# Patient Record
Sex: FEMALE | Race: WHITE | NOT HISPANIC OR LATINO | Employment: UNEMPLOYED | URBAN - METROPOLITAN AREA
[De-identification: names, ages, dates, MRNs, and addresses within clinical notes are randomized per-mention and may not be internally consistent; named-entity substitution may affect disease eponyms.]

---

## 2017-06-23 ENCOUNTER — APPOINTMENT (OUTPATIENT)
Dept: VASCULAR SURGERY | Facility: CLINIC | Age: 57
End: 2017-06-23
Payer: COMMERCIAL

## 2017-06-23 PROCEDURE — 93971 EXTREMITY STUDY: CPT | Performed by: SURGERY

## 2017-06-23 PROCEDURE — 99202 OFFICE O/P NEW SF 15 MIN: CPT | Performed by: SURGERY

## 2017-06-23 PROCEDURE — 99207 ZZC VEINSOLUTIONS FREE SCREENING: CPT | Performed by: SURGERY

## 2019-08-08 ENCOUNTER — OFFICE VISIT (OUTPATIENT)
Dept: VASCULAR SURGERY | Facility: CLINIC | Age: 59
End: 2019-08-08
Payer: COMMERCIAL

## 2019-08-08 DIAGNOSIS — Z53.9 ERRONEOUS ENCOUNTER--DISREGARD: Primary | ICD-10-CM

## 2019-08-08 PROCEDURE — 99204 OFFICE O/P NEW MOD 45 MIN: CPT | Performed by: SURGERY

## 2019-08-09 NOTE — PROGRESS NOTES
"[facility] Office Note    Rebeka Fuentes is a 58-year-old female with whom I am familiar.  We performed radiofrequency ablation of her right great saphenous vein in September 2007 and then performed radiofrequency ablation of her left great saphenous vein and radiofrequency ablation of an incompetent perforating vein of her left leg and ultrasound-guided sclerotherapy on December 30, 2011.  These were performed for CEAP 4 bilateral lower extremity chronic venous insufficiency.    She returned in 2017 with concerns of progression of the disease process, especially on the right leg at that time.  At that time she was not wearing compression regularly and I recommended conservative treatment for her.  She was to see me in 2 months follow-up but did not return.  She feels she did fairly well from that time until a few months ago when she felt increasing firmness and erythema as well as an area of \"puckering\" on the medial left leg.  She has had no fevers or chills nor significantly increased pain and has had no wounds.  She admits that she has not been wearing hose as regularly as she should and wishes to have measurements for new hose.    Her last ultrasound was in June 2017.  This revealed incompetence of her right common femoral, proximal femoral, distal femoral and popliteal veins.  Her mid femoral vein appeared to be competent.  The right great saphenous vein was not visualized throughout most of the thigh but had weblike thrombus just below the knee with varicosities coursing from the great saphenous vein below the right knee.  There were 2 incompetent perforators, both less than 3.5 mm in diameter.  Her small saphenous vein was incompetent, measured 4.2 mm in diameter and had a 4.6 mm diameter incompetent  communicating with it 15 cm from the malleolus.    Physical exam  General: Pleasant female in no acute distress.  Extremities: She has extensive lipodermatosclerosis from at least the mid legs to " the ankles bilaterally.  There is no suggestion of acute cellulitis nor other any wounds on either leg.    On the left leg she has an area of puckering near the mid leg medially with some flaking skin in this indented area.  It does not appear to be imminent risk of breakdown to ulceration.    There is minimal hyperpigmentation of her skin, but she has extensive firmness consistent with advanced lipodermatosclerosis.  The ankles are quite large from the chronic edema and lipodermatosclerosis.    She has easily palpable dorsalis pedis pulses but I have difficulty palpating posterior tibial pulses given the lipodermatosclerosis.    Impression  CEAP for bilateral lower extremity chronic venous insufficiency.  I recommend that the patient return for bilateral lower extremity venous duplex ultrasound to ensure that there has not been progression of the disease process and to see if there is any correctable abnormality that would help decrease the risk of breakdown.    Of most importance, however, is that she began to wear compression regularly.  We measured her today in that regard and had a long discussion of the importance of wearing compression on a regular basis.  She also states that she has been less active and I strongly encouraged her to walk on a daily basis and effort to control her weight and to help decrease venous hypertension in her lower extremities.    Plan  She will return in the near future for bilateral lower extremity venous duplex ultrasound.  New support hose were ordered and will be sent to her home.    JEFF Lagunas MD    Please send a copy of this dictation to [facility] office

## 2020-02-12 ENCOUNTER — TRANSFERRED RECORDS (OUTPATIENT)
Dept: HEALTH INFORMATION MANAGEMENT | Facility: CLINIC | Age: 60
End: 2020-02-12

## 2020-02-13 ENCOUNTER — TELEPHONE (OUTPATIENT)
Dept: VASCULAR SURGERY | Facility: CLINIC | Age: 60
End: 2020-02-13

## 2020-02-14 ENCOUNTER — TELEPHONE (OUTPATIENT)
Dept: OPHTHALMOLOGY | Facility: CLINIC | Age: 60
End: 2020-02-14

## 2020-02-14 ENCOUNTER — TRANSCRIBE ORDERS (OUTPATIENT)
Dept: OTHER | Age: 60
End: 2020-02-14

## 2020-02-14 DIAGNOSIS — H49.20 SIXTH NERVE PALSY: Primary | ICD-10-CM

## 2020-02-14 NOTE — TELEPHONE ENCOUNTER
[facility] Call Center    Phone Message    May a detailed message be left on voicemail: no     Reason for Call: Other: The pt has been referred to [facility]Dr Worrell. The [facility] from [facility] called [facility] yesterday to set up an appt and told her to call back. There isn't a call record in the chart. I offered her an appt w/Gm in March and she insisted someone had called and offered her something else sooner. Please see the referral and call the pt's cell. Thanks.     Action Taken: Message routed to:  [facility]  eye gen    Travel Screening: Not Applicable

## 2020-02-14 NOTE — TELEPHONE ENCOUNTER
Returned pt call.  Pt is wondering if recent dx of Sixth Nerve Cranial Palsy could have resulted from vascular hx or recent change in ASA regimen.    Pt stated she has been a pt of Dr. Lagunas's for several years.  Pt stated that she has been suffering from double vision that started in her right eye and is now bilateral, and has had symptoms for approx. 5 wks.  Pt stated that she went to an ophthalmologist and was diagnosed with Sixth Nerve Cranial Palsy.  The pt stated she has taken daily  mg prophalacticly for approx. 30 years and recently cut the daily dose in half.  The pt called today questioning if cutting her ASA dose in half or her vascular hx could have attributed to her current condition.    This writer advised her that it is not likely to have contributed to her condition but will discuss with Dr. Lagunas and follow up with her afterwards.    Patient agreed with the plan and had no further questions.     Crystal Smart RN on 2/14/2020 at 1:14 PM

## 2020-02-15 ENCOUNTER — TRANSFERRED RECORDS (OUTPATIENT)
Dept: HEALTH INFORMATION MANAGEMENT | Facility: CLINIC | Age: 60
End: 2020-02-15

## 2020-02-26 NOTE — TELEPHONE ENCOUNTER
Called pt LDVM:  Dr. Lagunas reviewed concerns and agreed with previous advise that cutting ASA dose should not hav had an effect on sixth cranial nerve palsy dx.      Advised pt to call clinic with any further questions or concerns.    Crystal Smart RN on 2/26/2020 at 10:04 AM

## 2020-03-25 ENCOUNTER — TELEPHONE (OUTPATIENT)
Dept: OPHTHALMOLOGY | Facility: CLINIC | Age: 60
End: 2020-03-25

## 2020-03-25 NOTE — TELEPHONE ENCOUNTER
Left voicemail for patient asking if could reschedule appt to week earlier or later than currently scheduled.  Also asked where had MRI done.  Left direct number in order to call back.      Margaret Ureña on 3/25/2020 at 1:01 PM

## 2020-05-04 ENCOUNTER — TELEPHONE (OUTPATIENT)
Dept: OPHTHALMOLOGY | Facility: CLINIC | Age: 60
End: 2020-05-04

## 2020-05-04 NOTE — TELEPHONE ENCOUNTER
[facility] Call Center    Phone Message    May a detailed message be left on voicemail: yes     Reason for Call: Other: per patient is wanting to get a clal back in regards to knowing if needing to come to apt on 05/14/2002 or not. please advise.      Action Taken: Message routed to:  Other: eye    Travel Screening: Not Applicable

## 2020-05-04 NOTE — TELEPHONE ENCOUNTER
Spoke to patient.  Dr. Worrell had planned to see her in clinic on 5/14.  She states she would prefer to push out further.  She thinks she is doing ok.  Assisted in rescheduling to 6/18, but patient will call us if she worsens and needs to be seen sooner.      Margaret Ureña on 5/4/2020 at 10:07 AM

## 2020-06-18 ENCOUNTER — OFFICE VISIT (OUTPATIENT)
Dept: OPHTHALMOLOGY | Facility: CLINIC | Age: 60
End: 2020-06-18
Attending: OPHTHALMOLOGY
Payer: COMMERCIAL

## 2020-06-18 DIAGNOSIS — H49.21 RIGHT ABDUCENS NERVE PALSY: Primary | ICD-10-CM

## 2020-06-18 DIAGNOSIS — H53.10 SUBJECTIVE VISUAL DISTURBANCE: Primary | ICD-10-CM

## 2020-06-18 PROCEDURE — 92060 SENSORIMOTOR EXAMINATION: CPT | Mod: ZF | Performed by: OPHTHALMOLOGY

## 2020-06-18 PROCEDURE — G0463 HOSPITAL OUTPT CLINIC VISIT: HCPCS | Mod: ZF

## 2020-06-18 ASSESSMENT — VISUAL ACUITY
OS_SC: 20/25
OD_SC+: +2
OD_SC: 20/40
METHOD: SNELLEN - LINEAR
OS_SC+: -1

## 2020-06-18 ASSESSMENT — EXTERNAL EXAM - RIGHT EYE: OD_EXAM: NORMAL

## 2020-06-18 ASSESSMENT — SLIT LAMP EXAM - LIDS
COMMENTS: NORMAL
COMMENTS: NORMAL

## 2020-06-18 ASSESSMENT — EXTERNAL EXAM - LEFT EYE: OS_EXAM: NORMAL

## 2020-06-18 ASSESSMENT — CONF VISUAL FIELD
OD_NORMAL: 1
METHOD: COUNTING FINGERS
OS_NORMAL: 1

## 2020-06-18 ASSESSMENT — TONOMETRY
OD_IOP_MMHG: 19
OS_IOP_MMHG: 19
IOP_METHOD: ICARE

## 2020-06-18 NOTE — PROGRESS NOTES
Assessment & Plan     Rebeka Fuentes is a 59 year old female with the following diagnoses:   1. Right abducens nerve palsy    2. Subjective visual disturbance       Patient is a 59 year old female sent by Dr. Faajrdo for consultation of possible sixth nerve palsy. In January she had a frontal headache and sinus congestion. She was on her way to see her doctor due to concern for sinus issue when she developed double vision characterized as constant binocular horizontal. The next day she saw Dr. Fajardo who diagnosed with a RIGHT sixth nerve palsy. Her daughter notes that her RIGHT eye was turned in. Over the next month she notes that it got worse before she saw Dr. Fajardo in follow up. Dr. Fajardo noted that she was having worse diplopia in LEFT gaze.Dr. Fajardo then ordered a MRI. She notes occasional diplopia when fatigued that she is able to control with a few blinks and concentration. Denies facial numbness, facial droop, ptosis, dysphagia.     MRI brain and orbit 2/15/20:  Negative for structural etiology of sixth nerve palsy.    POH:Refractive error, ?esotropia, amblyopia  PMH: Borderline hyperlipidemia, history of superficial blood clots on birth control in her 30s  Medications: Aspirin  FH: Father with glaucoma, negative for strabismus  SH: Non-smoker    Visual acuity is 20/40 right eye and 20/25 left eye. Intraocular pressure is normal both eyes. Pupils normal without afferent pupillary defect. Color plates full both eyes. Confrontational visual fields full both eyes. Sensorimotor exam shows an esophoria of 10 prism diopters in primary gaze that is comitant in all gaze positions. Intermittent esotropia of 10 prism diopters in primary. She did not have orbicularis weakness. Slit lamp exam unremarkable. Dilated fundus exam unremarkable both eyes.    It is my impression that Rebeka had a microvascular sixth nerve palsy that is resolved. Dr. Fajardo's notes document a right sixth nerve palsy first and  then a left sixth nerve palsy five weeks later. The resolution of her diplopia is suggestive of a microvascular etiology. One consideration is that she could have had myasthenia gravis as the cause of the alternating 6th nerve palsy.  She could also have had convergence spasm, but this would be a diagnosis of exclusion.  If this occurs again, then I would recommend careful observation of the pupils with eye movement testing.  I have personally reviewed her MRI and did not see structural etiology of a left or right sixth nerve palsy.     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 Offered Fresnel prism, but patient declined. Return to clinic as needed for worsening symptoms. Recommend follow up with regular eye care provider.           Attending Physician Attestation:  Complete documentation of historical and exam elements from today's encounter can be found in the full encounter summary report (not reduplicated in this progress note).  I personally obtained the chief complaint(s) and history of present illness.  I confirmed and edited as necessary the review of systems, past medical/surgical history, family history, social history, and examination findings as documented by others; and I examined the patient myself.  I personally reviewed the relevant tests, images, and reports as documented above.  I formulated and edited as necessary the assessment and plan and discussed the findings and management plan with the patient and family. I personally reviewed the ophthalmic test(s) associated with this encounter, agree with the interpretation(s) as documented by the resident/fellow, and have edited the corresponding report(s) as necessary.  - Charlie Garcia MD  Ophthalmology, PGY-5  Neuro-Ophthalmology Fellow

## 2020-06-18 NOTE — Clinical Note
6/18/2020       RE: Rebeka Fuentes  [address]  [facility] [address]     Dear Colleague,    Thank you for referring your patient, Rebeka Fuentes, to the EYE CLINIC at [facility]. Please see a copy of my visit note below.           Assessment & Plan     Rebeka Fuentes is a 59 year old female with the following diagnoses:   1. Subjective visual disturbance       Patient is a 59 year old female sent by Dr. Fajardo for consultation of possible sixth nerve palsy. In January she had a frontal headache and sinus congestion. She was on her way to see her doctor due to concern for sinus issue when she developed double vision characterized as constant binocular horizontal. The next day she saw Dr. Fajardo who diagnosed with a RIGHT sixth nerve palsy. Her daughter notes that her RIGHT eye was turned in. Over the next month she notes that it got worse before she saw Dr. Fajardo in follow up. Dr. Fajardo noted that she was having worse diplopia in LEFT gaze.Dr. Fajardo then ordered a MRI. She notes occasional diplopia when fatigued that she is able to control with a few blinks and concentration. Denies facial numbness, facial droop, ptosis.     MRI brain and orbit 2/15/20:  Negative for structural etiology of sixth nerve palsy.    POH:Refractive error, ?esotropia, amblyopia  PMH: Borderline hyperlipidemia, history of blood clots on birth control   Medications: Aspirin  FH: Father with glaucoma, negative for strabismus  SH: Non-smoker    Visual acuity is 20/40 right eye and 20/25 left eye. Intraocular pressure is normal both eyes. Pupils normal without afferent pupillary defect. Color plates full both eyes. Confrontational visual fields full both eyes. Sensorimotor exam shows an esophoria of 10 prism diopters in primary gaze that is comitant in all gaze positions. Intermittent esotropia of 10 prism diopters in primary. Slit lamp exam unremarkable. Dilated fundus exam  unremarkable both eyes.    It is my impression that Rebeka had a microvascular sixth nerve palsy based upon history of right sixth nerve palsy seen by Dr. Fajardo with negative MRI. I have personally reviewed her MRI and did not see structural etiology of a left or right sixth nerve palsy. This has resolved which is consistent with microvascular etiology. 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           @ATT@    Charlie Garcia MD  Ophthalmology, PGY-5  Neuro-Ophthalmology Fellow             Assessment & Plan     Rebeka Fuentes is a 59 year old female with the following diagnoses:   1. Right abducens nerve palsy    2. Subjective visual disturbance       Patient is a 59 year old female sent by Dr. Fajardo for consultation of possible sixth nerve palsy. In January she had a frontal headache and sinus congestion. She was on her way to see her doctor due to concern for sinus issue when she developed double vision characterized as constant binocular horizontal. The next day she saw Dr. Fajardo who diagnosed with a RIGHT sixth nerve palsy. Her daughter notes that her RIGHT eye was turned in. Over the next month she notes that it got worse before she saw Dr. Fajardo in follow up. Dr. Fajardo noted that she was having worse diplopia in LEFT gaze.Dr. Fajardo then ordered a MRI. She notes occasional diplopia when fatigued that she is able to control with a few blinks and concentration. Denies facial numbness, facial droop, ptosis, dysphagia.     MRI brain and orbit 2/15/20:  Negative for structural etiology of sixth nerve palsy.    POH:Refractive error, ?esotropia, amblyopia  PMH: Borderline hyperlipidemia, history of superficial blood clots on birth control in her 30s  Medications: Aspirin  FH: Father with glaucoma, negative for strabismus  SH: Non-smoker    Visual acuity is 20/40 right eye and 20/25 left eye. Intraocular pressure is normal  both eyes. Pupils normal without afferent pupillary defect. Color plates full both eyes. Confrontational visual fields full both eyes. Sensorimotor exam shows an esophoria of 10 prism diopters in primary gaze that is comitant in all gaze positions. Intermittent esotropia of 10 prism diopters in primary. She did not have orbicularis weakness. Slit lamp exam unremarkable. Dilated fundus exam unremarkable both eyes.    It is my impression that Rebeka had a microvascular sixth nerve palsy that is resolved. Dr. Fajardo's notes document a right sixth nerve palsy first and then a left sixth nerve palsy. The resolution of her diplopia is suggestive of a microvascular etiology. The comitance of her esophoria today argues against myasthenia.  I have personally reviewed her MRI and did not see structural etiology of a left or right sixth nerve palsy. 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 Offered Fresnel prism, but patient declined. Return to clinic as needed for worsening symptoms. Recommend follow up with regular eye care provider.          Attending Physician Attestation:  Complete documentation of historical and exam elements from today's encounter can be found in the full encounter summary report (not reduplicated in this progress note).  I personally obtained the chief complaint(s) and history of present illness.  I confirmed and edited as necessary the review of systems, past medical/surgical history, family history, social history, and examination findings as documented by others; and I examined the patient myself.  I personally reviewed the relevant tests, images, and reports as documented above.  I formulated and edited as necessary the assessment and plan and discussed the findings and management plan with the patient and family. - Charlie Garcia MD  Ophthalmology, PGY-5  Neuro-Ophthalmology Fellow      Again,  thank you for allowing me to participate in the care of your patient.      Sincerely,    Charlie Worrell MD

## 2020-06-18 NOTE — NURSING NOTE
Chief Complaint(s) and History of Present Illness(es)     New Patient     In both eyes (Consult for 6th CN palsy).  Onset was sudden.  Onset: 2/2020.  Since onset it is gradually improving.  Associated symptoms include headache and double vision.              Comments     Initially seen at  Eye and thought to be a Right 6th CN palsy. Follow-up exam seems to be a bilateral 6th CN palsy - MRI brain was ordered and referred here for further evaluation.     Patient states double vision is gradually improving. Has not patch for a couple months now (onset 2/2020). Horizontal double vision intermittently only. Patient does not remember noticing any droopy eyelids.  +headaches    Brought MRI disc with. MRI amber was unremarkable per patient.     HAYDEN Damon 6/18/2020 7:23 AM

## 2020-06-18 NOTE — LETTER
2020         RE:  :  MRN: Rebeka Fuentes  1960  [id number]       Dear Dr. Terrell Fajardo,    Thank you for asking me to see your very pleasant patient, Rebeka Fuentes, in neuro-ophthalmic consultation.  I would like to thank you for sending your records and I have summarized them in the history of present illness.  For further details, please see my attached clinic note.     Assessment & Plan     Rebeka Fuentes is a 59-year-old female with the following diagnoses:   1. Right abducens nerve palsy    2. Subjective visual disturbance       Patient is a 59-year-old female sent by Dr. Fajardo for consultation of possible sixth nerve palsy. In January she had a frontal headache and sinus congestion. She was on her way to see her doctor due to concern for sinus issue when she developed double vision characterized as constant binocular horizontal. The next day she saw Dr. Fajardo who diagnosed with a RIGHT sixth nerve palsy. Her daughter notes that her RIGHT eye was turned in. Over the next month she notes that it got worse before she saw Dr. Fajardo in follow up. Dr. Fajardo noted that she was having worse diplopia in LEFT gaze.Dr. Fajardo then ordered a MRI. She notes occasional diplopia when fatigued that she is able to control with a few blinks and concentration. Denies facial numbness, facial droop, ptosis, dysphagia.     MRI brain and orbit 2/15/20:  Negative for structural etiology of sixth nerve palsy.    POH:Refractive error, ?esotropia, amblyopia  PMH: Borderline hyperlipidemia, history of superficial blood clots on birth control in her 30s  Medications: Aspirin  FH: Father with glaucoma, negative for strabismus  SH: Non-smoker    Visual acuity is 20/40 right eye and 20/25 left eye. Intraocular pressure is normal both eyes. Pupils normal without afferent pupillary defect. Color plates full both eyes. Confrontational visual fields full both eyes. Sensorimotor exam shows an esophoria of 10  prism diopters in primary gaze that is comitant in all gaze positions. Intermittent esotropia of 10 prism diopters in primary. She did not have orbicularis weakness. Slit lamp exam unremarkable. Dilated fundus exam unremarkable both eyes.    It is my impression that Rebeka had a microvascular sixth nerve palsy that is resolved. Dr. Roman's notes document a right sixth nerve palsy first and then a left sixth nerve palsy five weeks later. The resolution of her diplopia is suggestive of a microvascular etiology. One consideration is that she could have had myasthenia gravis as the cause of the alternating 6th nerve palsy.  She could also have had convergence spasm, but this would be a diagnosis of exclusion.  If this occurs again, then I would recommend careful observation of the pupils with eye movement testing.  I have personally reviewed her MRI and did not see structural etiology of a left or right sixth nerve palsy.     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 Offered Fresnel prism, but patient declined. Return to clinic as needed for worsening symptoms. Recommend follow up with regular eye care provider.     Again, thank you for allowing me to participate in the care of your patient.      Sincerely,    Charlie Worrell MD  Professor  Ophthalmology Residency   Director of Neuro-Ophthalmology  [employer] [facility] [employer] Endowed Chair  Departments of Ophthalmology, Neurology, and Neurosurgery  [address]  [address]  T - [phone]  F - [phone]  MAIA [email or web address]      CC:   EDWARD ROMAN  [facility]  [address] [place], [place] [address]  [place] [address]  VIA Facsimile: [phone]     [facility]  [address]  [facility] [address]  VIA Facsimile: [phone]

## 2020-11-05 ENCOUNTER — VIRTUAL VISIT (OUTPATIENT)
Dept: VASCULAR SURGERY | Facility: CLINIC | Age: 60
End: 2020-11-05
Payer: COMMERCIAL

## 2020-11-05 DIAGNOSIS — I87.2 VENOUS INSUFFICIENCY: Primary | ICD-10-CM

## 2020-11-05 PROCEDURE — 99213 OFFICE O/P EST LOW 20 MIN: CPT | Mod: 95 | Performed by: SURGERY

## 2020-11-05 RX ORDER — MULTIPLE VITAMINS W/ MINERALS TAB 9MG-400MCG
1 TAB ORAL DAILY
COMMUNITY

## 2020-11-05 RX ORDER — ASPIRIN 325 MG
1 TABLET ORAL
COMMUNITY
End: 2022-01-01

## 2020-11-05 NOTE — PROGRESS NOTES
"Rebeka Fuentes is a 60 year old female who is being evaluated via a billable video visit.      The patient has been notified of following:     \"This video visit will be conducted via a call between you and your physician/provider. We have found that certain health care needs can be provided without the need for an in-person physical exam.  This service lets us provide the care you need with a video conversation.  If a prescription is necessary we can send it directly to your pharmacy.  If lab work is needed we can place an order for that and you can then stop by our lab to have the test done at a later time.    Video visits are billed at different rates depending on your insurance coverage.  Please reach out to your insurance provider with any questions.    If during the course of the call the physician/provider feels a video visit is not appropriate, you will not be charged for this service.\"    Patient has given verbal consent for Video visit? Yes  How would you like to obtain your AVS? MyChart  If you are dropped from the video visit, the video invite should be resent to: Text to cell phone: [phone]  Will anyone else be joining your video visit? No        Video-Visit Details    Type of service:  Video Visit    Video Start Time: 2:58 PM  Video End Time: 3:14 PM    Originating Location (pt. Location): Home    Distant Location (provider location):  [facility] [place]     Platform used for Video Visit: [facility]    [facility] clinic note  Rebeka Kwon is a 60-year-old female with whom I am familiar.  I last saw her in August 2019 at which time I recommended bilateral lower extremity venous competency studies.  She did not return, though she did begin wearing compression hose on a more regular basis.    Unfortunately, several months ago she became less diligent in wearing her compression hose and has been sitting for long hours at her desk at home for work.  As result, her legs have " swelled more significantly and she has developed increasing hyperpigmentation of her medial ankle and also some skin changes on her lateral left leg.  Additionally, she is concerned about some erythematous, tubular, firm areas on her distal medial left thigh and leg below the knee that seem to come and go.  She has had no specific calf pain, pleuritic chest pain, shortness of breath or acute changes in her edema, though she admits to swelling of her legs on a daily basis, especially when she is not wearing her hose.    The patient states that we have treated some veins on her left lower extremity in the past but I do not have her paper chart to review.    Physical exam  General: Pleasant female in no acute distress  Extremities: Examination of her left lower extremity reveals some linear, mildly erythematous areas measuring about 2 to 3 inches in length on the distal medial left thigh.  The patient states that these areas are not firm and that they are not specifically tender.  She has edema from her knees to her ankles with 2-3+ edema of her ankles.  There is rather impressive stasis hyperpigmentation about her left medial ankle.  On her left lateral ankle, there are multiple small eschars, consistent with stasis dermatitis and early breakdown.  There is no erythema to suggest infection.    Impression  CEAP 4 left lower extremity chronic venous insufficiency with early skin breakdown.  She has not been wearing compression as she should and I have strongly encouraged her to begin wearing compression on a daily basis, especially when she is sitting in her home working from her desk.  She understands that if she does not pursue these conservative measures, she will likely breakdown and develop an ulceration.    She voiced understanding of my discussion and agrees with the plan.    Plan  Compression, leg elevation, exercise.  We will see her in follow-up in 1 month with a video visit.  If her skin changes are not  improved, or if they worsen more quickly, she will contact me sooner and we will perform a left lower extremity venous competency study.    C Ismael Lagunas MD    Dictated using Dragon voice recognition software which may result in transcription errors

## 2020-11-05 NOTE — LETTER
"    11/5/2020         RE: Rebeka Fuentes  [address]  [place] [address]        Dear Colleague,    Thank you for referring your patient, Rebeka Fuentes, to the [facility] [place]. Please see a copy of my visit note below.    Rebeka Fuentes is a 60 year old female who is being evaluated via a billable video visit.      The patient has been notified of following:     \"This video visit will be conducted via a call between you and your physician/provider. We have found that certain health care needs can be provided without the need for an in-person physical exam.  This service lets us provide the care you need with a video conversation.  If a prescription is necessary we can send it directly to your pharmacy.  If lab work is needed we can place an order for that and you can then stop by our lab to have the test done at a later time.    Video visits are billed at different rates depending on your insurance coverage.  Please reach out to your insurance provider with any questions.    If during the course of the call the physician/provider feels a video visit is not appropriate, you will not be charged for this service.\"    Patient has given verbal consent for Video visit? Yes  How would you like to obtain your AVS? MyChart  If you are dropped from the video visit, the video invite should be resent to: Text to cell phone: [phone]  Will anyone else be joining your video visit? No        Video-Visit Details    Type of service:  Video Visit    Video Start Time: 2:58 PM  Video End Time: 3:14 PM    Originating Location (pt. Location): Home    Distant Location (provider location):  [facility] [place]     Platform used for Video Visit: [employer]    [facility] clinic note  Rebeka Kwon is a 60-year-old female with whom I am familiar.  I last saw her in August 2019 at which time I recommended bilateral lower extremity venous competency studies.  She did not " return, though she did begin wearing compression hose on a more regular basis.    Unfortunately, several months ago she became less diligent in wearing her compression hose and has been sitting for long hours at her desk at home for work.  As result, her legs have swelled more significantly and she has developed increasing hyperpigmentation of her medial ankle and also some skin changes on her lateral left leg.  Additionally, she is concerned about some erythematous, tubular, firm areas on her distal medial left thigh and leg below the knee that seem to come and go.  She has had no specific calf pain, pleuritic chest pain, shortness of breath or acute changes in her edema, though she admits to swelling of her legs on a daily basis, especially when she is not wearing her hose.    The patient states that we have treated some veins on her left lower extremity in the past but I do not have her paper chart to review.    Physical exam  General: Pleasant female in no acute distress  Extremities: Examination of her left lower extremity reveals some linear, mildly erythematous areas measuring about 2 to 3 inches in length on the distal medial left thigh.  The patient states that these areas are not firm and that they are not specifically tender.  She has edema from her knees to her ankles with 2-3+ edema of her ankles.  There is rather impressive stasis hyperpigmentation about her left medial ankle.  On her left lateral ankle, there are multiple small eschars, consistent with stasis dermatitis and early breakdown.  There is no erythema to suggest infection.    Impression  CEAP 4 left lower extremity chronic venous insufficiency with early skin breakdown.  She has not been wearing compression as she should and I have strongly encouraged her to begin wearing compression on a daily basis, especially when she is sitting in her home working from her desk.  She understands that if she does not pursue these conservative measures,  she will likely breakdown and develop an ulceration.    She voiced understanding of my discussion and agrees with the plan.    Plan  Compression, leg elevation, exercise.  We will see her in follow-up in 1 month with a video visit.  If her skin changes are not improved, or if they worsen more quickly, she will contact me sooner and we will perform a left lower extremity venous competency study.    JEFF Lagunas MD    Dictated using Dragon voice recognition software which may result in transcription errors        Again, thank you for allowing me to participate in the care of your patient.        Sincerely,        José Luis Lagunas MD

## 2020-11-12 ENCOUNTER — TELEPHONE (OUTPATIENT)
Dept: VASCULAR SURGERY | Facility: CLINIC | Age: 60
End: 2020-11-12

## 2020-11-12 DIAGNOSIS — I87.2 VENOUS INSUFFICIENCY: Primary | ICD-10-CM

## 2020-11-12 NOTE — TELEPHONE ENCOUNTER
After Visit Follow Up  Per pt request, faxed their compression hose Rx to [facility].   Pt would like 2 pair(s) of beige, open-toe, knee high, 20-30mmhg compression hose. Fax confirmed.    Pt aware they will be shipped to their home address.    Soy Witt RN

## 2020-11-22 ENCOUNTER — HEALTH MAINTENANCE LETTER (OUTPATIENT)
Age: 60
End: 2020-11-22

## 2020-12-11 ENCOUNTER — VIRTUAL VISIT (OUTPATIENT)
Dept: VASCULAR SURGERY | Facility: CLINIC | Age: 60
End: 2020-12-11
Payer: COMMERCIAL

## 2020-12-11 DIAGNOSIS — I87.2 VENOUS INSUFFICIENCY: Primary | ICD-10-CM

## 2020-12-11 PROCEDURE — 99213 OFFICE O/P EST LOW 20 MIN: CPT | Mod: 95 | Performed by: SURGERY

## 2020-12-11 NOTE — PROGRESS NOTES
"Rebeka Fuentes is a 60 year old female who is being evaluated via a billable video visit.      The patient has been notified of following:     \"This video visit will be conducted via a call between you and your physician/provider. We have found that certain health care needs can be provided without the need for an in-person physical exam.  This service lets us provide the care you need with a video conversation.  If a prescription is necessary we can send it directly to your pharmacy.  If lab work is needed we can place an order for that and you can then stop by our lab to have the test done at a later time.    Video visits are billed at different rates depending on your insurance coverage.  Please reach out to your insurance provider with any questions.    If during the course of the call the physician/provider feels a video visit is not appropriate, you will not be charged for this service.\"    Patient has given verbal consent for Video visit? Yes  How would you like to obtain your AVS? MyChart  If you are dropped from the video visit, the video invite should be resent to: Text to cell phone: [phone]  Will anyone else be joining your video visit? No      Video-Visit Details    Type of service:  Video Visit    Video Start Time: 3:22 PM  Video End Time: 3:30 PM    Originating Location (pt. Location): Home    Distant Location (provider location):  [facility] [place]     Platform used for Video Visit: [facility]    [facility] solutions clinic note/video visit  Soy Fuentes is seen in follow-up of CEAP 4 left lower extremity chronic venous insufficiency.  She was last seen on 11/5/2020 with what appeared to be eminent skin breakdown if she did not start wearing compression hose and being more active.  She was sitting increased hours and her work from home and was not as active nor was she wearing her compression.    Since that time, she has been much more diligent in wearing her compression hose " on a regular basis and walking every evening.  She states that the skin has healed nicely and is much improved.    Physical exam  General: Pleasant female in no acute distress  Left lower extremity: There is mild erythema overlying the distal lateral left leg but no significant scaling of the skin nor eschars are there.  She does have 1+ edema.    Impression  Much improved appearance of skin following wearing compression hose, elevating and increasing activities.  I stressed the importance of continuing these conservative measures to prevent tissue breakdown.  I believe she understands the importance and is in agreement with the plan.    Additionally, she talked about intermittently appearing areas of erythema which she thinks may represent superficial thrombophlebitis.  She has not had any episodes since she has been wearing her compression hose to be more active.    If she continues to have superficial thrombophlebitis despite wearing compression hose, we may need to obtain an ultrasound to consider treatment options if she has underlying superficial incompetence.  She voiced understanding and her questions were answered    Plan  Compression, elevation, exercise with return as needed.    JEFF Lagunas MD    Dictated using Dragon voice recognition software which may result in transcription errors

## 2020-12-11 NOTE — LETTER
"    12/11/2020         RE: Rebeka Fuentes  [address]  [place] [facility] [place] [address]        Dear Colleague,    Thank you for referring your patient, Rebeka Fuentes, to the [facility] [place]. Please see a copy of my visit note below.    Rebeka Fuentes is a 60 year old female who is being evaluated via a billable video visit.      The patient has been notified of following:     \"This video visit will be conducted via a call between you and your physician/provider. We have found that certain health care needs can be provided without the need for an in-person physical exam.  This service lets us provide the care you need with a video conversation.  If a prescription is necessary we can send it directly to your pharmacy.  If lab work is needed we can place an order for that and you can then stop by our lab to have the test done at a later time.    Video visits are billed at different rates depending on your insurance coverage.  Please reach out to your insurance provider with any questions.    If during the course of the call the physician/provider feels a video visit is not appropriate, you will not be charged for this service.\"    Patient has given verbal consent for Video visit? Yes  How would you like to obtain your AVS? MyChart  If you are dropped from the video visit, the video invite should be resent to: Text to cell phone: [phone]  Will anyone else be joining your video visit? No      Video-Visit Details    Type of service:  Video Visit    Video Start Time: 3:22 PM  Video End Time: 3:30 PM    Originating Location (pt. Location): Home    Distant Location (provider location):  [facility] [place]     Platform used for Video Visit: [email or web address]    [facility]in solutions clinic note/video visit  Soy Fuentes is seen in follow-up of CEAP 4 left lower extremity chronic venous insufficiency.  She was last seen on 11/5/2020 with what appeared to be eminent skin breakdown " if she did not start wearing compression hose and being more active.  She was sitting increased hours and her work from home and was not as active nor was she wearing her compression.    Since that time, she has been much more diligent in wearing her compression hose on a regular basis and walking every evening.  She states that the skin has healed nicely and is much improved.    Physical exam  General: Pleasant female in no acute distress  Left lower extremity: There is mild erythema overlying the distal lateral left leg but no significant scaling of the skin nor eschars are there.  She does have 1+ edema.    Impression  Much improved appearance of skin following wearing compression hose, elevating and increasing activities.  I stressed the importance of continuing these conservative measures to prevent tissue breakdown.  I believe she understands the importance and is in agreement with the plan.    Additionally, she talked about intermittently appearing areas of erythema which she thinks may represent superficial thrombophlebitis.  She has not had any episodes since she has been wearing her compression hose to be more active.    If she continues to have superficial thrombophlebitis despite wearing compression hose, we may need to obtain an ultrasound to consider treatment options if she has underlying superficial incompetence.  She voiced understanding and her questions were answered    Plan  Compression, elevation, exercise with return as needed.    JEFF Lagunas MD    Dictated using Dragon voice recognition software which may result in transcription errors      Again, thank you for allowing me to participate in the care of your patient.        Sincerely,        José Luis Lagunas MD

## 2021-09-19 ENCOUNTER — HEALTH MAINTENANCE LETTER (OUTPATIENT)
Age: 61
End: 2021-09-19

## 2022-01-01 ENCOUNTER — MEDICAL CORRESPONDENCE (OUTPATIENT)
Dept: ADMINISTRATIVE | Facility: CLINIC | Age: 62
End: 2022-01-01

## 2022-01-01 ENCOUNTER — ALLIED HEALTH/NURSE VISIT (OUTPATIENT)
Dept: TRANSPLANT | Facility: CLINIC | Age: 62
End: 2022-01-01
Attending: INTERNAL MEDICINE
Payer: COMMERCIAL

## 2022-01-01 ENCOUNTER — APPOINTMENT (OUTPATIENT)
Dept: OCCUPATIONAL THERAPY | Facility: CLINIC | Age: 62
DRG: 014 | End: 2022-01-01
Attending: STUDENT IN AN ORGANIZED HEALTH CARE EDUCATION/TRAINING PROGRAM
Payer: COMMERCIAL

## 2022-01-01 ENCOUNTER — APPOINTMENT (OUTPATIENT)
Dept: OCCUPATIONAL THERAPY | Facility: CLINIC | Age: 62
DRG: 014 | End: 2022-01-01
Attending: INTERNAL MEDICINE
Payer: COMMERCIAL

## 2022-01-01 ENCOUNTER — CARE COORDINATION (OUTPATIENT)
Dept: ONCOLOGY | Facility: CLINIC | Age: 62
End: 2022-01-01

## 2022-01-01 ENCOUNTER — OFFICE VISIT (OUTPATIENT)
Dept: RADIATION ONCOLOGY | Facility: CLINIC | Age: 62
End: 2022-01-01
Attending: RADIOLOGY
Payer: COMMERCIAL

## 2022-01-01 ENCOUNTER — APPOINTMENT (OUTPATIENT)
Dept: GENERAL RADIOLOGY | Facility: CLINIC | Age: 62
DRG: 014 | End: 2022-01-01
Attending: INTERNAL MEDICINE
Payer: COMMERCIAL

## 2022-01-01 ENCOUNTER — HOSPITAL ENCOUNTER (INPATIENT)
Facility: CLINIC | Age: 62
LOS: 35 days | Discharge: HOME | DRG: 014 | End: 2022-12-27
Attending: INTERNAL MEDICINE | Admitting: STUDENT IN AN ORGANIZED HEALTH CARE EDUCATION/TRAINING PROGRAM
Payer: COMMERCIAL

## 2022-01-01 ENCOUNTER — APPOINTMENT (OUTPATIENT)
Dept: LAB | Facility: CLINIC | Age: 62
End: 2022-01-01
Attending: PHYSICIAN ASSISTANT
Payer: COMMERCIAL

## 2022-01-01 ENCOUNTER — TRANSFERRED RECORDS (OUTPATIENT)
Dept: HEALTH INFORMATION MANAGEMENT | Facility: CLINIC | Age: 62
End: 2022-01-01

## 2022-01-01 ENCOUNTER — TELEPHONE (OUTPATIENT)
Dept: TRANSPLANT | Facility: CLINIC | Age: 62
End: 2022-01-01

## 2022-01-01 ENCOUNTER — OFFICE VISIT (OUTPATIENT)
Dept: TRANSPLANT | Facility: CLINIC | Age: 62
End: 2022-01-01
Attending: STUDENT IN AN ORGANIZED HEALTH CARE EDUCATION/TRAINING PROGRAM
Payer: COMMERCIAL

## 2022-01-01 ENCOUNTER — ANCILLARY PROCEDURE (OUTPATIENT)
Dept: GENERAL RADIOLOGY | Facility: CLINIC | Age: 62
End: 2022-01-01
Attending: INTERNAL MEDICINE
Payer: COMMERCIAL

## 2022-01-01 ENCOUNTER — CARE COORDINATION (OUTPATIENT)
Dept: TRANSPLANT | Facility: CLINIC | Age: 62
End: 2022-01-01

## 2022-01-01 ENCOUNTER — APPOINTMENT (OUTPATIENT)
Dept: LAB | Facility: CLINIC | Age: 62
End: 2022-01-01
Attending: INTERNAL MEDICINE
Payer: COMMERCIAL

## 2022-01-01 ENCOUNTER — ANCILLARY PROCEDURE (OUTPATIENT)
Dept: CARDIOLOGY | Facility: CLINIC | Age: 62
End: 2022-01-01
Attending: INTERNAL MEDICINE
Payer: COMMERCIAL

## 2022-01-01 ENCOUNTER — APPOINTMENT (OUTPATIENT)
Dept: INTERVENTIONAL RADIOLOGY/VASCULAR | Facility: CLINIC | Age: 62
DRG: 014 | End: 2022-01-01
Attending: INTERNAL MEDICINE
Payer: COMMERCIAL

## 2022-01-01 ENCOUNTER — APPOINTMENT (OUTPATIENT)
Dept: CT IMAGING | Facility: CLINIC | Age: 62
DRG: 014 | End: 2022-01-01
Attending: PHYSICIAN ASSISTANT
Payer: COMMERCIAL

## 2022-01-01 ENCOUNTER — APPOINTMENT (OUTPATIENT)
Dept: GENERAL RADIOLOGY | Facility: CLINIC | Age: 62
DRG: 014 | End: 2022-01-01
Attending: PHYSICIAN ASSISTANT
Payer: COMMERCIAL

## 2022-01-01 ENCOUNTER — ONCOLOGY VISIT (OUTPATIENT)
Dept: RADIATION ONCOLOGY | Facility: CLINIC | Age: 62
End: 2022-01-01

## 2022-01-01 ENCOUNTER — ONCOLOGY VISIT (OUTPATIENT)
Dept: TRANSPLANT | Facility: CLINIC | Age: 62
End: 2022-01-01
Attending: PHYSICIAN ASSISTANT
Payer: COMMERCIAL

## 2022-01-01 ENCOUNTER — ANCILLARY PROCEDURE (OUTPATIENT)
Dept: CT IMAGING | Facility: CLINIC | Age: 62
End: 2022-01-01
Attending: INTERNAL MEDICINE
Payer: COMMERCIAL

## 2022-01-01 ENCOUNTER — TELEPHONE (OUTPATIENT)
Dept: INTERVENTIONAL RADIOLOGY/VASCULAR | Facility: CLINIC | Age: 62
End: 2022-01-01

## 2022-01-01 ENCOUNTER — LAB (OUTPATIENT)
Dept: LAB | Facility: CLINIC | Age: 62
End: 2022-01-01
Payer: COMMERCIAL

## 2022-01-01 ENCOUNTER — PRE VISIT (OUTPATIENT)
Dept: RADIATION ONCOLOGY | Facility: CLINIC | Age: 62
End: 2022-01-01

## 2022-01-01 ENCOUNTER — APPOINTMENT (OUTPATIENT)
Dept: MEDSURG UNIT | Facility: CLINIC | Age: 62
DRG: 014 | End: 2022-01-01
Attending: INTERNAL MEDICINE
Payer: COMMERCIAL

## 2022-01-01 ENCOUNTER — HEALTH MAINTENANCE LETTER (OUTPATIENT)
Age: 62
End: 2022-01-01

## 2022-01-01 VITALS
WEIGHT: 214.4 LBS | SYSTOLIC BLOOD PRESSURE: 109 MMHG | BODY MASS INDEX: 38.18 KG/M2 | TEMPERATURE: 99.8 F | HEART RATE: 79 BPM | DIASTOLIC BLOOD PRESSURE: 72 MMHG | OXYGEN SATURATION: 100 % | RESPIRATION RATE: 20 BRPM

## 2022-01-01 VITALS — OXYGEN SATURATION: 98 % | BODY MASS INDEX: 37.56 KG/M2 | RESPIRATION RATE: 18 BRPM | WEIGHT: 212 LBS

## 2022-01-01 VITALS
RESPIRATION RATE: 16 BRPM | TEMPERATURE: 99.3 F | WEIGHT: 208.9 LBS | HEART RATE: 109 BPM | BODY MASS INDEX: 37.01 KG/M2 | OXYGEN SATURATION: 97 % | DIASTOLIC BLOOD PRESSURE: 56 MMHG | SYSTOLIC BLOOD PRESSURE: 115 MMHG

## 2022-01-01 VITALS
RESPIRATION RATE: 16 BRPM | TEMPERATURE: 98.2 F | DIASTOLIC BLOOD PRESSURE: 73 MMHG | SYSTOLIC BLOOD PRESSURE: 117 MMHG | OXYGEN SATURATION: 99 % | WEIGHT: 215.3 LBS | HEIGHT: 63 IN | BODY MASS INDEX: 38.15 KG/M2 | HEART RATE: 73 BPM

## 2022-01-01 VITALS
SYSTOLIC BLOOD PRESSURE: 116 MMHG | BODY MASS INDEX: 37.62 KG/M2 | HEART RATE: 75 BPM | HEIGHT: 63 IN | RESPIRATION RATE: 18 BRPM | TEMPERATURE: 98.7 F | OXYGEN SATURATION: 99 % | WEIGHT: 212.3 LBS | DIASTOLIC BLOOD PRESSURE: 63 MMHG

## 2022-01-01 VITALS
WEIGHT: 209.3 LBS | DIASTOLIC BLOOD PRESSURE: 50 MMHG | SYSTOLIC BLOOD PRESSURE: 117 MMHG | HEIGHT: 63 IN | OXYGEN SATURATION: 98 % | BODY MASS INDEX: 37.09 KG/M2 | HEART RATE: 103 BPM | RESPIRATION RATE: 16 BRPM | TEMPERATURE: 98.8 F

## 2022-01-01 VITALS
BODY MASS INDEX: 37.56 KG/M2 | TEMPERATURE: 98.5 F | OXYGEN SATURATION: 100 % | RESPIRATION RATE: 20 BRPM | DIASTOLIC BLOOD PRESSURE: 77 MMHG | WEIGHT: 212 LBS | HEART RATE: 62 BPM | SYSTOLIC BLOOD PRESSURE: 120 MMHG

## 2022-01-01 VITALS
RESPIRATION RATE: 18 BRPM | SYSTOLIC BLOOD PRESSURE: 121 MMHG | HEART RATE: 82 BPM | WEIGHT: 214.4 LBS | BODY MASS INDEX: 37.99 KG/M2 | TEMPERATURE: 98.2 F | DIASTOLIC BLOOD PRESSURE: 85 MMHG | OXYGEN SATURATION: 97 %

## 2022-01-01 VITALS
DIASTOLIC BLOOD PRESSURE: 81 MMHG | OXYGEN SATURATION: 99 % | HEART RATE: 67 BPM | RESPIRATION RATE: 16 BRPM | WEIGHT: 212.6 LBS | TEMPERATURE: 98 F | BODY MASS INDEX: 37.67 KG/M2 | SYSTOLIC BLOOD PRESSURE: 134 MMHG

## 2022-01-01 VITALS
OXYGEN SATURATION: 98 % | TEMPERATURE: 98.6 F | RESPIRATION RATE: 16 BRPM | BODY MASS INDEX: 37.09 KG/M2 | SYSTOLIC BLOOD PRESSURE: 116 MMHG | WEIGHT: 209.3 LBS | HEART RATE: 102 BPM | DIASTOLIC BLOOD PRESSURE: 51 MMHG

## 2022-01-01 VITALS
RESPIRATION RATE: 18 BRPM | OXYGEN SATURATION: 99 % | DIASTOLIC BLOOD PRESSURE: 63 MMHG | BODY MASS INDEX: 37.62 KG/M2 | SYSTOLIC BLOOD PRESSURE: 116 MMHG | WEIGHT: 212.3 LBS | HEART RATE: 75 BPM | HEIGHT: 63 IN | TEMPERATURE: 98.7 F

## 2022-01-01 DIAGNOSIS — Z01.818 PREOP EXAMINATION: ICD-10-CM

## 2022-01-01 DIAGNOSIS — Z71.9 VISIT FOR COUNSELING: Primary | ICD-10-CM

## 2022-01-01 DIAGNOSIS — D70.9 NEUTROPENIC FEVER (H): ICD-10-CM

## 2022-01-01 DIAGNOSIS — Z86.2 PERSONAL HISTORY OF DISEASES OF BLOOD AND BLOOD-FORMING ORGANS: ICD-10-CM

## 2022-01-01 DIAGNOSIS — C92.01 ACUTE MYELOID LEUKEMIA IN REMISSION (H): Primary | ICD-10-CM

## 2022-01-01 DIAGNOSIS — E87.20 LACTIC ACIDOSIS: ICD-10-CM

## 2022-01-01 DIAGNOSIS — Z11.59 SCREENING FOR VIRAL DISEASE: ICD-10-CM

## 2022-01-01 DIAGNOSIS — C92.01 ACUTE MYELOID LEUKEMIA IN REMISSION (H): ICD-10-CM

## 2022-01-01 DIAGNOSIS — C92.00 LEUKEMIA, ACUTE MYELOID (H): Primary | ICD-10-CM

## 2022-01-01 DIAGNOSIS — Z94.81 STATUS POST BONE MARROW TRANSPLANT (H): ICD-10-CM

## 2022-01-01 DIAGNOSIS — E87.1 HYPONATREMIA: ICD-10-CM

## 2022-01-01 DIAGNOSIS — Z94.81 STATUS POST BONE MARROW TRANSPLANT (H): Primary | ICD-10-CM

## 2022-01-01 DIAGNOSIS — T45.1X5A PANCYTOPENIA DUE TO ANTINEOPLASTIC CHEMOTHERAPY (H): ICD-10-CM

## 2022-01-01 DIAGNOSIS — D61.810 PANCYTOPENIA DUE TO ANTINEOPLASTIC CHEMOTHERAPY (H): ICD-10-CM

## 2022-01-01 DIAGNOSIS — A09 DIARRHEA OF INFECTIOUS ORIGIN: ICD-10-CM

## 2022-01-01 DIAGNOSIS — R50.81 NEUTROPENIC FEVER (H): ICD-10-CM

## 2022-01-01 DIAGNOSIS — C92.00 ACUTE MYELOID LEUKEMIA (H): Primary | ICD-10-CM

## 2022-01-01 DIAGNOSIS — C92.00 AML (ACUTE MYELOGENOUS LEUKEMIA) (H): Primary | ICD-10-CM

## 2022-01-01 LAB
3D LVEF ECHO: NORMAL
ABO/RH(D): NORMAL
ABSSPTEST METHOD: NORMAL
ALBUMIN SERPL BCG-MCNC: 3.1 G/DL (ref 3.5–5.2)
ALBUMIN SERPL BCG-MCNC: 3.4 G/DL (ref 3.5–5.2)
ALBUMIN SERPL BCG-MCNC: 3.5 G/DL (ref 3.5–5.2)
ALBUMIN SERPL BCG-MCNC: 3.6 G/DL (ref 3.5–5.2)
ALBUMIN SERPL BCG-MCNC: 3.6 G/DL (ref 3.5–5.2)
ALBUMIN SERPL BCG-MCNC: 3.7 G/DL (ref 3.5–5.2)
ALBUMIN SERPL BCG-MCNC: 3.8 G/DL (ref 3.5–5.2)
ALBUMIN SERPL BCG-MCNC: 4 G/DL (ref 3.5–5.2)
ALBUMIN SERPL BCG-MCNC: 4 G/DL (ref 3.5–5.2)
ALBUMIN SERPL BCG-MCNC: 4.2 G/DL (ref 3.5–5.2)
ALBUMIN UR-MCNC: 10 MG/DL
ALBUMIN UR-MCNC: 10 MG/DL
ALBUMIN UR-MCNC: 30 MG/DL
ALBUMIN UR-MCNC: NEGATIVE MG/DL
ALBUMIN UR-MCNC: NEGATIVE MG/DL
ALP SERPL-CCNC: 42 U/L (ref 35–104)
ALP SERPL-CCNC: 46 U/L (ref 35–104)
ALP SERPL-CCNC: 48 U/L (ref 35–104)
ALP SERPL-CCNC: 53 U/L (ref 35–104)
ALP SERPL-CCNC: 54 U/L (ref 35–104)
ALP SERPL-CCNC: 59 U/L (ref 35–104)
ALP SERPL-CCNC: 61 U/L (ref 35–104)
ALP SERPL-CCNC: 62 U/L (ref 35–104)
ALP SERPL-CCNC: 65 U/L (ref 35–104)
ALP SERPL-CCNC: 66 U/L (ref 35–104)
ALP SERPL-CCNC: 70 U/L (ref 35–104)
ALP SERPL-CCNC: 74 U/L (ref 35–104)
ALT SERPL W P-5'-P-CCNC: 10 U/L (ref 10–35)
ALT SERPL W P-5'-P-CCNC: 12 U/L (ref 10–35)
ALT SERPL W P-5'-P-CCNC: 15 U/L (ref 10–35)
ALT SERPL W P-5'-P-CCNC: 15 U/L (ref 10–35)
ALT SERPL W P-5'-P-CCNC: 18 U/L (ref 10–35)
ALT SERPL W P-5'-P-CCNC: 21 U/L (ref 10–35)
ALT SERPL W P-5'-P-CCNC: 6 U/L (ref 10–35)
ALT SERPL W P-5'-P-CCNC: 6 U/L (ref 10–35)
ALT SERPL W P-5'-P-CCNC: 7 U/L (ref 10–35)
ALT SERPL W P-5'-P-CCNC: 8 U/L (ref 10–35)
ANION GAP SERPL CALCULATED.3IONS-SCNC: 10 MMOL/L (ref 7–15)
ANION GAP SERPL CALCULATED.3IONS-SCNC: 11 MMOL/L (ref 7–15)
ANION GAP SERPL CALCULATED.3IONS-SCNC: 12 MMOL/L (ref 7–15)
ANION GAP SERPL CALCULATED.3IONS-SCNC: 13 MMOL/L (ref 7–15)
ANION GAP SERPL CALCULATED.3IONS-SCNC: 13 MMOL/L (ref 7–15)
ANION GAP SERPL CALCULATED.3IONS-SCNC: 14 MMOL/L (ref 7–15)
ANION GAP SERPL CALCULATED.3IONS-SCNC: 7 MMOL/L (ref 7–15)
ANION GAP SERPL CALCULATED.3IONS-SCNC: 8 MMOL/L (ref 7–15)
ANION GAP SERPL CALCULATED.3IONS-SCNC: 9 MMOL/L (ref 7–15)
ANTIBODY SCREEN: NEGATIVE
APPEARANCE CSF: CLEAR
APPEARANCE UR: CLEAR
APTT PPP: 28 SECONDS (ref 22–38)
APTT PPP: 39 SECONDS (ref 22–38)
AST SERPL W P-5'-P-CCNC: 15 U/L (ref 10–35)
AST SERPL W P-5'-P-CCNC: 15 U/L (ref 10–35)
AST SERPL W P-5'-P-CCNC: 16 U/L (ref 10–35)
AST SERPL W P-5'-P-CCNC: 17 U/L (ref 10–35)
AST SERPL W P-5'-P-CCNC: 19 U/L (ref 10–35)
AST SERPL W P-5'-P-CCNC: 24 U/L (ref 10–35)
AST SERPL W P-5'-P-CCNC: 24 U/L (ref 10–35)
AST SERPL W P-5'-P-CCNC: 26 U/L (ref 10–35)
ATRIAL RATE - MUSE: 60 BPM
BACTERIA BLD CULT: ABNORMAL
BACTERIA BLD CULT: ABNORMAL
BACTERIA BLD CULT: NO GROWTH
BACTERIA SPEC CULT: NORMAL
BACTERIA UR CULT: NO GROWTH
BACTERIA UR CULT: NO GROWTH
BASOPHILS # BLD AUTO: 0 10E3/UL (ref 0–0.2)
BASOPHILS # BLD AUTO: 0.1 10E3/UL (ref 0–0.2)
BASOPHILS # BLD MANUAL: 0 10E3/UL (ref 0–0.2)
BASOPHILS # BLD MANUAL: 0.1 10E3/UL (ref 0–0.2)
BASOPHILS # BLD MANUAL: 0.1 10E3/UL (ref 0–0.2)
BASOPHILS NFR BLD AUTO: 0 %
BASOPHILS NFR BLD AUTO: 0 %
BASOPHILS NFR BLD AUTO: 1 %
BASOPHILS NFR BLD AUTO: 2 %
BASOPHILS NFR BLD MANUAL: 0 %
BASOPHILS NFR BLD MANUAL: 1 %
BILIRUB DIRECT SERPL-MCNC: <0.2 MG/DL (ref 0–0.3)
BILIRUB SERPL-MCNC: 0.2 MG/DL
BILIRUB SERPL-MCNC: 0.3 MG/DL
BILIRUB SERPL-MCNC: 0.4 MG/DL
BILIRUB SERPL-MCNC: <0.2 MG/DL
BILIRUB UR QL STRIP: NEGATIVE
BILL ONLY AUTO PBPC FREEZE: NORMAL
BILL ONLY AUTO PBPC FREEZE: NORMAL
BILL ONLY STEM CELL INFUSION: NORMAL
BLD PROD TYP BPU: NORMAL
BLOOD COMPONENT TYPE: NORMAL
BMT WORKUP IRRADIATED BLOOD REQUIRED: NORMAL
BMT WORKUP IRRADIATED BLOOD REQUIRED: NORMAL
BUN SERPL-MCNC: 10.1 MG/DL (ref 8–23)
BUN SERPL-MCNC: 10.2 MG/DL (ref 8–23)
BUN SERPL-MCNC: 10.3 MG/DL (ref 8–23)
BUN SERPL-MCNC: 10.7 MG/DL (ref 8–23)
BUN SERPL-MCNC: 10.8 MG/DL (ref 8–23)
BUN SERPL-MCNC: 11.2 MG/DL (ref 8–23)
BUN SERPL-MCNC: 11.2 MG/DL (ref 8–23)
BUN SERPL-MCNC: 11.3 MG/DL (ref 8–23)
BUN SERPL-MCNC: 11.4 MG/DL (ref 8–23)
BUN SERPL-MCNC: 11.5 MG/DL (ref 8–23)
BUN SERPL-MCNC: 11.5 MG/DL (ref 8–23)
BUN SERPL-MCNC: 11.7 MG/DL (ref 8–23)
BUN SERPL-MCNC: 12.2 MG/DL (ref 8–23)
BUN SERPL-MCNC: 12.6 MG/DL (ref 8–23)
BUN SERPL-MCNC: 15.9 MG/DL (ref 8–23)
BUN SERPL-MCNC: 16.2 MG/DL (ref 8–23)
BUN SERPL-MCNC: 16.4 MG/DL (ref 8–23)
BUN SERPL-MCNC: 18.2 MG/DL (ref 8–23)
BUN SERPL-MCNC: 19.4 MG/DL (ref 8–23)
BUN SERPL-MCNC: 20.1 MG/DL (ref 8–23)
BUN SERPL-MCNC: 7.2 MG/DL (ref 8–23)
BUN SERPL-MCNC: 7.6 MG/DL (ref 8–23)
BUN SERPL-MCNC: 8.1 MG/DL (ref 8–23)
BUN SERPL-MCNC: 8.2 MG/DL (ref 8–23)
BUN SERPL-MCNC: 8.5 MG/DL (ref 8–23)
BUN SERPL-MCNC: 8.5 MG/DL (ref 8–23)
BUN SERPL-MCNC: 8.7 MG/DL (ref 8–23)
BUN SERPL-MCNC: 8.7 MG/DL (ref 8–23)
BUN SERPL-MCNC: 8.8 MG/DL (ref 8–23)
BUN SERPL-MCNC: 8.9 MG/DL (ref 8–23)
BUN SERPL-MCNC: 9 MG/DL (ref 8–23)
BUN SERPL-MCNC: 9 MG/DL (ref 8–23)
BUN SERPL-MCNC: 9.1 MG/DL (ref 8–23)
BUN SERPL-MCNC: 9.3 MG/DL (ref 8–23)
BUN SERPL-MCNC: 9.4 MG/DL (ref 8–23)
BUN SERPL-MCNC: 9.5 MG/DL (ref 8–23)
BUN SERPL-MCNC: 9.7 MG/DL (ref 8–23)
BUN SERPL-MCNC: 9.8 MG/DL (ref 8–23)
BURR CELLS BLD QL SMEAR: SLIGHT
BURR CELLS BLD QL SMEAR: SLIGHT
C DIFF GDH STL QL IA: POSITIVE
C DIFF TOX A+B STL QL IA: NEGATIVE
C DIFF TOX B STL QL: NEGATIVE
C DIFF TOX B STL QL: POSITIVE
C PNEUM DNA SPEC QL NAA+PROBE: NOT DETECTED
CALCIUM SERPL-MCNC: 7.8 MG/DL (ref 8.8–10.2)
CALCIUM SERPL-MCNC: 7.9 MG/DL (ref 8.8–10.2)
CALCIUM SERPL-MCNC: 8 MG/DL (ref 8.8–10.2)
CALCIUM SERPL-MCNC: 8.1 MG/DL (ref 8.8–10.2)
CALCIUM SERPL-MCNC: 8.2 MG/DL (ref 8.8–10.2)
CALCIUM SERPL-MCNC: 8.2 MG/DL (ref 8.8–10.2)
CALCIUM SERPL-MCNC: 8.3 MG/DL (ref 8.8–10.2)
CALCIUM SERPL-MCNC: 8.4 MG/DL (ref 8.8–10.2)
CALCIUM SERPL-MCNC: 8.5 MG/DL (ref 8.8–10.2)
CALCIUM SERPL-MCNC: 8.5 MG/DL (ref 8.8–10.2)
CALCIUM SERPL-MCNC: 8.6 MG/DL (ref 8.8–10.2)
CALCIUM SERPL-MCNC: 8.7 MG/DL (ref 8.8–10.2)
CALCIUM SERPL-MCNC: 8.8 MG/DL (ref 8.8–10.2)
CALCIUM SERPL-MCNC: 8.9 MG/DL (ref 8.8–10.2)
CALCIUM SERPL-MCNC: 8.9 MG/DL (ref 8.8–10.2)
CALCIUM SERPL-MCNC: 9 MG/DL (ref 8.8–10.2)
CALCIUM SERPL-MCNC: 9 MG/DL (ref 8.8–10.2)
CALCIUM SERPL-MCNC: 9.2 MG/DL (ref 8.8–10.2)
CALCIUM SERPL-MCNC: 9.3 MG/DL (ref 8.8–10.2)
CALCIUM SERPL-MCNC: 9.4 MG/DL (ref 8.8–10.2)
CHLORIDE SERPL-SCNC: 102 MMOL/L (ref 98–107)
CHLORIDE SERPL-SCNC: 103 MMOL/L (ref 98–107)
CHLORIDE SERPL-SCNC: 105 MMOL/L (ref 98–107)
CHLORIDE SERPL-SCNC: 105 MMOL/L (ref 98–107)
CHLORIDE SERPL-SCNC: 106 MMOL/L (ref 98–107)
CHLORIDE SERPL-SCNC: 107 MMOL/L (ref 98–107)
CHLORIDE SERPL-SCNC: 108 MMOL/L (ref 98–107)
CHLORIDE SERPL-SCNC: 109 MMOL/L (ref 98–107)
CHLORIDE SERPL-SCNC: 109 MMOL/L (ref 98–107)
CHLORIDE SERPL-SCNC: 110 MMOL/L (ref 98–107)
CHLORIDE SERPL-SCNC: 111 MMOL/L (ref 98–107)
CHLORIDE SERPL-SCNC: 111 MMOL/L (ref 98–107)
CHLORIDE SERPL-SCNC: 112 MMOL/L (ref 98–107)
CHLORIDE SERPL-SCNC: 97 MMOL/L (ref 98–107)
CMV DNA SPEC NAA+PROBE-ACNC: NOT DETECTED IU/ML
CMV IGG SERPL IA-ACNC: <0.2 U/ML
CMV IGG SERPL IA-ACNC: <0.2 U/ML
CMV IGG SERPL IA-ACNC: NORMAL
CMV IGG SERPL IA-ACNC: NORMAL
CODING SYSTEM: NORMAL
COLOR CSF: COLORLESS
COLOR UR AUTO: ABNORMAL
COLOR UR AUTO: YELLOW
CREAT SERPL-MCNC: 0.47 MG/DL (ref 0.51–0.95)
CREAT SERPL-MCNC: 0.47 MG/DL (ref 0.51–0.95)
CREAT SERPL-MCNC: 0.48 MG/DL (ref 0.51–0.95)
CREAT SERPL-MCNC: 0.48 MG/DL (ref 0.51–0.95)
CREAT SERPL-MCNC: 0.51 MG/DL (ref 0.51–0.95)
CREAT SERPL-MCNC: 0.56 MG/DL (ref 0.51–0.95)
CREAT SERPL-MCNC: 0.57 MG/DL (ref 0.51–0.95)
CREAT SERPL-MCNC: 0.59 MG/DL (ref 0.51–0.95)
CREAT SERPL-MCNC: 0.59 MG/DL (ref 0.51–0.95)
CREAT SERPL-MCNC: 0.6 MG/DL (ref 0.51–0.95)
CREAT SERPL-MCNC: 0.61 MG/DL (ref 0.51–0.95)
CREAT SERPL-MCNC: 0.61 MG/DL (ref 0.51–0.95)
CREAT SERPL-MCNC: 0.62 MG/DL (ref 0.51–0.95)
CREAT SERPL-MCNC: 0.63 MG/DL (ref 0.51–0.95)
CREAT SERPL-MCNC: 0.64 MG/DL (ref 0.51–0.95)
CREAT SERPL-MCNC: 0.65 MG/DL (ref 0.51–0.95)
CREAT SERPL-MCNC: 0.66 MG/DL (ref 0.51–0.95)
CREAT SERPL-MCNC: 0.67 MG/DL (ref 0.51–0.95)
CREAT SERPL-MCNC: 0.68 MG/DL (ref 0.51–0.95)
CREAT SERPL-MCNC: 0.7 MG/DL (ref 0.51–0.95)
CREAT SERPL-MCNC: 0.7 MG/DL (ref 0.51–0.95)
CREAT SERPL-MCNC: 0.71 MG/DL (ref 0.51–0.95)
CREAT SERPL-MCNC: 0.71 MG/DL (ref 0.51–0.95)
CREAT SERPL-MCNC: 0.72 MG/DL (ref 0.51–0.95)
CREAT SERPL-MCNC: 0.75 MG/DL (ref 0.51–0.95)
CROSSMATCH: NORMAL
CROSSMATCHDATEVXM: NORMAL
CRP SERPL-MCNC: 34 MG/L
CULTURE HARVEST COMPLETE DATE: NORMAL
CULTURE HARVEST COMPLETE DATE: NORMAL
DEPRECATED HCO3 PLAS-SCNC: 21 MMOL/L (ref 22–29)
DEPRECATED HCO3 PLAS-SCNC: 21 MMOL/L (ref 22–29)
DEPRECATED HCO3 PLAS-SCNC: 22 MMOL/L (ref 22–29)
DEPRECATED HCO3 PLAS-SCNC: 23 MMOL/L (ref 22–29)
DEPRECATED HCO3 PLAS-SCNC: 24 MMOL/L (ref 22–29)
DEPRECATED HCO3 PLAS-SCNC: 25 MMOL/L (ref 22–29)
DEPRECATED HCO3 PLAS-SCNC: 26 MMOL/L (ref 22–29)
DEPRECATED HCO3 PLAS-SCNC: 27 MMOL/L (ref 22–29)
DEPRECATED HCO3 PLAS-SCNC: 28 MMOL/L (ref 22–29)
DIASTOLIC BLOOD PRESSURE - MUSE: NORMAL MMHG
DLCOCOR-%PRED-PRE: 119 %
DLCOCOR-PRE: 23.12 ML/MIN/MMHG
DLCOUNC-%PRED-PRE: 102 %
DLCOUNC-PRE: 19.78 ML/MIN/MMHG
DLCOUNC-PRED: 19.31 ML/MIN/MMHG
DONOR VXM: NORMAL
DRSSPDPA1*LOCUS: NORMAL
DRSSPDPB1*2 NMDP: NORMAL
DRSSPDPB1*2: NORMAL
DRSSPDPB1*LOCUS: NORMAL
DRSSPDPB1*LOCUSNMDP: NORMAL
DRSSPTEST METHOD: NORMAL
DSA VXM B1: NORMAL
DSA VXM B2: NORMAL
DSA VXMT1: NORMAL
DSA VXMT2: NORMAL
EBV VCA IGG SER IA-ACNC: >750 U/ML
EBV VCA IGG SER IA-ACNC: POSITIVE
ELLIPTOCYTES BLD QL SMEAR: SLIGHT
ENTEROCOCCUS FAECALIS: NOT DETECTED
ENTEROCOCCUS FAECIUM: NOT DETECTED
EOSINOPHIL # BLD AUTO: 0 10E3/UL (ref 0–0.7)
EOSINOPHIL # BLD AUTO: 0.2 10E3/UL (ref 0–0.7)
EOSINOPHIL # BLD AUTO: 0.2 10E3/UL (ref 0–0.7)
EOSINOPHIL # BLD MANUAL: 0 10E3/UL (ref 0–0.7)
EOSINOPHIL # BLD MANUAL: 0.2 10E3/UL (ref 0–0.7)
EOSINOPHIL # BLD MANUAL: 0.3 10E3/UL (ref 0–0.7)
EOSINOPHIL # BLD MANUAL: 0.3 10E3/UL (ref 0–0.7)
EOSINOPHIL NFR BLD AUTO: 0 %
EOSINOPHIL NFR BLD AUTO: 1 %
EOSINOPHIL NFR BLD AUTO: 3 %
EOSINOPHIL NFR BLD AUTO: 4 %
EOSINOPHIL NFR BLD MANUAL: 0 %
EOSINOPHIL NFR BLD MANUAL: 1 %
EOSINOPHIL NFR BLD MANUAL: 2 %
EOSINOPHIL NFR BLD MANUAL: 2 %
EOSINOPHIL NFR BLD MANUAL: 4 %
EOSINOPHIL NFR BLD MANUAL: 5 %
EOSINOPHIL NFR BLD MANUAL: 5 %
ERV-%PRED-PRE: 44 %
ERV-PRE: 0.14 L
ERV-PRED: 0.31 L
ERYTHROCYTE [DISTWIDTH] IN BLOOD BY AUTOMATED COUNT: 13 % (ref 10–15)
ERYTHROCYTE [DISTWIDTH] IN BLOOD BY AUTOMATED COUNT: 13.1 % (ref 10–15)
ERYTHROCYTE [DISTWIDTH] IN BLOOD BY AUTOMATED COUNT: 13.2 % (ref 10–15)
ERYTHROCYTE [DISTWIDTH] IN BLOOD BY AUTOMATED COUNT: 13.3 % (ref 10–15)
ERYTHROCYTE [DISTWIDTH] IN BLOOD BY AUTOMATED COUNT: 13.3 % (ref 10–15)
ERYTHROCYTE [DISTWIDTH] IN BLOOD BY AUTOMATED COUNT: 13.4 % (ref 10–15)
ERYTHROCYTE [DISTWIDTH] IN BLOOD BY AUTOMATED COUNT: 13.4 % (ref 10–15)
ERYTHROCYTE [DISTWIDTH] IN BLOOD BY AUTOMATED COUNT: 13.5 % (ref 10–15)
ERYTHROCYTE [DISTWIDTH] IN BLOOD BY AUTOMATED COUNT: 13.7 % (ref 10–15)
ERYTHROCYTE [DISTWIDTH] IN BLOOD BY AUTOMATED COUNT: 13.7 % (ref 10–15)
ERYTHROCYTE [DISTWIDTH] IN BLOOD BY AUTOMATED COUNT: 14 % (ref 10–15)
ERYTHROCYTE [DISTWIDTH] IN BLOOD BY AUTOMATED COUNT: 14 % (ref 10–15)
ERYTHROCYTE [DISTWIDTH] IN BLOOD BY AUTOMATED COUNT: 14.1 % (ref 10–15)
ERYTHROCYTE [DISTWIDTH] IN BLOOD BY AUTOMATED COUNT: 14.2 % (ref 10–15)
ERYTHROCYTE [DISTWIDTH] IN BLOOD BY AUTOMATED COUNT: 14.5 % (ref 10–15)
ERYTHROCYTE [DISTWIDTH] IN BLOOD BY AUTOMATED COUNT: 14.6 % (ref 10–15)
ERYTHROCYTE [DISTWIDTH] IN BLOOD BY AUTOMATED COUNT: 14.7 % (ref 10–15)
ERYTHROCYTE [DISTWIDTH] IN BLOOD BY AUTOMATED COUNT: 15 % (ref 10–15)
ERYTHROCYTE [DISTWIDTH] IN BLOOD BY AUTOMATED COUNT: 15.1 % (ref 10–15)
ERYTHROCYTE [DISTWIDTH] IN BLOOD BY AUTOMATED COUNT: 15.3 % (ref 10–15)
ERYTHROCYTE [DISTWIDTH] IN BLOOD BY AUTOMATED COUNT: 15.5 % (ref 10–15)
ERYTHROCYTE [DISTWIDTH] IN BLOOD BY AUTOMATED COUNT: 15.5 % (ref 10–15)
ERYTHROCYTE [DISTWIDTH] IN BLOOD BY AUTOMATED COUNT: 15.6 % (ref 10–15)
ERYTHROCYTE [DISTWIDTH] IN BLOOD BY AUTOMATED COUNT: 15.7 % (ref 10–15)
ERYTHROCYTE [DISTWIDTH] IN BLOOD BY AUTOMATED COUNT: 16 % (ref 10–15)
ERYTHROCYTE [DISTWIDTH] IN BLOOD BY AUTOMATED COUNT: 16.4 % (ref 10–15)
ERYTHROCYTE [DISTWIDTH] IN BLOOD BY AUTOMATED COUNT: 16.5 % (ref 10–15)
ERYTHROCYTE [DISTWIDTH] IN BLOOD BY AUTOMATED COUNT: 16.6 % (ref 10–15)
ERYTHROCYTE [DISTWIDTH] IN BLOOD BY AUTOMATED COUNT: 17.2 % (ref 10–15)
ERYTHROCYTE [DISTWIDTH] IN BLOOD BY AUTOMATED COUNT: 18.3 % (ref 10–15)
ERYTHROCYTE [DISTWIDTH] IN BLOOD BY AUTOMATED COUNT: 18.6 % (ref 10–15)
EXPTIME-PRE: 6.93 SEC
FEF2575-%PRED-PRE: 86 %
FEF2575-PRE: 1.82 L/SEC
FEF2575-PRED: 2.1 L/SEC
FEFMAX-%PRED-PRE: 88 %
FEFMAX-PRE: 5.25 L/SEC
FEFMAX-PRED: 5.97 L/SEC
FERRITIN SERPL-MCNC: 1323 NG/ML (ref 11–328)
FEV1-%PRED-PRE: 97 %
FEV1-PRE: 2.27 L
FEV1FEV6-PRE: 76 %
FEV1FEV6-PRED: 80 %
FEV1FVC-PRE: 76 %
FEV1FVC-PRED: 79 %
FEV1SVC-PRE: 78 %
FEV1SVC-PRED: 76 %
FIFMAX-PRE: 3.72 L/SEC
FLUAV H1 2009 PAND RNA SPEC QL NAA+PROBE: NOT DETECTED
FLUAV H1 RNA SPEC QL NAA+PROBE: NOT DETECTED
FLUAV H3 RNA SPEC QL NAA+PROBE: NOT DETECTED
FLUAV RNA SPEC QL NAA+PROBE: NOT DETECTED
FLUBV RNA SPEC QL NAA+PROBE: NOT DETECTED
FRCPLETH-%PRED-PRE: 87 %
FRCPLETH-PRE: 2.32 L
FRCPLETH-PRED: 2.65 L
FVC-%PRED-PRE: 101 %
FVC-PRE: 3 L
FVC-PRED: 2.97 L
GFR SERPL CREATININE-BSD FRML MDRD: 89 ML/MIN/1.73M2
GFR SERPL CREATININE-BSD FRML MDRD: >90 ML/MIN/1.73M2
GLUCOSE CSF-MCNC: 65 MG/DL (ref 40–70)
GLUCOSE SERPL-MCNC: 101 MG/DL (ref 70–99)
GLUCOSE SERPL-MCNC: 102 MG/DL (ref 70–99)
GLUCOSE SERPL-MCNC: 103 MG/DL (ref 70–99)
GLUCOSE SERPL-MCNC: 105 MG/DL (ref 70–99)
GLUCOSE SERPL-MCNC: 106 MG/DL (ref 70–99)
GLUCOSE SERPL-MCNC: 109 MG/DL (ref 70–99)
GLUCOSE SERPL-MCNC: 109 MG/DL (ref 70–99)
GLUCOSE SERPL-MCNC: 110 MG/DL (ref 70–99)
GLUCOSE SERPL-MCNC: 114 MG/DL (ref 70–99)
GLUCOSE SERPL-MCNC: 116 MG/DL (ref 70–99)
GLUCOSE SERPL-MCNC: 117 MG/DL (ref 70–99)
GLUCOSE SERPL-MCNC: 121 MG/DL (ref 70–99)
GLUCOSE SERPL-MCNC: 141 MG/DL (ref 70–99)
GLUCOSE SERPL-MCNC: 82 MG/DL (ref 70–99)
GLUCOSE SERPL-MCNC: 84 MG/DL (ref 70–99)
GLUCOSE SERPL-MCNC: 85 MG/DL (ref 70–99)
GLUCOSE SERPL-MCNC: 86 MG/DL (ref 70–99)
GLUCOSE SERPL-MCNC: 89 MG/DL (ref 70–99)
GLUCOSE SERPL-MCNC: 90 MG/DL (ref 70–99)
GLUCOSE SERPL-MCNC: 91 MG/DL (ref 70–99)
GLUCOSE SERPL-MCNC: 93 MG/DL (ref 70–99)
GLUCOSE SERPL-MCNC: 93 MG/DL (ref 70–99)
GLUCOSE SERPL-MCNC: 94 MG/DL (ref 70–99)
GLUCOSE SERPL-MCNC: 96 MG/DL (ref 70–99)
GLUCOSE SERPL-MCNC: 97 MG/DL (ref 70–99)
GLUCOSE SERPL-MCNC: 97 MG/DL (ref 70–99)
GLUCOSE SERPL-MCNC: 98 MG/DL (ref 70–99)
GLUCOSE SERPL-MCNC: 99 MG/DL (ref 70–99)
GLUCOSE UR STRIP-MCNC: NEGATIVE MG/DL
HADV DNA # SPEC NAA+PROBE: NOT DETECTED COPIES/ML
HADV DNA SPEC QL NAA+PROBE: NOT DETECTED
HBV CORE AB SERPL QL IA: NONREACTIVE
HBV DNA SERPL QL NAA+PROBE: NORMAL
HBV SURFACE AB SERPL IA-ACNC: 2.15 M[IU]/ML
HBV SURFACE AB SERPL IA-ACNC: NONREACTIVE M[IU]/ML
HBV SURFACE AG SERPL QL IA: NONREACTIVE
HCOV PNL SPEC NAA+PROBE: NOT DETECTED
HCT VFR BLD AUTO: 21.8 % (ref 35–47)
HCT VFR BLD AUTO: 22 % (ref 35–47)
HCT VFR BLD AUTO: 22.4 % (ref 35–47)
HCT VFR BLD AUTO: 22.4 % (ref 35–47)
HCT VFR BLD AUTO: 22.8 % (ref 35–47)
HCT VFR BLD AUTO: 23.1 % (ref 35–47)
HCT VFR BLD AUTO: 23.2 % (ref 35–47)
HCT VFR BLD AUTO: 23.2 % (ref 35–47)
HCT VFR BLD AUTO: 23.3 % (ref 35–47)
HCT VFR BLD AUTO: 23.3 % (ref 35–47)
HCT VFR BLD AUTO: 23.5 % (ref 35–47)
HCT VFR BLD AUTO: 23.9 % (ref 35–47)
HCT VFR BLD AUTO: 24 % (ref 35–47)
HCT VFR BLD AUTO: 24.2 % (ref 35–47)
HCT VFR BLD AUTO: 24.2 % (ref 35–47)
HCT VFR BLD AUTO: 24.4 % (ref 35–47)
HCT VFR BLD AUTO: 24.5 % (ref 35–47)
HCT VFR BLD AUTO: 24.5 % (ref 35–47)
HCT VFR BLD AUTO: 24.6 % (ref 35–47)
HCT VFR BLD AUTO: 24.8 % (ref 35–47)
HCT VFR BLD AUTO: 24.8 % (ref 35–47)
HCT VFR BLD AUTO: 25 % (ref 35–47)
HCT VFR BLD AUTO: 25.1 % (ref 35–47)
HCT VFR BLD AUTO: 27.7 % (ref 35–47)
HCT VFR BLD AUTO: 28.5 % (ref 35–47)
HCT VFR BLD AUTO: 29.8 % (ref 35–47)
HCT VFR BLD AUTO: 30.6 % (ref 35–47)
HCT VFR BLD AUTO: 30.9 % (ref 35–47)
HCT VFR BLD AUTO: 31.3 % (ref 35–47)
HCT VFR BLD AUTO: 31.4 % (ref 35–47)
HCT VFR BLD AUTO: 31.5 % (ref 35–47)
HCT VFR BLD AUTO: 31.7 % (ref 35–47)
HCT VFR BLD AUTO: 32 % (ref 35–47)
HCT VFR BLD AUTO: 32.1 % (ref 35–47)
HCT VFR BLD AUTO: 32.3 % (ref 35–47)
HCT VFR BLD AUTO: 33.9 % (ref 35–47)
HCT VFR BLD AUTO: 34.9 % (ref 35–47)
HCT VFR BLD AUTO: 37.2 % (ref 35–47)
HCV AB SERPL QL IA: NONREACTIVE
HCV RNA SERPL QL NAA+PROBE: NORMAL
HGB BLD-MCNC: 10 G/DL (ref 11.7–15.7)
HGB BLD-MCNC: 10.2 G/DL (ref 11.7–15.7)
HGB BLD-MCNC: 10.5 G/DL (ref 11.7–15.7)
HGB BLD-MCNC: 10.7 G/DL (ref 11.7–15.7)
HGB BLD-MCNC: 11.5 G/DL (ref 11.7–15.7)
HGB BLD-MCNC: 6.9 G/DL (ref 11.7–15.7)
HGB BLD-MCNC: 6.9 G/DL (ref 11.7–15.7)
HGB BLD-MCNC: 7 G/DL (ref 11.7–15.7)
HGB BLD-MCNC: 7 G/DL (ref 11.7–15.7)
HGB BLD-MCNC: 7.2 G/DL (ref 11.7–15.7)
HGB BLD-MCNC: 7.4 G/DL (ref 11.7–15.7)
HGB BLD-MCNC: 7.5 G/DL (ref 11.7–15.7)
HGB BLD-MCNC: 7.6 G/DL (ref 11.7–15.7)
HGB BLD-MCNC: 7.6 G/DL (ref 11.7–15.7)
HGB BLD-MCNC: 7.7 G/DL (ref 11.7–15.7)
HGB BLD-MCNC: 7.8 G/DL (ref 11.7–15.7)
HGB BLD-MCNC: 7.8 G/DL (ref 11.7–15.7)
HGB BLD-MCNC: 7.9 G/DL (ref 11.7–15.7)
HGB BLD-MCNC: 8 G/DL (ref 11.7–15.7)
HGB BLD-MCNC: 8 G/DL (ref 11.7–15.7)
HGB BLD-MCNC: 8.1 G/DL (ref 11.7–15.7)
HGB BLD-MCNC: 8.8 G/DL (ref 11.7–15.7)
HGB BLD-MCNC: 8.8 G/DL (ref 11.7–15.7)
HGB BLD-MCNC: 9.5 G/DL (ref 11.7–15.7)
HGB BLD-MCNC: 9.5 G/DL (ref 11.7–15.7)
HGB BLD-MCNC: 9.6 G/DL (ref 11.7–15.7)
HGB BLD-MCNC: 9.7 G/DL (ref 11.7–15.7)
HGB BLD-MCNC: 9.8 G/DL (ref 11.7–15.7)
HGB BLD-MCNC: 9.8 G/DL (ref 11.7–15.7)
HGB UR QL STRIP: ABNORMAL
HGB UR QL STRIP: NEGATIVE
HGB UR QL STRIP: NEGATIVE
HHV-6 DNA COPIES/ML, INSTRUMENT: 3586 COPIES/ML
HHV6 DNA SPEC NAA+PROBE-LOG#: 3.6 {LOG_COPIES}/ML
HIV 1+2 AB+HIV1 P24 AG SERPL QL IA: NONREACTIVE
HIV1+2 RNA SERPL QL NAA+PROBE: NORMAL
HMPV RNA SPEC QL NAA+PROBE: NOT DETECTED
HPIV1 RNA SPEC QL NAA+PROBE: NOT DETECTED
HPIV2 RNA SPEC QL NAA+PROBE: NOT DETECTED
HPIV3 RNA SPEC QL NAA+PROBE: NOT DETECTED
HPIV4 RNA SPEC QL NAA+PROBE: NOT DETECTED
HSV1 IGG SERPL QL IA: 35.5 INDEX
HSV1 IGG SERPL QL IA: ABNORMAL
HSV2 IGG SERPL QL IA: 0.59 INDEX
HSV2 IGG SERPL QL IA: ABNORMAL
HTLV I+II AB SER QL IA: NEGATIVE
IC-%PRED-PRE: 99 %
IC-PRE: 2.76 L
IC-PRED: 2.77 L
IMM GRANULOCYTES # BLD: 0 10E3/UL
IMM GRANULOCYTES # BLD: 0.1 10E3/UL
IMM GRANULOCYTES NFR BLD: 0 %
IMM GRANULOCYTES NFR BLD: 0 %
IMM GRANULOCYTES NFR BLD: 1 %
IMM GRANULOCYTES NFR BLD: 1 %
IMM GRANULOCYTES NFR BLD: 2 %
IMM GRANULOCYTES NFR BLD: 4 %
INR PPP: 0.97 (ref 0.85–1.15)
INR PPP: 1.01 (ref 0.85–1.15)
INR PPP: 1.01 (ref 0.85–1.15)
INR PPP: 1.02 (ref 0.85–1.15)
INR PPP: 1.04 (ref 0.85–1.15)
INR PPP: 1.05 (ref 0.85–1.15)
INR PPP: 1.06 (ref 0.85–1.15)
INTERPRETATION ECG - MUSE: NORMAL
INTERPRETATION: NORMAL
ISCN: NORMAL
ISSUE DATE AND TIME: NORMAL
KETONES UR STRIP-MCNC: 60 MG/DL
KETONES UR STRIP-MCNC: NEGATIVE MG/DL
LAB DIRECTOR COMMENTS: NORMAL
LAB DIRECTOR DISCLAIMER: NORMAL
LAB DIRECTOR INTERPRETATION: NORMAL
LAB DIRECTOR METHODOLOGY: NORMAL
LAB DIRECTOR RESULTS: NORMAL
LACTATE SERPL-SCNC: 0.4 MMOL/L (ref 0.7–2)
LACTATE SERPL-SCNC: 0.7 MMOL/L (ref 0.7–2)
LACTATE SERPL-SCNC: 0.8 MMOL/L (ref 0.7–2)
LACTATE SERPL-SCNC: 0.8 MMOL/L (ref 0.7–2)
LACTATE SERPL-SCNC: 0.9 MMOL/L (ref 0.7–2)
LACTATE SERPL-SCNC: 1.1 MMOL/L (ref 0.7–2)
LACTATE SERPL-SCNC: 1.2 MMOL/L (ref 0.7–2)
LACTATE SERPL-SCNC: 2.1 MMOL/L (ref 0.7–2)
LEUKOCYTE ESTERASE UR QL STRIP: NEGATIVE
LISTERIA SPECIES (DETECTED/NOT DETECTED): NOT DETECTED
LYMPHOCYTES # BLD AUTO: 0 10E3/UL (ref 0.8–5.3)
LYMPHOCYTES # BLD AUTO: 0.3 10E3/UL (ref 0.8–5.3)
LYMPHOCYTES # BLD AUTO: 0.7 10E3/UL (ref 0.8–5.3)
LYMPHOCYTES # BLD AUTO: 0.8 10E3/UL (ref 0.8–5.3)
LYMPHOCYTES # BLD MANUAL: 0 10E3/UL (ref 0.8–5.3)
LYMPHOCYTES # BLD MANUAL: 0.1 10E3/UL (ref 0.8–5.3)
LYMPHOCYTES # BLD MANUAL: 0.4 10E3/UL (ref 0.8–5.3)
LYMPHOCYTES # BLD MANUAL: 0.6 10E3/UL (ref 0.8–5.3)
LYMPHOCYTES # BLD MANUAL: 0.8 10E3/UL (ref 0.8–5.3)
LYMPHOCYTES # BLD MANUAL: 0.8 10E3/UL (ref 0.8–5.3)
LYMPHOCYTES NFR BLD AUTO: 1 %
LYMPHOCYTES NFR BLD AUTO: 12 %
LYMPHOCYTES NFR BLD AUTO: 15 %
LYMPHOCYTES NFR BLD AUTO: 4 %
LYMPHOCYTES NFR BLD MANUAL: 0 %
LYMPHOCYTES NFR BLD MANUAL: 1 %
LYMPHOCYTES NFR BLD MANUAL: 1 %
LYMPHOCYTES NFR BLD MANUAL: 14 %
LYMPHOCYTES NFR BLD MANUAL: 14 %
LYMPHOCYTES NFR BLD MANUAL: 15 %
LYMPHOCYTES NFR BLD MANUAL: 2 %
LYMPHOCYTES NFR BLD MANUAL: 3 %
LYMPHOCYTES NFR BLD MANUAL: 3 %
LYMPHOCYTES NFR BLD MANUAL: 4 %
LYMPHOCYTES NFR BLD MANUAL: 4 %
LYMPHOCYTES NFR BLD MANUAL: 5 %
M PNEUMO DNA SPEC QL NAA+PROBE: NOT DETECTED
MAGNESIUM SERPL-MCNC: 1.9 MG/DL (ref 1.7–2.3)
MAGNESIUM SERPL-MCNC: 2 MG/DL (ref 1.7–2.3)
MAGNESIUM SERPL-MCNC: 2.1 MG/DL (ref 1.7–2.3)
MAGNESIUM SERPL-MCNC: 2.2 MG/DL (ref 1.7–2.3)
MAGNESIUM SERPL-MCNC: 2.3 MG/DL (ref 1.7–2.3)
MCH RBC QN AUTO: 28.8 PG (ref 26.5–33)
MCH RBC QN AUTO: 29 PG (ref 26.5–33)
MCH RBC QN AUTO: 29 PG (ref 26.5–33)
MCH RBC QN AUTO: 29.1 PG (ref 26.5–33)
MCH RBC QN AUTO: 29.1 PG (ref 26.5–33)
MCH RBC QN AUTO: 29.2 PG (ref 26.5–33)
MCH RBC QN AUTO: 29.2 PG (ref 26.5–33)
MCH RBC QN AUTO: 29.3 PG (ref 26.5–33)
MCH RBC QN AUTO: 29.4 PG (ref 26.5–33)
MCH RBC QN AUTO: 29.5 PG (ref 26.5–33)
MCH RBC QN AUTO: 29.5 PG (ref 26.5–33)
MCH RBC QN AUTO: 29.6 PG (ref 26.5–33)
MCH RBC QN AUTO: 29.7 PG (ref 26.5–33)
MCH RBC QN AUTO: 29.8 PG (ref 26.5–33)
MCH RBC QN AUTO: 29.9 PG (ref 26.5–33)
MCH RBC QN AUTO: 29.9 PG (ref 26.5–33)
MCH RBC QN AUTO: 30 PG (ref 26.5–33)
MCH RBC QN AUTO: 30.1 PG (ref 26.5–33)
MCH RBC QN AUTO: 30.1 PG (ref 26.5–33)
MCH RBC QN AUTO: 30.2 PG (ref 26.5–33)
MCH RBC QN AUTO: 30.2 PG (ref 26.5–33)
MCH RBC QN AUTO: 30.3 PG (ref 26.5–33)
MCH RBC QN AUTO: 30.4 PG (ref 26.5–33)
MCH RBC QN AUTO: 30.5 PG (ref 26.5–33)
MCHC RBC AUTO-ENTMCNC: 30.1 G/DL (ref 31.5–36.5)
MCHC RBC AUTO-ENTMCNC: 30.2 G/DL (ref 31.5–36.5)
MCHC RBC AUTO-ENTMCNC: 30.3 G/DL (ref 31.5–36.5)
MCHC RBC AUTO-ENTMCNC: 30.6 G/DL (ref 31.5–36.5)
MCHC RBC AUTO-ENTMCNC: 30.6 G/DL (ref 31.5–36.5)
MCHC RBC AUTO-ENTMCNC: 30.7 G/DL (ref 31.5–36.5)
MCHC RBC AUTO-ENTMCNC: 30.9 G/DL (ref 31.5–36.5)
MCHC RBC AUTO-ENTMCNC: 31.1 G/DL (ref 31.5–36.5)
MCHC RBC AUTO-ENTMCNC: 31.2 G/DL (ref 31.5–36.5)
MCHC RBC AUTO-ENTMCNC: 31.3 G/DL (ref 31.5–36.5)
MCHC RBC AUTO-ENTMCNC: 31.4 G/DL (ref 31.5–36.5)
MCHC RBC AUTO-ENTMCNC: 31.4 G/DL (ref 31.5–36.5)
MCHC RBC AUTO-ENTMCNC: 31.5 G/DL (ref 31.5–36.5)
MCHC RBC AUTO-ENTMCNC: 31.6 G/DL (ref 31.5–36.5)
MCHC RBC AUTO-ENTMCNC: 31.6 G/DL (ref 31.5–36.5)
MCHC RBC AUTO-ENTMCNC: 31.7 G/DL (ref 31.5–36.5)
MCHC RBC AUTO-ENTMCNC: 31.8 G/DL (ref 31.5–36.5)
MCHC RBC AUTO-ENTMCNC: 31.9 G/DL (ref 31.5–36.5)
MCHC RBC AUTO-ENTMCNC: 32 G/DL (ref 31.5–36.5)
MCHC RBC AUTO-ENTMCNC: 32.2 G/DL (ref 31.5–36.5)
MCHC RBC AUTO-ENTMCNC: 32.3 G/DL (ref 31.5–36.5)
MCHC RBC AUTO-ENTMCNC: 32.3 G/DL (ref 31.5–36.5)
MCHC RBC AUTO-ENTMCNC: 32.5 G/DL (ref 31.5–36.5)
MCHC RBC AUTO-ENTMCNC: 32.6 G/DL (ref 31.5–36.5)
MCHC RBC AUTO-ENTMCNC: 32.7 G/DL (ref 31.5–36.5)
MCHC RBC AUTO-ENTMCNC: 33 G/DL (ref 31.5–36.5)
MCHC RBC AUTO-ENTMCNC: 33.1 G/DL (ref 31.5–36.5)
MCV RBC AUTO: 90 FL (ref 78–100)
MCV RBC AUTO: 90 FL (ref 78–100)
MCV RBC AUTO: 91 FL (ref 78–100)
MCV RBC AUTO: 92 FL (ref 78–100)
MCV RBC AUTO: 93 FL (ref 78–100)
MCV RBC AUTO: 94 FL (ref 78–100)
MCV RBC AUTO: 95 FL (ref 78–100)
MCV RBC AUTO: 96 FL (ref 78–100)
MCV RBC AUTO: 97 FL (ref 78–100)
MCV RBC AUTO: 98 FL (ref 78–100)
MCV RBC AUTO: 98 FL (ref 78–100)
MCV RBC AUTO: 99 FL (ref 78–100)
METAMYELOCYTES # BLD MANUAL: 0 10E3/UL
METAMYELOCYTES NFR BLD MANUAL: 1 %
METHODS: NORMAL
MONOCYTES # BLD AUTO: 0.2 10E3/UL (ref 0–1.3)
MONOCYTES # BLD AUTO: 0.2 10E3/UL (ref 0–1.3)
MONOCYTES # BLD AUTO: 0.3 10E3/UL (ref 0–1.3)
MONOCYTES # BLD AUTO: 0.6 10E3/UL (ref 0–1.3)
MONOCYTES # BLD AUTO: 0.6 10E3/UL (ref 0–1.3)
MONOCYTES # BLD AUTO: 0.7 10E3/UL (ref 0–1.3)
MONOCYTES # BLD MANUAL: 0 10E3/UL (ref 0–1.3)
MONOCYTES # BLD MANUAL: 0.1 10E3/UL (ref 0–1.3)
MONOCYTES # BLD MANUAL: 0.2 10E3/UL (ref 0–1.3)
MONOCYTES # BLD MANUAL: 0.4 10E3/UL (ref 0–1.3)
MONOCYTES # BLD MANUAL: 0.5 10E3/UL (ref 0–1.3)
MONOCYTES # BLD MANUAL: 0.5 10E3/UL (ref 0–1.3)
MONOCYTES NFR BLD AUTO: 11 %
MONOCYTES NFR BLD AUTO: 12 %
MONOCYTES NFR BLD AUTO: 8 %
MONOCYTES NFR BLD AUTO: 8 %
MONOCYTES NFR BLD AUTO: 9 %
MONOCYTES NFR BLD AUTO: 9 %
MONOCYTES NFR BLD MANUAL: 0 %
MONOCYTES NFR BLD MANUAL: 2 %
MONOCYTES NFR BLD MANUAL: 2 %
MONOCYTES NFR BLD MANUAL: 4 %
MONOCYTES NFR BLD MANUAL: 4 %
MONOCYTES NFR BLD MANUAL: 5 %
MONOCYTES NFR BLD MANUAL: 8 %
MONOCYTES NFR BLD MANUAL: 9 %
MUCOUS THREADS #/AREA URNS LPF: PRESENT /LPF
MYELOCYTES # BLD MANUAL: 0 10E3/UL
MYELOCYTES NFR BLD MANUAL: 1 %
NEUTROPHILS # BLD AUTO: 1.7 10E3/UL (ref 1.6–8.3)
NEUTROPHILS # BLD AUTO: 2.2 10E3/UL (ref 1.6–8.3)
NEUTROPHILS # BLD AUTO: 2.7 10E3/UL (ref 1.6–8.3)
NEUTROPHILS # BLD AUTO: 3.3 10E3/UL (ref 1.6–8.3)
NEUTROPHILS # BLD AUTO: 4.7 10E3/UL (ref 1.6–8.3)
NEUTROPHILS # BLD AUTO: 5.5 10E3/UL (ref 1.6–8.3)
NEUTROPHILS # BLD MANUAL: 0.6 10E3/UL (ref 1.6–8.3)
NEUTROPHILS # BLD MANUAL: 0.8 10E3/UL (ref 1.6–8.3)
NEUTROPHILS # BLD MANUAL: 1 10E3/UL (ref 1.6–8.3)
NEUTROPHILS # BLD MANUAL: 1 10E3/UL (ref 1.6–8.3)
NEUTROPHILS # BLD MANUAL: 1.1 10E3/UL (ref 1.6–8.3)
NEUTROPHILS # BLD MANUAL: 1.3 10E3/UL (ref 1.6–8.3)
NEUTROPHILS # BLD MANUAL: 1.6 10E3/UL (ref 1.6–8.3)
NEUTROPHILS # BLD MANUAL: 2.1 10E3/UL (ref 1.6–8.3)
NEUTROPHILS # BLD MANUAL: 3.3 10E3/UL (ref 1.6–8.3)
NEUTROPHILS # BLD MANUAL: 3.6 10E3/UL (ref 1.6–8.3)
NEUTROPHILS # BLD MANUAL: 3.6 10E3/UL (ref 1.6–8.3)
NEUTROPHILS # BLD MANUAL: 3.8 10E3/UL (ref 1.6–8.3)
NEUTROPHILS # BLD MANUAL: 5.3 10E3/UL (ref 1.6–8.3)
NEUTROPHILS # BLD MANUAL: 8.9 10E3/UL (ref 1.6–8.3)
NEUTROPHILS NFR BLD AUTO: 67 %
NEUTROPHILS NFR BLD AUTO: 75 %
NEUTROPHILS NFR BLD AUTO: 82 %
NEUTROPHILS NFR BLD AUTO: 87 %
NEUTROPHILS NFR BLD AUTO: 88 %
NEUTROPHILS NFR BLD AUTO: 89 %
NEUTROPHILS NFR BLD MANUAL: 70 %
NEUTROPHILS NFR BLD MANUAL: 71 %
NEUTROPHILS NFR BLD MANUAL: 77 %
NEUTROPHILS NFR BLD MANUAL: 86 %
NEUTROPHILS NFR BLD MANUAL: 91 %
NEUTROPHILS NFR BLD MANUAL: 92 %
NEUTROPHILS NFR BLD MANUAL: 93 %
NEUTROPHILS NFR BLD MANUAL: 94 %
NEUTROPHILS NFR BLD MANUAL: 95 %
NEUTROPHILS NFR BLD MANUAL: 97 %
NEUTROPHILS NFR BLD MANUAL: 98 %
NITRATE UR QL: NEGATIVE
NRBC # BLD AUTO: 0 10E3/UL
NRBC BLD AUTO-RTO: 0 /100
NRBC BLD MANUAL-RTO: 1 %
P AXIS - MUSE: 40 DEGREES
PATH REPORT.COMMENTS IMP SPEC: ABNORMAL
PATH REPORT.COMMENTS IMP SPEC: NORMAL
PATH REPORT.COMMENTS IMP SPEC: YES
PATH REPORT.FINAL DX SPEC: ABNORMAL
PATH REPORT.FINAL DX SPEC: NORMAL
PATH REPORT.GROSS SPEC: ABNORMAL
PATH REPORT.GROSS SPEC: NORMAL
PATH REPORT.MICROSCOPIC SPEC OTHER STN: ABNORMAL
PATH REPORT.MICROSCOPIC SPEC OTHER STN: NORMAL
PATH REPORT.RELEVANT HX SPEC: ABNORMAL
PATH REPORT.RELEVANT HX SPEC: ABNORMAL
PATH REPORT.RELEVANT HX SPEC: NORMAL
PATH REPORT.SITE OF ORIGIN SPEC: ABNORMAL
PATH REV: NORMAL
PH UR STRIP: 5.5 [PH] (ref 5–7)
PH UR STRIP: 5.5 [PH] (ref 5–7)
PH UR STRIP: 6 [PH] (ref 5–7)
PH UR STRIP: 6 [PH] (ref 5–7)
PH UR STRIP: 7.5 [PH] (ref 5–7)
PHOSPHATE SERPL-MCNC: 2.1 MG/DL (ref 2.5–4.5)
PHOSPHATE SERPL-MCNC: 2.4 MG/DL (ref 2.5–4.5)
PHOSPHATE SERPL-MCNC: 2.5 MG/DL (ref 2.5–4.5)
PHOSPHATE SERPL-MCNC: 2.5 MG/DL (ref 2.5–4.5)
PHOSPHATE SERPL-MCNC: 2.6 MG/DL (ref 2.5–4.5)
PHOSPHATE SERPL-MCNC: 2.8 MG/DL (ref 2.5–4.5)
PHOSPHATE SERPL-MCNC: 2.9 MG/DL (ref 2.5–4.5)
PHOSPHATE SERPL-MCNC: 2.9 MG/DL (ref 2.5–4.5)
PHOSPHATE SERPL-MCNC: 3 MG/DL (ref 2.5–4.5)
PHOSPHATE SERPL-MCNC: 3.1 MG/DL (ref 2.5–4.5)
PHOSPHATE SERPL-MCNC: 3.1 MG/DL (ref 2.5–4.5)
PHOSPHATE SERPL-MCNC: 3.2 MG/DL (ref 2.5–4.5)
PHOSPHATE SERPL-MCNC: 3.3 MG/DL (ref 2.5–4.5)
PHOSPHATE SERPL-MCNC: 3.7 MG/DL (ref 2.5–4.5)
PHOSPHATE SERPL-MCNC: 4.2 MG/DL (ref 2.5–4.5)
PHOSPHATE SERPL-MCNC: 4.3 MG/DL (ref 2.5–4.5)
PHOSPHATE SERPL-MCNC: 4.7 MG/DL (ref 2.5–4.5)
PLAT MORPH BLD: ABNORMAL
PLAT MORPH BLD: NORMAL
PLATELET # BLD AUTO: 10 10E3/UL (ref 150–450)
PLATELET # BLD AUTO: 103 10E3/UL (ref 150–450)
PLATELET # BLD AUTO: 11 10E3/UL (ref 150–450)
PLATELET # BLD AUTO: 113 10E3/UL (ref 150–450)
PLATELET # BLD AUTO: 12 10E3/UL (ref 150–450)
PLATELET # BLD AUTO: 12 10E3/UL (ref 150–450)
PLATELET # BLD AUTO: 120 10E3/UL (ref 150–450)
PLATELET # BLD AUTO: 13 10E3/UL (ref 150–450)
PLATELET # BLD AUTO: 14 10E3/UL (ref 150–450)
PLATELET # BLD AUTO: 145 10E3/UL (ref 150–450)
PLATELET # BLD AUTO: 15 10E3/UL (ref 150–450)
PLATELET # BLD AUTO: 16 10E3/UL (ref 150–450)
PLATELET # BLD AUTO: 16 10E3/UL (ref 150–450)
PLATELET # BLD AUTO: 169 10E3/UL (ref 150–450)
PLATELET # BLD AUTO: 17 10E3/UL (ref 150–450)
PLATELET # BLD AUTO: 20 10E3/UL (ref 150–450)
PLATELET # BLD AUTO: 20 10E3/UL (ref 150–450)
PLATELET # BLD AUTO: 22 10E3/UL (ref 150–450)
PLATELET # BLD AUTO: 23 10E3/UL (ref 150–450)
PLATELET # BLD AUTO: 23 10E3/UL (ref 150–450)
PLATELET # BLD AUTO: 231 10E3/UL (ref 150–450)
PLATELET # BLD AUTO: 253 10E3/UL (ref 150–450)
PLATELET # BLD AUTO: 3 10E3/UL (ref 150–450)
PLATELET # BLD AUTO: 30 10E3/UL (ref 150–450)
PLATELET # BLD AUTO: 32 10E3/UL (ref 150–450)
PLATELET # BLD AUTO: 4 10E3/UL (ref 150–450)
PLATELET # BLD AUTO: 4 10E3/UL (ref 150–450)
PLATELET # BLD AUTO: 46 10E3/UL (ref 150–450)
PLATELET # BLD AUTO: 47 10E3/UL (ref 150–450)
PLATELET # BLD AUTO: 5 10E3/UL (ref 150–450)
PLATELET # BLD AUTO: 55 10E3/UL (ref 150–450)
PLATELET # BLD AUTO: 6 10E3/UL (ref 150–450)
PLATELET # BLD AUTO: 6 10E3/UL (ref 150–450)
PLATELET # BLD AUTO: 614 10E3/UL (ref 150–450)
PLATELET # BLD AUTO: 72 10E3/UL (ref 150–450)
PLATELET # BLD AUTO: 8 10E3/UL (ref 150–450)
PLATELET # BLD AUTO: 9 10E3/UL (ref 150–450)
POTASSIUM SERPL-SCNC: 3.1 MMOL/L (ref 3.4–5.3)
POTASSIUM SERPL-SCNC: 3.3 MMOL/L (ref 3.4–5.3)
POTASSIUM SERPL-SCNC: 3.4 MMOL/L (ref 3.4–5.3)
POTASSIUM SERPL-SCNC: 3.5 MMOL/L (ref 3.4–5.3)
POTASSIUM SERPL-SCNC: 3.6 MMOL/L (ref 3.4–5.3)
POTASSIUM SERPL-SCNC: 3.7 MMOL/L (ref 3.4–5.3)
POTASSIUM SERPL-SCNC: 3.8 MMOL/L (ref 3.4–5.3)
POTASSIUM SERPL-SCNC: 3.9 MMOL/L (ref 3.4–5.3)
POTASSIUM SERPL-SCNC: 4 MMOL/L (ref 3.4–5.3)
POTASSIUM SERPL-SCNC: 4.1 MMOL/L (ref 3.4–5.3)
POTASSIUM SERPL-SCNC: 4.2 MMOL/L (ref 3.4–5.3)
POTASSIUM SERPL-SCNC: 4.2 MMOL/L (ref 3.4–5.3)
POTASSIUM SERPL-SCNC: 4.3 MMOL/L (ref 3.4–5.3)
PR INTERVAL - MUSE: 138 MS
PROCALCITONIN SERPL IA-MCNC: 0.12 NG/ML
PROT CSF-MCNC: 21.8 MG/DL (ref 15–45)
PROT SERPL-MCNC: 5.2 G/DL (ref 6.4–8.3)
PROT SERPL-MCNC: 5.7 G/DL (ref 6.4–8.3)
PROT SERPL-MCNC: 5.8 G/DL (ref 6.4–8.3)
PROT SERPL-MCNC: 6 G/DL (ref 6.4–8.3)
PROT SERPL-MCNC: 6 G/DL (ref 6.4–8.3)
PROT SERPL-MCNC: 6.2 G/DL (ref 6.4–8.3)
PROT SERPL-MCNC: 6.7 G/DL (ref 6.4–8.3)
PROT SERPL-MCNC: 6.7 G/DL (ref 6.4–8.3)
QRS DURATION - MUSE: 86 MS
QT - MUSE: 440 MS
QTC - MUSE: 440 MS
R AXIS - MUSE: 6 DEGREES
RBC # BLD AUTO: 2.36 10E6/UL (ref 3.8–5.2)
RBC # BLD AUTO: 2.36 10E6/UL (ref 3.8–5.2)
RBC # BLD AUTO: 2.4 10E6/UL (ref 3.8–5.2)
RBC # BLD AUTO: 2.41 10E6/UL (ref 3.8–5.2)
RBC # BLD AUTO: 2.46 10E6/UL (ref 3.8–5.2)
RBC # BLD AUTO: 2.48 10E6/UL (ref 3.8–5.2)
RBC # BLD AUTO: 2.49 10E6/UL (ref 3.8–5.2)
RBC # BLD AUTO: 2.52 10E6/UL (ref 3.8–5.2)
RBC # BLD AUTO: 2.53 10E6/UL (ref 3.8–5.2)
RBC # BLD AUTO: 2.55 10E6/UL (ref 3.8–5.2)
RBC # BLD AUTO: 2.58 10E6/UL (ref 3.8–5.2)
RBC # BLD AUTO: 2.59 10E6/UL (ref 3.8–5.2)
RBC # BLD AUTO: 2.59 10E6/UL (ref 3.8–5.2)
RBC # BLD AUTO: 2.6 10E6/UL (ref 3.8–5.2)
RBC # BLD AUTO: 2.64 10E6/UL (ref 3.8–5.2)
RBC # BLD AUTO: 2.65 10E6/UL (ref 3.8–5.2)
RBC # BLD AUTO: 2.66 10E6/UL (ref 3.8–5.2)
RBC # BLD AUTO: 2.67 10E6/UL (ref 3.8–5.2)
RBC # BLD AUTO: 2.67 10E6/UL (ref 3.8–5.2)
RBC # BLD AUTO: 2.72 10E6/UL (ref 3.8–5.2)
RBC # BLD AUTO: 2.72 10E6/UL (ref 3.8–5.2)
RBC # BLD AUTO: 2.94 10E6/UL (ref 3.8–5.2)
RBC # BLD AUTO: 2.97 10E6/UL (ref 3.8–5.2)
RBC # BLD AUTO: 3.14 10E6/UL (ref 3.8–5.2)
RBC # BLD AUTO: 3.23 10E6/UL (ref 3.8–5.2)
RBC # BLD AUTO: 3.24 10E6/UL (ref 3.8–5.2)
RBC # BLD AUTO: 3.24 10E6/UL (ref 3.8–5.2)
RBC # BLD AUTO: 3.25 10E6/UL (ref 3.8–5.2)
RBC # BLD AUTO: 3.27 10E6/UL (ref 3.8–5.2)
RBC # BLD AUTO: 3.29 10E6/UL (ref 3.8–5.2)
RBC # BLD AUTO: 3.32 10E6/UL (ref 3.8–5.2)
RBC # BLD AUTO: 3.33 10E6/UL (ref 3.8–5.2)
RBC # BLD AUTO: 3.44 10E6/UL (ref 3.8–5.2)
RBC # BLD AUTO: 3.45 10E6/UL (ref 3.8–5.2)
RBC # BLD AUTO: 3.6 10E6/UL (ref 3.8–5.2)
RBC # BLD AUTO: 3.83 10E6/UL (ref 3.8–5.2)
RBC # CSF MANUAL: 0 /UL (ref 0–2)
RBC MORPH BLD: ABNORMAL
RBC MORPH BLD: NORMAL
RBC URINE: 1 /HPF
RBC URINE: 2 /HPF
RBC URINE: 5 /HPF
RBC URINE: <1 /HPF
RBC URINE: <1 /HPF
RESULT VXM B1: NORMAL
RESULT VXM B2: NORMAL
RESULT VXM T1: NORMAL
RESULT VXM T2: NORMAL
RSV RNA SPEC QL NAA+PROBE: NOT DETECTED
RSV RNA SPEC QL NAA+PROBE: NOT DETECTED
RV+EV RNA SPEC QL NAA+PROBE: NOT DETECTED
RVPLETH-%PRED-PRE: 114 %
RVPLETH-PRE: 2.18 L
RVPLETH-PRED: 1.9 L
SA 1 CELL: NORMAL
SA 1 CELL: NORMAL
SA 1 TEST METHOD: NORMAL
SA 1 TEST METHOD: NORMAL
SA 2 CELL: NORMAL
SA 2 CELL: NORMAL
SA 2 TEST METHOD: NORMAL
SA 2 TEST METHOD: NORMAL
SA1 HI RISK ABY: NORMAL
SA1 HI RISK ABY: NORMAL
SA1 MOD RISK ABY: NORMAL
SA1 MOD RISK ABY: NORMAL
SA2 HI RISK ABY: NORMAL
SA2 HI RISK ABY: NORMAL
SA2 MOD RISK ABY: NORMAL
SA2 MOD RISK ABY: NORMAL
SARS-COV-2 RNA RESP QL NAA+PROBE: NEGATIVE
SCANNED LAB RESULT: NORMAL
SCANNED LAB RESULT: NORMAL
SCR 1 TEST METHOD: NORMAL
SCR 1 TEST METHOD: NORMAL
SCR1 CELL: NORMAL
SCR1 CELL: NORMAL
SCR1 RESULT: NORMAL
SCR1 RESULT: NORMAL
SCR2 CELL: NORMAL
SCR2 CELL: NORMAL
SCR2 RESULT: NORMAL
SCR2 RESULT: NORMAL
SCR2 TEST METHOD: NORMAL
SCR2 TEST METHOD: NORMAL
SERUM DATE VXM B1: NORMAL
SERUM DATE VXM B2: NORMAL
SERUM DATE VXM T1: NORMAL
SERUM DATE VXM T2: NORMAL
SIGNIFICANT RESULTS: NORMAL
SIGNIFICANT RESULTS: NORMAL
SIROLIMUS BLD-MCNC: 11.7 UG/L (ref 5–15)
SIROLIMUS BLD-MCNC: 12.3 UG/L (ref 5–15)
SIROLIMUS BLD-MCNC: 19.9 UG/L (ref 5–15)
SIROLIMUS BLD-MCNC: 2.8 UG/L (ref 5–15)
SIROLIMUS BLD-MCNC: 7.1 UG/L (ref 5–15)
SIROLIMUS BLD-MCNC: 7.2 UG/L (ref 5–15)
SIROLIMUS BLD-MCNC: 8.1 UG/L (ref 5–15)
SIROLIMUS BLD-MCNC: 9.3 UG/L (ref 5–15)
SODIUM SERPL-SCNC: 130 MMOL/L (ref 136–145)
SODIUM SERPL-SCNC: 130 MMOL/L (ref 136–145)
SODIUM SERPL-SCNC: 132 MMOL/L (ref 136–145)
SODIUM SERPL-SCNC: 134 MMOL/L (ref 136–145)
SODIUM SERPL-SCNC: 135 MMOL/L (ref 136–145)
SODIUM SERPL-SCNC: 135 MMOL/L (ref 136–145)
SODIUM SERPL-SCNC: 136 MMOL/L (ref 136–145)
SODIUM SERPL-SCNC: 136 MMOL/L (ref 136–145)
SODIUM SERPL-SCNC: 137 MMOL/L (ref 136–145)
SODIUM SERPL-SCNC: 138 MMOL/L (ref 136–145)
SODIUM SERPL-SCNC: 138 MMOL/L (ref 136–145)
SODIUM SERPL-SCNC: 139 MMOL/L (ref 136–145)
SODIUM SERPL-SCNC: 140 MMOL/L (ref 136–145)
SODIUM SERPL-SCNC: 141 MMOL/L (ref 136–145)
SODIUM SERPL-SCNC: 142 MMOL/L (ref 136–145)
SODIUM SERPL-SCNC: 143 MMOL/L (ref 136–145)
SODIUM SERPL-SCNC: 144 MMOL/L (ref 136–145)
SODIUM SERPL-SCNC: 145 MMOL/L (ref 136–145)
SP GR UR STRIP: 1.01 (ref 1–1.03)
SP GR UR STRIP: 1.02 (ref 1–1.03)
SP GR UR STRIP: 1.02 (ref 1–1.03)
SPECIMEN DESCRIPTION: NORMAL
SPECIMEN EXPIRATION DATE: NORMAL
SPECIMEN STATUS: NORMAL
SQUAMOUS EPITHELIAL: 1 /HPF
SQUAMOUS EPITHELIAL: 3 /HPF
SQUAMOUS EPITHELIAL: <1 /HPF
SQUAMOUS EPITHELIAL: <1 /HPF
SSPA* LOCUS: NORMAL
SSPA*: NORMAL
SSPB* LOCUS: NORMAL
SSPB*: NORMAL
SSPBW-1: NORMAL
SSPC* LOCUS: NORMAL
SSPC*: NORMAL
SSPDQA1*: NORMAL
SSPDQA1*LOCUS: NORMAL
SSPDQB1*: NORMAL
SSPDQB1*LOCUS: NORMAL
SSPDRB1* LOCUS: NORMAL
SSPDRB1*: NORMAL
SSPDRB3* LOCUS: NORMAL
SSPDRB3*: NORMAL
SSPTEST METHOD: NORMAL
STAPHYLOCOCCUS AUREUS: NOT DETECTED
STAPHYLOCOCCUS EPIDERMIDIS: NOT DETECTED
STAPHYLOCOCCUS LUGDUNENSIS: NOT DETECTED
STAPHYLOCOCCUS SPECIES: NOT DETECTED
STREPTOCOCCUS AGALACTIAE: NOT DETECTED
STREPTOCOCCUS ANGINOSUS GROUP: NOT DETECTED
STREPTOCOCCUS PNEUMONIAE: NOT DETECTED
STREPTOCOCCUS PYOGENES: NOT DETECTED
STREPTOCOCCUS SPECIES: NOT DETECTED
SYSTOLIC BLOOD PRESSURE - MUSE: NORMAL MMHG
T AXIS - MUSE: 33 DEGREES
T PALLIDUM AB SER QL: NONREACTIVE
TARGETS BLD QL SMEAR: SLIGHT
TEST DETAILS, MDL: NORMAL
TEST DETAILS, MDL: NORMAL
TLCPLETH-%PRED-PRE: 106 %
TLCPLETH-PRE: 5.08 L
TLCPLETH-PRED: 4.77 L
TME LAST DOSE: ABNORMAL H
TME LAST DOSE: NORMAL H
TRANSITIONAL EPI: <1 /HPF
TRYPANOSOMA CRUZI: NORMAL
TUBE # CSF: 3
UNIT ABO/RH: NORMAL
UNIT NUMBER: NORMAL
UNIT STATUS: NORMAL
UNIT TYPE ISBT: 2800
UNIT TYPE ISBT: 600
UNIT TYPE ISBT: 600
UNIT TYPE ISBT: 6200
UNIT TYPE ISBT: 8400
UNIT TYPE ISBT: 9500
UNIT TYPE ISBT: 9500
URATE SERPL-MCNC: 5.1 MG/DL (ref 2.4–5.7)
URATE SERPL-MCNC: 5.2 MG/DL (ref 2.4–5.7)
UROBILINOGEN UR STRIP-MCNC: NORMAL MG/DL
VA-%PRED-PRE: 100 %
VA-PRE: 4.6 L
VC-%PRED-PRE: 94 %
VC-PRE: 2.9 L
VC-PRED: 3.08 L
VENTRICULAR RATE- MUSE: 60 BPM
WBC # BLD AUTO: 0.1 10E3/UL (ref 4–11)
WBC # BLD AUTO: 0.1 10E3/UL (ref 4–11)
WBC # BLD AUTO: 0.2 10E3/UL (ref 4–11)
WBC # BLD AUTO: 0.3 10E3/UL (ref 4–11)
WBC # BLD AUTO: 0.3 10E3/UL (ref 4–11)
WBC # BLD AUTO: 0.6 10E3/UL (ref 4–11)
WBC # BLD AUTO: 0.9 10E3/UL (ref 4–11)
WBC # BLD AUTO: 1 10E3/UL (ref 4–11)
WBC # BLD AUTO: 1.1 10E3/UL (ref 4–11)
WBC # BLD AUTO: 1.2 10E3/UL (ref 4–11)
WBC # BLD AUTO: 1.4 10E3/UL (ref 4–11)
WBC # BLD AUTO: 1.7 10E3/UL (ref 4–11)
WBC # BLD AUTO: 1.9 10E3/UL (ref 4–11)
WBC # BLD AUTO: 2.2 10E3/UL (ref 4–11)
WBC # BLD AUTO: 2.4 10E3/UL (ref 4–11)
WBC # BLD AUTO: 3.1 10E3/UL (ref 4–11)
WBC # BLD AUTO: 3.7 10E3/UL (ref 4–11)
WBC # BLD AUTO: 4.3 10E3/UL (ref 4–11)
WBC # BLD AUTO: 4.8 10E3/UL (ref 4–11)
WBC # BLD AUTO: 5.1 10E3/UL (ref 4–11)
WBC # BLD AUTO: 5.4 10E3/UL (ref 4–11)
WBC # BLD AUTO: 5.5 10E3/UL (ref 4–11)
WBC # BLD AUTO: 6.4 10E3/UL (ref 4–11)
WBC # BLD AUTO: 6.6 10E3/UL (ref 4–11)
WBC # BLD AUTO: 9.7 10E3/UL (ref 4–11)
WBC # BLD AUTO: <0.1 10E3/UL (ref 4–11)
WBC # CSF MANUAL: 0 /UL (ref 0–5)
WBC URINE: 0 /HPF
WBC URINE: 1 /HPF
WBC URINE: 2 /HPF
WNV RNA SERPL DONR QL NAA+PROBE: NORMAL
ZZZABSSP COMMENTS: NORMAL
ZZZCOMMENT VXMB1: NORMAL
ZZZCOMMENT VXMB2: NORMAL
ZZZCOMMENT VXMT1: NORMAL
ZZZCOMMENT VXMT2: NORMAL
ZZZDRSSP COMMENTS: NORMAL
ZZZSA 1  COMMENTS: NORMAL
ZZZSA 1  COMMENTS: NORMAL
ZZZSA 2 COMMENTS: NORMAL
ZZZSA 2 COMMENTS: NORMAL
ZZZSCR1 COMMENTS: NORMAL
ZZZSCR1 COMMENTS: NORMAL
ZZZSCR2 COMMENTS: NORMAL
ZZZSCR2 COMMENTS: NORMAL
ZZZSSP COMMENTS: NORMAL

## 2022-01-01 PROCEDURE — 71045 X-RAY EXAM CHEST 1 VIEW: CPT | Mod: 26 | Performed by: STUDENT IN AN ORGANIZED HEALTH CARE EDUCATION/TRAINING PROGRAM

## 2022-01-01 PROCEDURE — 85027 COMPLETE CBC AUTOMATED: CPT | Performed by: STUDENT IN AN ORGANIZED HEALTH CARE EDUCATION/TRAINING PROGRAM

## 2022-01-01 PROCEDURE — G0452 MOLECULAR PATHOLOGY INTERPR: HCPCS | Mod: 26 | Performed by: PATHOLOGY

## 2022-01-01 PROCEDURE — G0463 HOSPITAL OUTPT CLINIC VISIT: HCPCS

## 2022-01-01 PROCEDURE — 250N000011 HC RX IP 250 OP 636

## 2022-01-01 PROCEDURE — 250N000011 HC RX IP 250 OP 636: Performed by: STUDENT IN AN ORGANIZED HEALTH CARE EDUCATION/TRAINING PROGRAM

## 2022-01-01 PROCEDURE — 99233 SBSQ HOSP IP/OBS HIGH 50: CPT | Performed by: STUDENT IN AN ORGANIZED HEALTH CARE EDUCATION/TRAINING PROGRAM

## 2022-01-01 PROCEDURE — 80195 ASSAY OF SIROLIMUS: CPT | Performed by: STUDENT IN AN ORGANIZED HEALTH CARE EDUCATION/TRAINING PROGRAM

## 2022-01-01 PROCEDURE — 250N000011 HC RX IP 250 OP 636: Performed by: INTERNAL MEDICINE

## 2022-01-01 PROCEDURE — 86832 HLA CLASS I HIGH DEFIN QUAL: CPT | Performed by: INTERNAL MEDICINE

## 2022-01-01 PROCEDURE — 84100 ASSAY OF PHOSPHORUS: CPT | Performed by: STUDENT IN AN ORGANIZED HEALTH CARE EDUCATION/TRAINING PROGRAM

## 2022-01-01 PROCEDURE — 258N000003 HC RX IP 258 OP 636

## 2022-01-01 PROCEDURE — 77331 SPECIAL RADIATION DOSIMETRY: CPT | Mod: 26 | Performed by: RADIOLOGY

## 2022-01-01 PROCEDURE — 88237 TISSUE CULTURE BONE MARROW: CPT

## 2022-01-01 PROCEDURE — 80048 BASIC METABOLIC PNL TOTAL CA: CPT | Performed by: STUDENT IN AN ORGANIZED HEALTH CARE EDUCATION/TRAINING PROGRAM

## 2022-01-01 PROCEDURE — 77332 RADIATION TREATMENT AID(S): CPT | Mod: 26 | Performed by: RADIOLOGY

## 2022-01-01 PROCEDURE — P9037 PLATE PHERES LEUKOREDU IRRAD: HCPCS | Performed by: STUDENT IN AN ORGANIZED HEALTH CARE EDUCATION/TRAINING PROGRAM

## 2022-01-01 PROCEDURE — 82310 ASSAY OF CALCIUM: CPT | Performed by: STUDENT IN AN ORGANIZED HEALTH CARE EDUCATION/TRAINING PROGRAM

## 2022-01-01 PROCEDURE — 76937 US GUIDE VASCULAR ACCESS: CPT | Mod: 26 | Performed by: RADIOLOGY

## 2022-01-01 PROCEDURE — 250N000013 HC RX MED GY IP 250 OP 250 PS 637: Performed by: STUDENT IN AN ORGANIZED HEALTH CARE EDUCATION/TRAINING PROGRAM

## 2022-01-01 PROCEDURE — 83605 ASSAY OF LACTIC ACID: CPT | Performed by: STUDENT IN AN ORGANIZED HEALTH CARE EDUCATION/TRAINING PROGRAM

## 2022-01-01 PROCEDURE — 88311 DECALCIFY TISSUE: CPT | Mod: 26 | Performed by: PATHOLOGY

## 2022-01-01 PROCEDURE — 206N000001 HC R&B BMT UMMC

## 2022-01-01 PROCEDURE — 250N000013 HC RX MED GY IP 250 OP 250 PS 637

## 2022-01-01 PROCEDURE — 85610 PROTHROMBIN TIME: CPT | Performed by: STUDENT IN AN ORGANIZED HEALTH CARE EDUCATION/TRAINING PROGRAM

## 2022-01-01 PROCEDURE — 99233 SBSQ HOSP IP/OBS HIGH 50: CPT

## 2022-01-01 PROCEDURE — 80053 COMPREHEN METABOLIC PANEL: CPT

## 2022-01-01 PROCEDURE — 86850 RBC ANTIBODY SCREEN: CPT

## 2022-01-01 PROCEDURE — 250N000012 HC RX MED GY IP 250 OP 636 PS 637

## 2022-01-01 PROCEDURE — 86696 HERPES SIMPLEX TYPE 2 TEST: CPT

## 2022-01-01 PROCEDURE — 250N000011 HC RX IP 250 OP 636: Performed by: PHYSICIAN ASSISTANT

## 2022-01-01 PROCEDURE — 85097 BONE MARROW INTERPRETATION: CPT | Mod: GC | Performed by: PATHOLOGY

## 2022-01-01 PROCEDURE — 250N000012 HC RX MED GY IP 250 OP 636 PS 637: Performed by: STUDENT IN AN ORGANIZED HEALTH CARE EDUCATION/TRAINING PROGRAM

## 2022-01-01 PROCEDURE — 77334 RADIATION TREATMENT AID(S): CPT | Performed by: RADIOLOGY

## 2022-01-01 PROCEDURE — 88184 FLOWCYTOMETRY/ TC 1 MARKER: CPT | Performed by: PATHOLOGY

## 2022-01-01 PROCEDURE — 86901 BLOOD TYPING SEROLOGIC RH(D): CPT | Performed by: INTERNAL MEDICINE

## 2022-01-01 PROCEDURE — 85007 BL SMEAR W/DIFF WBC COUNT: CPT | Performed by: STUDENT IN AN ORGANIZED HEALTH CARE EDUCATION/TRAINING PROGRAM

## 2022-01-01 PROCEDURE — 77290 THER RAD SIMULAJ FIELD CPLX: CPT | Performed by: RADIOLOGY

## 2022-01-01 PROCEDURE — 87103 BLOOD FUNGUS CULTURE: CPT | Performed by: STUDENT IN AN ORGANIZED HEALTH CARE EDUCATION/TRAINING PROGRAM

## 2022-01-01 PROCEDURE — 88108 CYTOPATH CONCENTRATE TECH: CPT | Mod: TC

## 2022-01-01 PROCEDURE — 99207 PR SC NO CHARGE VISIT: CPT

## 2022-01-01 PROCEDURE — 99215 OFFICE O/P EST HI 40 MIN: CPT

## 2022-01-01 PROCEDURE — 81450 HL NEO GSAP 5-50DNA/DNA&RNA: CPT

## 2022-01-01 PROCEDURE — 97165 OT EVAL LOW COMPLEX 30 MIN: CPT | Mod: GO

## 2022-01-01 PROCEDURE — 81375 HLA II TYPING AG EQUIV LR: CPT

## 2022-01-01 PROCEDURE — 99232 SBSQ HOSP IP/OBS MODERATE 35: CPT | Performed by: PHYSICIAN ASSISTANT

## 2022-01-01 PROCEDURE — 250N000009 HC RX 250

## 2022-01-01 PROCEDURE — P9040 RBC LEUKOREDUCED IRRADIATED: HCPCS | Performed by: STUDENT IN AN ORGANIZED HEALTH CARE EDUCATION/TRAINING PROGRAM

## 2022-01-01 PROCEDURE — 99207 PR SATISFY VISIT NUMBER: CPT | Performed by: PHYSICIAN ASSISTANT

## 2022-01-01 PROCEDURE — 80053 COMPREHEN METABOLIC PANEL: CPT | Performed by: STUDENT IN AN ORGANIZED HEALTH CARE EDUCATION/TRAINING PROGRAM

## 2022-01-01 PROCEDURE — 77321 SPECIAL TELETX PORT PLAN: CPT | Performed by: RADIOLOGY

## 2022-01-01 PROCEDURE — 84132 ASSAY OF SERUM POTASSIUM: CPT

## 2022-01-01 PROCEDURE — 85014 HEMATOCRIT: CPT | Performed by: STUDENT IN AN ORGANIZED HEALTH CARE EDUCATION/TRAINING PROGRAM

## 2022-01-01 PROCEDURE — 87077 CULTURE AEROBIC IDENTIFY: CPT | Performed by: STUDENT IN AN ORGANIZED HEALTH CARE EDUCATION/TRAINING PROGRAM

## 2022-01-01 PROCEDURE — 272N000602 HC WOUND GLUE CR1

## 2022-01-01 PROCEDURE — 88184 FLOWCYTOMETRY/ TC 1 MARKER: CPT

## 2022-01-01 PROCEDURE — 88305 TISSUE EXAM BY PATHOLOGIST: CPT | Mod: TC

## 2022-01-01 PROCEDURE — 99232 SBSQ HOSP IP/OBS MODERATE 35: CPT | Performed by: STUDENT IN AN ORGANIZED HEALTH CARE EDUCATION/TRAINING PROGRAM

## 2022-01-01 PROCEDURE — 84100 ASSAY OF PHOSPHORUS: CPT | Performed by: INTERNAL MEDICINE

## 2022-01-01 PROCEDURE — 81001 URINALYSIS AUTO W/SCOPE: CPT | Performed by: INTERNAL MEDICINE

## 2022-01-01 PROCEDURE — 84100 ASSAY OF PHOSPHORUS: CPT | Performed by: HOSPITALIST

## 2022-01-01 PROCEDURE — 86753 PROTOZOA ANTIBODY NOS: CPT

## 2022-01-01 PROCEDURE — 83735 ASSAY OF MAGNESIUM: CPT | Performed by: STUDENT IN AN ORGANIZED HEALTH CARE EDUCATION/TRAINING PROGRAM

## 2022-01-01 PROCEDURE — 77336 RADIATION PHYSICS CONSULT: CPT | Performed by: RADIOLOGY

## 2022-01-01 PROCEDURE — 11104 PUNCH BX SKIN SINGLE LESION: CPT

## 2022-01-01 PROCEDURE — 88342 IMHCHEM/IMCYTCHM 1ST ANTB: CPT | Mod: 26 | Performed by: PATHOLOGY

## 2022-01-01 PROCEDURE — 82728 ASSAY OF FERRITIN: CPT

## 2022-01-01 PROCEDURE — 82945 GLUCOSE OTHER FLUID: CPT

## 2022-01-01 PROCEDURE — 82374 ASSAY BLOOD CARBON DIOXIDE: CPT | Performed by: STUDENT IN AN ORGANIZED HEALTH CARE EDUCATION/TRAINING PROGRAM

## 2022-01-01 PROCEDURE — 86850 RBC ANTIBODY SCREEN: CPT | Performed by: STUDENT IN AN ORGANIZED HEALTH CARE EDUCATION/TRAINING PROGRAM

## 2022-01-01 PROCEDURE — 258N000001 HC RX 258: Performed by: STUDENT IN AN ORGANIZED HEALTH CARE EDUCATION/TRAINING PROGRAM

## 2022-01-01 PROCEDURE — 99205 OFFICE O/P NEW HI 60 MIN: CPT | Performed by: INTERNAL MEDICINE

## 2022-01-01 PROCEDURE — 250N000009 HC RX 250: Performed by: INTERNAL MEDICINE

## 2022-01-01 PROCEDURE — 258N000003 HC RX IP 258 OP 636: Performed by: STUDENT IN AN ORGANIZED HEALTH CARE EDUCATION/TRAINING PROGRAM

## 2022-01-01 PROCEDURE — 258N000003 HC RX IP 258 OP 636: Performed by: INTERNAL MEDICINE

## 2022-01-01 PROCEDURE — 88188 FLOWCYTOMETRY/READ 9-15: CPT | Mod: GC | Performed by: PATHOLOGY

## 2022-01-01 PROCEDURE — 81001 URINALYSIS AUTO W/SCOPE: CPT

## 2022-01-01 PROCEDURE — 81403 MOPATH PROCEDURE LEVEL 4: CPT

## 2022-01-01 PROCEDURE — 87493 C DIFF AMPLIFIED PROBE: CPT

## 2022-01-01 PROCEDURE — 82040 ASSAY OF SERUM ALBUMIN: CPT

## 2022-01-01 PROCEDURE — 36558 INSERT TUNNELED CV CATH: CPT | Mod: LT | Performed by: RADIOLOGY

## 2022-01-01 PROCEDURE — 83605 ASSAY OF LACTIC ACID: CPT | Performed by: NURSE PRACTITIONER

## 2022-01-01 PROCEDURE — 86850 RBC ANTIBODY SCREEN: CPT | Performed by: INTERNAL MEDICINE

## 2022-01-01 PROCEDURE — 99233 SBSQ HOSP IP/OBS HIGH 50: CPT | Mod: GC | Performed by: STUDENT IN AN ORGANIZED HEALTH CARE EDUCATION/TRAINING PROGRAM

## 2022-01-01 PROCEDURE — 86706 HEP B SURFACE ANTIBODY: CPT

## 2022-01-01 PROCEDURE — 81401 MOPATH PROCEDURE LEVEL 2: CPT

## 2022-01-01 PROCEDURE — 85041 AUTOMATED RBC COUNT: CPT | Performed by: STUDENT IN AN ORGANIZED HEALTH CARE EDUCATION/TRAINING PROGRAM

## 2022-01-01 PROCEDURE — 88275 CYTOGENETICS 100-300: CPT

## 2022-01-01 PROCEDURE — 815N000004 HC ACQUISITION BONE MARROW NMDP

## 2022-01-01 PROCEDURE — 85025 COMPLETE CBC W/AUTO DIFF WBC: CPT

## 2022-01-01 PROCEDURE — 83605 ASSAY OF LACTIC ACID: CPT | Performed by: INTERNAL MEDICINE

## 2022-01-01 PROCEDURE — 88305 TISSUE EXAM BY PATHOLOGIST: CPT | Mod: 26 | Performed by: PATHOLOGY

## 2022-01-01 PROCEDURE — 77332 RADIATION TREATMENT AID(S): CPT | Performed by: RADIOLOGY

## 2022-01-01 PROCEDURE — 85049 AUTOMATED PLATELET COUNT: CPT | Performed by: PHYSICIAN ASSISTANT

## 2022-01-01 PROCEDURE — 99207 PR SC NO CHARGE VISIT: CPT | Performed by: STUDENT IN AN ORGANIZED HEALTH CARE EDUCATION/TRAINING PROGRAM

## 2022-01-01 PROCEDURE — 38208 THAW PRESERVED STEM CELLS: CPT | Performed by: STUDENT IN AN ORGANIZED HEALTH CARE EDUCATION/TRAINING PROGRAM

## 2022-01-01 PROCEDURE — 38207 CRYOPRESERVE STEM CELLS: CPT | Performed by: RADIOLOGY

## 2022-01-01 PROCEDURE — 87799 DETECT AGENT NOS DNA QUANT: CPT | Performed by: PHYSICIAN ASSISTANT

## 2022-01-01 PROCEDURE — 84295 ASSAY OF SERUM SODIUM: CPT

## 2022-01-01 PROCEDURE — 99233 SBSQ HOSP IP/OBS HIGH 50: CPT | Mod: 25 | Performed by: STUDENT IN AN ORGANIZED HEALTH CARE EDUCATION/TRAINING PROGRAM

## 2022-01-01 PROCEDURE — 81382 HLA II TYPING 1 LOC HR: CPT

## 2022-01-01 PROCEDURE — 94375 RESPIRATORY FLOW VOLUME LOOP: CPT | Performed by: INTERNAL MEDICINE

## 2022-01-01 PROCEDURE — 83735 ASSAY OF MAGNESIUM: CPT | Performed by: INTERNAL MEDICINE

## 2022-01-01 PROCEDURE — 81378 HLA I & II TYPING HR: CPT | Performed by: GENERAL ACUTE CARE HOSPITAL

## 2022-01-01 PROCEDURE — G0463 HOSPITAL OUTPT CLINIC VISIT: HCPCS | Mod: 25

## 2022-01-01 PROCEDURE — 71046 X-RAY EXAM CHEST 2 VIEWS: CPT | Mod: 26 | Performed by: RADIOLOGY

## 2022-01-01 PROCEDURE — 99203 OFFICE O/P NEW LOW 30 MIN: CPT | Mod: 25 | Performed by: RADIOLOGY

## 2022-01-01 PROCEDURE — 89050 BODY FLUID CELL COUNT: CPT

## 2022-01-01 PROCEDURE — 86828 HLA CLASS I&II ANTIBODY QUAL: CPT

## 2022-01-01 PROCEDURE — 87040 BLOOD CULTURE FOR BACTERIA: CPT

## 2022-01-01 PROCEDURE — 77290 THER RAD SIMULAJ FIELD CPLX: CPT | Mod: 26 | Performed by: RADIOLOGY

## 2022-01-01 PROCEDURE — 99214 OFFICE O/P EST MOD 30 MIN: CPT

## 2022-01-01 PROCEDURE — 87086 URINE CULTURE/COLONY COUNT: CPT | Performed by: STUDENT IN AN ORGANIZED HEALTH CARE EDUCATION/TRAINING PROGRAM

## 2022-01-01 PROCEDURE — 99152 MOD SED SAME PHYS/QHP 5/>YRS: CPT | Mod: GC | Performed by: RADIOLOGY

## 2022-01-01 PROCEDURE — 86828 HLA CLASS I&II ANTIBODY QUAL: CPT | Mod: XU | Performed by: INTERNAL MEDICINE

## 2022-01-01 PROCEDURE — 86644 CMV ANTIBODY: CPT

## 2022-01-01 PROCEDURE — 86923 COMPATIBILITY TEST ELECTRIC: CPT | Performed by: STUDENT IN AN ORGANIZED HEALTH CARE EDUCATION/TRAINING PROGRAM

## 2022-01-01 PROCEDURE — 36415 COLL VENOUS BLD VENIPUNCTURE: CPT | Performed by: INTERNAL MEDICINE

## 2022-01-01 PROCEDURE — 88341 IMHCHEM/IMCYTCHM EA ADD ANTB: CPT | Mod: 26 | Performed by: PATHOLOGY

## 2022-01-01 PROCEDURE — 250N000011 HC RX IP 250 OP 636: Performed by: HOSPITALIST

## 2022-01-01 PROCEDURE — 84157 ASSAY OF PROTEIN OTHER: CPT

## 2022-01-01 PROCEDURE — 85014 HEMATOCRIT: CPT

## 2022-01-01 PROCEDURE — 88305 TISSUE EXAM BY PATHOLOGIST: CPT | Mod: 26 | Performed by: DERMATOLOGY

## 2022-01-01 PROCEDURE — 99207 PR SC NO CHARGE VISIT: CPT | Performed by: INTERNAL MEDICINE

## 2022-01-01 PROCEDURE — 85025 COMPLETE CBC W/AUTO DIFF WBC: CPT | Performed by: STUDENT IN AN ORGANIZED HEALTH CARE EDUCATION/TRAINING PROGRAM

## 2022-01-01 PROCEDURE — 88264 CHROMOSOME ANALYSIS 20-25: CPT

## 2022-01-01 PROCEDURE — 97530 THERAPEUTIC ACTIVITIES: CPT | Mod: GO

## 2022-01-01 PROCEDURE — 99356 PR PROLONGED SERV,INPATIENT,1ST HR: CPT | Performed by: PHYSICIAN ASSISTANT

## 2022-01-01 PROCEDURE — 250N000012 HC RX MED GY IP 250 OP 636 PS 637: Performed by: INTERNAL MEDICINE

## 2022-01-01 PROCEDURE — 272N000504 HC NEEDLE CR4

## 2022-01-01 PROCEDURE — 77331 SPECIAL RADIATION DOSIMETRY: CPT | Performed by: RADIOLOGY

## 2022-01-01 PROCEDURE — 99214 OFFICE O/P EST MOD 30 MIN: CPT | Mod: 25

## 2022-01-01 PROCEDURE — 94729 DIFFUSING CAPACITY: CPT | Performed by: INTERNAL MEDICINE

## 2022-01-01 PROCEDURE — 87798 DETECT AGENT NOS DNA AMP: CPT

## 2022-01-01 PROCEDURE — U0003 INFECTIOUS AGENT DETECTION BY NUCLEIC ACID (DNA OR RNA); SEVERE ACUTE RESPIRATORY SYNDROME CORONAVIRUS 2 (SARS-COV-2) (CORONAVIRUS DISEASE [COVID-19]), AMPLIFIED PROBE TECHNIQUE, MAKING USE OF HIGH THROUGHPUT TECHNOLOGIES AS DESCRIBED BY CMS-2020-01-R: HCPCS | Performed by: STUDENT IN AN ORGANIZED HEALTH CARE EDUCATION/TRAINING PROGRAM

## 2022-01-01 PROCEDURE — 84145 PROCALCITONIN (PCT): CPT | Performed by: PHYSICIAN ASSISTANT

## 2022-01-01 PROCEDURE — 38221 DX BONE MARROW BIOPSIES: CPT | Performed by: STUDENT IN AN ORGANIZED HEALTH CARE EDUCATION/TRAINING PROGRAM

## 2022-01-01 PROCEDURE — 80195 ASSAY OF SIROLIMUS: CPT | Performed by: INTERNAL MEDICINE

## 2022-01-01 PROCEDURE — 87081 CULTURE SCREEN ONLY: CPT | Performed by: STUDENT IN AN ORGANIZED HEALTH CARE EDUCATION/TRAINING PROGRAM

## 2022-01-01 PROCEDURE — 81310 NPM1 GENE: CPT

## 2022-01-01 PROCEDURE — 94640 AIRWAY INHALATION TREATMENT: CPT | Performed by: INTERNAL MEDICINE

## 2022-01-01 PROCEDURE — 999N001098 HLA ANTIBODY (PRA) CLASS II SCREEN: Performed by: INTERNAL MEDICINE

## 2022-01-01 PROCEDURE — 87040 BLOOD CULTURE FOR BACTERIA: CPT | Performed by: INTERNAL MEDICINE

## 2022-01-01 PROCEDURE — 82248 BILIRUBIN DIRECT: CPT | Performed by: STUDENT IN AN ORGANIZED HEALTH CARE EDUCATION/TRAINING PROGRAM

## 2022-01-01 PROCEDURE — 71046 X-RAY EXAM CHEST 2 VIEWS: CPT

## 2022-01-01 PROCEDURE — 71045 X-RAY EXAM CHEST 1 VIEW: CPT | Mod: 26 | Performed by: RADIOLOGY

## 2022-01-01 PROCEDURE — 81245 FLT3 GENE: CPT

## 2022-01-01 PROCEDURE — 85007 BL SMEAR W/DIFF WBC COUNT: CPT

## 2022-01-01 PROCEDURE — 86901 BLOOD TYPING SEROLOGIC RH(D): CPT

## 2022-01-01 PROCEDURE — 62270 DX LMBR SPI PNXR: CPT | Performed by: PHYSICIAN ASSISTANT

## 2022-01-01 PROCEDURE — 85097 BONE MARROW INTERPRETATION: CPT | Performed by: PATHOLOGY

## 2022-01-01 PROCEDURE — 86780 TREPONEMA PALLIDUM: CPT

## 2022-01-01 PROCEDURE — 86644 CMV ANTIBODY: CPT | Performed by: INTERNAL MEDICINE

## 2022-01-01 PROCEDURE — 36591 DRAW BLOOD OFF VENOUS DEVICE: CPT

## 2022-01-01 PROCEDURE — 87533 HHV-6 DNA QUANT: CPT | Performed by: PHYSICIAN ASSISTANT

## 2022-01-01 PROCEDURE — 99223 1ST HOSP IP/OBS HIGH 75: CPT | Mod: AI

## 2022-01-01 PROCEDURE — 85730 THROMBOPLASTIN TIME PARTIAL: CPT | Performed by: STUDENT IN AN ORGANIZED HEALTH CARE EDUCATION/TRAINING PROGRAM

## 2022-01-01 PROCEDURE — 97110 THERAPEUTIC EXERCISES: CPT | Mod: GO

## 2022-01-01 PROCEDURE — 82310 ASSAY OF CALCIUM: CPT

## 2022-01-01 PROCEDURE — 77470 SPECIAL RADIATION TREATMENT: CPT | Mod: 26 | Performed by: RADIOLOGY

## 2022-01-01 PROCEDURE — 87324 CLOSTRIDIUM AG IA: CPT | Performed by: STUDENT IN AN ORGANIZED HEALTH CARE EDUCATION/TRAINING PROGRAM

## 2022-01-01 PROCEDURE — 87040 BLOOD CULTURE FOR BACTERIA: CPT | Performed by: STUDENT IN AN ORGANIZED HEALTH CARE EDUCATION/TRAINING PROGRAM

## 2022-01-01 PROCEDURE — 999N000147 HC STATISTIC PT IP EVAL DEFER

## 2022-01-01 PROCEDURE — 84550 ASSAY OF BLOOD/URIC ACID: CPT | Performed by: STUDENT IN AN ORGANIZED HEALTH CARE EDUCATION/TRAINING PROGRAM

## 2022-01-01 PROCEDURE — 99152 MOD SED SAME PHYS/QHP 5/>YRS: CPT

## 2022-01-01 PROCEDURE — G0452 MOLECULAR PATHOLOGY INTERPR: HCPCS | Mod: 26 | Performed by: STUDENT IN AN ORGANIZED HEALTH CARE EDUCATION/TRAINING PROGRAM

## 2022-01-01 PROCEDURE — 86833 HLA CLASS II HIGH DEFIN QUAL: CPT | Performed by: INTERNAL MEDICINE

## 2022-01-01 PROCEDURE — 71250 CT THORAX DX C-: CPT

## 2022-01-01 PROCEDURE — 3E04305 INTRODUCTION OF OTHER ANTINEOPLASTIC INTO CENTRAL VEIN, PERCUTANEOUS APPROACH: ICD-10-PCS | Performed by: STUDENT IN AN ORGANIZED HEALTH CARE EDUCATION/TRAINING PROGRAM

## 2022-01-01 PROCEDURE — 93306 TTE W/DOPPLER COMPLETE: CPT | Performed by: INTERNAL MEDICINE

## 2022-01-01 PROCEDURE — 84132 ASSAY OF SERUM POTASSIUM: CPT | Performed by: INTERNAL MEDICINE

## 2022-01-01 PROCEDURE — 77263 THER RADIOLOGY TX PLNG CPLX: CPT | Performed by: RADIOLOGY

## 2022-01-01 PROCEDURE — 77334 RADIATION TREATMENT AID(S): CPT | Mod: 26 | Performed by: RADIOLOGY

## 2022-01-01 PROCEDURE — 85018 HEMOGLOBIN: CPT

## 2022-01-01 PROCEDURE — 84132 ASSAY OF SERUM POTASSIUM: CPT | Performed by: HOSPITALIST

## 2022-01-01 PROCEDURE — 250N000013 HC RX MED GY IP 250 OP 250 PS 637: Performed by: INTERNAL MEDICINE

## 2022-01-01 PROCEDURE — 71046 X-RAY EXAM CHEST 2 VIEWS: CPT | Mod: GC | Performed by: RADIOLOGY

## 2022-01-01 PROCEDURE — 85730 THROMBOPLASTIN TIME PARTIAL: CPT

## 2022-01-01 PROCEDURE — 81272 KIT GENE TARGETED SEQ ANALYS: CPT

## 2022-01-01 PROCEDURE — 93005 ELECTROCARDIOGRAM TRACING: CPT

## 2022-01-01 PROCEDURE — 87486 CHLMYD PNEUM DNA AMP PROBE: CPT | Performed by: PHYSICIAN ASSISTANT

## 2022-01-01 PROCEDURE — 38222 DX BONE MARROW BX & ASPIR: CPT | Mod: RT | Performed by: STUDENT IN AN ORGANIZED HEALTH CARE EDUCATION/TRAINING PROGRAM

## 2022-01-01 PROCEDURE — 71250 CT THORAX DX C-: CPT | Mod: 26 | Performed by: RADIOLOGY

## 2022-01-01 PROCEDURE — 88189 FLOWCYTOMETRY/READ 16 & >: CPT | Mod: GC | Performed by: PATHOLOGY

## 2022-01-01 PROCEDURE — 94726 PLETHYSMOGRAPHY LUNG VOLUMES: CPT | Performed by: INTERNAL MEDICINE

## 2022-01-01 PROCEDURE — 250N000009 HC RX 250: Performed by: STUDENT IN AN ORGANIZED HEALTH CARE EDUCATION/TRAINING PROGRAM

## 2022-01-01 PROCEDURE — 87633 RESP VIRUS 12-25 TARGETS: CPT | Performed by: PHYSICIAN ASSISTANT

## 2022-01-01 PROCEDURE — 86923 COMPATIBILITY TEST ELECTRIC: CPT | Performed by: INTERNAL MEDICINE

## 2022-01-01 PROCEDURE — 88291 CYTO/MOLECULAR REPORT: CPT | Performed by: MEDICAL GENETICS

## 2022-01-01 PROCEDURE — 07DR3ZX EXTRACTION OF ILIAC BONE MARROW, PERCUTANEOUS APPROACH, DIAGNOSTIC: ICD-10-PCS

## 2022-01-01 PROCEDURE — 99233 SBSQ HOSP IP/OBS HIGH 50: CPT | Performed by: PHYSICIAN ASSISTANT

## 2022-01-01 PROCEDURE — 88311 DECALCIFY TISSUE: CPT | Mod: TC

## 2022-01-01 PROCEDURE — 81267 CHIMERISM ANAL NO CELL SELEC: CPT

## 2022-01-01 PROCEDURE — 71045 X-RAY EXAM CHEST 1 VIEW: CPT

## 2022-01-01 PROCEDURE — 38240 TRANSPLT ALLO HCT/DONOR: CPT | Performed by: STUDENT IN AN ORGANIZED HEALTH CARE EDUCATION/TRAINING PROGRAM

## 2022-01-01 PROCEDURE — 87149 DNA/RNA DIRECT PROBE: CPT | Performed by: STUDENT IN AN ORGANIZED HEALTH CARE EDUCATION/TRAINING PROGRAM

## 2022-01-01 PROCEDURE — 88161 CYTOPATH SMEAR OTHER SOURCE: CPT | Mod: 26 | Performed by: PATHOLOGY

## 2022-01-01 PROCEDURE — 999N000142 HC STATISTIC PROCEDURE PREP ONLY

## 2022-01-01 PROCEDURE — 71250 CT THORAX DX C-: CPT | Performed by: RADIOLOGY

## 2022-01-01 PROCEDURE — U0005 INFEC AGEN DETEC AMPLI PROBE: HCPCS | Performed by: INTERNAL MEDICINE

## 2022-01-01 PROCEDURE — 85060 BLOOD SMEAR INTERPRETATION: CPT | Mod: GC | Performed by: PATHOLOGY

## 2022-01-01 PROCEDURE — 84132 ASSAY OF SERUM POTASSIUM: CPT | Performed by: STUDENT IN AN ORGANIZED HEALTH CARE EDUCATION/TRAINING PROGRAM

## 2022-01-01 PROCEDURE — 85025 COMPLETE CBC W/AUTO DIFF WBC: CPT | Performed by: INTERNAL MEDICINE

## 2022-01-01 PROCEDURE — 83735 ASSAY OF MAGNESIUM: CPT | Performed by: HOSPITALIST

## 2022-01-01 PROCEDURE — 86803 HEPATITIS C AB TEST: CPT

## 2022-01-01 PROCEDURE — 99232 SBSQ HOSP IP/OBS MODERATE 35: CPT | Mod: GC | Performed by: STUDENT IN AN ORGANIZED HEALTH CARE EDUCATION/TRAINING PROGRAM

## 2022-01-01 PROCEDURE — U0003 INFECTIOUS AGENT DETECTION BY NUCLEIC ACID (DNA OR RNA); SEVERE ACUTE RESPIRATORY SYNDROME CORONAVIRUS 2 (SARS-COV-2) (CORONAVIRUS DISEASE [COVID-19]), AMPLIFIED PROBE TECHNIQUE, MAKING USE OF HIGH THROUGHPUT TECHNOLOGIES AS DESCRIBED BY CMS-2020-01-R: HCPCS | Performed by: PHYSICIAN ASSISTANT

## 2022-01-01 PROCEDURE — 88185 FLOWCYTOMETRY/TC ADD-ON: CPT

## 2022-01-01 PROCEDURE — 77470 SPECIAL RADIATION TREATMENT: CPT | Performed by: RADIOLOGY

## 2022-01-01 PROCEDURE — 97530 THERAPEUTIC ACTIVITIES: CPT | Mod: GO | Performed by: OCCUPATIONAL THERAPIST

## 2022-01-01 PROCEDURE — U0005 INFEC AGEN DETEC AMPLI PROBE: HCPCS

## 2022-01-01 PROCEDURE — 86901 BLOOD TYPING SEROLOGIC RH(D): CPT | Performed by: STUDENT IN AN ORGANIZED HEALTH CARE EDUCATION/TRAINING PROGRAM

## 2022-01-01 PROCEDURE — C1769 GUIDE WIRE: HCPCS

## 2022-01-01 PROCEDURE — 87340 HEPATITIS B SURFACE AG IA: CPT

## 2022-01-01 PROCEDURE — 86140 C-REACTIVE PROTEIN: CPT | Performed by: PHYSICIAN ASSISTANT

## 2022-01-01 PROCEDURE — 99238 HOSP IP/OBS DSCHRG MGMT 30/<: CPT | Performed by: PHYSICIAN ASSISTANT

## 2022-01-01 PROCEDURE — 81001 URINALYSIS AUTO W/SCOPE: CPT | Performed by: STUDENT IN AN ORGANIZED HEALTH CARE EDUCATION/TRAINING PROGRAM

## 2022-01-01 PROCEDURE — C1751 CATH, INF, PER/CENT/MIDLINE: HCPCS

## 2022-01-01 PROCEDURE — 85048 AUTOMATED LEUKOCYTE COUNT: CPT

## 2022-01-01 PROCEDURE — 84550 ASSAY OF BLOOD/URIC ACID: CPT

## 2022-01-01 PROCEDURE — 99356 PR PROLONGED SERV,INPATIENT,1ST HR: CPT

## 2022-01-01 PROCEDURE — 86704 HEP B CORE ANTIBODY TOTAL: CPT

## 2022-01-01 PROCEDURE — 77431 RADIATION THERAPY MANAGEMENT: CPT | Performed by: RADIOLOGY

## 2022-01-01 PROCEDURE — 88313 SPECIAL STAINS GROUP 2: CPT | Mod: 26 | Performed by: PATHOLOGY

## 2022-01-01 PROCEDURE — U0005 INFEC AGEN DETEC AMPLI PROBE: HCPCS | Performed by: PHYSICIAN ASSISTANT

## 2022-01-01 PROCEDURE — 30243Y3 TRANSFUSION OF ALLOGENEIC UNRELATED HEMATOPOIETIC STEM CELLS INTO CENTRAL VEIN, PERCUTANEOUS APPROACH: ICD-10-PCS | Performed by: STUDENT IN AN ORGANIZED HEALTH CARE EDUCATION/TRAINING PROGRAM

## 2022-01-01 PROCEDURE — 81265 STR MARKERS SPECIMEN ANAL: CPT

## 2022-01-01 PROCEDURE — 85610 PROTHROMBIN TIME: CPT

## 2022-01-01 PROCEDURE — 87086 URINE CULTURE/COLONY COUNT: CPT | Performed by: PHYSICIAN ASSISTANT

## 2022-01-01 PROCEDURE — 88321 CONSLTJ&REPRT SLD PREP ELSWR: CPT | Performed by: PATHOLOGY

## 2022-01-01 PROCEDURE — 258N000003 HC RX IP 258 OP 636: Performed by: PHYSICIAN ASSISTANT

## 2022-01-01 PROCEDURE — 99417 PROLNG OP E/M EACH 15 MIN: CPT

## 2022-01-01 PROCEDURE — 86665 EPSTEIN-BARR CAPSID VCA: CPT

## 2022-01-01 PROCEDURE — 87389 HIV-1 AG W/HIV-1&-2 AB AG IA: CPT

## 2022-01-01 PROCEDURE — 94642 AEROSOL INHALATION TREATMENT: CPT | Performed by: INTERNAL MEDICINE

## 2022-01-01 PROCEDURE — 88108 CYTOPATH CONCENTRATE TECH: CPT | Mod: 26 | Performed by: PATHOLOGY

## 2022-01-01 PROCEDURE — 77321 SPECIAL TELETX PORT PLAN: CPT | Mod: 26 | Performed by: RADIOLOGY

## 2022-01-01 PROCEDURE — 38222 DX BONE MARROW BX & ASPIR: CPT | Performed by: STUDENT IN AN ORGANIZED HEALTH CARE EDUCATION/TRAINING PROGRAM

## 2022-01-01 PROCEDURE — 81372 HLA I TYPING COMPLETE LR: CPT

## 2022-01-01 PROCEDURE — 38222 DX BONE MARROW BX & ASPIR: CPT | Mod: RT

## 2022-01-01 PROCEDURE — 77412 RADIATION TX DELIVERY LVL 3: CPT | Performed by: RADIOLOGY

## 2022-01-01 PROCEDURE — 77001 FLUOROGUIDE FOR VEIN DEVICE: CPT | Mod: 26 | Performed by: RADIOLOGY

## 2022-01-01 PROCEDURE — 87493 C DIFF AMPLIFIED PROBE: CPT | Performed by: STUDENT IN AN ORGANIZED HEALTH CARE EDUCATION/TRAINING PROGRAM

## 2022-01-01 PROCEDURE — 85060 BLOOD SMEAR INTERPRETATION: CPT | Performed by: PATHOLOGY

## 2022-01-01 PROCEDURE — 999N001093 HLA VIRTUAL CROSSMATCH (VXM), LIVING DONOR: Performed by: INTERNAL MEDICINE

## 2022-01-01 PROCEDURE — 86790 VIRUS ANTIBODY NOS: CPT

## 2022-01-01 PROCEDURE — C1887 CATHETER, GUIDING: HCPCS

## 2022-01-01 RX ORDER — HEPARIN SODIUM (PORCINE) LOCK FLUSH IV SOLN 100 UNIT/ML 100 UNIT/ML
500 SOLUTION INTRAVENOUS EVERY 8 HOURS PRN
Status: DISCONTINUED | OUTPATIENT
Start: 2022-01-01 | End: 2022-01-01 | Stop reason: HOSPADM

## 2022-01-01 RX ORDER — ONDANSETRON 8 MG/1
8 TABLET, FILM COATED ORAL EVERY 8 HOURS
Status: COMPLETED | OUTPATIENT
Start: 2022-01-01 | End: 2022-01-01

## 2022-01-01 RX ORDER — FLUCONAZOLE 200 MG/1
200 TABLET ORAL DAILY
Status: DISCONTINUED | OUTPATIENT
Start: 2022-01-01 | End: 2022-01-01

## 2022-01-01 RX ORDER — SIROLIMUS 1 MG/1
5 TABLET, FILM COATED ORAL DAILY
Qty: 30 TABLET | Refills: 0 | Status: SHIPPED | OUTPATIENT
Start: 2022-01-01 | End: 2022-01-01

## 2022-01-01 RX ORDER — ACYCLOVIR 800 MG/1
800 TABLET ORAL 2 TIMES DAILY
Qty: 60 TABLET | Refills: 3 | Status: SHIPPED | OUTPATIENT
Start: 2022-01-01 | End: 2022-01-01

## 2022-01-01 RX ORDER — SIROLIMUS 2 MG/1
8 TABLET, FILM COATED ORAL ONCE
Status: COMPLETED | OUTPATIENT
Start: 2022-01-01 | End: 2022-01-01

## 2022-01-01 RX ORDER — HEPARIN SODIUM,PORCINE 10 UNIT/ML
5 VIAL (ML) INTRAVENOUS ONCE
Status: COMPLETED | OUTPATIENT
Start: 2022-01-01 | End: 2022-01-01

## 2022-01-01 RX ORDER — LORAZEPAM 2 MG/ML
.5-1 INJECTION INTRAMUSCULAR EVERY 4 HOURS PRN
Status: DISCONTINUED | OUTPATIENT
Start: 2022-01-01 | End: 2022-01-01 | Stop reason: HOSPADM

## 2022-01-01 RX ORDER — HEPARIN SODIUM,PORCINE 10 UNIT/ML
5-20 VIAL (ML) INTRAVENOUS
Status: CANCELLED | OUTPATIENT
Start: 2022-01-01

## 2022-01-01 RX ORDER — HEPARIN SODIUM,PORCINE 10 UNIT/ML
5 VIAL (ML) INTRAVENOUS
Status: CANCELLED | OUTPATIENT
Start: 2022-01-01

## 2022-01-01 RX ORDER — ACYCLOVIR 800 MG/1
800 TABLET ORAL 2 TIMES DAILY
Status: DISCONTINUED | OUTPATIENT
Start: 2022-01-01 | End: 2022-01-01 | Stop reason: HOSPADM

## 2022-01-01 RX ORDER — PANTOPRAZOLE SODIUM 40 MG/1
40 TABLET, DELAYED RELEASE ORAL
Qty: 60 TABLET | Refills: 0 | Status: SHIPPED | OUTPATIENT
Start: 2022-01-01

## 2022-01-01 RX ORDER — PANTOPRAZOLE SODIUM 40 MG/1
40 TABLET, DELAYED RELEASE ORAL DAILY
Status: DISCONTINUED | OUTPATIENT
Start: 2022-01-01 | End: 2022-01-01

## 2022-01-01 RX ORDER — POTASSIUM CHLORIDE 750 MG/1
20 TABLET, EXTENDED RELEASE ORAL ONCE
Status: COMPLETED | OUTPATIENT
Start: 2022-01-01 | End: 2022-01-01

## 2022-01-01 RX ORDER — SIROLIMUS 2 MG/1
4 TABLET, FILM COATED ORAL DAILY
Status: DISCONTINUED | OUTPATIENT
Start: 2022-01-01 | End: 2022-01-01

## 2022-01-01 RX ORDER — ACYCLOVIR 400 MG/1
400 TABLET ORAL 2 TIMES DAILY
Status: ON HOLD | COMMUNITY
End: 2022-01-01

## 2022-01-01 RX ORDER — ONDANSETRON 8 MG/1
8 TABLET, FILM COATED ORAL EVERY 8 HOURS
Status: DISCONTINUED | OUTPATIENT
Start: 2022-01-01 | End: 2022-01-01

## 2022-01-01 RX ORDER — HEPARIN SODIUM,PORCINE 10 UNIT/ML
5 VIAL (ML) INTRAVENOUS
Status: DISCONTINUED | OUTPATIENT
Start: 2022-01-01 | End: 2022-01-01 | Stop reason: HOSPADM

## 2022-01-01 RX ORDER — POTASSIUM CHLORIDE 29.8 MG/ML
20 INJECTION INTRAVENOUS
Status: COMPLETED | OUTPATIENT
Start: 2022-01-01 | End: 2022-01-01

## 2022-01-01 RX ORDER — PROCHLORPERAZINE MALEATE 5 MG
5 TABLET ORAL EVERY 6 HOURS PRN
Status: DISCONTINUED | OUTPATIENT
Start: 2022-01-01 | End: 2022-01-01 | Stop reason: HOSPADM

## 2022-01-01 RX ORDER — SODIUM CHLORIDE 9 MG/ML
INJECTION, SOLUTION INTRAVENOUS CONTINUOUS
Status: DISCONTINUED | OUTPATIENT
Start: 2022-01-01 | End: 2022-01-01

## 2022-01-01 RX ORDER — URSODIOL 300 MG/1
300 CAPSULE ORAL 3 TIMES DAILY
Status: DISCONTINUED | OUTPATIENT
Start: 2022-01-01 | End: 2022-01-01 | Stop reason: HOSPADM

## 2022-01-01 RX ORDER — FUROSEMIDE 10 MG/ML
10 INJECTION INTRAMUSCULAR; INTRAVENOUS
Status: DISPENSED | OUTPATIENT
Start: 2022-01-01 | End: 2022-01-01

## 2022-01-01 RX ORDER — FUROSEMIDE 10 MG/ML
20 INJECTION INTRAMUSCULAR; INTRAVENOUS EVERY 8 HOURS PRN
Status: DISPENSED | OUTPATIENT
Start: 2022-01-01 | End: 2022-01-01

## 2022-01-01 RX ORDER — MEPERIDINE HYDROCHLORIDE 25 MG/ML
25-50 INJECTION INTRAMUSCULAR; INTRAVENOUS; SUBCUTANEOUS
Status: ACTIVE | OUTPATIENT
Start: 2022-01-01 | End: 2022-01-01

## 2022-01-01 RX ORDER — ONDANSETRON 2 MG/ML
4 INJECTION INTRAMUSCULAR; INTRAVENOUS EVERY 6 HOURS PRN
Status: DISCONTINUED | OUTPATIENT
Start: 2022-01-01 | End: 2022-01-01

## 2022-01-01 RX ORDER — CEFPODOXIME PROXETIL 200 MG/1
200 TABLET, FILM COATED ORAL 2 TIMES DAILY
Qty: 5 TABLET | Refills: 0 | Status: SHIPPED | OUTPATIENT
Start: 2022-01-01 | End: 2022-01-01

## 2022-01-01 RX ORDER — PIPERACILLIN SODIUM, TAZOBACTAM SODIUM 4; .5 G/20ML; G/20ML
4.5 INJECTION, POWDER, LYOPHILIZED, FOR SOLUTION INTRAVENOUS EVERY 6 HOURS
Status: DISCONTINUED | OUTPATIENT
Start: 2022-01-01 | End: 2022-01-01

## 2022-01-01 RX ORDER — LIDOCAINE HYDROCHLORIDE 20 MG/ML
JELLY TOPICAL EVERY 4 HOURS PRN
Status: DISCONTINUED | OUTPATIENT
Start: 2022-01-01 | End: 2022-01-01 | Stop reason: HOSPADM

## 2022-01-01 RX ORDER — SODIUM CHLORIDE 9 MG/ML
INJECTION, SOLUTION INTRAVENOUS CONTINUOUS
Status: ACTIVE | OUTPATIENT
Start: 2022-01-01 | End: 2022-01-01

## 2022-01-01 RX ORDER — FUROSEMIDE 10 MG/ML
20 INJECTION INTRAMUSCULAR; INTRAVENOUS ONCE
Status: COMPLETED | OUTPATIENT
Start: 2022-01-01 | End: 2022-01-01

## 2022-01-01 RX ORDER — HEPARIN SODIUM (PORCINE) LOCK FLUSH IV SOLN 100 UNIT/ML 100 UNIT/ML
5 SOLUTION INTRAVENOUS
Status: CANCELLED | OUTPATIENT
Start: 2022-01-01

## 2022-01-01 RX ORDER — ALBUTEROL SULFATE 0.83 MG/ML
2.5 SOLUTION RESPIRATORY (INHALATION) ONCE
Status: CANCELLED
Start: 2022-01-01 | End: 2022-01-01

## 2022-01-01 RX ORDER — SIROLIMUS 1 MG/1
3 TABLET, FILM COATED ORAL DAILY
Qty: 30 TABLET | Refills: 0
Start: 2022-01-01

## 2022-01-01 RX ORDER — HEPARIN SODIUM (PORCINE) LOCK FLUSH IV SOLN 100 UNIT/ML 100 UNIT/ML
5 SOLUTION INTRAVENOUS ONCE
Status: COMPLETED | OUTPATIENT
Start: 2022-01-01 | End: 2022-01-01

## 2022-01-01 RX ORDER — ACETAMINOPHEN 325 MG/1
650 TABLET ORAL ONCE
Status: COMPLETED | OUTPATIENT
Start: 2022-01-01 | End: 2022-01-01

## 2022-01-01 RX ORDER — PANTOPRAZOLE SODIUM 40 MG/1
40 TABLET, DELAYED RELEASE ORAL
Qty: 60 TABLET | Refills: 3 | Status: SHIPPED | OUTPATIENT
Start: 2022-01-01 | End: 2022-01-01

## 2022-01-01 RX ORDER — NALOXONE HYDROCHLORIDE 0.4 MG/ML
0.2 INJECTION, SOLUTION INTRAMUSCULAR; INTRAVENOUS; SUBCUTANEOUS
Status: DISCONTINUED | OUTPATIENT
Start: 2022-01-01 | End: 2022-01-01

## 2022-01-01 RX ORDER — TRIAMCINOLONE ACETONIDE 1 MG/G
OINTMENT TOPICAL 2 TIMES DAILY PRN
Status: DISCONTINUED | OUTPATIENT
Start: 2022-01-01 | End: 2022-01-01 | Stop reason: HOSPADM

## 2022-01-01 RX ORDER — FENTANYL CITRATE 50 UG/ML
25-50 INJECTION, SOLUTION INTRAMUSCULAR; INTRAVENOUS EVERY 5 MIN PRN
Status: DISCONTINUED | OUTPATIENT
Start: 2022-01-01 | End: 2022-01-01

## 2022-01-01 RX ORDER — HEPARIN SODIUM (PORCINE) LOCK FLUSH IV SOLN 100 UNIT/ML 100 UNIT/ML
500 SOLUTION INTRAVENOUS ONCE
Status: COMPLETED | OUTPATIENT
Start: 2022-01-01 | End: 2022-01-01

## 2022-01-01 RX ORDER — LORATADINE 10 MG/1
10 TABLET ORAL DAILY
Qty: 30 TABLET | Refills: 0 | Status: SHIPPED | OUTPATIENT
Start: 2022-01-01 | End: 2022-01-01

## 2022-01-01 RX ORDER — POTASSIUM CHLORIDE 29.8 MG/ML
20 INJECTION INTRAVENOUS ONCE
Status: COMPLETED | OUTPATIENT
Start: 2022-01-01 | End: 2022-01-01

## 2022-01-01 RX ORDER — HEPARIN SODIUM,PORCINE 10 UNIT/ML
5-20 VIAL (ML) INTRAVENOUS EVERY 24 HOURS
Status: CANCELLED | OUTPATIENT
Start: 2022-01-01

## 2022-01-01 RX ORDER — DEXTROSE MONOHYDRATE, SODIUM CHLORIDE, AND POTASSIUM CHLORIDE 50; 1.49; 4.5 G/1000ML; G/1000ML; G/1000ML
INJECTION, SOLUTION INTRAVENOUS CONTINUOUS
Status: DISCONTINUED | OUTPATIENT
Start: 2022-01-01 | End: 2022-01-01

## 2022-01-01 RX ORDER — LEVOFLOXACIN 250 MG/1
250 TABLET, FILM COATED ORAL
Status: DISCONTINUED | OUTPATIENT
Start: 2022-01-01 | End: 2022-01-01

## 2022-01-01 RX ORDER — FLUCONAZOLE 200 MG/1
200 TABLET ORAL DAILY
Qty: 30 TABLET | Refills: 0 | Status: SHIPPED | OUTPATIENT
Start: 2022-01-01 | End: 2022-01-01

## 2022-01-01 RX ORDER — ALBUTEROL SULFATE 0.83 MG/ML
2.5 SOLUTION RESPIRATORY (INHALATION) ONCE
Status: COMPLETED | OUTPATIENT
Start: 2022-01-01 | End: 2022-01-01

## 2022-01-01 RX ORDER — LIDOCAINE 40 MG/G
CREAM TOPICAL
Status: DISCONTINUED | OUTPATIENT
Start: 2022-01-01 | End: 2022-01-01

## 2022-01-01 RX ORDER — CEFPODOXIME PROXETIL 200 MG/1
200 TABLET, FILM COATED ORAL 2 TIMES DAILY
Qty: 3 TABLET | Refills: 0 | Status: SHIPPED | OUTPATIENT
Start: 2022-01-01 | End: 2023-01-01

## 2022-01-01 RX ORDER — SULFAMETHOXAZOLE/TRIMETHOPRIM 800-160 MG
1 TABLET ORAL
Status: DISCONTINUED | OUTPATIENT
Start: 2022-01-01 | End: 2022-01-01 | Stop reason: HOSPADM

## 2022-01-01 RX ORDER — HEPARIN SODIUM (PORCINE) LOCK FLUSH IV SOLN 100 UNIT/ML 100 UNIT/ML
5-10 SOLUTION INTRAVENOUS
Status: DISCONTINUED | OUTPATIENT
Start: 2022-01-01 | End: 2022-01-01

## 2022-01-01 RX ORDER — FLUCONAZOLE 200 MG/1
200 TABLET ORAL DAILY
Qty: 30 TABLET | Refills: 3 | Status: SHIPPED | OUTPATIENT
Start: 2022-01-01 | End: 2022-01-01

## 2022-01-01 RX ORDER — LORATADINE 10 MG/1
10 TABLET ORAL DAILY
Status: DISCONTINUED | OUTPATIENT
Start: 2022-01-01 | End: 2022-01-01 | Stop reason: HOSPADM

## 2022-01-01 RX ORDER — DEXAMETHASONE 4 MG/1
8 TABLET ORAL ONCE
Status: COMPLETED | OUTPATIENT
Start: 2022-01-01 | End: 2022-01-01

## 2022-01-01 RX ORDER — PENTAMIDINE ISETHIONATE 300 MG/300MG
300 INHALANT RESPIRATORY (INHALATION) ONCE
Status: COMPLETED | OUTPATIENT
Start: 2022-01-01 | End: 2022-01-01

## 2022-01-01 RX ORDER — MYCOPHENOLATE MOFETIL 500 MG/1
1500 TABLET ORAL 2 TIMES DAILY
Qty: 25 TABLET | Refills: 0 | COMMUNITY
Start: 2022-01-01 | End: 2023-01-01

## 2022-01-01 RX ORDER — ALLOPURINOL 300 MG/1
300 TABLET ORAL DAILY
Status: COMPLETED | OUTPATIENT
Start: 2022-01-01 | End: 2022-01-01

## 2022-01-01 RX ORDER — PANTOPRAZOLE SODIUM 40 MG/1
40 TABLET, DELAYED RELEASE ORAL
Qty: 60 TABLET | Refills: 0 | Status: SHIPPED | OUTPATIENT
Start: 2022-01-01 | End: 2022-01-01

## 2022-01-01 RX ORDER — NALOXONE HYDROCHLORIDE 0.4 MG/ML
0.4 INJECTION, SOLUTION INTRAMUSCULAR; INTRAVENOUS; SUBCUTANEOUS
Status: DISCONTINUED | OUTPATIENT
Start: 2022-01-01 | End: 2022-01-01

## 2022-01-01 RX ORDER — ACYCLOVIR 800 MG/1
800 TABLET ORAL 2 TIMES DAILY
Qty: 60 TABLET | Refills: 0 | Status: SHIPPED | OUTPATIENT
Start: 2022-01-01 | End: 2022-01-01

## 2022-01-01 RX ORDER — FLUCONAZOLE 200 MG/1
400 TABLET ORAL DAILY
Status: ON HOLD | COMMUNITY
End: 2022-01-01

## 2022-01-01 RX ORDER — PROCHLORPERAZINE MALEATE 5 MG
5 TABLET ORAL EVERY 6 HOURS PRN
Qty: 30 TABLET | Refills: 0 | Status: SHIPPED | OUTPATIENT
Start: 2022-01-01 | End: 2023-01-01

## 2022-01-01 RX ORDER — ONDANSETRON 8 MG/1
8 TABLET, FILM COATED ORAL EVERY 8 HOURS PRN
Status: DISCONTINUED | OUTPATIENT
Start: 2022-01-01 | End: 2022-01-01 | Stop reason: HOSPADM

## 2022-01-01 RX ORDER — FUROSEMIDE 10 MG/ML
20 INJECTION INTRAMUSCULAR; INTRAVENOUS EVERY 8 HOURS PRN
Status: ACTIVE | OUTPATIENT
Start: 2022-01-01 | End: 2022-01-01

## 2022-01-01 RX ORDER — LEVOFLOXACIN 250 MG/1
250 TABLET, FILM COATED ORAL DAILY
Status: ON HOLD | COMMUNITY
End: 2022-01-01

## 2022-01-01 RX ORDER — SIROLIMUS 2 MG/1
2 TABLET, FILM COATED ORAL ONCE
Status: COMPLETED | OUTPATIENT
Start: 2022-01-01 | End: 2022-01-01

## 2022-01-01 RX ORDER — CEFAZOLIN SODIUM 2 G/100ML
2 INJECTION, SOLUTION INTRAVENOUS
Status: COMPLETED | OUTPATIENT
Start: 2022-01-01 | End: 2022-01-01

## 2022-01-01 RX ORDER — URSODIOL 300 MG/1
300 CAPSULE ORAL 3 TIMES DAILY
Qty: 105 CAPSULE | Refills: 0 | Status: SHIPPED | OUTPATIENT
Start: 2022-01-01

## 2022-01-01 RX ORDER — ENOXAPARIN SODIUM 100 MG/ML
100 INJECTION SUBCUTANEOUS 2 TIMES DAILY
Qty: 14 ML | Refills: 0 | Status: ON HOLD | COMMUNITY
Start: 2022-01-01 | End: 2022-01-01

## 2022-01-01 RX ORDER — FLUCONAZOLE 200 MG/1
200 TABLET ORAL DAILY
Qty: 30 TABLET | Refills: 0 | Status: SHIPPED | OUTPATIENT
Start: 2022-01-01

## 2022-01-01 RX ORDER — SULFAMETHOXAZOLE/TRIMETHOPRIM 800-160 MG
1 TABLET ORAL
Qty: 16 TABLET | Refills: 0 | COMMUNITY
Start: 2022-01-01

## 2022-01-01 RX ORDER — VANCOMYCIN HYDROCHLORIDE 125 MG/1
125 CAPSULE ORAL 4 TIMES DAILY
Status: COMPLETED | OUTPATIENT
Start: 2022-01-01 | End: 2022-01-01

## 2022-01-01 RX ORDER — DEXTROSE MONOHYDRATE 50 MG/ML
10-20 INJECTION, SOLUTION INTRAVENOUS
Status: DISCONTINUED | OUTPATIENT
Start: 2022-01-01 | End: 2022-01-01 | Stop reason: HOSPADM

## 2022-01-01 RX ORDER — MYCOPHENOLATE MOFETIL 500 MG/1
1500 TABLET ORAL
Status: DISCONTINUED | OUTPATIENT
Start: 2022-01-01 | End: 2022-01-01 | Stop reason: HOSPADM

## 2022-01-01 RX ORDER — HEPARIN SODIUM,PORCINE 10 UNIT/ML
5-10 VIAL (ML) INTRAVENOUS
Status: DISCONTINUED | OUTPATIENT
Start: 2022-01-01 | End: 2022-01-01 | Stop reason: HOSPADM

## 2022-01-01 RX ORDER — ZINC OXIDE 20 %
OINTMENT (GRAM) TOPICAL
Status: DISCONTINUED | OUTPATIENT
Start: 2022-01-01 | End: 2022-01-01 | Stop reason: HOSPADM

## 2022-01-01 RX ORDER — LORAZEPAM 0.5 MG/1
.5-1 TABLET ORAL EVERY 4 HOURS PRN
Status: DISCONTINUED | OUTPATIENT
Start: 2022-01-01 | End: 2022-01-01 | Stop reason: HOSPADM

## 2022-01-01 RX ORDER — MYCOPHENOLATE MOFETIL 500 MG/1
1500 TABLET ORAL 2 TIMES DAILY
Qty: 25 TABLET | Refills: 0 | Status: SHIPPED | OUTPATIENT
Start: 2022-01-01 | End: 2022-01-01

## 2022-01-01 RX ORDER — FLUCONAZOLE 200 MG/1
200 TABLET ORAL DAILY
Status: DISCONTINUED | OUTPATIENT
Start: 2022-01-01 | End: 2022-01-01 | Stop reason: HOSPADM

## 2022-01-01 RX ORDER — ONDANSETRON 8 MG/1
8 TABLET, FILM COATED ORAL EVERY 12 HOURS
Status: DISCONTINUED | OUTPATIENT
Start: 2022-01-01 | End: 2022-01-01

## 2022-01-01 RX ORDER — TRIAMCINOLONE ACETONIDE 1 MG/G
OINTMENT TOPICAL 2 TIMES DAILY PRN
Qty: 80 G | Refills: 0 | Status: SHIPPED | OUTPATIENT
Start: 2022-01-01

## 2022-01-01 RX ORDER — HEPARIN SODIUM,PORCINE 10 UNIT/ML
5-10 VIAL (ML) INTRAVENOUS EVERY 24 HOURS
Status: DISCONTINUED | OUTPATIENT
Start: 2022-01-01 | End: 2022-01-01 | Stop reason: HOSPADM

## 2022-01-01 RX ORDER — DIPHENHYDRAMINE HYDROCHLORIDE AND LIDOCAINE HYDROCHLORIDE AND ALUMINUM HYDROXIDE AND MAGNESIUM HYDRO
10 KIT EVERY 6 HOURS PRN
Status: DISCONTINUED | OUTPATIENT
Start: 2022-01-01 | End: 2022-01-01 | Stop reason: HOSPADM

## 2022-01-01 RX ORDER — SULFAMETHOXAZOLE/TRIMETHOPRIM 800-160 MG
1 TABLET ORAL
Qty: 16 TABLET | Refills: 0 | Status: SHIPPED | OUTPATIENT
Start: 2022-01-01 | End: 2022-01-01

## 2022-01-01 RX ORDER — PANTOPRAZOLE SODIUM 40 MG/1
40 TABLET, DELAYED RELEASE ORAL
Status: DISCONTINUED | OUTPATIENT
Start: 2022-01-01 | End: 2022-01-01 | Stop reason: HOSPADM

## 2022-01-01 RX ORDER — PENTAMIDINE ISETHIONATE 300 MG/300MG
300 INHALANT RESPIRATORY (INHALATION) ONCE
Status: CANCELLED
Start: 2022-01-01 | End: 2022-01-01

## 2022-01-01 RX ORDER — HEPARIN SODIUM (PORCINE) LOCK FLUSH IV SOLN 100 UNIT/ML 100 UNIT/ML
3 SOLUTION INTRAVENOUS
Status: DISCONTINUED | OUTPATIENT
Start: 2022-01-01 | End: 2022-01-01

## 2022-01-01 RX ORDER — FLUMAZENIL 0.1 MG/ML
0.2 INJECTION, SOLUTION INTRAVENOUS
Status: DISCONTINUED | OUTPATIENT
Start: 2022-01-01 | End: 2022-01-01

## 2022-01-01 RX ORDER — PROCHLORPERAZINE MALEATE 5 MG
5 TABLET ORAL EVERY 6 HOURS PRN
Qty: 30 TABLET | Refills: 0 | Status: SHIPPED | OUTPATIENT
Start: 2022-01-01 | End: 2022-01-01

## 2022-01-01 RX ORDER — ONDANSETRON 4 MG/1
4 TABLET, ORALLY DISINTEGRATING ORAL EVERY 6 HOURS PRN
Status: DISCONTINUED | OUTPATIENT
Start: 2022-01-01 | End: 2022-01-01

## 2022-01-01 RX ORDER — ENOXAPARIN SODIUM 100 MG/ML
100 INJECTION SUBCUTANEOUS 2 TIMES DAILY
Status: DISCONTINUED | OUTPATIENT
Start: 2022-01-01 | End: 2022-01-01

## 2022-01-01 RX ORDER — ACYCLOVIR 800 MG/1
800 TABLET ORAL 2 TIMES DAILY
Qty: 60 TABLET | Refills: 0 | Status: SHIPPED | OUTPATIENT
Start: 2022-01-01 | End: 2023-01-01

## 2022-01-01 RX ORDER — MYCOPHENOLATE MOFETIL 500 MG/1
1000 TABLET ORAL 2 TIMES DAILY
Qty: 21 TABLET | Refills: 0 | Status: SHIPPED | OUTPATIENT
Start: 2022-01-01 | End: 2022-01-01

## 2022-01-01 RX ORDER — DEXTROSE MONOHYDRATE, SODIUM CHLORIDE, AND POTASSIUM CHLORIDE 50; 1.49; 4.5 G/1000ML; G/1000ML; G/1000ML
INJECTION, SOLUTION INTRAVENOUS CONTINUOUS
Status: DISPENSED | OUTPATIENT
Start: 2022-01-01 | End: 2022-01-01

## 2022-01-01 RX ORDER — HEPARIN SODIUM (PORCINE) LOCK FLUSH IV SOLN 100 UNIT/ML 100 UNIT/ML
3 SOLUTION INTRAVENOUS EVERY 24 HOURS
Status: DISCONTINUED | OUTPATIENT
Start: 2022-01-01 | End: 2022-01-01

## 2022-01-01 RX ORDER — CALCIUM CARBONATE 500 MG/1
500 TABLET, CHEWABLE ORAL DAILY PRN
Status: DISCONTINUED | OUTPATIENT
Start: 2022-01-01 | End: 2022-01-01 | Stop reason: HOSPADM

## 2022-01-01 RX ORDER — URSODIOL 300 MG/1
300 CAPSULE ORAL 3 TIMES DAILY
Qty: 105 CAPSULE | Refills: 0 | Status: SHIPPED | OUTPATIENT
Start: 2022-01-01 | End: 2022-01-01

## 2022-01-01 RX ORDER — LIDOCAINE HYDROCHLORIDE AND EPINEPHRINE 10; 10 MG/ML; UG/ML
1 INJECTION, SOLUTION INFILTRATION; PERINEURAL ONCE
Status: DISCONTINUED | OUTPATIENT
Start: 2022-01-01 | End: 2022-01-01 | Stop reason: HOSPADM

## 2022-01-01 RX ORDER — CEFPODOXIME PROXETIL 200 MG/1
200 TABLET, FILM COATED ORAL 2 TIMES DAILY
Status: DISCONTINUED | OUTPATIENT
Start: 2022-01-01 | End: 2022-01-01 | Stop reason: HOSPADM

## 2022-01-01 RX ORDER — ZINC OXIDE 20 %
OINTMENT (GRAM) TOPICAL
Qty: 425 G | Refills: 0 | Status: SHIPPED | OUTPATIENT
Start: 2022-01-01

## 2022-01-01 RX ORDER — HEPARIN SODIUM,PORCINE 10 UNIT/ML
3 VIAL (ML) INTRAVENOUS ONCE
Status: COMPLETED | OUTPATIENT
Start: 2022-01-01 | End: 2022-01-01

## 2022-01-01 RX ORDER — ACETAMINOPHEN 325 MG/1
325-650 TABLET ORAL EVERY 4 HOURS PRN
Status: DISCONTINUED | OUTPATIENT
Start: 2022-01-01 | End: 2022-01-01 | Stop reason: HOSPADM

## 2022-01-01 RX ORDER — HEPARIN SODIUM (PORCINE) LOCK FLUSH IV SOLN 100 UNIT/ML 100 UNIT/ML
5-10 SOLUTION INTRAVENOUS
Status: DISCONTINUED | OUTPATIENT
Start: 2022-01-01 | End: 2022-01-01 | Stop reason: HOSPADM

## 2022-01-01 RX ORDER — ONDANSETRON 8 MG/1
8 TABLET, FILM COATED ORAL EVERY 8 HOURS PRN
Qty: 30 TABLET | Refills: 0 | Status: SHIPPED | OUTPATIENT
Start: 2022-01-01 | End: 2022-01-01

## 2022-01-01 RX ORDER — SIROLIMUS 0.5 MG/1
0.5 TABLET, SUGAR COATED ORAL DAILY
Qty: 30 TABLET | Refills: 0 | Status: SHIPPED | OUTPATIENT
Start: 2022-01-01 | End: 2022-01-01

## 2022-01-01 RX ORDER — LOPERAMIDE HCL 2 MG
2 CAPSULE ORAL 4 TIMES DAILY PRN
Status: DISCONTINUED | OUTPATIENT
Start: 2022-01-01 | End: 2022-01-01

## 2022-01-01 RX ORDER — DIPHENHYDRAMINE HCL 25 MG
25 CAPSULE ORAL ONCE
Status: COMPLETED | OUTPATIENT
Start: 2022-01-01 | End: 2022-01-01

## 2022-01-01 RX ORDER — ONDANSETRON 8 MG/1
8 TABLET, FILM COATED ORAL EVERY 8 HOURS PRN
Qty: 30 TABLET | Refills: 0 | Status: SHIPPED | OUTPATIENT
Start: 2022-01-01 | End: 2023-01-01

## 2022-01-01 RX ORDER — ZINC OXIDE 20 %
OINTMENT (GRAM) TOPICAL
Qty: 425 G | Refills: 0 | Status: SHIPPED | OUTPATIENT
Start: 2022-01-01 | End: 2022-01-01

## 2022-01-01 RX ADMIN — VANCOMYCIN HYDROCHLORIDE 125 MG: 125 CAPSULE ORAL at 13:08

## 2022-01-01 RX ADMIN — URSODIOL 300 MG: 300 CAPSULE ORAL at 19:46

## 2022-01-01 RX ADMIN — PANTOPRAZOLE SODIUM 40 MG: 40 TABLET, DELAYED RELEASE ORAL at 08:51

## 2022-01-01 RX ADMIN — Medication 1 CAPSULE: at 08:37

## 2022-01-01 RX ADMIN — CEFEPIME HYDROCHLORIDE 2 G: 2 INJECTION, POWDER, FOR SOLUTION INTRAVENOUS at 12:41

## 2022-01-01 RX ADMIN — URSODIOL 300 MG: 300 CAPSULE ORAL at 14:27

## 2022-01-01 RX ADMIN — CEFEPIME HYDROCHLORIDE 2 G: 2 INJECTION, POWDER, FOR SOLUTION INTRAVENOUS at 05:31

## 2022-01-01 RX ADMIN — PIPERACILLIN AND TAZOBACTAM 4.5 G: 4; .5 INJECTION, POWDER, FOR SOLUTION INTRAVENOUS at 04:33

## 2022-01-01 RX ADMIN — ACYCLOVIR 800 MG: 800 TABLET ORAL at 19:35

## 2022-01-01 RX ADMIN — MICAFUNGIN SODIUM 50 MG: 50 INJECTION, POWDER, LYOPHILIZED, FOR SOLUTION INTRAVENOUS at 08:24

## 2022-01-01 RX ADMIN — PANTOPRAZOLE SODIUM 40 MG: 40 TABLET, DELAYED RELEASE ORAL at 08:41

## 2022-01-01 RX ADMIN — SODIUM CHLORIDE, PRESERVATIVE FREE 5 ML: 5 INJECTION INTRAVENOUS at 03:53

## 2022-01-01 RX ADMIN — URSODIOL 300 MG: 300 CAPSULE ORAL at 13:07

## 2022-01-01 RX ADMIN — ALLOPURINOL 300 MG: 300 TABLET ORAL at 19:56

## 2022-01-01 RX ADMIN — POTASSIUM CHLORIDE 20 MEQ: 29.8 INJECTION, SOLUTION INTRAVENOUS at 17:11

## 2022-01-01 RX ADMIN — DEXTROSE MONOHYDRATE 20 ML: 50 INJECTION, SOLUTION INTRAVENOUS at 20:01

## 2022-01-01 RX ADMIN — ALLOPURINOL 300 MG: 300 TABLET ORAL at 08:03

## 2022-01-01 RX ADMIN — SODIUM CHLORIDE, PRESERVATIVE FREE 10 ML: 5 INJECTION INTRAVENOUS at 04:21

## 2022-01-01 RX ADMIN — URSODIOL 300 MG: 300 CAPSULE ORAL at 08:15

## 2022-01-01 RX ADMIN — SODIUM CHLORIDE, PRESERVATIVE FREE 5 ML: 5 INJECTION INTRAVENOUS at 22:35

## 2022-01-01 RX ADMIN — MYCOPHENOLATE MOFETIL 1500 MG: 500 INJECTION, POWDER, LYOPHILIZED, FOR SOLUTION INTRAVENOUS at 20:23

## 2022-01-01 RX ADMIN — PANTOPRAZOLE SODIUM 40 MG: 40 TABLET, DELAYED RELEASE ORAL at 08:13

## 2022-01-01 RX ADMIN — PANTOPRAZOLE SODIUM 40 MG: 40 TABLET, DELAYED RELEASE ORAL at 08:44

## 2022-01-01 RX ADMIN — Medication 5 ML: at 08:03

## 2022-01-01 RX ADMIN — CEFEPIME HYDROCHLORIDE 2 G: 2 INJECTION, POWDER, FOR SOLUTION INTRAVENOUS at 21:21

## 2022-01-01 RX ADMIN — SIROLIMUS 3 MG: 2 TABLET, SUGAR COATED ORAL at 08:12

## 2022-01-01 RX ADMIN — FILGRASTIM 480 MCG: 480 INJECTION, SOLUTION INTRAVENOUS; SUBCUTANEOUS at 19:54

## 2022-01-01 RX ADMIN — Medication 3 ML: at 10:38

## 2022-01-01 RX ADMIN — CEFEPIME HYDROCHLORIDE 2 G: 2 INJECTION, POWDER, FOR SOLUTION INTRAVENOUS at 21:33

## 2022-01-01 RX ADMIN — URSODIOL 300 MG: 300 CAPSULE ORAL at 14:55

## 2022-01-01 RX ADMIN — FLUCONAZOLE 200 MG: 200 TABLET ORAL at 08:06

## 2022-01-01 RX ADMIN — Medication 1 CAPSULE: at 08:22

## 2022-01-01 RX ADMIN — LEVOFLOXACIN 250 MG: 250 TABLET, FILM COATED ORAL at 09:02

## 2022-01-01 RX ADMIN — Medication 1 CAPSULE: at 11:50

## 2022-01-01 RX ADMIN — MYCOPHENOLATE MOFETIL 1500 MG: 500 INJECTION, POWDER, LYOPHILIZED, FOR SOLUTION INTRAVENOUS at 07:49

## 2022-01-01 RX ADMIN — PROCHLORPERAZINE EDISYLATE 5 MG: 5 INJECTION INTRAMUSCULAR; INTRAVENOUS at 20:39

## 2022-01-01 RX ADMIN — ACETAMINOPHEN 650 MG: 325 TABLET, FILM COATED ORAL at 03:37

## 2022-01-01 RX ADMIN — MICONAZOLE NITRATE: 2 POWDER TOPICAL at 18:30

## 2022-01-01 RX ADMIN — PIPERACILLIN AND TAZOBACTAM 4.5 G: 4; .5 INJECTION, POWDER, FOR SOLUTION INTRAVENOUS at 11:04

## 2022-01-01 RX ADMIN — POTASSIUM CHLORIDE 20 MEQ: 750 TABLET, EXTENDED RELEASE ORAL at 08:05

## 2022-01-01 RX ADMIN — FUROSEMIDE 20 MG: 10 INJECTION, SOLUTION INTRAVENOUS at 15:38

## 2022-01-01 RX ADMIN — VANCOMYCIN HYDROCHLORIDE 1250 MG: 10 INJECTION, POWDER, LYOPHILIZED, FOR SOLUTION INTRAVENOUS at 03:43

## 2022-01-01 RX ADMIN — MYCOPHENOLATE MOFETIL 1500 MG: 500 INJECTION, POWDER, LYOPHILIZED, FOR SOLUTION INTRAVENOUS at 08:23

## 2022-01-01 RX ADMIN — ACYCLOVIR 800 MG: 800 TABLET ORAL at 19:46

## 2022-01-01 RX ADMIN — URSODIOL 300 MG: 300 CAPSULE ORAL at 20:03

## 2022-01-01 RX ADMIN — URSODIOL 300 MG: 300 CAPSULE ORAL at 08:42

## 2022-01-01 RX ADMIN — ALLOPURINOL 300 MG: 300 TABLET ORAL at 08:35

## 2022-01-01 RX ADMIN — ONDANSETRON HYDROCHLORIDE 8 MG: 8 TABLET, FILM COATED ORAL at 08:03

## 2022-01-01 RX ADMIN — ACYCLOVIR 800 MG: 800 TABLET ORAL at 20:30

## 2022-01-01 RX ADMIN — FLUCONAZOLE 200 MG: 200 TABLET ORAL at 08:16

## 2022-01-01 RX ADMIN — FLUCONAZOLE 200 MG: 200 TABLET ORAL at 08:44

## 2022-01-01 RX ADMIN — ACYCLOVIR 800 MG: 800 TABLET ORAL at 07:54

## 2022-01-01 RX ADMIN — ACYCLOVIR 800 MG: 800 TABLET ORAL at 20:23

## 2022-01-01 RX ADMIN — DEXTROSE MONOHYDRATE 20 ML: 50 INJECTION, SOLUTION INTRAVENOUS at 20:03

## 2022-01-01 RX ADMIN — SIROLIMUS 3 MG: 2 TABLET, SUGAR COATED ORAL at 07:54

## 2022-01-01 RX ADMIN — POTASSIUM CHLORIDE 20 MEQ: 29.8 INJECTION, SOLUTION INTRAVENOUS at 06:14

## 2022-01-01 RX ADMIN — Medication 1 CAPSULE: at 07:54

## 2022-01-01 RX ADMIN — MYCOPHENOLATE MOFETIL 1500 MG: 500 INJECTION, POWDER, LYOPHILIZED, FOR SOLUTION INTRAVENOUS at 08:47

## 2022-01-01 RX ADMIN — SODIUM CHLORIDE, PRESERVATIVE FREE 5 ML: 5 INJECTION INTRAVENOUS at 11:21

## 2022-01-01 RX ADMIN — SODIUM CHLORIDE, PRESERVATIVE FREE 5 ML: 5 INJECTION INTRAVENOUS at 11:40

## 2022-01-01 RX ADMIN — MYCOPHENOLATE MOFETIL 1500 MG: 500 INJECTION, POWDER, LYOPHILIZED, FOR SOLUTION INTRAVENOUS at 10:00

## 2022-01-01 RX ADMIN — VANCOMYCIN HYDROCHLORIDE 125 MG: 125 CAPSULE ORAL at 11:24

## 2022-01-01 RX ADMIN — DEXTROSE MONOHYDRATE 20 ML: 50 INJECTION, SOLUTION INTRAVENOUS at 20:00

## 2022-01-01 RX ADMIN — MICAFUNGIN SODIUM 50 MG: 50 INJECTION, POWDER, LYOPHILIZED, FOR SOLUTION INTRAVENOUS at 08:03

## 2022-01-01 RX ADMIN — PIPERACILLIN AND TAZOBACTAM 4.5 G: 4; .5 INJECTION, POWDER, FOR SOLUTION INTRAVENOUS at 05:07

## 2022-01-01 RX ADMIN — DEXTROSE MONOHYDRATE 20 ML: 50 INJECTION, SOLUTION INTRAVENOUS at 20:33

## 2022-01-01 RX ADMIN — MYCOPHENOLATE MOFETIL 1500 MG: 500 INJECTION, POWDER, LYOPHILIZED, FOR SOLUTION INTRAVENOUS at 10:28

## 2022-01-01 RX ADMIN — FUROSEMIDE 10 MG: 10 INJECTION, SOLUTION INTRAVENOUS at 08:57

## 2022-01-01 RX ADMIN — PANTOPRAZOLE SODIUM 40 MG: 40 TABLET, DELAYED RELEASE ORAL at 16:10

## 2022-01-01 RX ADMIN — Medication 1 CAPSULE: at 08:17

## 2022-01-01 RX ADMIN — URSODIOL 300 MG: 300 CAPSULE ORAL at 07:45

## 2022-01-01 RX ADMIN — ACYCLOVIR 800 MG: 800 TABLET ORAL at 20:03

## 2022-01-01 RX ADMIN — DEXAMETHASONE 6 MG: 2 TABLET ORAL at 08:55

## 2022-01-01 RX ADMIN — URSODIOL 300 MG: 300 CAPSULE ORAL at 20:06

## 2022-01-01 RX ADMIN — URSODIOL 300 MG: 300 CAPSULE ORAL at 08:29

## 2022-01-01 RX ADMIN — CEFEPIME HYDROCHLORIDE 2 G: 2 INJECTION, POWDER, FOR SOLUTION INTRAVENOUS at 22:43

## 2022-01-01 RX ADMIN — PIPERACILLIN AND TAZOBACTAM 4.5 G: 4; .5 INJECTION, POWDER, FOR SOLUTION INTRAVENOUS at 23:32

## 2022-01-01 RX ADMIN — ACYCLOVIR 800 MG: 800 TABLET ORAL at 20:13

## 2022-01-01 RX ADMIN — FLUCONAZOLE 200 MG: 200 TABLET ORAL at 08:12

## 2022-01-01 RX ADMIN — VANCOMYCIN HYDROCHLORIDE 125 MG: 125 CAPSULE ORAL at 20:01

## 2022-01-01 RX ADMIN — VANCOMYCIN HYDROCHLORIDE 125 MG: 125 CAPSULE ORAL at 16:08

## 2022-01-01 RX ADMIN — VANCOMYCIN HYDROCHLORIDE 125 MG: 125 CAPSULE ORAL at 08:46

## 2022-01-01 RX ADMIN — MYCOPHENOLATE MOFETIL 1500 MG: 500 INJECTION, POWDER, LYOPHILIZED, FOR SOLUTION INTRAVENOUS at 20:41

## 2022-01-01 RX ADMIN — SODIUM CHLORIDE, PRESERVATIVE FREE 5 ML: 5 INJECTION INTRAVENOUS at 12:02

## 2022-01-01 RX ADMIN — ONDANSETRON HYDROCHLORIDE 8 MG: 8 TABLET, FILM COATED ORAL at 08:35

## 2022-01-01 RX ADMIN — Medication 500 UNITS: at 11:00

## 2022-01-01 RX ADMIN — MICAFUNGIN SODIUM 50 MG: 50 INJECTION, POWDER, LYOPHILIZED, FOR SOLUTION INTRAVENOUS at 08:26

## 2022-01-01 RX ADMIN — URSODIOL 300 MG: 300 CAPSULE ORAL at 14:46

## 2022-01-01 RX ADMIN — ACYCLOVIR 800 MG: 800 TABLET ORAL at 20:27

## 2022-01-01 RX ADMIN — SODIUM CHLORIDE, PRESERVATIVE FREE 5 ML: 5 INJECTION INTRAVENOUS at 17:06

## 2022-01-01 RX ADMIN — MYCOPHENOLATE MOFETIL 1500 MG: 500 INJECTION, POWDER, LYOPHILIZED, FOR SOLUTION INTRAVENOUS at 20:25

## 2022-01-01 RX ADMIN — ACYCLOVIR 800 MG: 800 TABLET ORAL at 07:48

## 2022-01-01 RX ADMIN — FUROSEMIDE 10 MG: 10 INJECTION, SOLUTION INTRAVENOUS at 20:39

## 2022-01-01 RX ADMIN — PANTOPRAZOLE SODIUM 40 MG: 40 TABLET, DELAYED RELEASE ORAL at 07:59

## 2022-01-01 RX ADMIN — ACYCLOVIR 800 MG: 800 TABLET ORAL at 19:36

## 2022-01-01 RX ADMIN — FUROSEMIDE 10 MG: 10 INJECTION, SOLUTION INTRAVENOUS at 09:29

## 2022-01-01 RX ADMIN — ACETAMINOPHEN 650 MG: 325 TABLET, FILM COATED ORAL at 22:18

## 2022-01-01 RX ADMIN — ACYCLOVIR 800 MG: 800 TABLET ORAL at 20:06

## 2022-01-01 RX ADMIN — FILGRASTIM 480 MCG: 480 INJECTION, SOLUTION INTRAVENOUS; SUBCUTANEOUS at 20:31

## 2022-01-01 RX ADMIN — CEFEPIME HYDROCHLORIDE 2 G: 2 INJECTION, POWDER, FOR SOLUTION INTRAVENOUS at 06:08

## 2022-01-01 RX ADMIN — URSODIOL 300 MG: 300 CAPSULE ORAL at 07:58

## 2022-01-01 RX ADMIN — SIROLIMUS 5 MG: 2 TABLET, SUGAR COATED ORAL at 15:44

## 2022-01-01 RX ADMIN — ONDANSETRON HYDROCHLORIDE 8 MG: 8 TABLET, FILM COATED ORAL at 03:06

## 2022-01-01 RX ADMIN — SODIUM CHLORIDE, PRESERVATIVE FREE 5 ML: 5 INJECTION INTRAVENOUS at 07:00

## 2022-01-01 RX ADMIN — URSODIOL 300 MG: 300 CAPSULE ORAL at 13:01

## 2022-01-01 RX ADMIN — VANCOMYCIN HYDROCHLORIDE 125 MG: 125 CAPSULE ORAL at 15:06

## 2022-01-01 RX ADMIN — URSODIOL 300 MG: 300 CAPSULE ORAL at 13:41

## 2022-01-01 RX ADMIN — URSODIOL 300 MG: 300 CAPSULE ORAL at 07:47

## 2022-01-01 RX ADMIN — PANTOPRAZOLE SODIUM 40 MG: 40 TABLET, DELAYED RELEASE ORAL at 15:49

## 2022-01-01 RX ADMIN — MICAFUNGIN SODIUM 50 MG: 50 INJECTION, POWDER, LYOPHILIZED, FOR SOLUTION INTRAVENOUS at 07:57

## 2022-01-01 RX ADMIN — URSODIOL 300 MG: 300 CAPSULE ORAL at 07:54

## 2022-01-01 RX ADMIN — SODIUM CHLORIDE, PRESERVATIVE FREE 10 ML: 5 INJECTION INTRAVENOUS at 04:40

## 2022-01-01 RX ADMIN — ACYCLOVIR 800 MG: 800 TABLET ORAL at 08:51

## 2022-01-01 RX ADMIN — FILGRASTIM 480 MCG: 480 INJECTION, SOLUTION INTRAVENOUS; SUBCUTANEOUS at 19:38

## 2022-01-01 RX ADMIN — ALLOPURINOL 300 MG: 300 TABLET ORAL at 08:56

## 2022-01-01 RX ADMIN — POTASSIUM CHLORIDE 20 MEQ: 750 TABLET, EXTENDED RELEASE ORAL at 08:22

## 2022-01-01 RX ADMIN — SIROLIMUS 3 MG: 2 TABLET, SUGAR COATED ORAL at 08:03

## 2022-01-01 RX ADMIN — SODIUM CHLORIDE, PRESERVATIVE FREE 5 ML: 5 INJECTION INTRAVENOUS at 23:31

## 2022-01-01 RX ADMIN — FENTANYL CITRATE 25 MCG: 50 INJECTION, SOLUTION INTRAMUSCULAR; INTRAVENOUS at 09:40

## 2022-01-01 RX ADMIN — URSODIOL 300 MG: 300 CAPSULE ORAL at 13:39

## 2022-01-01 RX ADMIN — VANCOMYCIN HYDROCHLORIDE 125 MG: 125 CAPSULE ORAL at 07:47

## 2022-01-01 RX ADMIN — ACETAMINOPHEN 650 MG: 325 TABLET, FILM COATED ORAL at 13:21

## 2022-01-01 RX ADMIN — ONDANSETRON HYDROCHLORIDE 8 MG: 8 TABLET, FILM COATED ORAL at 01:53

## 2022-01-01 RX ADMIN — DEXAMETHASONE 10 MG: 2 TABLET ORAL at 08:35

## 2022-01-01 RX ADMIN — URSODIOL 300 MG: 300 CAPSULE ORAL at 14:30

## 2022-01-01 RX ADMIN — URSODIOL 300 MG: 300 CAPSULE ORAL at 13:24

## 2022-01-01 RX ADMIN — Medication 1 CAPSULE: at 13:08

## 2022-01-01 RX ADMIN — CEFEPIME HYDROCHLORIDE 2 G: 2 INJECTION, POWDER, FOR SOLUTION INTRAVENOUS at 15:48

## 2022-01-01 RX ADMIN — VANCOMYCIN HYDROCHLORIDE 125 MG: 125 CAPSULE ORAL at 11:50

## 2022-01-01 RX ADMIN — SULFAMETHOXAZOLE AND TRIMETHOPRIM 1 TABLET: 800; 160 TABLET ORAL at 08:05

## 2022-01-01 RX ADMIN — SODIUM CHLORIDE, PRESERVATIVE FREE 5 ML: 5 INJECTION INTRAVENOUS at 11:51

## 2022-01-01 RX ADMIN — ONDANSETRON HYDROCHLORIDE 8 MG: 8 TABLET, FILM COATED ORAL at 23:30

## 2022-01-01 RX ADMIN — PANTOPRAZOLE SODIUM 40 MG: 40 TABLET, DELAYED RELEASE ORAL at 17:28

## 2022-01-01 RX ADMIN — URSODIOL 300 MG: 300 CAPSULE ORAL at 08:37

## 2022-01-01 RX ADMIN — PANTOPRAZOLE SODIUM 40 MG: 40 TABLET, DELAYED RELEASE ORAL at 15:54

## 2022-01-01 RX ADMIN — PANTOPRAZOLE SODIUM 40 MG: 40 TABLET, DELAYED RELEASE ORAL at 08:22

## 2022-01-01 RX ADMIN — SODIUM CHLORIDE, PRESERVATIVE FREE 5 ML: 5 INJECTION INTRAVENOUS at 12:14

## 2022-01-01 RX ADMIN — DEXAMETHASONE 10 MG: 2 TABLET ORAL at 07:59

## 2022-01-01 RX ADMIN — PANTOPRAZOLE SODIUM 40 MG: 40 TABLET, DELAYED RELEASE ORAL at 16:59

## 2022-01-01 RX ADMIN — SODIUM CHLORIDE, PRESERVATIVE FREE 5 ML: 5 INJECTION INTRAVENOUS at 04:34

## 2022-01-01 RX ADMIN — MESNA 3690 MG: 100 INJECTION, SOLUTION INTRAVENOUS at 07:55

## 2022-01-01 RX ADMIN — URSODIOL 300 MG: 300 CAPSULE ORAL at 20:22

## 2022-01-01 RX ADMIN — Medication 1 CAPSULE: at 08:42

## 2022-01-01 RX ADMIN — URSODIOL 300 MG: 300 CAPSULE ORAL at 19:56

## 2022-01-01 RX ADMIN — MYCOPHENOLATE MOFETIL 1500 MG: 500 TABLET, FILM COATED ORAL at 08:45

## 2022-01-01 RX ADMIN — ONDANSETRON HYDROCHLORIDE 8 MG: 8 TABLET, FILM COATED ORAL at 19:18

## 2022-01-01 RX ADMIN — CEFEPIME HYDROCHLORIDE 2 G: 2 INJECTION, POWDER, FOR SOLUTION INTRAVENOUS at 05:11

## 2022-01-01 RX ADMIN — MYCOPHENOLATE MOFETIL 1500 MG: 500 TABLET, FILM COATED ORAL at 08:41

## 2022-01-01 RX ADMIN — ACYCLOVIR 800 MG: 800 TABLET ORAL at 19:59

## 2022-01-01 RX ADMIN — Medication 1 CAPSULE: at 12:01

## 2022-01-01 RX ADMIN — FUROSEMIDE 10 MG: 10 INJECTION, SOLUTION INTRAVENOUS at 00:34

## 2022-01-01 RX ADMIN — PANTOPRAZOLE SODIUM 40 MG: 40 TABLET, DELAYED RELEASE ORAL at 08:40

## 2022-01-01 RX ADMIN — VANCOMYCIN HYDROCHLORIDE 125 MG: 125 CAPSULE ORAL at 12:36

## 2022-01-01 RX ADMIN — MIDAZOLAM 0.5 MG: 1 INJECTION INTRAMUSCULAR; INTRAVENOUS at 09:40

## 2022-01-01 RX ADMIN — URSODIOL 300 MG: 300 CAPSULE ORAL at 19:44

## 2022-01-01 RX ADMIN — SODIUM CHLORIDE, PRESERVATIVE FREE 5 ML: 5 INJECTION INTRAVENOUS at 11:05

## 2022-01-01 RX ADMIN — VANCOMYCIN HYDROCHLORIDE 125 MG: 125 CAPSULE ORAL at 19:56

## 2022-01-01 RX ADMIN — SODIUM CHLORIDE, PRESERVATIVE FREE 5 ML: 5 INJECTION INTRAVENOUS at 15:47

## 2022-01-01 RX ADMIN — SIROLIMUS 5 MG: 2 TABLET, SUGAR COATED ORAL at 07:48

## 2022-01-01 RX ADMIN — Medication 500 UNITS: at 10:44

## 2022-01-01 RX ADMIN — ACYCLOVIR 800 MG: 800 TABLET ORAL at 08:13

## 2022-01-01 RX ADMIN — DIPHENHYDRAMINE HYDROCHLORIDE AND ZINC ACETATE: 10; 1 CREAM TOPICAL at 20:32

## 2022-01-01 RX ADMIN — ONDANSETRON HYDROCHLORIDE 8 MG: 8 TABLET, FILM COATED ORAL at 00:30

## 2022-01-01 RX ADMIN — VANCOMYCIN HYDROCHLORIDE 125 MG: 125 CAPSULE ORAL at 07:57

## 2022-01-01 RX ADMIN — URSODIOL 300 MG: 300 CAPSULE ORAL at 08:27

## 2022-01-01 RX ADMIN — URSODIOL 300 MG: 300 CAPSULE ORAL at 20:02

## 2022-01-01 RX ADMIN — SIROLIMUS 2 MG: 2 TABLET, SUGAR COATED ORAL at 18:25

## 2022-01-01 RX ADMIN — PANTOPRAZOLE SODIUM 40 MG: 40 TABLET, DELAYED RELEASE ORAL at 07:48

## 2022-01-01 RX ADMIN — VANCOMYCIN HYDROCHLORIDE 125 MG: 125 CAPSULE ORAL at 12:14

## 2022-01-01 RX ADMIN — URSODIOL 300 MG: 300 CAPSULE ORAL at 19:27

## 2022-01-01 RX ADMIN — URSODIOL 300 MG: 300 CAPSULE ORAL at 08:04

## 2022-01-01 RX ADMIN — VANCOMYCIN HYDROCHLORIDE 125 MG: 125 CAPSULE ORAL at 08:06

## 2022-01-01 RX ADMIN — MESNA 3690 MG: 100 INJECTION, SOLUTION INTRAVENOUS at 08:02

## 2022-01-01 RX ADMIN — CEFEPIME HYDROCHLORIDE 2 G: 2 INJECTION, POWDER, FOR SOLUTION INTRAVENOUS at 04:50

## 2022-01-01 RX ADMIN — LORATADINE 10 MG: 10 TABLET ORAL at 08:44

## 2022-01-01 RX ADMIN — VANCOMYCIN HYDROCHLORIDE 125 MG: 125 CAPSULE ORAL at 08:15

## 2022-01-01 RX ADMIN — PANTOPRAZOLE SODIUM 40 MG: 40 TABLET, DELAYED RELEASE ORAL at 08:09

## 2022-01-01 RX ADMIN — URSODIOL 300 MG: 300 CAPSULE ORAL at 20:43

## 2022-01-01 RX ADMIN — ACYCLOVIR 800 MG: 800 TABLET ORAL at 07:45

## 2022-01-01 RX ADMIN — Medication 1 CAPSULE: at 08:51

## 2022-01-01 RX ADMIN — ONDANSETRON HYDROCHLORIDE 8 MG: 8 TABLET, FILM COATED ORAL at 16:15

## 2022-01-01 RX ADMIN — URSODIOL 300 MG: 300 CAPSULE ORAL at 14:08

## 2022-01-01 RX ADMIN — FILGRASTIM 480 MCG: 480 INJECTION, SOLUTION INTRAVENOUS; SUBCUTANEOUS at 19:52

## 2022-01-01 RX ADMIN — ONDANSETRON HYDROCHLORIDE 8 MG: 8 TABLET, FILM COATED ORAL at 19:01

## 2022-01-01 RX ADMIN — ACYCLOVIR 800 MG: 800 TABLET ORAL at 19:58

## 2022-01-01 RX ADMIN — VANCOMYCIN HYDROCHLORIDE 125 MG: 125 CAPSULE ORAL at 12:49

## 2022-01-01 RX ADMIN — MYCOPHENOLATE MOFETIL 1500 MG: 500 TABLET, FILM COATED ORAL at 18:11

## 2022-01-01 RX ADMIN — ACETAMINOPHEN 650 MG: 325 TABLET, FILM COATED ORAL at 05:50

## 2022-01-01 RX ADMIN — DEXTROSE MONOHYDRATE 20 ML: 50 INJECTION, SOLUTION INTRAVENOUS at 19:53

## 2022-01-01 RX ADMIN — URSODIOL 300 MG: 300 CAPSULE ORAL at 20:30

## 2022-01-01 RX ADMIN — MIDAZOLAM HYDROCHLORIDE 2 MG: 1 INJECTION, SOLUTION INTRAMUSCULAR; INTRAVENOUS at 10:03

## 2022-01-01 RX ADMIN — VANCOMYCIN HYDROCHLORIDE 125 MG: 125 CAPSULE ORAL at 12:01

## 2022-01-01 RX ADMIN — URSODIOL 300 MG: 300 CAPSULE ORAL at 09:23

## 2022-01-01 RX ADMIN — SODIUM CHLORIDE, PRESERVATIVE FREE 5 ML: 5 INJECTION INTRAVENOUS at 11:43

## 2022-01-01 RX ADMIN — CEFPODOXIME PROXETIL 200 MG: 200 TABLET, FILM COATED ORAL at 07:54

## 2022-01-01 RX ADMIN — DEXTROSE MONOHYDRATE 10 ML: 50 INJECTION, SOLUTION INTRAVENOUS at 20:23

## 2022-01-01 RX ADMIN — ENOXAPARIN SODIUM 100 MG: 100 INJECTION SUBCUTANEOUS at 19:56

## 2022-01-01 RX ADMIN — URSODIOL 300 MG: 300 CAPSULE ORAL at 20:10

## 2022-01-01 RX ADMIN — URSODIOL 300 MG: 300 CAPSULE ORAL at 13:16

## 2022-01-01 RX ADMIN — DEXTROSE MONOHYDRATE 10 ML: 50 INJECTION, SOLUTION INTRAVENOUS at 20:18

## 2022-01-01 RX ADMIN — FILGRASTIM 480 MCG: 480 INJECTION, SOLUTION INTRAVENOUS; SUBCUTANEOUS at 20:06

## 2022-01-01 RX ADMIN — FILGRASTIM 480 MCG: 480 INJECTION, SOLUTION INTRAVENOUS; SUBCUTANEOUS at 19:59

## 2022-01-01 RX ADMIN — URSODIOL 300 MG: 300 CAPSULE ORAL at 15:06

## 2022-01-01 RX ADMIN — SODIUM CHLORIDE, PRESERVATIVE FREE 5 ML: 5 INJECTION INTRAVENOUS at 11:24

## 2022-01-01 RX ADMIN — PIPERACILLIN AND TAZOBACTAM 4.5 G: 4; .5 INJECTION, POWDER, FOR SOLUTION INTRAVENOUS at 17:06

## 2022-01-01 RX ADMIN — VANCOMYCIN HYDROCHLORIDE 125 MG: 125 CAPSULE ORAL at 19:58

## 2022-01-01 RX ADMIN — ACYCLOVIR 800 MG: 800 TABLET ORAL at 07:57

## 2022-01-01 RX ADMIN — SODIUM CHLORIDE, PRESERVATIVE FREE 5 ML: 5 INJECTION INTRAVENOUS at 12:50

## 2022-01-01 RX ADMIN — PIPERACILLIN AND TAZOBACTAM 4.5 G: 4; .5 INJECTION, POWDER, FOR SOLUTION INTRAVENOUS at 10:23

## 2022-01-01 RX ADMIN — URSODIOL 300 MG: 300 CAPSULE ORAL at 20:25

## 2022-01-01 RX ADMIN — VANCOMYCIN HYDROCHLORIDE 125 MG: 125 CAPSULE ORAL at 07:46

## 2022-01-01 RX ADMIN — CEFPODOXIME PROXETIL 200 MG: 200 TABLET, FILM COATED ORAL at 08:05

## 2022-01-01 RX ADMIN — ONDANSETRON HYDROCHLORIDE 8 MG: 8 TABLET, FILM COATED ORAL at 10:14

## 2022-01-01 RX ADMIN — CEFEPIME HYDROCHLORIDE 2 G: 2 INJECTION, POWDER, FOR SOLUTION INTRAVENOUS at 08:57

## 2022-01-01 RX ADMIN — SODIUM CHLORIDE, PRESERVATIVE FREE 5 ML: 5 INJECTION INTRAVENOUS at 12:11

## 2022-01-01 RX ADMIN — LORATADINE 10 MG: 10 TABLET ORAL at 08:05

## 2022-01-01 RX ADMIN — SODIUM CHLORIDE, PRESERVATIVE FREE 5 ML: 5 INJECTION INTRAVENOUS at 16:59

## 2022-01-01 RX ADMIN — FLUDARABINE PHOSPHATE 62 MG: 25 INJECTION, SOLUTION INTRAVENOUS at 09:35

## 2022-01-01 RX ADMIN — PIPERACILLIN AND TAZOBACTAM 4.5 G: 4; .5 INJECTION, POWDER, FOR SOLUTION INTRAVENOUS at 22:49

## 2022-01-01 RX ADMIN — CEFEPIME HYDROCHLORIDE 2 G: 2 INJECTION, POWDER, FOR SOLUTION INTRAVENOUS at 04:52

## 2022-01-01 RX ADMIN — URSODIOL 300 MG: 300 CAPSULE ORAL at 19:59

## 2022-01-01 RX ADMIN — FUROSEMIDE 20 MG: 10 INJECTION, SOLUTION INTRAVENOUS at 17:17

## 2022-01-01 RX ADMIN — ACYCLOVIR 800 MG: 800 TABLET ORAL at 09:23

## 2022-01-01 RX ADMIN — MYCOPHENOLATE MOFETIL 1500 MG: 500 INJECTION, POWDER, LYOPHILIZED, FOR SOLUTION INTRAVENOUS at 20:24

## 2022-01-01 RX ADMIN — VANCOMYCIN HYDROCHLORIDE 125 MG: 125 CAPSULE ORAL at 15:05

## 2022-01-01 RX ADMIN — MYCOPHENOLATE MOFETIL 1500 MG: 500 TABLET, FILM COATED ORAL at 09:34

## 2022-01-01 RX ADMIN — VANCOMYCIN HYDROCHLORIDE 125 MG: 125 CAPSULE ORAL at 15:45

## 2022-01-01 RX ADMIN — SIROLIMUS 8 MG: 2 TABLET, SUGAR COATED ORAL at 07:49

## 2022-01-01 RX ADMIN — CEFAZOLIN SODIUM 2 G: 2 INJECTION, SOLUTION INTRAVENOUS at 08:16

## 2022-01-01 RX ADMIN — ACETAMINOPHEN 650 MG: 325 TABLET, FILM COATED ORAL at 15:33

## 2022-01-01 RX ADMIN — ONDANSETRON HYDROCHLORIDE 8 MG: 8 TABLET, FILM COATED ORAL at 07:59

## 2022-01-01 RX ADMIN — URSODIOL 300 MG: 300 CAPSULE ORAL at 08:05

## 2022-01-01 RX ADMIN — POTASSIUM CHLORIDE 20 MEQ: 29.8 INJECTION, SOLUTION INTRAVENOUS at 07:21

## 2022-01-01 RX ADMIN — LORATADINE 10 MG: 10 TABLET ORAL at 12:20

## 2022-01-01 RX ADMIN — PANTOPRAZOLE SODIUM 40 MG: 40 TABLET, DELAYED RELEASE ORAL at 08:11

## 2022-01-01 RX ADMIN — URSODIOL 300 MG: 300 CAPSULE ORAL at 19:30

## 2022-01-01 RX ADMIN — DEXTROSE MONOHYDRATE 20 ML: 50 INJECTION, SOLUTION INTRAVENOUS at 20:06

## 2022-01-01 RX ADMIN — ENOXAPARIN SODIUM 100 MG: 100 INJECTION SUBCUTANEOUS at 13:01

## 2022-01-01 RX ADMIN — CEFEPIME HYDROCHLORIDE 2 G: 2 INJECTION, POWDER, FOR SOLUTION INTRAVENOUS at 17:46

## 2022-01-01 RX ADMIN — PANTOPRAZOLE SODIUM 40 MG: 40 TABLET, DELAYED RELEASE ORAL at 08:17

## 2022-01-01 RX ADMIN — POTASSIUM PHOSPHATE, MONOBASIC POTASSIUM PHOSPHATE, DIBASIC 15 MMOL: 224; 236 INJECTION, SOLUTION, CONCENTRATE INTRAVENOUS at 08:46

## 2022-01-01 RX ADMIN — SODIUM CHLORIDE, PRESERVATIVE FREE 5 ML: 5 INJECTION INTRAVENOUS at 00:16

## 2022-01-01 RX ADMIN — ACYCLOVIR 800 MG: 800 TABLET ORAL at 08:22

## 2022-01-01 RX ADMIN — MYCOPHENOLATE MOFETIL 1500 MG: 500 INJECTION, POWDER, LYOPHILIZED, FOR SOLUTION INTRAVENOUS at 21:11

## 2022-01-01 RX ADMIN — PIPERACILLIN AND TAZOBACTAM 4.5 G: 4; .5 INJECTION, POWDER, FOR SOLUTION INTRAVENOUS at 18:26

## 2022-01-01 RX ADMIN — SODIUM CHLORIDE, PRESERVATIVE FREE 5 ML: 5 INJECTION INTRAVENOUS at 23:32

## 2022-01-01 RX ADMIN — PIPERACILLIN AND TAZOBACTAM 4.5 G: 4; .5 INJECTION, POWDER, FOR SOLUTION INTRAVENOUS at 22:20

## 2022-01-01 RX ADMIN — SODIUM CHLORIDE, PRESERVATIVE FREE 5 ML: 5 INJECTION INTRAVENOUS at 12:00

## 2022-01-01 RX ADMIN — ACYCLOVIR 800 MG: 800 TABLET ORAL at 07:46

## 2022-01-01 RX ADMIN — VANCOMYCIN HYDROCHLORIDE 125 MG: 125 CAPSULE ORAL at 07:48

## 2022-01-01 RX ADMIN — ACETAMINOPHEN 650 MG: 325 TABLET, FILM COATED ORAL at 10:27

## 2022-01-01 RX ADMIN — SODIUM CHLORIDE, PRESERVATIVE FREE 5 ML: 5 INJECTION INTRAVENOUS at 11:09

## 2022-01-01 RX ADMIN — ACYCLOVIR 800 MG: 800 TABLET ORAL at 08:46

## 2022-01-01 RX ADMIN — ACYCLOVIR 800 MG: 800 TABLET ORAL at 08:35

## 2022-01-01 RX ADMIN — Medication 2 ML: at 09:54

## 2022-01-01 RX ADMIN — ACETAMINOPHEN 650 MG: 325 TABLET, FILM COATED ORAL at 20:30

## 2022-01-01 RX ADMIN — SODIUM CHLORIDE, PRESERVATIVE FREE 5 ML: 5 INJECTION INTRAVENOUS at 10:16

## 2022-01-01 RX ADMIN — VANCOMYCIN HYDROCHLORIDE 125 MG: 125 CAPSULE ORAL at 16:09

## 2022-01-01 RX ADMIN — MIDAZOLAM 2 MG: 1 INJECTION INTRAMUSCULAR; INTRAVENOUS at 11:34

## 2022-01-01 RX ADMIN — LEVOFLOXACIN 250 MG: 250 TABLET, FILM COATED ORAL at 09:33

## 2022-01-01 RX ADMIN — PANTOPRAZOLE SODIUM 40 MG: 40 TABLET, DELAYED RELEASE ORAL at 13:01

## 2022-01-01 RX ADMIN — PANTOPRAZOLE SODIUM 40 MG: 40 TABLET, DELAYED RELEASE ORAL at 16:02

## 2022-01-01 RX ADMIN — MYCOPHENOLATE MOFETIL 1500 MG: 500 INJECTION, POWDER, LYOPHILIZED, FOR SOLUTION INTRAVENOUS at 08:43

## 2022-01-01 RX ADMIN — Medication 1 CAPSULE: at 07:48

## 2022-01-01 RX ADMIN — SIROLIMUS 3 MG: 2 TABLET, SUGAR COATED ORAL at 09:04

## 2022-01-01 RX ADMIN — ACETAMINOPHEN 650 MG: 325 TABLET, FILM COATED ORAL at 01:53

## 2022-01-01 RX ADMIN — DEXTROSE MONOHYDRATE 20 ML: 50 INJECTION, SOLUTION INTRAVENOUS at 20:07

## 2022-01-01 RX ADMIN — SODIUM CHLORIDE: 9 INJECTION, SOLUTION INTRAVENOUS at 07:57

## 2022-01-01 RX ADMIN — Medication 5 ML: at 07:41

## 2022-01-01 RX ADMIN — URSODIOL 300 MG: 300 CAPSULE ORAL at 08:56

## 2022-01-01 RX ADMIN — MYCOPHENOLATE MOFETIL 1500 MG: 500 INJECTION, POWDER, LYOPHILIZED, FOR SOLUTION INTRAVENOUS at 20:34

## 2022-01-01 RX ADMIN — Medication 1 CAPSULE: at 13:24

## 2022-01-01 RX ADMIN — PANTOPRAZOLE SODIUM 40 MG: 40 TABLET, DELAYED RELEASE ORAL at 08:05

## 2022-01-01 RX ADMIN — ACYCLOVIR 800 MG: 800 TABLET ORAL at 08:09

## 2022-01-01 RX ADMIN — CEFEPIME HYDROCHLORIDE 2 G: 2 INJECTION, POWDER, FOR SOLUTION INTRAVENOUS at 05:04

## 2022-01-01 RX ADMIN — ONDANSETRON HYDROCHLORIDE 8 MG: 8 TABLET, FILM COATED ORAL at 10:50

## 2022-01-01 RX ADMIN — Medication 1 CAPSULE: at 13:44

## 2022-01-01 RX ADMIN — CEFEPIME HYDROCHLORIDE 2 G: 2 INJECTION, POWDER, FOR SOLUTION INTRAVENOUS at 07:48

## 2022-01-01 RX ADMIN — URSODIOL 300 MG: 300 CAPSULE ORAL at 13:22

## 2022-01-01 RX ADMIN — DEXTROSE MONOHYDRATE 10 ML: 50 INJECTION, SOLUTION INTRAVENOUS at 19:39

## 2022-01-01 RX ADMIN — SIROLIMUS 4 MG: 2 TABLET, SUGAR COATED ORAL at 07:48

## 2022-01-01 RX ADMIN — FLUCONAZOLE 200 MG: 200 TABLET ORAL at 08:22

## 2022-01-01 RX ADMIN — PIPERACILLIN AND TAZOBACTAM 4.5 G: 4; .5 INJECTION, POWDER, FOR SOLUTION INTRAVENOUS at 10:02

## 2022-01-01 RX ADMIN — SODIUM CHLORIDE, PRESERVATIVE FREE 5 ML: 5 INJECTION INTRAVENOUS at 19:31

## 2022-01-01 RX ADMIN — ALBUTEROL SULFATE 2.5 MG: 0.83 SOLUTION RESPIRATORY (INHALATION) at 09:02

## 2022-01-01 RX ADMIN — MYCOPHENOLATE MOFETIL 1500 MG: 500 INJECTION, POWDER, LYOPHILIZED, FOR SOLUTION INTRAVENOUS at 10:19

## 2022-01-01 RX ADMIN — LIDOCAINE HYDROCHLORIDE 10 ML: 10 INJECTION, SOLUTION EPIDURAL; INFILTRATION; INTRACAUDAL; PERINEURAL at 09:52

## 2022-01-01 RX ADMIN — DIPHENHYDRAMINE HYDROCHLORIDE AND ZINC ACETATE: 10; 1 CREAM TOPICAL at 10:32

## 2022-01-01 RX ADMIN — FLUCONAZOLE 200 MG: 200 TABLET ORAL at 08:05

## 2022-01-01 RX ADMIN — CEFPODOXIME PROXETIL 200 MG: 200 TABLET, FILM COATED ORAL at 19:42

## 2022-01-01 RX ADMIN — FUROSEMIDE 20 MG: 10 INJECTION, SOLUTION INTRAVENOUS at 14:59

## 2022-01-01 RX ADMIN — ACYCLOVIR 800 MG: 800 TABLET ORAL at 08:05

## 2022-01-01 RX ADMIN — MYCOPHENOLATE MOFETIL 1500 MG: 500 INJECTION, POWDER, LYOPHILIZED, FOR SOLUTION INTRAVENOUS at 20:50

## 2022-01-01 RX ADMIN — LEVOFLOXACIN 250 MG: 250 TABLET, FILM COATED ORAL at 10:14

## 2022-01-01 RX ADMIN — PIPERACILLIN AND TAZOBACTAM 4.5 G: 4; .5 INJECTION, POWDER, FOR SOLUTION INTRAVENOUS at 17:10

## 2022-01-01 RX ADMIN — ACYCLOVIR 800 MG: 800 TABLET ORAL at 20:00

## 2022-01-01 RX ADMIN — SIROLIMUS 4 MG: 2 TABLET, SUGAR COATED ORAL at 07:54

## 2022-01-01 RX ADMIN — URSODIOL 300 MG: 300 CAPSULE ORAL at 07:59

## 2022-01-01 RX ADMIN — FLUCONAZOLE 200 MG: 200 TABLET ORAL at 08:42

## 2022-01-01 RX ADMIN — CEFEPIME HYDROCHLORIDE 2 G: 2 INJECTION, POWDER, FOR SOLUTION INTRAVENOUS at 04:23

## 2022-01-01 RX ADMIN — FILGRASTIM 480 MCG: 480 INJECTION, SOLUTION INTRAVENOUS; SUBCUTANEOUS at 19:48

## 2022-01-01 RX ADMIN — URSODIOL 300 MG: 300 CAPSULE ORAL at 14:40

## 2022-01-01 RX ADMIN — Medication 1 CAPSULE: at 08:26

## 2022-01-01 RX ADMIN — ACYCLOVIR 800 MG: 800 TABLET ORAL at 08:29

## 2022-01-01 RX ADMIN — VANCOMYCIN HYDROCHLORIDE 125 MG: 125 CAPSULE ORAL at 12:53

## 2022-01-01 RX ADMIN — URSODIOL 300 MG: 300 CAPSULE ORAL at 08:40

## 2022-01-01 RX ADMIN — PIPERACILLIN AND TAZOBACTAM 4.5 G: 4; .5 INJECTION, POWDER, FOR SOLUTION INTRAVENOUS at 05:46

## 2022-01-01 RX ADMIN — SODIUM CHLORIDE: 9 INJECTION, SOLUTION INTRAVENOUS at 18:09

## 2022-01-01 RX ADMIN — VANCOMYCIN HYDROCHLORIDE 125 MG: 125 CAPSULE ORAL at 20:44

## 2022-01-01 RX ADMIN — POTASSIUM CHLORIDE, DEXTROSE MONOHYDRATE AND SODIUM CHLORIDE: 150; 5; 450 INJECTION, SOLUTION INTRAVENOUS at 22:02

## 2022-01-01 RX ADMIN — ACYCLOVIR 800 MG: 800 TABLET ORAL at 20:02

## 2022-01-01 RX ADMIN — VANCOMYCIN HYDROCHLORIDE 125 MG: 125 CAPSULE ORAL at 08:27

## 2022-01-01 RX ADMIN — PANTOPRAZOLE SODIUM 40 MG: 40 TABLET, DELAYED RELEASE ORAL at 16:25

## 2022-01-01 RX ADMIN — ACYCLOVIR 800 MG: 800 TABLET ORAL at 19:28

## 2022-01-01 RX ADMIN — PANTOPRAZOLE SODIUM 40 MG: 40 TABLET, DELAYED RELEASE ORAL at 09:24

## 2022-01-01 RX ADMIN — SODIUM CHLORIDE, PRESERVATIVE FREE 5 ML: 5 INJECTION INTRAVENOUS at 15:32

## 2022-01-01 RX ADMIN — Medication 5 ML: at 10:44

## 2022-01-01 RX ADMIN — ONDANSETRON HYDROCHLORIDE 8 MG: 8 TABLET, FILM COATED ORAL at 06:42

## 2022-01-01 RX ADMIN — MYCOPHENOLATE MOFETIL 1500 MG: 500 TABLET, FILM COATED ORAL at 08:21

## 2022-01-01 RX ADMIN — ONDANSETRON HYDROCHLORIDE 8 MG: 8 TABLET, FILM COATED ORAL at 19:00

## 2022-01-01 RX ADMIN — ENOXAPARIN SODIUM 100 MG: 100 INJECTION SUBCUTANEOUS at 20:13

## 2022-01-01 RX ADMIN — ACYCLOVIR 800 MG: 800 TABLET ORAL at 08:15

## 2022-01-01 RX ADMIN — DEXTROSE MONOHYDRATE 20 ML: 50 INJECTION, SOLUTION INTRAVENOUS at 19:45

## 2022-01-01 RX ADMIN — CEFEPIME HYDROCHLORIDE 2 G: 2 INJECTION, POWDER, FOR SOLUTION INTRAVENOUS at 13:35

## 2022-01-01 RX ADMIN — SODIUM CHLORIDE, PRESERVATIVE FREE 5 ML: 5 INJECTION INTRAVENOUS at 14:09

## 2022-01-01 RX ADMIN — CEFPODOXIME PROXETIL 200 MG: 200 TABLET, FILM COATED ORAL at 19:30

## 2022-01-01 RX ADMIN — URSODIOL 300 MG: 300 CAPSULE ORAL at 07:48

## 2022-01-01 RX ADMIN — SIROLIMUS 3 MG: 2 TABLET, SUGAR COATED ORAL at 08:17

## 2022-01-01 RX ADMIN — URSODIOL 300 MG: 300 CAPSULE ORAL at 14:32

## 2022-01-01 RX ADMIN — URSODIOL 300 MG: 300 CAPSULE ORAL at 08:16

## 2022-01-01 RX ADMIN — MYCOPHENOLATE MOFETIL 1500 MG: 500 INJECTION, POWDER, LYOPHILIZED, FOR SOLUTION INTRAVENOUS at 21:52

## 2022-01-01 RX ADMIN — PANTOPRAZOLE SODIUM 40 MG: 40 TABLET, DELAYED RELEASE ORAL at 16:53

## 2022-01-01 RX ADMIN — VANCOMYCIN HYDROCHLORIDE 125 MG: 125 CAPSULE ORAL at 08:51

## 2022-01-01 RX ADMIN — FLUDARABINE PHOSPHATE 62 MG: 25 INJECTION, SOLUTION INTRAVENOUS at 09:32

## 2022-01-01 RX ADMIN — VANCOMYCIN HYDROCHLORIDE 125 MG: 125 CAPSULE ORAL at 12:57

## 2022-01-01 RX ADMIN — URSODIOL 300 MG: 300 CAPSULE ORAL at 20:23

## 2022-01-01 RX ADMIN — ACYCLOVIR 800 MG: 800 TABLET ORAL at 08:42

## 2022-01-01 RX ADMIN — SODIUM CHLORIDE, PRESERVATIVE FREE 5 ML: 5 INJECTION INTRAVENOUS at 00:46

## 2022-01-01 RX ADMIN — CEFEPIME HYDROCHLORIDE 2 G: 2 INJECTION, POWDER, FOR SOLUTION INTRAVENOUS at 22:02

## 2022-01-01 RX ADMIN — Medication 500 UNITS: at 10:23

## 2022-01-01 RX ADMIN — ONDANSETRON HYDROCHLORIDE 8 MG: 8 TABLET, FILM COATED ORAL at 23:41

## 2022-01-01 RX ADMIN — SODIUM CHLORIDE, PRESERVATIVE FREE 10 ML: 5 INJECTION INTRAVENOUS at 04:48

## 2022-01-01 RX ADMIN — POTASSIUM CHLORIDE 20 MEQ: 29.8 INJECTION, SOLUTION INTRAVENOUS at 06:42

## 2022-01-01 RX ADMIN — ACYCLOVIR 800 MG: 800 TABLET ORAL at 20:29

## 2022-01-01 RX ADMIN — SODIUM CHLORIDE, PRESERVATIVE FREE 5 ML: 5 INJECTION INTRAVENOUS at 04:27

## 2022-01-01 RX ADMIN — MYCOPHENOLATE MOFETIL 1500 MG: 500 INJECTION, POWDER, LYOPHILIZED, FOR SOLUTION INTRAVENOUS at 20:04

## 2022-01-01 RX ADMIN — MYCOPHENOLATE MOFETIL 1500 MG: 500 INJECTION, POWDER, LYOPHILIZED, FOR SOLUTION INTRAVENOUS at 09:55

## 2022-01-01 RX ADMIN — DEXTROSE MONOHYDRATE 10 ML: 50 INJECTION, SOLUTION INTRAVENOUS at 19:42

## 2022-01-01 RX ADMIN — URSODIOL 300 MG: 300 CAPSULE ORAL at 13:20

## 2022-01-01 RX ADMIN — CEFEPIME HYDROCHLORIDE 2 G: 2 INJECTION, POWDER, FOR SOLUTION INTRAVENOUS at 15:04

## 2022-01-01 RX ADMIN — ACYCLOVIR 800 MG: 800 TABLET ORAL at 08:44

## 2022-01-01 RX ADMIN — MYCOPHENOLATE MOFETIL 1500 MG: 500 INJECTION, POWDER, LYOPHILIZED, FOR SOLUTION INTRAVENOUS at 20:00

## 2022-01-01 RX ADMIN — RUGBY ZINC OXIDE 20%: 20 OINTMENT TOPICAL at 20:23

## 2022-01-01 RX ADMIN — POTASSIUM CHLORIDE, DEXTROSE MONOHYDRATE AND SODIUM CHLORIDE: 150; 5; 450 INJECTION, SOLUTION INTRAVENOUS at 07:55

## 2022-01-01 RX ADMIN — PIPERACILLIN AND TAZOBACTAM 4.5 G: 4; .5 INJECTION, POWDER, FOR SOLUTION INTRAVENOUS at 10:38

## 2022-01-01 RX ADMIN — ACYCLOVIR 800 MG: 800 TABLET ORAL at 19:47

## 2022-01-01 RX ADMIN — FLUCONAZOLE 200 MG: 200 TABLET ORAL at 08:51

## 2022-01-01 RX ADMIN — URSODIOL 300 MG: 300 CAPSULE ORAL at 07:57

## 2022-01-01 RX ADMIN — SODIUM CHLORIDE, PRESERVATIVE FREE 5 ML: 5 INJECTION INTRAVENOUS at 05:07

## 2022-01-01 RX ADMIN — MYCOPHENOLATE MOFETIL 1500 MG: 500 INJECTION, POWDER, LYOPHILIZED, FOR SOLUTION INTRAVENOUS at 21:07

## 2022-01-01 RX ADMIN — PIPERACILLIN AND TAZOBACTAM 4.5 G: 4; .5 INJECTION, POWDER, FOR SOLUTION INTRAVENOUS at 16:59

## 2022-01-01 RX ADMIN — Medication 1 CAPSULE: at 12:00

## 2022-01-01 RX ADMIN — VANCOMYCIN HYDROCHLORIDE 125 MG: 125 CAPSULE ORAL at 20:11

## 2022-01-01 RX ADMIN — URSODIOL 300 MG: 300 CAPSULE ORAL at 14:03

## 2022-01-01 RX ADMIN — URSODIOL 300 MG: 300 CAPSULE ORAL at 08:46

## 2022-01-01 RX ADMIN — ALLOPURINOL 300 MG: 300 TABLET ORAL at 08:04

## 2022-01-01 RX ADMIN — ACYCLOVIR 800 MG: 800 TABLET ORAL at 08:03

## 2022-01-01 RX ADMIN — SODIUM CHLORIDE, PRESERVATIVE FREE 5 ML: 5 INJECTION INTRAVENOUS at 14:08

## 2022-01-01 RX ADMIN — URSODIOL 300 MG: 300 CAPSULE ORAL at 19:36

## 2022-01-01 RX ADMIN — CEFPODOXIME PROXETIL 200 MG: 200 TABLET, FILM COATED ORAL at 08:41

## 2022-01-01 RX ADMIN — SODIUM CHLORIDE, PRESERVATIVE FREE 5 ML: 5 INJECTION INTRAVENOUS at 20:33

## 2022-01-01 RX ADMIN — CYCLOPHOSPHAMIDE 3685 MG: 2 INJECTION, POWDER, FOR SOLUTION INTRAVENOUS; ORAL at 09:26

## 2022-01-01 RX ADMIN — FILGRASTIM 480 MCG: 480 INJECTION, SOLUTION INTRAVENOUS; SUBCUTANEOUS at 20:02

## 2022-01-01 RX ADMIN — ONDANSETRON HYDROCHLORIDE 8 MG: 8 TABLET, FILM COATED ORAL at 15:32

## 2022-01-01 RX ADMIN — SODIUM CHLORIDE, PRESERVATIVE FREE 5 ML: 5 INJECTION INTRAVENOUS at 10:45

## 2022-01-01 RX ADMIN — MYCOPHENOLATE MOFETIL 1500 MG: 500 INJECTION, POWDER, LYOPHILIZED, FOR SOLUTION INTRAVENOUS at 08:06

## 2022-01-01 RX ADMIN — SODIUM CHLORIDE, PRESERVATIVE FREE 10 ML: 5 INJECTION INTRAVENOUS at 10:29

## 2022-01-01 RX ADMIN — POTASSIUM CHLORIDE 20 MEQ: 750 TABLET, EXTENDED RELEASE ORAL at 08:41

## 2022-01-01 RX ADMIN — URSODIOL 300 MG: 300 CAPSULE ORAL at 13:54

## 2022-01-01 RX ADMIN — FILGRASTIM 480 MCG: 480 INJECTION, SOLUTION INTRAVENOUS; SUBCUTANEOUS at 20:00

## 2022-01-01 RX ADMIN — PIPERACILLIN AND TAZOBACTAM 4.5 G: 4; .5 INJECTION, POWDER, FOR SOLUTION INTRAVENOUS at 13:07

## 2022-01-01 RX ADMIN — MYCOPHENOLATE MOFETIL 1500 MG: 500 TABLET, FILM COATED ORAL at 17:08

## 2022-01-01 RX ADMIN — FILGRASTIM 480 MCG: 480 INJECTION, SOLUTION INTRAVENOUS; SUBCUTANEOUS at 19:44

## 2022-01-01 RX ADMIN — ONDANSETRON HYDROCHLORIDE 8 MG: 8 TABLET, FILM COATED ORAL at 06:39

## 2022-01-01 RX ADMIN — SODIUM CHLORIDE, PRESERVATIVE FREE 5 ML: 5 INJECTION INTRAVENOUS at 04:52

## 2022-01-01 RX ADMIN — SIROLIMUS 4 MG: 2 TABLET, SUGAR COATED ORAL at 08:21

## 2022-01-01 RX ADMIN — FILGRASTIM 480 MCG: 480 INJECTION, SOLUTION INTRAVENOUS; SUBCUTANEOUS at 20:23

## 2022-01-01 RX ADMIN — ACYCLOVIR 800 MG: 800 TABLET ORAL at 19:38

## 2022-01-01 RX ADMIN — MICAFUNGIN SODIUM 50 MG: 50 INJECTION, POWDER, LYOPHILIZED, FOR SOLUTION INTRAVENOUS at 07:59

## 2022-01-01 RX ADMIN — PIPERACILLIN AND TAZOBACTAM 4.5 G: 4; .5 INJECTION, POWDER, FOR SOLUTION INTRAVENOUS at 22:30

## 2022-01-01 RX ADMIN — SODIUM CHLORIDE, PRESERVATIVE FREE 5 ML: 5 INJECTION INTRAVENOUS at 08:45

## 2022-01-01 RX ADMIN — SODIUM CHLORIDE, PRESERVATIVE FREE 5 ML: 5 INJECTION INTRAVENOUS at 22:20

## 2022-01-01 RX ADMIN — MYCOPHENOLATE MOFETIL 1500 MG: 500 INJECTION, POWDER, LYOPHILIZED, FOR SOLUTION INTRAVENOUS at 22:10

## 2022-01-01 RX ADMIN — SODIUM CHLORIDE, PRESERVATIVE FREE 5 ML: 5 INJECTION INTRAVENOUS at 07:53

## 2022-01-01 RX ADMIN — PANTOPRAZOLE SODIUM 40 MG: 40 TABLET, DELAYED RELEASE ORAL at 08:37

## 2022-01-01 RX ADMIN — MICAFUNGIN SODIUM 50 MG: 50 INJECTION, POWDER, LYOPHILIZED, FOR SOLUTION INTRAVENOUS at 08:11

## 2022-01-01 RX ADMIN — MYCOPHENOLATE MOFETIL 1500 MG: 500 INJECTION, POWDER, LYOPHILIZED, FOR SOLUTION INTRAVENOUS at 21:05

## 2022-01-01 RX ADMIN — DEXTROSE MONOHYDRATE 10 ML: 50 INJECTION, SOLUTION INTRAVENOUS at 20:29

## 2022-01-01 RX ADMIN — DIPHENHYDRAMINE HYDROCHLORIDE AND ZINC ACETATE: 10; 1 CREAM TOPICAL at 08:18

## 2022-01-01 RX ADMIN — ACYCLOVIR 800 MG: 800 TABLET ORAL at 08:40

## 2022-01-01 RX ADMIN — MYCOPHENOLATE MOFETIL 1500 MG: 500 INJECTION, POWDER, LYOPHILIZED, FOR SOLUTION INTRAVENOUS at 21:00

## 2022-01-01 RX ADMIN — ACYCLOVIR 800 MG: 800 TABLET ORAL at 20:11

## 2022-01-01 RX ADMIN — CEFEPIME HYDROCHLORIDE 2 G: 2 INJECTION, POWDER, FOR SOLUTION INTRAVENOUS at 20:43

## 2022-01-01 RX ADMIN — ENOXAPARIN SODIUM 100 MG: 100 INJECTION SUBCUTANEOUS at 09:09

## 2022-01-01 RX ADMIN — DEXTROSE MONOHYDRATE 20 ML: 50 INJECTION, SOLUTION INTRAVENOUS at 20:05

## 2022-01-01 RX ADMIN — URSODIOL 300 MG: 300 CAPSULE ORAL at 20:13

## 2022-01-01 RX ADMIN — MYCOPHENOLATE MOFETIL 1500 MG: 500 INJECTION, POWDER, LYOPHILIZED, FOR SOLUTION INTRAVENOUS at 10:15

## 2022-01-01 RX ADMIN — URSODIOL 300 MG: 300 CAPSULE ORAL at 19:42

## 2022-01-01 RX ADMIN — ONDANSETRON HYDROCHLORIDE 8 MG: 8 TABLET, FILM COATED ORAL at 02:40

## 2022-01-01 RX ADMIN — SIROLIMUS 5 MG: 2 TABLET, SUGAR COATED ORAL at 08:31

## 2022-01-01 RX ADMIN — Medication 5 ML: at 09:54

## 2022-01-01 RX ADMIN — SODIUM CHLORIDE, PRESERVATIVE FREE 5 ML: 5 INJECTION INTRAVENOUS at 12:21

## 2022-01-01 RX ADMIN — PIPERACILLIN AND TAZOBACTAM 4.5 G: 4; .5 INJECTION, POWDER, FOR SOLUTION INTRAVENOUS at 16:10

## 2022-01-01 RX ADMIN — SODIUM CHLORIDE, PRESERVATIVE FREE 5 ML: 5 INJECTION INTRAVENOUS at 15:38

## 2022-01-01 RX ADMIN — Medication 1 CAPSULE: at 07:45

## 2022-01-01 RX ADMIN — FLUDARABINE PHOSPHATE 62 MG: 25 INJECTION, SOLUTION INTRAVENOUS at 09:06

## 2022-01-01 RX ADMIN — Medication 1 CAPSULE: at 12:14

## 2022-01-01 RX ADMIN — MICAFUNGIN SODIUM 50 MG: 50 INJECTION, POWDER, LYOPHILIZED, FOR SOLUTION INTRAVENOUS at 08:01

## 2022-01-01 RX ADMIN — ENOXAPARIN SODIUM 100 MG: 100 INJECTION SUBCUTANEOUS at 19:58

## 2022-01-01 RX ADMIN — PIPERACILLIN AND TAZOBACTAM 4.5 G: 4; .5 INJECTION, POWDER, FOR SOLUTION INTRAVENOUS at 06:22

## 2022-01-01 RX ADMIN — FILGRASTIM 480 MCG: 480 INJECTION, SOLUTION INTRAVENOUS; SUBCUTANEOUS at 19:42

## 2022-01-01 RX ADMIN — VANCOMYCIN HYDROCHLORIDE 125 MG: 125 CAPSULE ORAL at 12:27

## 2022-01-01 RX ADMIN — URSODIOL 300 MG: 300 CAPSULE ORAL at 15:47

## 2022-01-01 RX ADMIN — PANTOPRAZOLE SODIUM 40 MG: 40 TABLET, DELAYED RELEASE ORAL at 08:26

## 2022-01-01 RX ADMIN — SIROLIMUS 5 MG: 2 TABLET, SUGAR COATED ORAL at 07:46

## 2022-01-01 RX ADMIN — VANCOMYCIN HYDROCHLORIDE 125 MG: 125 CAPSULE ORAL at 19:52

## 2022-01-01 RX ADMIN — DEXTROSE MONOHYDRATE 20 ML: 50 INJECTION, SOLUTION INTRAVENOUS at 19:48

## 2022-01-01 RX ADMIN — LEVOFLOXACIN 250 MG: 250 TABLET, FILM COATED ORAL at 10:27

## 2022-01-01 RX ADMIN — CEFEPIME HYDROCHLORIDE 2 G: 2 INJECTION, POWDER, FOR SOLUTION INTRAVENOUS at 04:42

## 2022-01-01 RX ADMIN — MYCOPHENOLATE MOFETIL 1500 MG: 500 INJECTION, POWDER, LYOPHILIZED, FOR SOLUTION INTRAVENOUS at 09:20

## 2022-01-01 RX ADMIN — VANCOMYCIN HYDROCHLORIDE 125 MG: 125 CAPSULE ORAL at 20:30

## 2022-01-01 RX ADMIN — ACYCLOVIR 800 MG: 800 TABLET ORAL at 19:55

## 2022-01-01 RX ADMIN — URSODIOL 300 MG: 300 CAPSULE ORAL at 14:51

## 2022-01-01 RX ADMIN — PANTOPRAZOLE SODIUM 40 MG: 40 TABLET, DELAYED RELEASE ORAL at 08:04

## 2022-01-01 RX ADMIN — MYCOPHENOLATE MOFETIL 1500 MG: 500 TABLET, FILM COATED ORAL at 17:28

## 2022-01-01 RX ADMIN — ONDANSETRON HYDROCHLORIDE 8 MG: 8 TABLET, FILM COATED ORAL at 00:15

## 2022-01-01 RX ADMIN — PIPERACILLIN AND TAZOBACTAM 4.5 G: 4; .5 INJECTION, POWDER, FOR SOLUTION INTRAVENOUS at 04:22

## 2022-01-01 RX ADMIN — ACYCLOVIR 800 MG: 800 TABLET ORAL at 20:43

## 2022-01-01 RX ADMIN — MICAFUNGIN SODIUM 50 MG: 50 INJECTION, POWDER, LYOPHILIZED, FOR SOLUTION INTRAVENOUS at 09:02

## 2022-01-01 RX ADMIN — URSODIOL 300 MG: 300 CAPSULE ORAL at 19:47

## 2022-01-01 RX ADMIN — PIPERACILLIN AND TAZOBACTAM 4.5 G: 4; .5 INJECTION, POWDER, FOR SOLUTION INTRAVENOUS at 10:46

## 2022-01-01 RX ADMIN — CEFPODOXIME PROXETIL 200 MG: 200 TABLET, FILM COATED ORAL at 19:59

## 2022-01-01 RX ADMIN — URSODIOL 300 MG: 300 CAPSULE ORAL at 14:36

## 2022-01-01 RX ADMIN — URSODIOL 300 MG: 300 CAPSULE ORAL at 19:50

## 2022-01-01 RX ADMIN — ACETAMINOPHEN 650 MG: 325 TABLET, FILM COATED ORAL at 05:00

## 2022-01-01 RX ADMIN — ACYCLOVIR 800 MG: 800 TABLET ORAL at 08:37

## 2022-01-01 RX ADMIN — MYCOPHENOLATE MOFETIL 1500 MG: 500 TABLET, FILM COATED ORAL at 08:04

## 2022-01-01 RX ADMIN — ACYCLOVIR 800 MG: 800 TABLET ORAL at 08:17

## 2022-01-01 RX ADMIN — SODIUM CHLORIDE, PRESERVATIVE FREE 5 ML: 5 INJECTION INTRAVENOUS at 10:14

## 2022-01-01 RX ADMIN — PANTOPRAZOLE SODIUM 40 MG: 40 TABLET, DELAYED RELEASE ORAL at 07:49

## 2022-01-01 RX ADMIN — URSODIOL 300 MG: 300 CAPSULE ORAL at 19:35

## 2022-01-01 RX ADMIN — DEXTROSE MONOHYDRATE 20 ML: 50 INJECTION, SOLUTION INTRAVENOUS at 18:40

## 2022-01-01 RX ADMIN — ACYCLOVIR 800 MG: 800 TABLET ORAL at 20:25

## 2022-01-01 RX ADMIN — SIROLIMUS 4 MG: 2 TABLET, SUGAR COATED ORAL at 08:47

## 2022-01-01 RX ADMIN — PENTAMIDINE ISETHIONATE 300 MG: 300 INHALANT RESPIRATORY (INHALATION) at 09:02

## 2022-01-01 RX ADMIN — SODIUM CHLORIDE, PRESERVATIVE FREE 5 ML: 5 INJECTION INTRAVENOUS at 05:00

## 2022-01-01 RX ADMIN — ONDANSETRON HYDROCHLORIDE 8 MG: 8 TABLET, FILM COATED ORAL at 03:22

## 2022-01-01 RX ADMIN — PANTOPRAZOLE SODIUM 40 MG: 40 TABLET, DELAYED RELEASE ORAL at 08:43

## 2022-01-01 RX ADMIN — MYCOPHENOLATE MOFETIL 1500 MG: 500 TABLET, FILM COATED ORAL at 18:12

## 2022-01-01 RX ADMIN — MESNA 3690 MG: 100 INJECTION, SOLUTION INTRAVENOUS at 07:56

## 2022-01-01 RX ADMIN — VANCOMYCIN HYDROCHLORIDE 125 MG: 125 CAPSULE ORAL at 20:23

## 2022-01-01 RX ADMIN — VANCOMYCIN HYDROCHLORIDE 125 MG: 125 CAPSULE ORAL at 20:31

## 2022-01-01 RX ADMIN — DEXTROSE MONOHYDRATE 20 ML: 50 INJECTION, SOLUTION INTRAVENOUS at 20:14

## 2022-01-01 RX ADMIN — VANCOMYCIN HYDROCHLORIDE 125 MG: 125 CAPSULE ORAL at 08:17

## 2022-01-01 RX ADMIN — SODIUM CHLORIDE: 9 INJECTION, SOLUTION INTRAVENOUS at 08:00

## 2022-01-01 RX ADMIN — URSODIOL 300 MG: 300 CAPSULE ORAL at 13:31

## 2022-01-01 RX ADMIN — SODIUM CHLORIDE, PRESERVATIVE FREE 5 ML: 5 INJECTION INTRAVENOUS at 10:32

## 2022-01-01 RX ADMIN — MYCOPHENOLATE MOFETIL 1500 MG: 500 TABLET, FILM COATED ORAL at 07:54

## 2022-01-01 RX ADMIN — CEFPODOXIME PROXETIL 200 MG: 200 TABLET, FILM COATED ORAL at 12:44

## 2022-01-01 RX ADMIN — FILGRASTIM 480 MCG: 480 INJECTION, SOLUTION INTRAVENOUS; SUBCUTANEOUS at 19:46

## 2022-01-01 RX ADMIN — ONDANSETRON HYDROCHLORIDE 8 MG: 8 TABLET, FILM COATED ORAL at 16:52

## 2022-01-01 RX ADMIN — LEVOFLOXACIN 250 MG: 250 TABLET, FILM COATED ORAL at 10:08

## 2022-01-01 RX ADMIN — ACYCLOVIR 800 MG: 800 TABLET ORAL at 08:26

## 2022-01-01 RX ADMIN — DEXTROSE MONOHYDRATE 10 ML: 50 INJECTION, SOLUTION INTRAVENOUS at 20:24

## 2022-01-01 RX ADMIN — POTASSIUM CHLORIDE 20 MEQ: 29.8 INJECTION, SOLUTION INTRAVENOUS at 06:32

## 2022-01-01 RX ADMIN — VANCOMYCIN HYDROCHLORIDE 125 MG: 125 CAPSULE ORAL at 19:44

## 2022-01-01 RX ADMIN — PANTOPRAZOLE SODIUM 40 MG: 40 TABLET, DELAYED RELEASE ORAL at 08:46

## 2022-01-01 RX ADMIN — POTASSIUM CHLORIDE 20 MEQ: 29.8 INJECTION, SOLUTION INTRAVENOUS at 06:05

## 2022-01-01 RX ADMIN — FILGRASTIM 480 MCG: 480 INJECTION, SOLUTION INTRAVENOUS; SUBCUTANEOUS at 20:18

## 2022-01-01 RX ADMIN — FLUDARABINE PHOSPHATE 62 MG: 25 INJECTION, SOLUTION INTRAVENOUS at 09:41

## 2022-01-01 RX ADMIN — MYCOPHENOLATE MOFETIL 1500 MG: 500 INJECTION, POWDER, LYOPHILIZED, FOR SOLUTION INTRAVENOUS at 20:30

## 2022-01-01 RX ADMIN — POTASSIUM CHLORIDE, DEXTROSE MONOHYDRATE AND SODIUM CHLORIDE: 150; 5; 450 INJECTION, SOLUTION INTRAVENOUS at 08:18

## 2022-01-01 RX ADMIN — CEFEPIME HYDROCHLORIDE 2 G: 2 INJECTION, POWDER, FOR SOLUTION INTRAVENOUS at 07:47

## 2022-01-01 RX ADMIN — SODIUM CHLORIDE, PRESERVATIVE FREE 5 ML: 5 INJECTION INTRAVENOUS at 12:59

## 2022-01-01 RX ADMIN — PIPERACILLIN AND TAZOBACTAM 4.5 G: 4; .5 INJECTION, POWDER, FOR SOLUTION INTRAVENOUS at 03:59

## 2022-01-01 RX ADMIN — VANCOMYCIN HYDROCHLORIDE 1250 MG: 10 INJECTION, POWDER, LYOPHILIZED, FOR SOLUTION INTRAVENOUS at 17:46

## 2022-01-01 RX ADMIN — Medication 1 CAPSULE: at 10:54

## 2022-01-01 RX ADMIN — DEXTROSE MONOHYDRATE 10 ML: 50 INJECTION, SOLUTION INTRAVENOUS at 20:13

## 2022-01-01 RX ADMIN — FLUCONAZOLE 200 MG: 200 TABLET ORAL at 07:45

## 2022-01-01 RX ADMIN — SODIUM CHLORIDE, PRESERVATIVE FREE 10 ML: 5 INJECTION INTRAVENOUS at 04:03

## 2022-01-01 RX ADMIN — DEXTROSE MONOHYDRATE 20 ML: 50 INJECTION, SOLUTION INTRAVENOUS at 20:02

## 2022-01-01 RX ADMIN — PANTOPRAZOLE SODIUM 40 MG: 40 TABLET, DELAYED RELEASE ORAL at 17:08

## 2022-01-01 RX ADMIN — VANCOMYCIN HYDROCHLORIDE 125 MG: 125 CAPSULE ORAL at 14:59

## 2022-01-01 RX ADMIN — FILGRASTIM 480 MCG: 480 INJECTION, SOLUTION INTRAVENOUS; SUBCUTANEOUS at 18:34

## 2022-01-01 RX ADMIN — MIDAZOLAM 0.5 MG: 1 INJECTION INTRAMUSCULAR; INTRAVENOUS at 09:37

## 2022-01-01 RX ADMIN — URSODIOL 300 MG: 300 CAPSULE ORAL at 08:12

## 2022-01-01 RX ADMIN — ONDANSETRON HYDROCHLORIDE 8 MG: 8 TABLET, FILM COATED ORAL at 16:07

## 2022-01-01 RX ADMIN — ACYCLOVIR 800 MG: 800 TABLET ORAL at 19:56

## 2022-01-01 RX ADMIN — SODIUM CHLORIDE, PRESERVATIVE FREE 5 ML: 5 INJECTION INTRAVENOUS at 07:47

## 2022-01-01 RX ADMIN — PIPERACILLIN AND TAZOBACTAM 4.5 G: 4; .5 INJECTION, POWDER, FOR SOLUTION INTRAVENOUS at 23:11

## 2022-01-01 RX ADMIN — SODIUM CHLORIDE, PRESERVATIVE FREE 5 ML: 5 INJECTION INTRAVENOUS at 09:44

## 2022-01-01 RX ADMIN — SODIUM CHLORIDE, PRESERVATIVE FREE 10 ML: 5 INJECTION INTRAVENOUS at 10:21

## 2022-01-01 RX ADMIN — PANTOPRAZOLE SODIUM 40 MG: 40 TABLET, DELAYED RELEASE ORAL at 07:57

## 2022-01-01 RX ADMIN — DEXTROSE MONOHYDRATE 20 ML: 50 INJECTION, SOLUTION INTRAVENOUS at 19:55

## 2022-01-01 RX ADMIN — VANCOMYCIN HYDROCHLORIDE 125 MG: 125 CAPSULE ORAL at 11:40

## 2022-01-01 RX ADMIN — DEXAMETHASONE 10 MG: 2 TABLET ORAL at 08:04

## 2022-01-01 RX ADMIN — DEXAMETHASONE 10 MG: 2 TABLET ORAL at 08:03

## 2022-01-01 RX ADMIN — FLUCONAZOLE 200 MG: 200 TABLET ORAL at 07:54

## 2022-01-01 RX ADMIN — SODIUM CHLORIDE, PRESERVATIVE FREE 5 ML: 5 INJECTION INTRAVENOUS at 20:23

## 2022-01-01 RX ADMIN — VANCOMYCIN HYDROCHLORIDE 125 MG: 125 CAPSULE ORAL at 08:04

## 2022-01-01 RX ADMIN — ACYCLOVIR 800 MG: 800 TABLET ORAL at 08:11

## 2022-01-01 RX ADMIN — MYCOPHENOLATE MOFETIL 1500 MG: 500 INJECTION, POWDER, LYOPHILIZED, FOR SOLUTION INTRAVENOUS at 08:51

## 2022-01-01 RX ADMIN — URSODIOL 300 MG: 300 CAPSULE ORAL at 14:23

## 2022-01-01 RX ADMIN — CEFEPIME HYDROCHLORIDE 2 G: 2 INJECTION, POWDER, FOR SOLUTION INTRAVENOUS at 12:01

## 2022-01-01 RX ADMIN — SODIUM CHLORIDE, PRESERVATIVE FREE 5 ML: 5 INJECTION INTRAVENOUS at 16:52

## 2022-01-01 RX ADMIN — FLUCONAZOLE 200 MG: 200 TABLET ORAL at 07:48

## 2022-01-01 RX ADMIN — SODIUM CHLORIDE, PRESERVATIVE FREE 5 ML: 5 INJECTION INTRAVENOUS at 23:21

## 2022-01-01 RX ADMIN — SODIUM CHLORIDE, PRESERVATIVE FREE 5 ML: 5 INJECTION INTRAVENOUS at 16:55

## 2022-01-01 RX ADMIN — FLUCONAZOLE 200 MG: 200 TABLET ORAL at 08:17

## 2022-01-01 RX ADMIN — URSODIOL 300 MG: 300 CAPSULE ORAL at 08:06

## 2022-01-01 RX ADMIN — FILGRASTIM 480 MCG: 480 INJECTION, SOLUTION INTRAVENOUS; SUBCUTANEOUS at 20:04

## 2022-01-01 RX ADMIN — Medication 1 CAPSULE: at 08:05

## 2022-01-01 RX ADMIN — POTASSIUM CHLORIDE 20 MEQ: 29.8 INJECTION, SOLUTION INTRAVENOUS at 18:32

## 2022-01-01 RX ADMIN — ONDANSETRON HYDROCHLORIDE 8 MG: 8 TABLET, FILM COATED ORAL at 23:45

## 2022-01-01 RX ADMIN — ENOXAPARIN SODIUM 100 MG: 100 INJECTION SUBCUTANEOUS at 08:24

## 2022-01-01 RX ADMIN — POTASSIUM CHLORIDE 20 MEQ: 29.8 INJECTION, SOLUTION INTRAVENOUS at 21:44

## 2022-01-01 RX ADMIN — SODIUM CHLORIDE, PRESERVATIVE FREE 5 ML: 5 INJECTION INTRAVENOUS at 04:37

## 2022-01-01 RX ADMIN — SIROLIMUS 4 MG: 2 TABLET, SUGAR COATED ORAL at 08:51

## 2022-01-01 RX ADMIN — URSODIOL 300 MG: 300 CAPSULE ORAL at 19:55

## 2022-01-01 RX ADMIN — VANCOMYCIN HYDROCHLORIDE 125 MG: 125 CAPSULE ORAL at 15:47

## 2022-01-01 RX ADMIN — URSODIOL 300 MG: 300 CAPSULE ORAL at 08:51

## 2022-01-01 RX ADMIN — MIDAZOLAM 1 MG: 1 INJECTION INTRAMUSCULAR; INTRAVENOUS at 09:29

## 2022-01-01 RX ADMIN — URSODIOL 300 MG: 300 CAPSULE ORAL at 20:05

## 2022-01-01 RX ADMIN — SODIUM CHLORIDE, PRESERVATIVE FREE 10 ML: 5 INJECTION INTRAVENOUS at 11:32

## 2022-01-01 RX ADMIN — Medication 1 CAPSULE: at 12:36

## 2022-01-01 RX ADMIN — VANCOMYCIN HYDROCHLORIDE 125 MG: 125 CAPSULE ORAL at 07:49

## 2022-01-01 RX ADMIN — PIPERACILLIN AND TAZOBACTAM 4.5 G: 4; .5 INJECTION, POWDER, FOR SOLUTION INTRAVENOUS at 10:00

## 2022-01-01 RX ADMIN — URSODIOL 300 MG: 300 CAPSULE ORAL at 14:31

## 2022-01-01 RX ADMIN — POTASSIUM CHLORIDE 20 MEQ: 29.8 INJECTION, SOLUTION INTRAVENOUS at 22:58

## 2022-01-01 RX ADMIN — VANCOMYCIN HYDROCHLORIDE 125 MG: 125 CAPSULE ORAL at 15:59

## 2022-01-01 RX ADMIN — PANTOPRAZOLE SODIUM 40 MG: 40 TABLET, DELAYED RELEASE ORAL at 16:08

## 2022-01-01 RX ADMIN — SIROLIMUS 3 MG: 2 TABLET, SUGAR COATED ORAL at 08:46

## 2022-01-01 RX ADMIN — SODIUM CHLORIDE, PRESERVATIVE FREE 5 ML: 5 INJECTION INTRAVENOUS at 03:22

## 2022-01-01 RX ADMIN — MYCOPHENOLATE MOFETIL 1500 MG: 500 TABLET, FILM COATED ORAL at 17:18

## 2022-01-01 RX ADMIN — MYCOPHENOLATE MOFETIL 1500 MG: 500 INJECTION, POWDER, LYOPHILIZED, FOR SOLUTION INTRAVENOUS at 09:42

## 2022-01-01 RX ADMIN — URSODIOL 300 MG: 300 CAPSULE ORAL at 13:59

## 2022-01-01 RX ADMIN — URSODIOL 300 MG: 300 CAPSULE ORAL at 14:59

## 2022-01-01 RX ADMIN — URSODIOL 300 MG: 300 CAPSULE ORAL at 20:27

## 2022-01-01 RX ADMIN — PANTOPRAZOLE SODIUM 40 MG: 40 TABLET, DELAYED RELEASE ORAL at 17:05

## 2022-01-01 RX ADMIN — MYCOPHENOLATE MOFETIL 1500 MG: 500 INJECTION, POWDER, LYOPHILIZED, FOR SOLUTION INTRAVENOUS at 08:12

## 2022-01-01 RX ADMIN — VANCOMYCIN HYDROCHLORIDE 125 MG: 125 CAPSULE ORAL at 16:34

## 2022-01-01 RX ADMIN — VANCOMYCIN HYDROCHLORIDE 125 MG: 125 CAPSULE ORAL at 20:27

## 2022-01-01 RX ADMIN — ACYCLOVIR 800 MG: 800 TABLET ORAL at 19:50

## 2022-01-01 RX ADMIN — VANCOMYCIN HYDROCHLORIDE 125 MG: 125 CAPSULE ORAL at 20:25

## 2022-01-01 RX ADMIN — SIROLIMUS 5 MG: 2 TABLET, SUGAR COATED ORAL at 08:15

## 2022-01-01 RX ADMIN — Medication 1 CAPSULE: at 07:47

## 2022-01-01 RX ADMIN — ONDANSETRON HYDROCHLORIDE 8 MG: 8 TABLET, FILM COATED ORAL at 17:11

## 2022-01-01 RX ADMIN — SODIUM CHLORIDE, PRESERVATIVE FREE 10 ML: 5 INJECTION INTRAVENOUS at 05:13

## 2022-01-01 RX ADMIN — PANTOPRAZOLE SODIUM 40 MG: 40 TABLET, DELAYED RELEASE ORAL at 17:10

## 2022-01-01 RX ADMIN — PANTOPRAZOLE SODIUM 40 MG: 40 TABLET, DELAYED RELEASE ORAL at 07:54

## 2022-01-01 RX ADMIN — FLUDARABINE PHOSPHATE 62 MG: 25 INJECTION, SOLUTION INTRAVENOUS at 08:54

## 2022-01-01 RX ADMIN — PIPERACILLIN AND TAZOBACTAM 4.5 G: 4; .5 INJECTION, POWDER, FOR SOLUTION INTRAVENOUS at 23:15

## 2022-01-01 RX ADMIN — PANTOPRAZOLE SODIUM 40 MG: 40 TABLET, DELAYED RELEASE ORAL at 15:47

## 2022-01-01 RX ADMIN — FILGRASTIM 480 MCG: 480 INJECTION, SOLUTION INTRAVENOUS; SUBCUTANEOUS at 20:01

## 2022-01-01 RX ADMIN — VANCOMYCIN HYDROCHLORIDE 125 MG: 125 CAPSULE ORAL at 20:22

## 2022-01-01 RX ADMIN — SIROLIMUS 3 MG: 2 TABLET, SUGAR COATED ORAL at 08:42

## 2022-01-01 RX ADMIN — DEXTROSE MONOHYDRATE 10 ML: 50 INJECTION, SOLUTION INTRAVENOUS at 19:44

## 2022-01-01 RX ADMIN — POTASSIUM CHLORIDE, DEXTROSE MONOHYDRATE AND SODIUM CHLORIDE: 150; 5; 450 INJECTION, SOLUTION INTRAVENOUS at 22:36

## 2022-01-01 RX ADMIN — RUGBY ZINC OXIDE 20%: 20 OINTMENT TOPICAL at 15:07

## 2022-01-01 RX ADMIN — ACYCLOVIR 800 MG: 800 TABLET ORAL at 19:44

## 2022-01-01 RX ADMIN — Medication 1 CAPSULE: at 08:06

## 2022-01-01 RX ADMIN — SIROLIMUS 3 MG: 2 TABLET, SUGAR COATED ORAL at 09:23

## 2022-01-01 RX ADMIN — SODIUM CHLORIDE, PRESERVATIVE FREE 5 ML: 5 INJECTION INTRAVENOUS at 17:03

## 2022-01-01 RX ADMIN — CEFEPIME HYDROCHLORIDE 2 G: 2 INJECTION, POWDER, FOR SOLUTION INTRAVENOUS at 13:52

## 2022-01-01 RX ADMIN — PANTOPRAZOLE SODIUM 40 MG: 40 TABLET, DELAYED RELEASE ORAL at 15:04

## 2022-01-01 RX ADMIN — ACYCLOVIR 800 MG: 800 TABLET ORAL at 08:04

## 2022-01-01 RX ADMIN — SODIUM CHLORIDE, PRESERVATIVE FREE 5 ML: 5 INJECTION INTRAVENOUS at 04:32

## 2022-01-01 RX ADMIN — DIPHENHYDRAMINE HYDROCHLORIDE 25 MG: 25 CAPSULE ORAL at 10:27

## 2022-01-01 RX ADMIN — PANTOPRAZOLE SODIUM 40 MG: 40 TABLET, DELAYED RELEASE ORAL at 08:35

## 2022-01-01 RX ADMIN — POTASSIUM PHOSPHATE, MONOBASIC POTASSIUM PHOSPHATE, DIBASIC 15 MMOL: 224; 236 INJECTION, SOLUTION, CONCENTRATE INTRAVENOUS at 08:55

## 2022-01-01 RX ADMIN — CEFEPIME HYDROCHLORIDE 2 G: 2 INJECTION, POWDER, FOR SOLUTION INTRAVENOUS at 23:08

## 2022-01-01 RX ADMIN — URSODIOL 300 MG: 300 CAPSULE ORAL at 08:11

## 2022-01-01 RX ADMIN — FENTANYL CITRATE 50 MCG: 50 INJECTION, SOLUTION INTRAMUSCULAR; INTRAVENOUS at 09:29

## 2022-01-01 RX ADMIN — URSODIOL 300 MG: 300 CAPSULE ORAL at 14:58

## 2022-01-01 RX ADMIN — Medication 1 CAPSULE: at 08:11

## 2022-01-01 RX ADMIN — ONDANSETRON HYDROCHLORIDE 8 MG: 8 TABLET, FILM COATED ORAL at 10:36

## 2022-01-01 RX ADMIN — ACYCLOVIR 800 MG: 800 TABLET ORAL at 08:56

## 2022-01-01 RX ADMIN — SODIUM CHLORIDE, PRESERVATIVE FREE 5 ML: 5 INJECTION INTRAVENOUS at 22:08

## 2022-01-01 RX ADMIN — DEXTROSE MONOHYDRATE 10 ML: 50 INJECTION, SOLUTION INTRAVENOUS at 20:31

## 2022-01-01 RX ADMIN — LEVOFLOXACIN 250 MG: 250 TABLET, FILM COATED ORAL at 08:26

## 2022-01-01 RX ADMIN — VANCOMYCIN HYDROCHLORIDE 125 MG: 125 CAPSULE ORAL at 17:46

## 2022-01-01 RX ADMIN — ALLOPURINOL 300 MG: 300 TABLET ORAL at 07:59

## 2022-01-01 RX ADMIN — ENOXAPARIN SODIUM 100 MG: 100 INJECTION SUBCUTANEOUS at 19:46

## 2022-01-01 RX ADMIN — SODIUM CHLORIDE, PRESERVATIVE FREE 5 ML: 5 INJECTION INTRAVENOUS at 12:13

## 2022-01-01 RX ADMIN — POTASSIUM CHLORIDE 20 MEQ: 29.8 INJECTION, SOLUTION INTRAVENOUS at 07:11

## 2022-01-01 RX ADMIN — LORAZEPAM 1 MG: 0.5 TABLET ORAL at 18:30

## 2022-01-01 RX ADMIN — SODIUM CHLORIDE, PRESERVATIVE FREE 5 ML: 5 INJECTION INTRAVENOUS at 08:22

## 2022-01-01 RX ADMIN — DEXTROSE MONOHYDRATE 20 ML: 50 INJECTION, SOLUTION INTRAVENOUS at 19:46

## 2022-01-01 RX ADMIN — PANTOPRAZOLE SODIUM 40 MG: 40 TABLET, DELAYED RELEASE ORAL at 18:11

## 2022-01-01 RX ADMIN — VANCOMYCIN HYDROCHLORIDE 125 MG: 125 CAPSULE ORAL at 17:09

## 2022-01-01 RX ADMIN — ONDANSETRON HYDROCHLORIDE 8 MG: 8 TABLET, FILM COATED ORAL at 16:53

## 2022-01-01 RX ADMIN — DEXTROSE MONOHYDRATE 20 ML: 50 INJECTION, SOLUTION INTRAVENOUS at 19:50

## 2022-01-01 RX ADMIN — SIROLIMUS 5 MG: 2 TABLET, SUGAR COATED ORAL at 08:51

## 2022-01-01 RX ADMIN — FLUCONAZOLE 200 MG: 200 TABLET ORAL at 08:37

## 2022-01-01 RX ADMIN — PANTOPRAZOLE SODIUM 40 MG: 40 TABLET, DELAYED RELEASE ORAL at 08:15

## 2022-01-01 RX ADMIN — URSODIOL 300 MG: 300 CAPSULE ORAL at 15:32

## 2022-01-01 RX ADMIN — FUROSEMIDE 10 MG: 10 INJECTION, SOLUTION INTRAVENOUS at 09:18

## 2022-01-01 RX ADMIN — POTASSIUM CHLORIDE, DEXTROSE MONOHYDRATE AND SODIUM CHLORIDE: 150; 5; 450 INJECTION, SOLUTION INTRAVENOUS at 09:45

## 2022-01-01 RX ADMIN — SODIUM CHLORIDE, PRESERVATIVE FREE 5 ML: 5 INJECTION INTRAVENOUS at 16:39

## 2022-01-01 RX ADMIN — ACETAMINOPHEN 650 MG: 325 TABLET, FILM COATED ORAL at 19:30

## 2022-01-01 RX ADMIN — PIPERACILLIN AND TAZOBACTAM 4.5 G: 4; .5 INJECTION, POWDER, FOR SOLUTION INTRAVENOUS at 11:39

## 2022-01-01 RX ADMIN — CYCLOPHOSPHAMIDE 3685 MG: 2 INJECTION, POWDER, FOR SOLUTION INTRAVENOUS; ORAL at 09:51

## 2022-01-01 RX ADMIN — FLUCONAZOLE 200 MG: 200 TABLET ORAL at 09:24

## 2022-01-01 RX ADMIN — SODIUM CHLORIDE, PRESERVATIVE FREE 10 ML: 5 INJECTION INTRAVENOUS at 23:08

## 2022-01-01 RX ADMIN — ACYCLOVIR 800 MG: 800 TABLET ORAL at 19:52

## 2022-01-01 RX ADMIN — PIPERACILLIN AND TAZOBACTAM 4.5 G: 4; .5 INJECTION, POWDER, FOR SOLUTION INTRAVENOUS at 11:26

## 2022-01-01 RX ADMIN — URSODIOL 300 MG: 300 CAPSULE ORAL at 19:58

## 2022-01-01 RX ADMIN — ONDANSETRON HYDROCHLORIDE 8 MG: 8 TABLET, FILM COATED ORAL at 15:50

## 2022-01-01 RX ADMIN — FILGRASTIM 480 MCG: 480 INJECTION, SOLUTION INTRAVENOUS; SUBCUTANEOUS at 10:43

## 2022-01-01 RX ADMIN — VANCOMYCIN HYDROCHLORIDE 125 MG: 125 CAPSULE ORAL at 15:49

## 2022-01-01 RX ADMIN — SODIUM CHLORIDE, PRESERVATIVE FREE 10 ML: 5 INJECTION INTRAVENOUS at 08:07

## 2022-01-01 RX ADMIN — PANTOPRAZOLE SODIUM 40 MG: 40 TABLET, DELAYED RELEASE ORAL at 07:47

## 2022-01-01 RX ADMIN — MICAFUNGIN SODIUM 50 MG: 50 INJECTION, POWDER, LYOPHILIZED, FOR SOLUTION INTRAVENOUS at 08:37

## 2022-01-01 RX ADMIN — URSODIOL 300 MG: 300 CAPSULE ORAL at 08:44

## 2022-01-01 RX ADMIN — CYCLOPHOSPHAMIDE 3685 MG: 2 INJECTION, POWDER, FOR SOLUTION INTRAVENOUS; ORAL at 10:06

## 2022-01-01 RX ADMIN — SODIUM CHLORIDE, PRESERVATIVE FREE 5 ML: 5 INJECTION INTRAVENOUS at 11:50

## 2022-01-01 RX ADMIN — CEFPODOXIME PROXETIL 200 MG: 200 TABLET, FILM COATED ORAL at 19:50

## 2022-01-01 RX ADMIN — DEXTROSE MONOHYDRATE 20 ML: 50 INJECTION, SOLUTION INTRAVENOUS at 20:04

## 2022-01-01 RX ADMIN — PIPERACILLIN AND TAZOBACTAM 4.5 G: 4; .5 INJECTION, POWDER, FOR SOLUTION INTRAVENOUS at 16:08

## 2022-01-01 RX ADMIN — ACYCLOVIR 800 MG: 800 TABLET ORAL at 20:22

## 2022-01-01 RX ADMIN — SODIUM CHLORIDE, PRESERVATIVE FREE 5 ML: 5 INJECTION INTRAVENOUS at 14:32

## 2022-01-01 RX ADMIN — CEFPODOXIME PROXETIL 200 MG: 200 TABLET, FILM COATED ORAL at 08:22

## 2022-01-01 RX ADMIN — FILGRASTIM 480 MCG: 480 INJECTION, SOLUTION INTRAVENOUS; SUBCUTANEOUS at 20:26

## 2022-01-01 RX ADMIN — DEXTROSE MONOHYDRATE 20 ML: 50 INJECTION, SOLUTION INTRAVENOUS at 20:26

## 2022-01-01 RX ADMIN — ONDANSETRON HYDROCHLORIDE 8 MG: 8 TABLET, FILM COATED ORAL at 23:35

## 2022-01-01 RX ADMIN — PIPERACILLIN AND TAZOBACTAM 4.5 G: 4; .5 INJECTION, POWDER, FOR SOLUTION INTRAVENOUS at 23:35

## 2022-01-01 RX ADMIN — SODIUM CHLORIDE, PRESERVATIVE FREE 5 ML: 5 INJECTION INTRAVENOUS at 17:10

## 2022-01-01 RX ADMIN — MICAFUNGIN SODIUM 50 MG: 50 INJECTION, POWDER, LYOPHILIZED, FOR SOLUTION INTRAVENOUS at 08:43

## 2022-01-01 RX ADMIN — Medication 1 CAPSULE: at 08:03

## 2022-01-01 RX ADMIN — SIROLIMUS 5 MG: 2 TABLET, SUGAR COATED ORAL at 07:47

## 2022-01-01 RX ADMIN — MICAFUNGIN SODIUM 50 MG: 50 INJECTION, POWDER, LYOPHILIZED, FOR SOLUTION INTRAVENOUS at 08:39

## 2022-01-01 RX ADMIN — FILGRASTIM 480 MCG: 480 INJECTION, SOLUTION INTRAVENOUS; SUBCUTANEOUS at 20:13

## 2022-01-01 RX ADMIN — POTASSIUM CHLORIDE, DEXTROSE MONOHYDRATE AND SODIUM CHLORIDE: 150; 5; 450 INJECTION, SOLUTION INTRAVENOUS at 22:01

## 2022-01-01 RX ADMIN — CEFPODOXIME PROXETIL 200 MG: 200 TABLET, FILM COATED ORAL at 20:02

## 2022-01-01 RX ADMIN — PANTOPRAZOLE SODIUM 40 MG: 40 TABLET, DELAYED RELEASE ORAL at 08:55

## 2022-01-01 RX ADMIN — SODIUM CHLORIDE, PRESERVATIVE FREE 5 ML: 5 INJECTION INTRAVENOUS at 23:45

## 2022-01-01 RX ADMIN — SODIUM CHLORIDE, PRESERVATIVE FREE 5 ML: 5 INJECTION INTRAVENOUS at 11:06

## 2022-01-01 RX ADMIN — SODIUM CHLORIDE, PRESERVATIVE FREE 10 ML: 5 INJECTION INTRAVENOUS at 09:06

## 2022-01-01 RX ADMIN — Medication 1 CAPSULE: at 08:44

## 2022-01-01 RX ADMIN — FENTANYL CITRATE 25 MCG: 50 INJECTION, SOLUTION INTRAMUSCULAR; INTRAVENOUS at 09:37

## 2022-01-01 RX ADMIN — DEXTROSE MONOHYDRATE 10 ML: 50 INJECTION, SOLUTION INTRAVENOUS at 19:58

## 2022-01-01 RX ADMIN — ENOXAPARIN SODIUM 100 MG: 100 INJECTION SUBCUTANEOUS at 20:06

## 2022-01-01 RX ADMIN — ONDANSETRON HYDROCHLORIDE 8 MG: 8 TABLET, FILM COATED ORAL at 08:56

## 2022-01-01 RX ADMIN — FLUCONAZOLE 200 MG: 200 TABLET ORAL at 08:04

## 2022-01-01 RX ADMIN — CEFEPIME HYDROCHLORIDE 2 G: 2 INJECTION, POWDER, FOR SOLUTION INTRAVENOUS at 20:30

## 2022-01-01 RX ADMIN — MYCOPHENOLATE MOFETIL 1500 MG: 500 INJECTION, POWDER, LYOPHILIZED, FOR SOLUTION INTRAVENOUS at 09:33

## 2022-01-01 RX ADMIN — Medication 2 ML: at 09:55

## 2022-01-01 RX ADMIN — DEXTROSE MONOHYDRATE 20 ML: 50 INJECTION, SOLUTION INTRAVENOUS at 20:32

## 2022-01-01 RX ADMIN — FILGRASTIM 480 MCG: 480 INJECTION, SOLUTION INTRAVENOUS; SUBCUTANEOUS at 19:50

## 2022-01-01 RX ADMIN — URSODIOL 300 MG: 300 CAPSULE ORAL at 13:44

## 2022-01-01 RX ADMIN — DEXTROSE MONOHYDRATE 20 ML: 50 INJECTION, SOLUTION INTRAVENOUS at 18:45

## 2022-01-01 RX ADMIN — URSODIOL 300 MG: 300 CAPSULE ORAL at 20:29

## 2022-01-01 RX ADMIN — MYCOPHENOLATE MOFETIL 1500 MG: 500 INJECTION, POWDER, LYOPHILIZED, FOR SOLUTION INTRAVENOUS at 20:28

## 2022-01-01 RX ADMIN — FLUCONAZOLE 200 MG: 200 TABLET ORAL at 07:47

## 2022-01-01 RX ADMIN — POTASSIUM CHLORIDE, DEXTROSE MONOHYDRATE AND SODIUM CHLORIDE: 150; 5; 450 INJECTION, SOLUTION INTRAVENOUS at 22:12

## 2022-01-01 RX ADMIN — ONDANSETRON HYDROCHLORIDE 8 MG: 8 TABLET, FILM COATED ORAL at 18:09

## 2022-01-01 RX ADMIN — ACYCLOVIR 800 MG: 800 TABLET ORAL at 19:30

## 2022-01-01 RX ADMIN — CEFEPIME HYDROCHLORIDE 2 G: 2 INJECTION, POWDER, FOR SOLUTION INTRAVENOUS at 21:13

## 2022-01-01 RX ADMIN — URSODIOL 300 MG: 300 CAPSULE ORAL at 08:21

## 2022-01-01 RX ADMIN — ONDANSETRON HYDROCHLORIDE 8 MG: 8 TABLET, FILM COATED ORAL at 10:15

## 2022-01-01 RX ADMIN — ALLOPURINOL 300 MG: 300 TABLET ORAL at 08:12

## 2022-01-01 RX ADMIN — SIROLIMUS 3 MG: 2 TABLET, SUGAR COATED ORAL at 08:21

## 2022-01-01 RX ADMIN — PANTOPRAZOLE SODIUM 40 MG: 40 TABLET, DELAYED RELEASE ORAL at 07:46

## 2022-01-01 RX ADMIN — ACYCLOVIR 800 MG: 800 TABLET ORAL at 07:59

## 2022-01-01 RX ADMIN — POTASSIUM CHLORIDE 20 MEQ: 29.8 INJECTION, SOLUTION INTRAVENOUS at 07:13

## 2022-01-01 RX ADMIN — FUROSEMIDE 10 MG: 10 INJECTION, SOLUTION INTRAVENOUS at 21:32

## 2022-01-01 RX ADMIN — ONDANSETRON HYDROCHLORIDE 8 MG: 8 TABLET, FILM COATED ORAL at 08:12

## 2022-01-01 RX ADMIN — ONDANSETRON HYDROCHLORIDE 8 MG: 8 TABLET, FILM COATED ORAL at 18:44

## 2022-01-01 RX ADMIN — PIPERACILLIN AND TAZOBACTAM 4.5 G: 4; .5 INJECTION, POWDER, FOR SOLUTION INTRAVENOUS at 22:53

## 2022-01-01 RX ADMIN — FILGRASTIM 480 MCG: 480 INJECTION, SOLUTION INTRAVENOUS; SUBCUTANEOUS at 20:29

## 2022-01-01 RX ADMIN — SODIUM CHLORIDE, PRESERVATIVE FREE 5 ML: 5 INJECTION INTRAVENOUS at 04:20

## 2022-01-01 RX ADMIN — LEVOFLOXACIN 250 MG: 250 TABLET, FILM COATED ORAL at 09:19

## 2022-01-01 RX ADMIN — PIPERACILLIN AND TAZOBACTAM 4.5 G: 4; .5 INJECTION, POWDER, FOR SOLUTION INTRAVENOUS at 16:25

## 2022-01-01 RX ADMIN — POTASSIUM CHLORIDE 20 MEQ: 29.8 INJECTION, SOLUTION INTRAVENOUS at 06:35

## 2022-01-01 RX ADMIN — CEFEPIME HYDROCHLORIDE 2 G: 2 INJECTION, POWDER, FOR SOLUTION INTRAVENOUS at 12:53

## 2022-01-01 RX ADMIN — Medication 1 CAPSULE: at 11:26

## 2022-01-01 RX ADMIN — URSODIOL 300 MG: 300 CAPSULE ORAL at 07:49

## 2022-01-01 RX ADMIN — PANTOPRAZOLE SODIUM 40 MG: 40 TABLET, DELAYED RELEASE ORAL at 08:06

## 2022-01-01 RX ADMIN — URSODIOL 300 MG: 300 CAPSULE ORAL at 08:09

## 2022-01-01 RX ADMIN — FUROSEMIDE 20 MG: 10 INJECTION, SOLUTION INTRAVENOUS at 08:49

## 2022-01-01 RX ADMIN — URSODIOL 300 MG: 300 CAPSULE ORAL at 08:35

## 2022-01-01 RX ADMIN — SODIUM CHLORIDE: 9 INJECTION, SOLUTION INTRAVENOUS at 08:16

## 2022-01-01 RX ADMIN — PANTOPRAZOLE SODIUM 40 MG: 40 TABLET, DELAYED RELEASE ORAL at 17:46

## 2022-01-01 RX ADMIN — DEXTROSE MONOHYDRATE 20 ML: 50 INJECTION, SOLUTION INTRAVENOUS at 20:18

## 2022-01-01 RX ADMIN — POTASSIUM CHLORIDE 20 MEQ: 29.8 INJECTION, SOLUTION INTRAVENOUS at 06:10

## 2022-01-01 RX ADMIN — DEXTROSE MONOHYDRATE 10 ML: 50 INJECTION, SOLUTION INTRAVENOUS at 20:32

## 2022-01-01 RX ADMIN — URSODIOL 300 MG: 300 CAPSULE ORAL at 08:17

## 2022-01-01 RX ADMIN — Medication 1 CAPSULE: at 09:23

## 2022-01-01 RX ADMIN — ENOXAPARIN SODIUM 100 MG: 100 INJECTION SUBCUTANEOUS at 08:36

## 2022-01-01 RX ADMIN — ACYCLOVIR 800 MG: 800 TABLET ORAL at 19:42

## 2022-01-01 RX ADMIN — SIROLIMUS 5 MG: 2 TABLET, SUGAR COATED ORAL at 08:26

## 2022-01-01 RX ADMIN — SIROLIMUS 3 MG: 2 TABLET, SUGAR COATED ORAL at 08:55

## 2022-01-01 RX ADMIN — URSODIOL 300 MG: 300 CAPSULE ORAL at 19:52

## 2022-01-01 RX ADMIN — SODIUM CHLORIDE, POTASSIUM CHLORIDE, SODIUM LACTATE AND CALCIUM CHLORIDE 500 ML: 600; 310; 30; 20 INJECTION, SOLUTION INTRAVENOUS at 09:51

## 2022-01-01 RX ADMIN — Medication 1 CAPSULE: at 11:40

## 2022-01-01 RX ADMIN — MYCOPHENOLATE MOFETIL 1500 MG: 500 INJECTION, POWDER, LYOPHILIZED, FOR SOLUTION INTRAVENOUS at 08:16

## 2022-01-01 RX ADMIN — MICAFUNGIN SODIUM 50 MG: 50 INJECTION, POWDER, LYOPHILIZED, FOR SOLUTION INTRAVENOUS at 07:51

## 2022-01-01 RX ADMIN — DEXTROSE AND SODIUM CHLORIDE: 5; 450 INJECTION, SOLUTION INTRAVENOUS at 12:03

## 2022-01-01 RX ADMIN — PANTOPRAZOLE SODIUM 40 MG: 40 TABLET, DELAYED RELEASE ORAL at 08:29

## 2022-01-01 RX ADMIN — VANCOMYCIN HYDROCHLORIDE 125 MG: 125 CAPSULE ORAL at 08:40

## 2022-01-01 RX ADMIN — VANCOMYCIN HYDROCHLORIDE 125 MG: 125 CAPSULE ORAL at 12:09

## 2022-01-01 RX ADMIN — MYCOPHENOLATE MOFETIL 1500 MG: 500 INJECTION, POWDER, LYOPHILIZED, FOR SOLUTION INTRAVENOUS at 21:03

## 2022-01-01 RX ADMIN — DEXAMETHASONE 8 MG: 4 TABLET ORAL at 08:12

## 2022-01-01 RX ADMIN — SODIUM CHLORIDE, PRESERVATIVE FREE 5 ML: 5 INJECTION INTRAVENOUS at 03:15

## 2022-01-01 RX ADMIN — SODIUM CHLORIDE, PRESERVATIVE FREE 5 ML: 5 INJECTION INTRAVENOUS at 13:40

## 2022-01-01 RX ADMIN — VANCOMYCIN HYDROCHLORIDE 125 MG: 125 CAPSULE ORAL at 16:33

## 2022-01-01 RX ADMIN — Medication 1 CAPSULE: at 13:22

## 2022-01-01 RX ADMIN — ONDANSETRON HYDROCHLORIDE 8 MG: 8 TABLET, FILM COATED ORAL at 02:32

## 2022-01-01 RX ADMIN — URSODIOL 300 MG: 300 CAPSULE ORAL at 12:52

## 2022-01-01 RX ADMIN — ENOXAPARIN SODIUM 100 MG: 100 INJECTION SUBCUTANEOUS at 08:04

## 2022-01-01 RX ADMIN — Medication 500 UNITS: at 08:57

## 2022-01-01 RX ADMIN — PIPERACILLIN AND TAZOBACTAM 4.5 G: 4; .5 INJECTION, POWDER, FOR SOLUTION INTRAVENOUS at 16:53

## 2022-01-01 RX ADMIN — SIROLIMUS 3 MG: 2 TABLET, SUGAR COATED ORAL at 08:11

## 2022-01-01 RX ADMIN — CEFEPIME HYDROCHLORIDE 2 G: 2 INJECTION, POWDER, FOR SOLUTION INTRAVENOUS at 08:16

## 2022-01-01 RX ADMIN — LEVOFLOXACIN 250 MG: 250 TABLET, FILM COATED ORAL at 10:04

## 2022-01-01 RX ADMIN — ONDANSETRON HYDROCHLORIDE 8 MG: 8 TABLET, FILM COATED ORAL at 17:09

## 2022-01-01 RX ADMIN — CEFEPIME HYDROCHLORIDE 2 G: 2 INJECTION, POWDER, FOR SOLUTION INTRAVENOUS at 23:22

## 2022-01-01 RX ADMIN — MICAFUNGIN SODIUM 50 MG: 50 INJECTION, POWDER, LYOPHILIZED, FOR SOLUTION INTRAVENOUS at 08:41

## 2022-01-01 RX ADMIN — MICAFUNGIN SODIUM 50 MG: 50 INJECTION, POWDER, LYOPHILIZED, FOR SOLUTION INTRAVENOUS at 08:52

## 2022-01-01 RX ADMIN — FUROSEMIDE 10 MG: 10 INJECTION, SOLUTION INTRAVENOUS at 14:25

## 2022-01-01 RX ADMIN — ONDANSETRON HYDROCHLORIDE 8 MG: 8 TABLET, FILM COATED ORAL at 16:02

## 2022-01-01 RX ADMIN — URSODIOL 300 MG: 300 CAPSULE ORAL at 19:38

## 2022-01-01 RX ADMIN — MICAFUNGIN SODIUM 50 MG: 50 INJECTION, POWDER, LYOPHILIZED, FOR SOLUTION INTRAVENOUS at 08:04

## 2022-01-01 ASSESSMENT — ACTIVITIES OF DAILY LIVING (ADL)
ADLS_ACUITY_SCORE: 22
ADLS_ACUITY_SCORE: 26
ADLS_ACUITY_SCORE: 24
ADLS_ACUITY_SCORE: 24
ADLS_ACUITY_SCORE: 23
ADLS_ACUITY_SCORE: 29
ADLS_ACUITY_SCORE: 20
ADLS_ACUITY_SCORE: 20
ADLS_ACUITY_SCORE: 22
ADLS_ACUITY_SCORE: 24
ADLS_ACUITY_SCORE: 25
ADLS_ACUITY_SCORE: 25
ADLS_ACUITY_SCORE: 20
ADLS_ACUITY_SCORE: 24
ADLS_ACUITY_SCORE: 21
ADLS_ACUITY_SCORE: 20
ADLS_ACUITY_SCORE: 21
ADLS_ACUITY_SCORE: 24
ADLS_ACUITY_SCORE: 25
ADLS_ACUITY_SCORE: 22
ADLS_ACUITY_SCORE: 26
ADLS_ACUITY_SCORE: 20
ADLS_ACUITY_SCORE: 20
ADLS_ACUITY_SCORE: 22
ADLS_ACUITY_SCORE: 26
ADLS_ACUITY_SCORE: 20
ADLS_ACUITY_SCORE: 24
ADLS_ACUITY_SCORE: 24
ADLS_ACUITY_SCORE: 20
ADLS_ACUITY_SCORE: 35
ADLS_ACUITY_SCORE: 20
ADLS_ACUITY_SCORE: 29
ADLS_ACUITY_SCORE: 20
ADLS_ACUITY_SCORE: 24
ADLS_ACUITY_SCORE: 22
ADLS_ACUITY_SCORE: 24
ADLS_ACUITY_SCORE: 20
ADLS_ACUITY_SCORE: 25
ADLS_ACUITY_SCORE: 24
ADLS_ACUITY_SCORE: 24
ADLS_ACUITY_SCORE: 25
ADLS_ACUITY_SCORE: 24
ADLS_ACUITY_SCORE: 20
ADLS_ACUITY_SCORE: 25
ADLS_ACUITY_SCORE: 24
ADLS_ACUITY_SCORE: 26
ADLS_ACUITY_SCORE: 20
ADLS_ACUITY_SCORE: 22
ADLS_ACUITY_SCORE: 20
ADLS_ACUITY_SCORE: 26
ADLS_ACUITY_SCORE: 26
ADLS_ACUITY_SCORE: 20
ADLS_ACUITY_SCORE: 20
ADLS_ACUITY_SCORE: 25
ADLS_ACUITY_SCORE: 24
ADLS_ACUITY_SCORE: 25
ADLS_ACUITY_SCORE: 22
ADLS_ACUITY_SCORE: 24
ADLS_ACUITY_SCORE: 24
ADLS_ACUITY_SCORE: 20
ADLS_ACUITY_SCORE: 24
ADLS_ACUITY_SCORE: 23
ADLS_ACUITY_SCORE: 22
ADLS_ACUITY_SCORE: 22
ADLS_ACUITY_SCORE: 20
ADLS_ACUITY_SCORE: 20
ADLS_ACUITY_SCORE: 26
ADLS_ACUITY_SCORE: 25
ADLS_ACUITY_SCORE: 20
ADLS_ACUITY_SCORE: 22
ADLS_ACUITY_SCORE: 20
ADLS_ACUITY_SCORE: 22
ADLS_ACUITY_SCORE: 25
ADLS_ACUITY_SCORE: 24
ADLS_ACUITY_SCORE: 20
ADLS_ACUITY_SCORE: 21
ADLS_ACUITY_SCORE: 22
ADLS_ACUITY_SCORE: 24
ADLS_ACUITY_SCORE: 20
ADLS_ACUITY_SCORE: 20
ADLS_ACUITY_SCORE: 21
ADLS_ACUITY_SCORE: 24
ADLS_ACUITY_SCORE: 29
ADLS_ACUITY_SCORE: 25
ADLS_ACUITY_SCORE: 21
ADLS_ACUITY_SCORE: 26
ADLS_ACUITY_SCORE: 24
ADLS_ACUITY_SCORE: 20
ADLS_ACUITY_SCORE: 26
ADLS_ACUITY_SCORE: 22
ADLS_ACUITY_SCORE: 20
ADLS_ACUITY_SCORE: 28
ADLS_ACUITY_SCORE: 25
ADLS_ACUITY_SCORE: 20
ADLS_ACUITY_SCORE: 25
ADLS_ACUITY_SCORE: 25
ADLS_ACUITY_SCORE: 22
ADLS_ACUITY_SCORE: 26
ADLS_ACUITY_SCORE: 26
ADLS_ACUITY_SCORE: 20
ADLS_ACUITY_SCORE: 20
ADLS_ACUITY_SCORE: 22
ADLS_ACUITY_SCORE: 25
ADLS_ACUITY_SCORE: 22
ADLS_ACUITY_SCORE: 24
ADLS_ACUITY_SCORE: 22
ADLS_ACUITY_SCORE: 20
ADLS_ACUITY_SCORE: 22
ADLS_ACUITY_SCORE: 20
ADLS_ACUITY_SCORE: 22
ADLS_ACUITY_SCORE: 26
ADLS_ACUITY_SCORE: 24
ADLS_ACUITY_SCORE: 22
ADLS_ACUITY_SCORE: 28
ADLS_ACUITY_SCORE: 20
ADLS_ACUITY_SCORE: 20
ADLS_ACUITY_SCORE: 24
ADLS_ACUITY_SCORE: 20
ADLS_ACUITY_SCORE: 24
ADLS_ACUITY_SCORE: 20
ADLS_ACUITY_SCORE: 20
ADLS_ACUITY_SCORE: 24
ADLS_ACUITY_SCORE: 24
ADLS_ACUITY_SCORE: 22
ADLS_ACUITY_SCORE: 20
ADLS_ACUITY_SCORE: 26
ADLS_ACUITY_SCORE: 24
ADLS_ACUITY_SCORE: 21
ADLS_ACUITY_SCORE: 24
ADLS_ACUITY_SCORE: 24
ADLS_ACUITY_SCORE: 20
ADLS_ACUITY_SCORE: 23
ADLS_ACUITY_SCORE: 20
ADLS_ACUITY_SCORE: 26
ADLS_ACUITY_SCORE: 23
ADLS_ACUITY_SCORE: 20
ADLS_ACUITY_SCORE: 24
ADLS_ACUITY_SCORE: 20
ADLS_ACUITY_SCORE: 25
ADLS_ACUITY_SCORE: 22
ADLS_ACUITY_SCORE: 24
ADLS_ACUITY_SCORE: 24
ADLS_ACUITY_SCORE: 20
ADLS_ACUITY_SCORE: 20
ADLS_ACUITY_SCORE: 22
ADLS_ACUITY_SCORE: 26
ADLS_ACUITY_SCORE: 26
ADLS_ACUITY_SCORE: 20
ADLS_ACUITY_SCORE: 21
ADLS_ACUITY_SCORE: 24
ADLS_ACUITY_SCORE: 25
ADLS_ACUITY_SCORE: 22
ADLS_ACUITY_SCORE: 25
ADLS_ACUITY_SCORE: 28
ADLS_ACUITY_SCORE: 24
ADLS_ACUITY_SCORE: 20
ADLS_ACUITY_SCORE: 25
ADLS_ACUITY_SCORE: 24
ADLS_ACUITY_SCORE: 22
ADLS_ACUITY_SCORE: 20
ADLS_ACUITY_SCORE: 24
ADLS_ACUITY_SCORE: 26
ADLS_ACUITY_SCORE: 22
ADLS_ACUITY_SCORE: 25
ADLS_ACUITY_SCORE: 21
ADLS_ACUITY_SCORE: 20
ADLS_ACUITY_SCORE: 24
ADLS_ACUITY_SCORE: 25
ADLS_ACUITY_SCORE: 24
ADLS_ACUITY_SCORE: 20
ADLS_ACUITY_SCORE: 21
ADLS_ACUITY_SCORE: 20
ADLS_ACUITY_SCORE: 25
ADLS_ACUITY_SCORE: 25
ADLS_ACUITY_SCORE: 22
ADLS_ACUITY_SCORE: 24
ADLS_ACUITY_SCORE: 24
ADLS_ACUITY_SCORE: 20
ADLS_ACUITY_SCORE: 21
ADLS_ACUITY_SCORE: 24
ADLS_ACUITY_SCORE: 20
ADLS_ACUITY_SCORE: 25
ADLS_ACUITY_SCORE: 22
ADLS_ACUITY_SCORE: 22
ADLS_ACUITY_SCORE: 20
ADLS_ACUITY_SCORE: 20
ADLS_ACUITY_SCORE: 25
ADLS_ACUITY_SCORE: 25
ADLS_ACUITY_SCORE: 26
ADLS_ACUITY_SCORE: 22
ADLS_ACUITY_SCORE: 20
ADLS_ACUITY_SCORE: 22
ADLS_ACUITY_SCORE: 22
ADLS_ACUITY_SCORE: 24
ADLS_ACUITY_SCORE: 20
ADLS_ACUITY_SCORE: 23
ADLS_ACUITY_SCORE: 33
ADLS_ACUITY_SCORE: 26
ADLS_ACUITY_SCORE: 22
ADLS_ACUITY_SCORE: 22
ADLS_ACUITY_SCORE: 24
ADLS_ACUITY_SCORE: 26
ADLS_ACUITY_SCORE: 22
ADLS_ACUITY_SCORE: 24
ADLS_ACUITY_SCORE: 22
ADLS_ACUITY_SCORE: 22
ADLS_ACUITY_SCORE: 26
ADLS_ACUITY_SCORE: 26
ADLS_ACUITY_SCORE: 20
ADLS_ACUITY_SCORE: 22
ADLS_ACUITY_SCORE: 26
ADLS_ACUITY_SCORE: 24
ADLS_ACUITY_SCORE: 22
ADLS_ACUITY_SCORE: 22
ADLS_ACUITY_SCORE: 24
ADLS_ACUITY_SCORE: 24
ADLS_ACUITY_SCORE: 25
ADLS_ACUITY_SCORE: 25
ADLS_ACUITY_SCORE: 24
ADLS_ACUITY_SCORE: 26
ADLS_ACUITY_SCORE: 20
ADLS_ACUITY_SCORE: 29
ADLS_ACUITY_SCORE: 25
ADLS_ACUITY_SCORE: 24
ADLS_ACUITY_SCORE: 25
ADLS_ACUITY_SCORE: 24
ADLS_ACUITY_SCORE: 22
ADLS_ACUITY_SCORE: 24
ADLS_ACUITY_SCORE: 20
ADLS_ACUITY_SCORE: 22
ADLS_ACUITY_SCORE: 26
ADLS_ACUITY_SCORE: 24
ADLS_ACUITY_SCORE: 22
ADLS_ACUITY_SCORE: 24
ADLS_ACUITY_SCORE: 22
ADLS_ACUITY_SCORE: 24
ADLS_ACUITY_SCORE: 26
ADLS_ACUITY_SCORE: 21
ADLS_ACUITY_SCORE: 25
ADLS_ACUITY_SCORE: 29
ADLS_ACUITY_SCORE: 22
ADLS_ACUITY_SCORE: 22
ADLS_ACUITY_SCORE: 24
ADLS_ACUITY_SCORE: 26
ADLS_ACUITY_SCORE: 24
ADLS_ACUITY_SCORE: 25
ADLS_ACUITY_SCORE: 25
ADLS_ACUITY_SCORE: 24
ADLS_ACUITY_SCORE: 24
ADLS_ACUITY_SCORE: 26
ADLS_ACUITY_SCORE: 21
ADLS_ACUITY_SCORE: 28
ADLS_ACUITY_SCORE: 28
ADLS_ACUITY_SCORE: 20
ADLS_ACUITY_SCORE: 26
ADLS_ACUITY_SCORE: 26
ADLS_ACUITY_SCORE: 20
ADLS_ACUITY_SCORE: 22
ADLS_ACUITY_SCORE: 22
ADLS_ACUITY_SCORE: 25
ADLS_ACUITY_SCORE: 20
ADLS_ACUITY_SCORE: 20
ADLS_ACUITY_SCORE: 24
ADLS_ACUITY_SCORE: 24
ADLS_ACUITY_SCORE: 22
ADLS_ACUITY_SCORE: 25
ADLS_ACUITY_SCORE: 22
ADLS_ACUITY_SCORE: 22
ADLS_ACUITY_SCORE: 24
ADLS_ACUITY_SCORE: 25
ADLS_ACUITY_SCORE: 22
ADLS_ACUITY_SCORE: 22
ADLS_ACUITY_SCORE: 29
ADLS_ACUITY_SCORE: 24
ADLS_ACUITY_SCORE: 24
ADLS_ACUITY_SCORE: 22
ADLS_ACUITY_SCORE: 24
ADLS_ACUITY_SCORE: 26
ADLS_ACUITY_SCORE: 22
ADLS_ACUITY_SCORE: 22
ADLS_ACUITY_SCORE: 26
ADLS_ACUITY_SCORE: 20
ADLS_ACUITY_SCORE: 22
ADLS_ACUITY_SCORE: 24
ADLS_ACUITY_SCORE: 26
ADLS_ACUITY_SCORE: 26
ADLS_ACUITY_SCORE: 22
ADLS_ACUITY_SCORE: 24
ADLS_ACUITY_SCORE: 24
ADLS_ACUITY_SCORE: 22
ADLS_ACUITY_SCORE: 26
ADLS_ACUITY_SCORE: 24
ADLS_ACUITY_SCORE: 20
ADLS_ACUITY_SCORE: 28
ADLS_ACUITY_SCORE: 26
ADLS_ACUITY_SCORE: 24
ADLS_ACUITY_SCORE: 20
ADLS_ACUITY_SCORE: 24
ADLS_ACUITY_SCORE: 22
ADLS_ACUITY_SCORE: 25
ADLS_ACUITY_SCORE: 20
ADLS_ACUITY_SCORE: 20
ADLS_ACUITY_SCORE: 24
ADLS_ACUITY_SCORE: 20
ADLS_ACUITY_SCORE: 20
ADLS_ACUITY_SCORE: 28
ADLS_ACUITY_SCORE: 22
ADLS_ACUITY_SCORE: 25
ADLS_ACUITY_SCORE: 22
ADLS_ACUITY_SCORE: 24
ADLS_ACUITY_SCORE: 26
ADLS_ACUITY_SCORE: 20
ADLS_ACUITY_SCORE: 22
ADLS_ACUITY_SCORE: 33
ADLS_ACUITY_SCORE: 21
ADLS_ACUITY_SCORE: 22
ADLS_ACUITY_SCORE: 22
ADLS_ACUITY_SCORE: 20
ADLS_ACUITY_SCORE: 24
ADLS_ACUITY_SCORE: 25
ADLS_ACUITY_SCORE: 20
ADLS_ACUITY_SCORE: 29
ADLS_ACUITY_SCORE: 26
ADLS_ACUITY_SCORE: 29
ADLS_ACUITY_SCORE: 24
ADLS_ACUITY_SCORE: 20
ADLS_ACUITY_SCORE: 20
ADLS_ACUITY_SCORE: 24
ADLS_ACUITY_SCORE: 20
ADLS_ACUITY_SCORE: 25
ADLS_ACUITY_SCORE: 24
ADLS_ACUITY_SCORE: 21
ADLS_ACUITY_SCORE: 25
ADLS_ACUITY_SCORE: 21
ADLS_ACUITY_SCORE: 20
ADLS_ACUITY_SCORE: 24
ADLS_ACUITY_SCORE: 25
ADLS_ACUITY_SCORE: 20
ADLS_ACUITY_SCORE: 23
ADLS_ACUITY_SCORE: 20
ADLS_ACUITY_SCORE: 25
ADLS_ACUITY_SCORE: 22
ADLS_ACUITY_SCORE: 26
ADLS_ACUITY_SCORE: 28
ADLS_ACUITY_SCORE: 22
ADLS_ACUITY_SCORE: 22
ADLS_ACUITY_SCORE: 25
ADLS_ACUITY_SCORE: 21
ADLS_ACUITY_SCORE: 25
ADLS_ACUITY_SCORE: 20
ADLS_ACUITY_SCORE: 24
ADLS_ACUITY_SCORE: 22
ADLS_ACUITY_SCORE: 33
ADLS_ACUITY_SCORE: 20
ADLS_ACUITY_SCORE: 25
ADLS_ACUITY_SCORE: 24
ADLS_ACUITY_SCORE: 26
ADLS_ACUITY_SCORE: 24
ADLS_ACUITY_SCORE: 33
ADLS_ACUITY_SCORE: 20
ADLS_ACUITY_SCORE: 25
ADLS_ACUITY_SCORE: 20
ADLS_ACUITY_SCORE: 20
ADLS_ACUITY_SCORE: 24
ADLS_ACUITY_SCORE: 20
ADLS_ACUITY_SCORE: 24
ADLS_ACUITY_SCORE: 22
ADLS_ACUITY_SCORE: 20
ADLS_ACUITY_SCORE: 25
ADLS_ACUITY_SCORE: 20
ADLS_ACUITY_SCORE: 26
ADLS_ACUITY_SCORE: 26
ADLS_ACUITY_SCORE: 22
ADLS_ACUITY_SCORE: 28
ADLS_ACUITY_SCORE: 29
ADLS_ACUITY_SCORE: 25
ADLS_ACUITY_SCORE: 20
ADLS_ACUITY_SCORE: 26
ADLS_ACUITY_SCORE: 26
ADLS_ACUITY_SCORE: 20
ADLS_ACUITY_SCORE: 22
ADLS_ACUITY_SCORE: 24
ADLS_ACUITY_SCORE: 26
ADLS_ACUITY_SCORE: 24
ADLS_ACUITY_SCORE: 25
ADLS_ACUITY_SCORE: 22
ADLS_ACUITY_SCORE: 20
ADLS_ACUITY_SCORE: 28
ADLS_ACUITY_SCORE: 24
ADLS_ACUITY_SCORE: 24

## 2022-01-01 ASSESSMENT — ANXIETY QUESTIONNAIRES
3. WORRYING TOO MUCH ABOUT DIFFERENT THINGS: NOT AT ALL
1. FEELING NERVOUS, ANXIOUS, OR ON EDGE: NOT AT ALL
5. BEING SO RESTLESS THAT IT IS HARD TO SIT STILL: NOT AT ALL
GAD7 TOTAL SCORE: 0
6. BECOMING EASILY ANNOYED OR IRRITABLE: NOT AT ALL
GAD7 TOTAL SCORE: 0
IF YOU CHECKED OFF ANY PROBLEMS ON THIS QUESTIONNAIRE, HOW DIFFICULT HAVE THESE PROBLEMS MADE IT FOR YOU TO DO YOUR WORK, TAKE CARE OF THINGS AT HOME, OR GET ALONG WITH OTHER PEOPLE: NOT DIFFICULT AT ALL
7. FEELING AFRAID AS IF SOMETHING AWFUL MIGHT HAPPEN: NOT AT ALL
2. NOT BEING ABLE TO STOP OR CONTROL WORRYING: NOT AT ALL

## 2022-01-01 ASSESSMENT — PAIN SCALES - GENERAL
PAINLEVEL: NO PAIN (0)

## 2022-01-01 ASSESSMENT — PATIENT HEALTH QUESTIONNAIRE - PHQ9
5. POOR APPETITE OR OVEREATING: NOT AT ALL
SUM OF ALL RESPONSES TO PHQ QUESTIONS 1-9: 1

## 2022-01-01 ASSESSMENT — EJECTION FRACTION: LAST EJECTION FRACTION (EF) PRIOR TO CONDITIONING (%): NO

## 2022-01-01 ASSESSMENT — PULMONARY FUNCTION TESTS: FEV1/FVC_PERCENT_PREDICTED: 97

## 2022-01-09 ENCOUNTER — HEALTH MAINTENANCE LETTER (OUTPATIENT)
Age: 62
End: 2022-01-09

## 2022-09-12 NOTE — PROGRESS NOTES
BMT / Cell Therapy Consultation      Yahaira Fuentes is a 61 year old female referred by Dr. Arshad for AML.      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 She by PCP, WBC of 60.   8/5/2022 BMB:  - Acute myeloid leukemia with 87% blasts (predominately monoblasts) with mutated NPM1.   - A FLT3 ITD mutation detected , ratio 0.58  - Flow Positive for increased blast population with monocytoid differentiation. 84% of cells are in the monocyte/blast gate (dim to moderate CD45/low to moderate side scatter) and show expression of HLA-DR, partial CD13, CD4, CD64, partial CD14, CD33, CD15, CD11b and possible partial dim MPO.  They are negative for the remaining markers including CD34.    - CG: normal female karyotype     - AML Panel by NGS   TIER 1: Variants of Known Clinical Significance    1. FLT3 c.1738_1794dup, p.[id number] (NM_004119.2)   VAF: Not Reported   2. NPM1 c.860_863dup, p.Kgn436do (NM_002520.6)   VAF: 35.2%   3. DNMT3A c.2645G>A, p.Uzu695Ssd (NM_175629.2)   VAF: 39.7%   TIER 2: Variants of Unknown Clinical Significance     1. KIT c.200C>G, p.Fct98Boe (NM_000222.2)   VAF: 47.5%   Given that the variant frequency is close to 50%, it is unclear whether this is a germline or somatic variant. The clinical significance, if any, is uncertain.   - Peripheral blood, morphology: Acute myeloid leukemia with 77% blasts. WBC 34655, hgb 11.3, plt 34612    9/6/2022 BMB:  Bone marrow aspirate, clot section, and trephine core biopsy:    Normocellular marrow with trilinear hematopoiesis    1% blasts    FLOW CYTOMETRY:Not performed    CYTOGENETICS/ MOLECULAR/ FISH: Not  "submitted     Peripheral blood, morphology: WBC 2.1, hb 8.4, plt 864200    Negative for circulating blasts/blast equivalents            Date Treatment Name Response Side Effects / Toxicities   8/8/2022  Cytarabine and idarubicin (\"7+3\") with midostaurin therapy   Midostaurin: 8/15-8/28 9/6/2022 morphologic and immunophenotypic CR -Neutropenic fever cx neg completed cefepime 9/1  -ROHIT-Max creat 1.27.                                  HPI:  Please see my entry above for hematologic history.    Patient is here today with her daughter.  She describes a longstanding history of superficial thrombosis associated with using OCPs in her 20s and 30s and she remains on aspirin denies any history of DVTs or pulmonary embolus, reports she has had chronic lymphedema, she says that generally she certainly feels deconditioning after her inpatient hospital stay, but she intends to be more physically active and we discussed the importance of that prior to transplantation to help with faster recovery and better outcomes, she denies any current infectious concerns, no respiratory symptoms GI or  symptoms, no bleeding and no falls.    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  Per classification the patient fits into an intermediate risk category, and with likely the c-kit mutation and DNMT 3A associated with worse prognosis per reports-therefore we would recommend consolidation with allogeneic metachromatic cell transplantation ideally an MRD " negative remission.    We reviewed the toxicities, risks and benefits of the the preparative regimen of allogeneic hematopoietic cell transplantation including but not limited to prolonged cytopenia increasing the risk for opportunistic and life threatening infections, fatigue, nausea, vomiting, mouth sores, diarrhea, lung damage, liver damage, hair loss, and mortality.  We also discussed long term risks of increased secondary cancers, including blood cancers as well.  There is also the possibility of engraftment failure. We discussed graft vs host disease as a short and long term risk that can affect many different parts of the body, including the skin, gut, and liver leading to rashes, mouth sores, diarrhea, nausea, jaundice.  We discussed that this can be an acute or chronic complication and some patients will develop chronic graft versus host disease, and can be fatal.  We discussed that uqkfc-pshhxl-oqpn disease both acute and chronic can compound his risk of infection which are most in the first few weeks but then continue over a period of time with progressive reduction in his antibiotic support as his immune competence improves.      Recommendations:   -HLA typing, blood group and CMV of the patient today, initiate matched unrelated donor search  -We will discuss with referring oncologist, patient will proceed with consolidation therapy as we lined up the donor.  We will recommend assessing MRD status to assess readiness for transplant and timing.  -We discussed with the patient being more physically active and performance status improvement prior to transplantation  -Patient has not been COVID vaccinated, will discuss if she would be open to receiving evusheld  -Genetic referral with high ckit VAF    I spent 80 minutes in the care of this patient today, which included time necessary for preparation for the visit, obtaining history, ordering medications/tests/procedures as medically indicated, review of  "pertinent medical literature, counseling of the patient, communication of recommendations to the care team, and documentation time.    Meggan French MD        ROS:    10 point ROS neg other than the symptoms noted above in the HPI.      Past Medical History:      Fibroid uterus 2/23/2016   Status post appendectomy    Lymphedema     Obesity BMI 38    Rectocele 2/23/2016     Superficial thrombosis of leg of legs, multiple since 20's and 30's which on birth control , takes ASA daily for this   No known autoimmune diseases, peptic ulcer disease ulcerative colitis or Crohn's disease    Social History     Number of children: 1     Occupation: [employer]     Smoking status: Never Smoker     Alcohol use: occasional     Live by self works from home    Family History     Thyroid Disease Maternal Aunt     High Blood Pressure Paternal Aunt     Thyroid Disease Sister     Father htn , NHL    Kidney Disease Father stones     Thyroid Disease Mother   1 sister 67, 1 child 39 healthy    Social History     Tobacco Use     Smoking status: Never Smoker     Smokeless tobacco: Never Used   Substance Use Topics     Alcohol use: Yes     Drug use: No         Allergies   Allergen Reactions     Sulfa Drugs Other (See Comments)        Current Outpatient Medications   Medication Sig Dispense Refill     aspirin (ASA) 325 MG tablet Take 1 tablet by mouth       cholecalciferol 25 MCG (1000 UT) TABS Take 1 tablet by mouth       multivitamin w/minerals (THERA-VIT-M) tablet Take 1 tablet by mouth       UNABLE TO FIND        UNABLE TO FIND Take 1 tablet by mouth       UNABLE TO FIND            Physical Exam:     Vital Signs: /73 (BP Location: Right arm, Patient Position: Sitting, Cuff Size: Adult Large)   Pulse 73   Temp 98.2  F (36.8  C) (Oral)   Resp 16   Ht 1.596 m (5' 2.84\")   Wt 97.7 kg (215 lb 4.8 oz)   SpO2 99%   BMI 38.34 kg/m        KPS: 70  General Appearance: alert and no distress  Eyes: PERRL, conjunctiva and lids normal, " sclera nonicteric  Ears/Nose/M/Throat: Oral mucosa and posterior oropharynx normal, moist mucous membranes  Neck supple, non-tender, free range of motion, no adenopathy  Cardio/Vascular:regular rate and rhythm, normal S1 and S2, no murmur  Resp Effort And Auscultation: Normal - Clear to auscultation without rales, rhonchi, or wheezing.  GI: soft, nontender, bowel sounds present in all four quadrants, no hepatosplenomegaly  Lymphatics:no significant enlargement of lymph nodes globally   Musculoskeletal: Musculoskeletal decreased muscle mass  Edema: Nonpitting lymphedema venous stasis and visible varicose veins  Skin: Skin color, texture, turgor normal. No rashes or lesions.  Neurologic: Gait normal. Sensation grossly WNL.  Psych/Affect: Mood and affect are appropriate.        ------------------------------------------------------------------------------------------------------------------------------------------------    Patient Care Team       Relationship Specialty Notifications Start End    Lyndon Ross PCP - General   9/28/11     Phone: [phone] Fax: [phone] [address] [facility] [address], [address] [facility] [address]    Charlie Worrell MD MD Ophthalmology  2/14/20     Phone: [phone] Fax: [phone]         [address] [facility] [address]

## 2022-09-12 NOTE — PROGRESS NOTES
[facility] BMT and Cell Therapy Program  RN Coordinator Pre-Visit Documentation      Yahaira Fuentes is a 61 year old female who has been referred to the [facility] BMT and Cell Therapy Program for hematopoietic cell transplant or immune effector cell therapy.      Referring MD Name: Dr Emmanuel Arshad, telephone [phone]    Referring RN Coordinator: n/a    Reason for referral: Allo BMT consult for AML    Link to Adult BMT algorithm        For allos only:    Previous HLA typing? No, ordered to be drawn.      Previous formal donor search? no    PRA needed? Yes, ordered to be drawn    CMV IGG and ABO needed? Yes, ordered to be drawn    Potential family donors to type? TBD    Potential BMT CTN 1702 candidate? no    Need URD consents? Yes, will bring to clinic          All relevant clinical notes, labs, imaging, and pathology are available in [facility], Care Everywhere, or scanned into Media.    The appropriate disease evaluation form has been emailed to the physician and their continuity clinic fellow to complete.      Patient Care Team       Relationship Specialty Notifications Start End    Lyndon Ross PCP - General   9/28/11     Phone: [phone] Fax: [phone]         [address] [place] [address]., [address] [facility] [address]    Charlie Worrell MD MD Ophthalmology  2/14/20     Phone: [phone] Fax: [phone]         [address] [facility] [address]            Lilliam Chaeny, RN

## 2022-09-13 NOTE — LETTER
9/13/2022         RE: Yahaira Fuentes  [address] [facility] [address]        Dear Colleague,    Thank you for referring your patient, Yahaira Fuentes, to the [facility]LOOD AND [facility]ARROW TRANSPLANT PRO[facility] [place]. Please see a copy of my visit note below.           BMT / Cell Therapy Consultation      Yahaira Fuentes is a 61 year old female referred by Dr. Arshad for AML.      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 She by PCP, WBC of 60.   8/5/2022 BMB:  - Acute myeloid leukemia with 87% blasts (predominately monoblasts) with mutated NPM1.   - A FLT3 ITD mutation detected , ratio 0.58  - Flow Positive for increased blast population with monocytoid differentiation. 84% of cells are in the monocyte/blast gate (dim to moderate CD45/low to moderate side scatter) and show expression of HLA-DR, partial CD13, CD4, CD64, partial CD14, CD33, CD15, CD11b and possible partial dim MPO.  They are negative for the remaining markers including CD34.    - CG: normal female karyotype     - AML Panel by NGS   TIER 1: Variants of Known Clinical Significance    1. FLT3 c.1738_1794dup, p.[id number] (NM_004119.2)   VAF: Not Reported   2. NPM1 c.860_863dup, p.Wzy236kk (NM_002520.6)   VAF: 35.2%   3. DNMT3A c.2645G>A, p.Mox153Qrc (NM_175629.2)   VAF: 39.7%   TIER 2: Variants of Unknown Clinical Significance     1. KIT c.200C>G, p.Auu81Gxq (NM_000222.2)   VAF: 47.5%   Given that the variant frequency is close to 50%, it is unclear whether this is a germline or somatic variant. The clinical significance, if any, is uncertain.   - Peripheral blood, morphology: Acute myeloid leukemia  "with 77% blasts. WBC 95442, hgb 11.3, plt 93797    9/6/2022 BMB:  Bone marrow aspirate, clot section, and trephine core biopsy:    Normocellular marrow with trilinear hematopoiesis    1% blasts    FLOW CYTOMETRY:Not performed    CYTOGENETICS/ MOLECULAR/ FISH: Not submitted     Peripheral blood, morphology: WBC 2.1, hb 8.4, plt 396534    Negative for circulating blasts/blast equivalents            Date Treatment Name Response Side Effects / Toxicities   8/8/2022  Cytarabine and idarubicin (\"7+3\") with midostaurin therapy   Midostaurin: 8/15-8/28 9/6/2022 morphologic and immunophenotypic CR -Neutropenic fever cx neg completed cefepime 9/1  -ROHIT-Max creat 1.27.                                  HPI:  Please see my entry above for hematologic history.    Patient is here today with her daughter.  She describes a longstanding history of superficial thrombosis associated with using OCPs in her 20s and 30s and she remains on aspirin denies any history of DVTs or pulmonary embolus, reports she has had chronic lymphedema, she says that generally she certainly feels deconditioning after her inpatient hospital stay, but she intends to be more physically active and we discussed the importance of that prior to transplantation to help with faster recovery and better outcomes, she denies any current infectious concerns, no respiratory symptoms GI or  symptoms, no bleeding and no falls.    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  Per " classification the patient fits into an intermediate risk category, and with likely the c-kit mutation and DNMT 3A associated with worse prognosis per reports-therefore we would recommend consolidation with allogeneic metachromatic cell transplantation ideally an MRD negative remission.    We reviewed the toxicities, risks and benefits of the the preparative regimen of allogeneic hematopoietic cell transplantation including but not limited to prolonged cytopenia increasing the risk for opportunistic and life threatening infections, fatigue, nausea, vomiting, mouth sores, diarrhea, lung damage, liver damage, hair loss, and mortality.  We also discussed long term risks of increased secondary cancers, including blood cancers as well.  There is also the possibility of engraftment failure. We discussed graft vs host disease as a short and long term risk that can affect many different parts of the body, including the skin, gut, and liver leading to rashes, mouth sores, diarrhea, nausea, jaundice.  We discussed that this can be an acute or chronic complication and some patients will develop chronic graft versus host disease, and can be fatal.  We discussed that htenu-khdevz-yzni disease both acute and chronic can compound his risk of infection which are most in the first few weeks but then continue over a period of time with progressive reduction in his antibiotic support as his immune competence improves.      Recommendations:   -HLA typing, blood group and CMV of the patient today, initiate matched unrelated donor search  -We will discuss with referring oncologist, patient will proceed with consolidation therapy as we lined up the donor.  We will recommend assessing MRD status to assess readiness for transplant and timing.  -We discussed with the patient being more physically active and performance status improvement prior to transplantation  -Patient has not been COVID vaccinated, will discuss if she would be open to  receiving evusheld  -Genetic referral with high ckit VAF    I spent 80 minutes in the care of this patient today, which included time necessary for preparation for the visit, obtaining history, ordering medications/tests/procedures as medically indicated, review of pertinent medical literature, counseling of the patient, communication of recommendations to the care team, and documentation time.    Meggan French MD        ROS:    10 point ROS neg other than the symptoms noted above in the HPI.      Past Medical History:      Fibroid uterus 2/23/2016   Status post appendectomy    Lymphedema     Obesity BMI 38    Rectocele 2/23/2016     Superficial thrombosis of leg of legs, multiple since 20's and 30's which on birth control , takes ASA daily for this   No known autoimmune diseases, peptic ulcer disease ulcerative colitis or Crohn's disease    Social History     Number of children: 1     Occupation: [employer]     Smoking status: Never Smoker     Alcohol use: occasional     Live by self works from home    Family History     Thyroid Disease Maternal Aunt     High Blood Pressure Paternal Aunt     Thyroid Disease Sister     Father htn , NHL    Kidney Disease Father stones     Thyroid Disease Mother   1 sister 67, 1 child 39 healthy    Social History     Tobacco Use     Smoking status: Never Smoker     Smokeless tobacco: Never Used   Substance Use Topics     Alcohol use: Yes     Drug use: No         Allergies   Allergen Reactions     Sulfa Drugs Other (See Comments)        Current Outpatient Medications   Medication Sig Dispense Refill     aspirin (ASA) 325 MG tablet Take 1 tablet by mouth       cholecalciferol 25 MCG (1000 UT) TABS Take 1 tablet by mouth       multivitamin w/minerals (THERA-VIT-M) tablet Take 1 tablet by mouth       UNABLE TO FIND        UNABLE TO FIND Take 1 tablet by mouth       UNABLE TO FIND            Physical Exam:     Vital Signs: /73 (BP Location: Right arm, Patient Position: Sitting,  "Cuff Size: Adult Large)   Pulse 73   Temp 98.2  F (36.8  C) (Oral)   Resp 16   Ht 1.596 m (5' 2.84\")   Wt 97.7 kg (215 lb 4.8 oz)   SpO2 99%   BMI 38.34 kg/m        KPS: 70  General Appearance: alert and no distress  Eyes: PERRL, conjunctiva and lids normal, sclera nonicteric  Ears/Nose/M/Throat: Oral mucosa and posterior oropharynx normal, moist mucous membranes  Neck supple, non-tender, free range of motion, no adenopathy  Cardio/Vascular:regular rate and rhythm, normal S1 and S2, no murmur  Resp Effort And Auscultation: Normal - Clear to auscultation without rales, rhonchi, or wheezing.  GI: soft, nontender, bowel sounds present in all four quadrants, no hepatosplenomegaly  Lymphatics:no significant enlargement of lymph nodes globally   Musculoskeletal: Musculoskeletal decreased muscle mass  Edema: Nonpitting lymphedema venous stasis and visible varicose veins  Skin: Skin color, texture, turgor normal. No rashes or lesions.  Neurologic: Gait normal. Sensation grossly WNL.  Psych/Affect: Mood and affect are appropriate.        ------------------------------------------------------------------------------------------------------------------------------------------------    Patient Care Team       Relationship Specialty Notifications Start End    Lyndon Ross PCP - General   9/28/11     Phone: [phone] Fax: [phone] [address] [facility] [facility] [address]    Charlie Worrell MD MD Ophthalmology  2/14/20     Phone: [phone] Fax: [phone]         [address] [facility] [address]            Again, thank you for allowing me to participate in the care of your patient.      Sincerely,    Meggan French MD    "

## 2022-09-13 NOTE — NURSING NOTE
Chief Complaint   Patient presents with     New Patient     AML      Blood Draw     Labs drawn via CVC by RN.     Labs collected from CVC by RN, line flushed with saline and heparin.    Jazmin Katz RN

## 2022-09-13 NOTE — PROGRESS NOTES
Spoke with Rebeka and her daughter following new transplant visit with Dr. Zoltan French. Reviewed plan of care per NT conversation for Stem Cell Transplant. Explained role of the Nurse Coordinator throughout the BMT process as well as general time line and expectations for transplant. Discussed necessity of caregiver and program's proximity requirements. All questions were answered.     Plan: Allogeneic Transplant, pending donor identification. Per Dr Zoltan French, timely likely 6-8 weeks. Rebeka will get consolidation while we arrange a donor.    Contact information provided for :  yes    HLA typing drawn: yes    PRA typing drawn:  yes    CMV-IgG and ABO-Rh drawn or in record:  yes    Contact information provided for :  yes    Financial Release for URD search obtained:  yes    [id number] Consent Signed: no    EOC Reason updated: yes

## 2022-09-13 NOTE — PROGRESS NOTES
"Blood and Marrow Transplant and Cellular Therapy  New Transplant Visit with   Clinical     Yahaira \"Rebeka\" YUMIKO Alfredo  2022    New Transplant MD: Meggan French MD  New Transplant NC: Lilliam Chaney RN    With whom do you live: Lives with Daughter Darren (has for 1 year)    Relocation Requirement:   Rebeka lives 45 minutes / 31.7 miles from [facility] and will not be required to relocate post-transplant.     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 She is currently on Short-Term Disability (STD) and then can utilize her long term disability (LTD). Darren is currently not working but does have significant volunteering responsibilities that she will need to look in regard to her mom needing a caregiver.    Source of Income: Short term disability (22 days left) and then LTD (she is not sure of the length of this benefit). She is aware that her LTD policy may also require that she apply for SSDI - we talked about how often LTD companies have a 3rd party vendors that will help people apply for SSDI.     Do you have concerns or questions about finances or insurance related to BMT?:   [employer] through her employer. She has no questions or concerns about financial or insurance related to BMT.    IMM required while hospitalized:  No    Fertility Preservation discussed with patient by medical team: n/a    Have you completed a health care directive?: We discussed the FV policy in the absence of a document we would go to her legal NOK for any medical decisions ONLY IF the patient is unable to make these decisions themselves. Rebeka's legal " NOK would be her daughter Darren.    Provided education and blank form    Support:  Is there a network of people who are available to support you and/or your family?: yes    Caregiver:   SW discussed the caregiver role and expectation at length with Rebeka and Darren. Rebeka is agreeable to having a full time caregiver for the minimum of 100 days until cleared by the BMT Physician. Rebeka identified caregiver would be her daughter (primary) and then friends. She does not feel that her sister would be able to be a caregiver. Caregiver education and information provided. No caregiver concerns identified.     Education Provided:   Caregiver Requirement/Role  BMT Packet provided  BMT Book provided  HCD information and blank document  Local lodging  [place]  Support resources  Description of Inpatient Unit    Comments: Rebeka comes to her NT with her daughter Darren. They were not surprised by what they heard from Dr. Zoltan French - they felt the discussion was thorough. We talked about the caregiver requirement - Darren and she will discuss further. Rebeka may want to stay closer to the medical center after transplant discharge due to it being winter (potentially) and how this may impact travelling. We talked about [facility] and other furnished apartment options - she is NOT interested in [place]. It will likely take 6-8 weeks to find a donor and Rebeka will receive treatment with her referring during that time. Encouraged Rebeka to call with questions or concerns as they arise.     Bette WILSON [facility]    Clinical   9/13/2022  [facility]  Adult Blood and Marrow Transplant and Cellular Therapy Program  [address] [place] [id number]  [email or web address]   Office: [phone]   Pager: [phone]

## 2022-09-13 NOTE — NURSING NOTE
"Oncology Rooming Note    September 13, 2022 8:26 AM   Yahaira Fuentes is a 61 year old female who presents for:    Chief Complaint   Patient presents with     New Patient     AML      Initial Vitals: /73 (BP Location: Right arm, Patient Position: Sitting, Cuff Size: Adult Large)   Pulse 73   Temp 98.2  F (36.8  C) (Oral)   Resp 16   Ht 1.596 m (5' 2.84\")   Wt 97.7 kg (215 lb 4.8 oz)   SpO2 99%   BMI 38.34 kg/m   Estimated body mass index is 38.34 kg/m  as calculated from the following:    Height as of this encounter: 1.596 m (5' 2.84\").    Weight as of this encounter: 97.7 kg (215 lb 4.8 oz). Body surface area is 2.08 meters squared.  No Pain (0) Comment: Data Unavailable   No LMP recorded. Patient has had a hysterectomy.  Allergies reviewed: Yes  Medications reviewed: Yes    Medications: Medication refills not needed today.  Pharmacy name entered into [employer]: [id number] - [place], [place] - [address] [facility] [address]    Clinical concerns: New patient       Rhianna Ariza CMA            "

## 2022-09-13 NOTE — LETTER
9/13/2022         RE: Yahaira Fuentes  [address] [address]             BMT / Cell Therapy Consultation      Yahaira Fuentes is a 61 year old female referred by Dr. Arshad for AML.      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 She by PCP, WBC of 60.   8/5/2022 BMB:  - Acute myeloid leukemia with 87% blasts (predominately monoblasts) with mutated NPM1.   - A FLT3 ITD mutation detected , ratio 0.58  - Flow Positive for increased blast population with monocytoid differentiation. 84% of cells are in the monocyte/blast gate (dim to moderate CD45/low to moderate side scatter) and show expression of HLA-DR, partial CD13, CD4, CD64, partial CD14, CD33, CD15, CD11b and possible partial dim MPO.  They are negative for the remaining markers including CD34.    - CG: normal female karyotype     - AML Panel by NGS   TIER 1: Variants of Known Clinical Significance    1. FLT3 c.1738_1794dup, p.[id number] (NM_[id number])   VAF: Not Reported   2. NPM1 c.860_863dup, p.Rlc770ri (NM_[id number])   VAF: 35.2%   3. DNMT3A c.2645G>A, p.Xgf026Dqm (NM_[id number])   VAF: 39.7%   TIER 2: Variants of Unknown Clinical Significance     1. KIT c.200C>G, p.Ywr86Jzt (NM_[id number])   VAF: 47.5%   Given that the variant frequency is close to 50%, it is unclear whether this is a germline or somatic variant. The clinical significance, if any, is uncertain.   - Peripheral blood, morphology: Acute myeloid leukemia with 77% blasts. WBC 48561, hgb 11.3, plt 67921    9/6/2022 BMB:  Bone marrow aspirate, clot section, and trephine core biopsy:    Normocellular marrow with trilinear hematopoiesis    1%  "blasts    FLOW CYTOMETRY:Not performed    CYTOGENETICS/ MOLECULAR/ FISH: Not submitted     Peripheral blood, morphology: WBC 2.1, hb 8.4, plt [id number]    Negative for circulating blasts/blast equivalents            Date Treatment Name Response Side Effects / Toxicities   8/8/2022  Cytarabine and idarubicin (\"7+3\") with midostaurin therapy   Midostaurin: 8/15-8/28 9/6/2022 morphologic and immunophenotypic CR -Neutropenic fever cx neg completed cefepime 9/1  -ROHIT-Max creat 1.27.                                  HPI:  Please see my entry above for hematologic history.    Patient is here today with her daughter.  She describes a longstanding history of superficial thrombosis associated with using OCPs in her 20s and 30s and she remains on aspirin denies any history of DVTs or pulmonary embolus, reports she has had chronic lymphedema, she says that generally she certainly feels deconditioning after her inpatient hospital stay, but she intends to be more physically active and we discussed the importance of that prior to transplantation to help with faster recovery and better outcomes, she denies any current infectious concerns, no respiratory symptoms GI or  symptoms, no bleeding and no falls.    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  Per classification the patient fits into an intermediate risk category, and with likely the c-kit mutation and DNMT 3A associated with worse prognosis per reports-therefore we would recommend " consolidation with allogeneic metachromatic cell transplantation ideally an MRD negative remission.    We reviewed the toxicities, risks and benefits of the the preparative regimen of allogeneic hematopoietic cell transplantation including but not limited to prolonged cytopenia increasing the risk for opportunistic and life threatening infections, fatigue, nausea, vomiting, mouth sores, diarrhea, lung damage, liver damage, hair loss, and mortality.  We also discussed long term risks of increased secondary cancers, including blood cancers as well.  There is also the possibility of engraftment failure. We discussed graft vs host disease as a short and long term risk that can affect many different parts of the body, including the skin, gut, and liver leading to rashes, mouth sores, diarrhea, nausea, jaundice.  We discussed that this can be an acute or chronic complication and some patients will develop chronic graft versus host disease, and can be fatal.  We discussed that pkifx-zhzhbp-vkph disease both acute and chronic can compound his risk of infection which are most in the first few weeks but then continue over a period of time with progressive reduction in his antibiotic support as his immune competence improves.      Recommendations:   -HLA typing, blood group and CMV of the patient today, initiate matched unrelated donor search  -We will discuss with referring oncologist, patient will proceed with consolidation therapy as we lined up the donor.  We will recommend assessing MRD status to assess readiness for transplant and timing.  -We discussed with the patient being more physically active and performance status improvement prior to transplantation  -Patient has not been COVID vaccinated, will discuss if she would be open to receiving evusheld  -Genetic referral with high ckit VAF    I spent 80 minutes in the care of this patient today, which included time necessary for preparation for the visit, obtaining  "history, ordering medications/tests/procedures as medically indicated, review of pertinent medical literature, counseling of the patient, communication of recommendations to the care team, and documentation time.    Meggan French MD        ROS:    10 point ROS neg other than the symptoms noted above in the HPI.      Past Medical History:      Fibroid uterus 2/23/2016   Status post appendectomy    Lymphedema     Obesity BMI 38    Rectocele 2/23/2016     Superficial thrombosis of leg of legs, multiple since 20's and 30's which on birth control , takes ASA daily for this   No known autoimmune diseases, peptic ulcer disease ulcerative colitis or Crohn's disease    Social History     Number of children: 1     Occupation: [employer]     Smoking status: Never Smoker     Alcohol use: occasional     Live by self works from home    Family History     Thyroid Disease Maternal Aunt     High Blood Pressure Paternal Aunt     Thyroid Disease Sister     Father htn , NHL    Kidney Disease Father stones     Thyroid Disease Mother   1 sister 67, 1 child 39 healthy    Social History     Tobacco Use     Smoking status: Never Smoker     Smokeless tobacco: Never Used   Substance Use Topics     Alcohol use: Yes     Drug use: No         Allergies   Allergen Reactions     Sulfa Drugs Other (See Comments)        Current Outpatient Medications   Medication Sig Dispense Refill     aspirin (ASA) 325 MG tablet Take 1 tablet by mouth       cholecalciferol 25 MCG (1000 UT) TABS Take 1 tablet by mouth       multivitamin w/minerals (THERA-VIT-M) tablet Take 1 tablet by mouth       UNABLE TO FIND        UNABLE TO FIND Take 1 tablet by mouth       UNABLE TO FIND            Physical Exam:     Vital Signs: /73 (BP Location: Right arm, Patient Position: Sitting, Cuff Size: Adult Large)   Pulse 73   Temp 98.2  F (36.8  C) (Oral)   Resp 16   Ht 1.596 m (5' 2.84\")   Wt 97.7 kg (215 lb 4.8 oz)   SpO2 99%   BMI 38.34 kg/m        KPS: " 70  General Appearance: alert and no distress  Eyes: PERRL, conjunctiva and lids normal, sclera nonicteric  Ears/Nose/M/Throat: Oral mucosa and posterior oropharynx normal, moist mucous membranes  Neck supple, non-tender, free range of motion, no adenopathy  Cardio/Vascular:regular rate and rhythm, normal S1 and S2, no murmur  Resp Effort And Auscultation: Normal - Clear to auscultation without rales, rhonchi, or wheezing.  GI: soft, nontender, bowel sounds present in all four quadrants, no hepatosplenomegaly  Lymphatics:no significant enlargement of lymph nodes globally   Musculoskeletal: Musculoskeletal decreased muscle mass  Edema: Nonpitting lymphedema venous stasis and visible varicose veins  Skin: Skin color, texture, turgor normal. No rashes or lesions.  Neurologic: Gait normal. Sensation grossly WNL.  Psych/Affect: Mood and affect are appropriate.  ------------------------------------------------------------------------------------------------------------------------------------------------    Patient Care Team       Relationship Specialty Notifications Start End    Lyndon Ross PCP - General   9/28/11     Phone: [phone] Fax: [phone]         [address], [address] [facility] [address]    Charlie Worrell MD MD Ophthalmology  2/14/20     Phone: [phone] Fax: [phone]         [address] [facility] [address]        Meggan French MD

## 2022-10-20 NOTE — PROGRESS NOTES
"Patient diagnosed with RLE DVT yesterday at [facility] office and started on 100mg/mL enoxaparin subcutaneous BID.    \"The study is positive for DVT in the right popliteal vein and posterior tibial veins of the upper and mid calf. No DVT elsewhere.\"      Spoke with CARLA Maynard, who will be completing BMBX tomorrow am.  Patient is to hold medication this evening and tomorrow morning and will have her resume tomorrow evening.      Patient aware of plan and verbalized understanding.  Patient also aware she will need to have a  home from procedure tomorrow.    "

## 2022-10-21 NOTE — NURSING NOTE
"Oncology Rooming Note    October 21, 2022 9:27 AM   Yahaira Fuentes is a 62 year old female who presents for:    Chief Complaint   Patient presents with     RECHECK     AML here for bmbx     Initial Vitals: /55   Pulse 89   Temp 99  F (37.2  C) (Oral)   Resp 20   Wt 97.3 kg (214 lb 6.4 oz)   SpO2 99%   BMI 38.18 kg/m   Estimated body mass index is 38.18 kg/m  as calculated from the following:    Height as of 9/13/22: 1.596 m (5' 2.84\").    Weight as of this encounter: 97.3 kg (214 lb 6.4 oz). Body surface area is 2.08 meters squared.  No Pain (0) Comment: Data Unavailable   No LMP recorded. Patient has had a hysterectomy.  Allergies reviewed: Yes  Medications reviewed: Yes    Medications: Medication refills not needed today.  Pharmacy name entered into [employer]: [id number] - [place], [place] - [address] AT [facility] [address]    Clinical concerns: None      Shree Alonso RN              "

## 2022-10-21 NOTE — NURSING NOTE
BMT Teaching Flowsheet   Teaching Topic: post biopsy instructions    Person(s) involved in teaching: Patient  Motivation Level  Asks Questions: Yes  Eager to Learn: Yes  Cooperative: Yes  Receptive (willing/able to accept information): Yes    Patient demonstrates understanding of the following:   - Reason for the appointment, diagnosis and treatment plan: Yes  - Knowledge of proper use of medications and conditions for which they are ordered (with special attention to potential side effects or drug interactions): Yes  - Which situations necessitate calling provider and whom to contact: Yes    Teaching concerns addressed: reviewed activity restrictions if received premeds, potential for bleeding and actions to take if develops any of the issues below    Proper use and care of (medical equipment, care aids, etc.) Yes  Pain management techniques: Yes  Patient instructed on hand hygiene: Yes  How and/when to access community resources: Yes    Infection Control:  Patient demonstrates understanding of the following:   Surgical procedure site care taught NA  Signs and symptoms of infection taught Yes  Wound care taught Yes  Central venous catheter care taught Yes    Teaching concerns addressed: Bone marrow biopsy and infection prevention.      Instructional Materials Used/Given: Pt instructed to keep bmbx site clean and dry for 24hrs. Pt educated to monitor site for signs of infection such as redness, rash, oozing, puss, bleeding, pain, and elevated temp. Pt instructed to go to ER if any signs of infection should occur. Pt educated to not operate machinery due to receiving versed. Pt verbalize understanding.     Post procedure: Pt vss, ambulating, site is c,d,i. Port deaccessed. Pt d/c'd in stable condition.      Time spent with patient: 0 minutes.

## 2022-10-21 NOTE — LETTER
Date:October 21, 2022      Provider requested that no letter be sent. Do not send.       [facility]

## 2022-10-21 NOTE — PROGRESS NOTES
BMT ONC Adult Bone Marrow Biopsy Procedure Note  October 21, 2022  /72   Pulse 79   Temp 99.8  F (37.7  C) (Oral)   Resp 20   Wt 97.3 kg (214 lb 6.4 oz)   SpO2 100%   BMI 38.18 kg/m       Learning needs assessment complete within 12 months? YES    DIAGNOSIS: AML     PROCEDURE: Unilateral Bone Marrow Biopsy and Unilateral Aspirate    [place] [facility]    Patient s identification was positively verified by verbal identification and invasive procedure safety checklist was completed. Informed consent was obtained. Following the administration of Midazolam as pre-medication, patient was placed in the prone position and prepped and draped in a sterile manner. Approximately 10 cc of 1% Lidocaine was used over the right posterior iliac spine. Following this a 3 mm incision was made. Trephine bone marrow core(s) was (were) obtained from the [facility]. Bone marrow aspirates were obtained from the [facility]. Aspirates were sent for morphology, immunophenotyping, cytogenetics and molecular diagnostics RFLP. A total of approximately 20 ml of marrow was aspirated. Following this procedure a sterile dressing was applied to the bone marrow biopsy site(s). The patient was placed in the supine position to maintain pressure on the biopsy site. Post-procedure wound care instructions were given.     Complications: NO    Pre-procedural pain: 0 out of 10 on the numeric pain rating scale.     Procedural pain: 3 out of 10 on the numeric pain rating scale.     Post-procedural pain assessment: 0 out of 10 on the numeric pain rating scale.     Interventions: NO    Length of procedure:20 minutes or less      Procedure performed by: Aleyda Amado PAC  [phone]

## 2022-10-21 NOTE — LETTER
10/21/2022         RE: Yahaira Fuentes  [address] [address]        Dear Colleague,    Thank you for referring your patient, Yahaira Fuentes, to the [facility] BLOOD AND MARROW TRANSPLANT PROGRAM [place]. Please see a copy of my visit note below.    BMT ONC Adult Bone Marrow Biopsy Procedure Note  October 21, 2022  /72   Pulse 79   Temp 99.8  F (37.7  C) (Oral)   Resp 20   Wt 97.3 kg (214 lb 6.4 oz)   SpO2 100%   BMI 38.18 kg/m       Learning needs assessment complete within 12 months? YES    DIAGNOSIS: AML     PROCEDURE: Unilateral Bone Marrow Biopsy and Unilateral Aspirate    LOCATION: [facility] 2nd Floor    Patient s identification was positively verified by verbal identification and invasive procedure safety checklist was completed. Informed consent was obtained. Following the administration of Midazolam as pre-medication, patient was placed in the prone position and prepped and draped in a sterile manner. Approximately 10 cc of 1% Lidocaine was used over the right posterior iliac spine. Following this a 3 mm incision was made. Trephine bone marrow core(s) was (were) obtained from the [facility]. Bone marrow aspirates were obtained from the [facility]. Aspirates were sent for morphology, immunophenotyping, cytogenetics and molecular diagnostics RFLP. A total of approximately 20 ml of marrow was aspirated. Following this procedure a sterile dressing was applied to the bone marrow biopsy site(s). The patient was placed in the supine position to maintain pressure on the biopsy site. Post-procedure wound care instructions were given.     Complications: NO    Pre-procedural pain: 0 out of 10 on the numeric pain rating scale.     Procedural pain: 3 out of 10 on the numeric pain rating scale.     Post-procedural pain assessment: 0 out of 10 on the numeric pain rating scale.     Interventions: NO    Length of procedure:20 minutes or less      Procedure performed by: Aleyda Amado  PAC  [phone]        Again, thank you for allowing me to participate in the care of your patient.        Sincerely,        No name on file

## 2022-10-25 PROBLEM — C92.01 ACUTE MYELOID LEUKEMIA IN REMISSION (H): Status: ACTIVE | Noted: 2022-01-01

## 2022-11-01 NOTE — TELEPHONE ENCOUNTER
Called and LM returning patient's [employer] message regarding appointments for her daughter to attend at .  Requested callback with any additional questions.

## 2022-11-03 NOTE — NURSING NOTE
"Oncology Rooming Note    November 3, 2022 11:22 AM   Yahaira Fuentes is a 62 year old female who presents for:    Chief Complaint   Patient presents with     Oncology Clinic Visit     AML in remission     Initial Vitals: /63   Pulse 75   Temp 98.7  F (37.1  C) (Rectal)   Resp 18   Ht 1.6 m (5' 2.99\")   Wt 96.3 kg (212 lb 4.9 oz)   SpO2 99%   BMI 37.62 kg/m   Estimated body mass index is 37.62 kg/m  as calculated from the following:    Height as of this encounter: 1.6 m (5' 2.99\").    Weight as of this encounter: 96.3 kg (212 lb 4.9 oz). Body surface area is 2.07 meters squared.  No Pain (0) Comment: Data Unavailable   No LMP recorded. Patient has had a hysterectomy.  Allergies reviewed: Yes  Medications reviewed: Yes    Medications: Medication refills not needed today.  Pharmacy name entered into [employer]icip[employer]: [facility] #[id number] - [place], [place] - [address] AT [facility] [address]    Clinical concerns: none       Gabi Penn CMA            "

## 2022-11-03 NOTE — LETTER
11/3/2022         RE: Yahaira Fuentes  [address] [facility] [address]        Dear Colleague,    Thank you for referring your patient, Yahaira Fuentes, to the [facility] BLOOD AND MARROW TRANSPLANT PROGRAM [place]. Please see a copy of my visit note below.    Pharmacy Assessment - Pre-Stem Cell Transplant    Assessments & Recommendations:  1) Hold Fluconazole for 5-7 days prior to transplant due to drug interaction with cyclophosphamide.  Rebeka is on 400mg/day, which causes more significant interactions that the 100mg or 200mg dose, which is why holding is necessary.  2) Patient is CMV(-). Will need to assess donor CMV status to determine need for standard dose vs high dose acyclovir for viral prophylaxis.  3) Use Adj BW for cyclophosphamide dose calculations.  4) Sulfa allergy - patient noted that this was just upset stomach.  Can re-try Bactrim for PJP prophylaxis.  If Rebeka does not tolerate at that time, can consider alternatives.    History of Present Illness:  Yahaira Fuentes is a 62 year old year old female diagnosed with AML.  She has been treated with 7+3 and HiDAC.  She is now being work up for Allogeneic Stem Cell Transplant on protocol 2015-32, which utilizes Flu/Cy/TBI as a conditioning regimen and pCy/Siro/MMF for GVH prophylaxis.    Pertinent labs/tests:  Viral Testing:  CMV(-) / everything else pending  Ejection Fraction: pendgin  QTc: 440 msec (11/3/22)    Weights:   Wt Readings from Last 3 Encounters:   11/03/22 96.3 kg (212 lb 4.9 oz)   11/03/22 96.3 kg (212 lb 4.8 oz)   10/21/22 97.3 kg (214 lb 6.4 oz)   Ideal body weight: 52.4 kg (115 lb 7.7 oz)  Adjusted ideal body weight: 70 kg (154 lb 3.4 oz)  % IBW:  84% over IBW  There is no height or weight on file to calculate BMI.    Primary BMT Physician: Dr. Zoltan French  BMT RN Coordinator:  Carmen Purvis    Past Medical History:  No past medical history on file.    Medication Allergies:  Allergies   Allergen Reactions     Sulfa  "Drugs Other (See Comments)     Other reaction(s): Gastrointestinal     Current Medications (pre-admit):  Current Outpatient Medications   Medication Sig Dispense Refill     acyclovir (ZOVIRAX) 400 MG tablet Take 400 mg by mouth 2 times daily       Ascorbic Acid (RITESH-C PO) Take 1 tablet by mouth daily \"Vitamin C extra with Zinc\"       cholecalciferol 25 MCG (1000 UT) TABS Take 1,000 Units by mouth daily       enoxaparin ANTICOAGULANT (LOVENOX) 100 MG/ML syringe Inject 1 mL (100 mg) Subcutaneous 2 times daily 14 mL 0     fluconazole (DIFLUCAN) 200 MG tablet Take 400 mg by mouth daily       levofloxacin (LEVAQUIN) 250 MG tablet Take 250 mg by mouth daily       multivitamin w/minerals (THERA-VIT-M) tablet Take 1 tablet by mouth daily       Herbal Medication/Nutritional Supplements:  none    Smoking/Past Drug Use:  Not addressed    Nausea/Vomiting, Pain, or other issues:  Tolerated past chemotherapy well.    Summary:  I met with Yahaira Fuentes for approximately 40 minutes.  We discussed current medications, allergies, conditioning chemotherapy, risk of infection, common side effects, use of immunosuppression, use of antimicrobials, GVHD treatment, and expectations post transplant.    Thank you,  Andy J. Kurtzweil, PharmD  "

## 2022-11-03 NOTE — NURSING NOTE
EKG done in clinic, pt tolerated. Results scanned to pt chart.    Gabi Penn CMA on 11/3/2022 at 11:19 AM

## 2022-11-03 NOTE — PROGRESS NOTES
BMT Teaching Flowsheet    Yahaira Fuentes is a 62 year old female  The primary encounter diagnosis was Leukemia, acute myeloid (H). Diagnoses of Acute myeloid leukemia in remission (H), Preop examination, Personal history of diseases of blood and blood-forming organs, and Screening for viral disease were also pertinent to this visit.    Teaching Topic: BMT work up    Person(s) involved in teaching: Patient  Motivation Level  Asks Questions: Yes  Eager to Learn: Yes  Cooperative: Yes  Receptive (willing/able to accept information): Yes  Any cultural factors/Jewish beliefs that may influence understanding or compliance? No    Patient demonstrates understanding of the following:  - Reason for the appointment, diagnosis and treatment plan: Yes  - Knowledge of proper use of medications and conditions for which they are ordered (with special attention to potential side effects or drug interactions): Yes  - Which situations necessitate calling provider and whom to contact: Yes      Instructional Materials Used/Given:   BMT work up calendar, campus map.      Pt here today for BMT work up nurse visit. Height, weight, and VS obtained. VSS. Meds and allergies reviewed. BMT work up calendar reviewed including all appointment dates/times, locations, and pertinent details. Instructed pt hold Lovenox PM dose today and AM dose tomorrow prior to LP. All questions answered and pt verbalized understanding. Labs drawn via port. UA and covid swab collected.       Time spent with patient: 60 minutes.    Paige Arboleda RN

## 2022-11-03 NOTE — PROGRESS NOTES
[facility]  BMTCT OPEN VISIT    November 3, 2022        Yahaira Fuentes is a 62 year old female undergoing evaluation prior to hematopoietic cell transplant or immune effector cell therapy.    Reason for BMTCT: AML 2015-32 7/8 pbsc urd    Recent chemotherapy: Consolidation x 1 HIDAC    Recent infections: No    Blood thinner use? If yes, why? Yes 10/19/22 Lovenox for DVT R popliteal vein and posterior tibial veins of the upper and mid calf.     Treatment for diabetes? No    Today, the patient notes the following symptoms:    Feeling well with no acute medical complaints.   Her pain from DVT has     Remainder of 10 point ROS neg.       Yahaira Fuentes's History    Past Medical and Surgical History:       Fibroid uterus 2/23/2016--w/ laparoscopic hysterectomy bilateral salpingoopherectomy    Status post appendectomy    Lymphedema     Obesity BMI 38    Rectocele repair 2/23/2016     New DVT RLE    Social History     Number of children: 1     Occupation: Works in medical insurance    Smoking status: Never Smoker     Alcohol use: occasional     Live by self works from home    Family History     Thyroid Disease Maternal Aunt     High Blood Pressure Paternal Aunt     Thyroid Disease Sister     Father htn , NHL    Kidney Disease Father stones     Thyroid Disease Mother   1 sister 67, 1 child 39 healthy      Yahaira Fuentes's Medications and Allergies    Current Outpatient Medications   Medication     acyclovir (ZOVIRAX) 400 MG tablet     Ascorbic Acid (RITESH-C PO)     cholecalciferol 25 MCG (1000 UT) TABS     enoxaparin ANTICOAGULANT (LOVENOX) 100 MG/ML syringe     fluconazole (DIFLUCAN) 100 MG tablet     levofloxacin (LEVAQUIN) 250 MG tablet     multivitamin w/minerals (THERA-VIT-M) tablet     No current facility-administered medications for this visit.       Allergies   Allergen Reactions     Sulfa Drugs Other (See Comments)     Other reaction(s): Gastrointestinal       Physical Examination    /63   " Pulse 75   Temp 98.7  F (37.1  C) (Rectal)   Resp 18   Ht 1.6 m (5' 2.99\")   Wt 96.3 kg (212 lb 4.9 oz)   SpO2 99%   BMI 37.62 kg/m      Exam:  Constitutional: healthy, alert and no distress  Head: Normocephalic. No masses, lesions, tenderness or abnormalities  ENT: No oral lesions  Cardiovascular: RRR. No G/R/M  Respiratory: BCTA  Musculoskeletal: extremities normal- no gross deformities noted, gait normal and normal muscle tone  Skin: no suspicious lesions or rashes  Neurologic: No focal deficits. Grossly intact.  Psychiatric: mentation appears normal and affect normal/bright  Some doughy LE edema    Frailty Screening    1. Weight loss: Have you lost >10 pounds (or >5% body weight) unintentionally over the last year? No      2. Exhaustion: How often in the past week did you feel that:  o I feel that everything I do is an effort : .Exhaustion: 0 = rarely or none of the time (<1 day)  o I feel I cannot get going: Exhaustion: 0 = rarely or none of the time (<1 day)    3. Weakness:  Hand  strength (measured by MA; calculate average): 8     Male BMI Frailty Criteria for  Male  Strength Female BMI Frailty Criteria for  Female  Strength   ?24 ?29 ?23 ?17   24.1 - 26 ?30 23.1 - 26 ?17.3   26.1 - 28 ?30 26.1 - 29 ?18   >28 ?32 >29 ?21     4. Slowness:  15 foot walk time (measured by MA):  5    Height Frailty Criteria for  15 Foot Walk Time   Men   ?173 cm ? 7 seconds    >173 cm  ? 6 seconds   Women   ?159 cm ? 7 seconds   >159 cm  ? 6 seconds     5. Physical activity:     *Please complete 2 calculations for kcal (see frailty worksheet for equations)     Energy expenditure for frailty: Not Frail based on subjective activity reports    Gender Frailty Criteria for Low Physical Activity   Male <383 kcal/week   Female <270 kcal/weel     IPAQ score: Not frail based on subjective activity reports.    Yahaira Fuentes met the following criteria for prefrailty (score 1-2) or frailty (score 3+): Frailty: " Weakness  Frailty Score is: 1      Additional assessments not to be used in frailty calculation:     Sit to stand test (time to complete 5 reps): 15 seconds    Standing balance in 10 seconds: Record the Total number of seconds(0-30)--add a+b+c ( take best attempt for each)    First attempt: 30 seconds  Second attempt: not reported    Overall Assessment    I have reviewed the diagnostic data, medications, frailty screening, and general processes prior to BMTCT.  I have notified the Primary BMT Physician/and or Attending Physician in the clinic of any issues.     No consents present at time of visit.     45 minutes spent on the date of the encounter doing chart review, history and exam, documentation and further activities per the note        Cassidy Gupta PA-C

## 2022-11-03 NOTE — NURSING NOTE
"Oncology Rooming Note    November 3, 2022 11:02 AM   Yahaira Fuentes is a 62 year old female who presents for:    Chief Complaint   Patient presents with     Nurse Visit     BMT work up; hx AML     Initial Vitals: /63 (BP Location: Right arm, Patient Position: Sitting)   Pulse 75   Temp 98.7  F (37.1  C) (Oral)   Resp 18   Ht 1.6 m (5' 2.99\")   Wt 96.3 kg (212 lb 4.8 oz)   SpO2 99%   BMI 37.62 kg/m   Estimated body mass index is 37.62 kg/m  as calculated from the following:    Height as of this encounter: 1.6 m (5' 2.99\").    Weight as of this encounter: 96.3 kg (212 lb 4.8 oz). Body surface area is 2.07 meters squared.  No Pain (0) Comment: Data Unavailable   No LMP recorded. Patient has had a hysterectomy.  Allergies reviewed: Yes  Medications reviewed: Yes    Medications: Medication refills not needed today.  Pharmacy name entered into [employer]: [id number], [place] - [address] AT [facility] [address]    Clinical concerns: None.     Paige Arboleda RN              "

## 2022-11-03 NOTE — NURSING NOTE
"Frailty testing done in clinic, pt tolerated. Results in \"completed\" folder in F colab.    Gabi Penn CMA on 11/3/2022 at 11:23 AM    "

## 2022-11-03 NOTE — LETTER
11/3/2022         RE: Yahaira Fuentes  [address] [facility] [address]        Dear Colleague,    Thank you for referring your patient, Yahaira Fuentes, to the [facility] BLOOD AND MARROW TRANSPLANT PROGRAM [place]. Please see a copy of my visit note below.        [facility]  BMTCT OPEN VISIT    November 3, 2022        Yahaira Fuentes is a 62 year old female undergoing evaluation prior to hematopoietic cell transplant or immune effector cell therapy.    Reason for BMTCT: AML 2015-32 7/8 pbsc urd    Recent chemotherapy: Consolidation x 1 HIDAC    Recent infections: No    Blood thinner use? If yes, why? Yes 10/19/22 Lovenox for DVT R popliteal vein and posterior tibial veins of the upper and mid calf.     Treatment for diabetes? No    Today, the patient notes the following symptoms:    Feeling well with no acute medical complaints.   Her pain from DVT has     Remainder of 10 point ROS neg.       Yahaira Fuentes's History    Past Medical and Surgical History:       Fibroid uterus 2/23/2016--w/ laparoscopic hysterectomy bilateral salpingoopherectomy    Status post appendectomy    Lymphedema     Obesity BMI 38    Rectocele repair 2/23/2016     New DVT RLE    Social History     Number of children: 1     Occupation: Works in medical insurance    Smoking status: Never Smoker     Alcohol use: occasional     Live by self works from home    Family History     Thyroid Disease Maternal Aunt     High Blood Pressure Paternal Aunt     Thyroid Disease Sister     Father htn , NHL    Kidney Disease Father stones     Thyroid Disease Mother   1 sister 67, 1 child 39 healthy      Yahaira Fuentes's Medications and Allergies    Current Outpatient Medications   Medication     acyclovir (ZOVIRAX) 400 MG tablet     Ascorbic Acid (RITESH-C PO)     cholecalciferol 25 MCG (1000 UT) TABS     enoxaparin ANTICOAGULANT (LOVENOX) 100 MG/ML syringe     fluconazole (DIFLUCAN) 100 MG tablet     levofloxacin (LEVAQUIN)  "250 MG tablet     multivitamin w/minerals (THERA-VIT-M) tablet     No current facility-administered medications for this visit.       Allergies   Allergen Reactions     Sulfa Drugs Other (See Comments)     Other reaction(s): Gastrointestinal       Physical Examination    /63   Pulse 75   Temp 98.7  F (37.1  C) (Rectal)   Resp 18   Ht 1.6 m (5' 2.99\")   Wt 96.3 kg (212 lb 4.9 oz)   SpO2 99%   BMI 37.62 kg/m      Exam:  Constitutional: healthy, alert and no distress  Head: Normocephalic. No masses, lesions, tenderness or abnormalities  ENT: No oral lesions  Cardiovascular: RRR. No G/R/M  Respiratory: BCTA  Musculoskeletal: extremities normal- no gross deformities noted, gait normal and normal muscle tone  Skin: no suspicious lesions or rashes  Neurologic: No focal deficits. Grossly intact.  Psychiatric: mentation appears normal and affect normal/bright  Some doughy LE edema    Frailty Screening    1. Weight loss: Have you lost >10 pounds (or >5% body weight) unintentionally over the last year? No      2. Exhaustion: How often in the past week did you feel that:  o I feel that everything I do is an effort : .Exhaustion: 0 = rarely or none of the time (<1 day)  o I feel I cannot get going: Exhaustion: 0 = rarely or none of the time (<1 day)    3. Weakness:  Hand  strength (measured by MA; calculate average): 8     Male BMI Frailty Criteria for  Male  Strength Female BMI Frailty Criteria for  Female  Strength   ?24 ?29 ?23 ?17   24.1 - 26 ?30 23.1 - 26 ?17.3   26.1 - 28 ?30 26.1 - 29 ?18   >28 ?32 >29 ?21     4. Slowness:  15 foot walk time (measured by MA):  5    Height Frailty Criteria for  15 Foot Walk Time   Men   ?173 cm ? 7 seconds    >173 cm  ? 6 seconds   Women   ?159 cm ? 7 seconds   >159 cm  ? 6 seconds     5. Physical activity:     *Please complete 2 calculations for kcal (see frailty worksheet for equations)     Energy expenditure for frailty: Not Frail based on subjective activity " reports    Gender Frailty Criteria for Low Physical Activity   Male <383 kcal/week   Female <270 kcal/weel     IPAQ score: Not frail based on subjective activity reports.    Yahaira Fuentes met the following criteria for prefrailty (score 1-2) or frailty (score 3+): Frailty: Weakness  Frailty Score is: 1      Additional assessments not to be used in frailty calculation:     Sit to stand test (time to complete 5 reps): 15 seconds    Standing balance in 10 seconds: Record the Total number of seconds(0-30)--add a+b+c ( take best attempt for each)    First attempt: 30 seconds  Second attempt: not reported    Overall Assessment    I have reviewed the diagnostic data, medications, frailty screening, and general processes prior to BMTCT.  I have notified the Primary BMT Physician/and or Attending Physician in the clinic of any issues.     No consents present at time of visit.     45 minutes spent on the date of the encounter doing chart review, history and exam, documentation and further activities per the note        Cassidy Gupta PA-C

## 2022-11-03 NOTE — PROGRESS NOTES
Pharmacy Assessment - Pre-Stem Cell Transplant    Assessments & Recommendations:  1) Hold Fluconazole for 5-7 days prior to transplant due to drug interaction with cyclophosphamide.  Rebeka is on 400mg/day, which causes more significant interactions that the 100mg or 200mg dose, which is why holding is necessary.  2) Patient is CMV(-). Will need to assess donor CMV status to determine need for standard dose vs high dose acyclovir for viral prophylaxis.  3) Use Adj BW for cyclophosphamide dose calculations.  4) Sulfa allergy - patient noted that this was just upset stomach.  Can re-try Bactrim for PJP prophylaxis.  If Rebeka does not tolerate at that time, can consider alternatives.    History of Present Illness:  Yahaira Fuentes is a 62 year old year old female diagnosed with AML.  She has been treated with 7+3 and HiDAC.  She is now being work up for Allogeneic Stem Cell Transplant on protocol 2015-32, which utilizes Flu/Cy/TBI as a conditioning regimen and pCy/Siro/MMF for GVH prophylaxis.    Pertinent labs/tests:  Viral Testing:  CMV(-) / everything else pending  Ejection Fraction: pendgin  QTc: 440 msec (11/3/22)    Weights:   Wt Readings from Last 3 Encounters:   11/03/22 96.3 kg (212 lb 4.9 oz)   11/03/22 96.3 kg (212 lb 4.8 oz)   10/21/22 97.3 kg (214 lb 6.4 oz)   Ideal body weight: 52.4 kg (115 lb 7.7 oz)  Adjusted ideal body weight: 70 kg (154 lb 3.4 oz)  % IBW:  84% over IBW  There is no height or weight on file to calculate BMI.    Primary BMT Physician: Dr. Zoltan French  BMT RN Coordinator:  Carmen Purvis    Past Medical History:  No past medical history on file.    Medication Allergies:  Allergies   Allergen Reactions     Sulfa Drugs Other (See Comments)     Other reaction(s): Gastrointestinal     Current Medications (pre-admit):  Current Outpatient Medications   Medication Sig Dispense Refill     acyclovir (ZOVIRAX) 400 MG tablet Take 400 mg by mouth 2 times daily       Ascorbic Acid (RITESH-C PO) Take 1  "tablet by mouth daily \"Vitamin C extra with Zinc\"       cholecalciferol 25 MCG (1000 UT) TABS Take 1,000 Units by mouth daily       enoxaparin ANTICOAGULANT (LOVENOX) 100 MG/ML syringe Inject 1 mL (100 mg) Subcutaneous 2 times daily 14 mL 0     fluconazole (DIFLUCAN) 200 MG tablet Take 400 mg by mouth daily       levofloxacin (LEVAQUIN) 250 MG tablet Take 250 mg by mouth daily       multivitamin w/minerals (THERA-VIT-M) tablet Take 1 tablet by mouth daily       Herbal Medication/Nutritional Supplements:  none    Smoking/Past Drug Use:  Not addressed    Nausea/Vomiting, Pain, or other issues:  Tolerated past chemotherapy well.    Summary:  I met with Yahaira Fuentes for approximately 40 minutes.  We discussed current medications, allergies, conditioning chemotherapy, risk of infection, common side effects, use of immunosuppression, use of antimicrobials, GVHD treatment, and expectations post transplant.    Thank you,  Andy J. Kurtzweil, PharmD    "

## 2022-11-04 NOTE — PROGRESS NOTES
BMT ONC Adult Lumbar Puncture Procedure Note    November 4, 2022    Procedure: Lumbar Puncture to obtain CSF     Diagnosis: AML, in work-up for stem cell transplant    Learning needs assessment complete within 12 months: YES    Vitals reviewed:  YES    Informed Consent: Procedure, benefits, risks, and alternatives explained to the patient who verbalized understanding of the information and agreed to proceed with lumbar puncture. Risks include bleeding, headache, and or infection.  Patient safety checklist completed.    Labs: Reviewed:      Lab Results   Component Value Date    INR 1.01 11/03/2022     11/03/2022       Other: patient held two doses of her bid lovenox prior to procedure; she is aware to resume her usual dose of lovenox the morning of 11/5.     Description:. The patient was seated at the bedside with knees dangling. The L3-4 disc space was prepped and draped in a sterile fashion. Local anesthesia with 3mL 1% preservative-free lidocaine was used. A 22 gauge, 4 inch needle was placed on the second attempt.  8 mL spinal fluid collected. Specimen appears clear and colorless. Specimen sent for cell count and differential, cytology, protein, glucose and flow cytometry.  The needle was removed and a bandage was applied to the site.  Patient tolerated with mild difficulty.  Patient developed lightheadedness, headache and some chest discomfort during the procedure.  She did not become diaphoretic and she did not lose consciousness.  Immediately post procedure, patient was helped into the supine position, and all of her symptoms resolved.  She will lie in the supine position for 1 hour post procedure.     Post-procedure pain assessment: 0 out of 10 on the numeric pain rating scale  Interventions: No    Complications: Yes: see symptoms above     Disposition: Patient will remain on back for 1 hour post procedure. Avoid soaking in a tub tonight.  Call if develops headaches, fevers and or chills.     Performed  by:   Damaris Dela Cruz PA-C

## 2022-11-04 NOTE — LETTER
11/4/2022         RE: Yahaira Fuentes  [address] [facility] [address]        Dear Colleague,    Thank you for referring your patient, Yahaira Fuentes, to the [facility] BLOOD AND MARROW TRANSPLANT PROGRAM [place]. Please see a copy of my visit note below.    BMT ONC Adult Lumbar Puncture Procedure Note    November 4, 2022    Procedure: Lumbar Puncture to obtain CSF     Diagnosis: AML, in work-up for stem cell transplant    Learning needs assessment complete within 12 months: YES    Vitals reviewed:  YES    Informed Consent: Procedure, benefits, risks, and alternatives explained to the patient who verbalized understanding of the information and agreed to proceed with lumbar puncture. Risks include bleeding, headache, and or infection.  Patient safety checklist completed.    Labs: Reviewed:      Lab Results   Component Value Date    INR 1.01 11/03/2022     11/03/2022       Other: patient held two doses of her bid lovenox prior to procedure; she is aware to resume her usual dose of lovenox the morning of 11/5.     Description:. The patient was seated at the bedside with knees dangling. The L3-4 disc space was prepped and draped in a sterile fashion. Local anesthesia with 3mL 1% preservative-free lidocaine was used. A 22 gauge, 4 inch needle was placed on the second attempt.  8 mL spinal fluid collected. Specimen appears clear and colorless. Specimen sent for cell count and differential, cytology, protein, glucose and flow cytometry.  The needle was removed and a bandage was applied to the site.  Patient tolerated with mild difficulty.  Patient developed lightheadedness, headache and some chest discomfort during the procedure.  She did not become diaphoretic and she did not lose consciousness.  Immediately post procedure, patient was helped into the supine position, and all of her symptoms resolved.  She will lie in the supine position for 1 hour post procedure.     Post-procedure pain  assessment: 0 out of 10 on the numeric pain rating scale  Interventions: No    Complications: Yes: see symptoms above     Disposition: Patient will remain on back for 1 hour post procedure. Avoid soaking in a tub tonight.  Call if develops headaches, fevers and or chills.     Performed by:   Damaris Dela Cruz PA-C

## 2022-11-04 NOTE — NURSING NOTE
"Oncology Rooming Note    November 4, 2022 1:56 PM   Yahaira Fuentes is a 62 year old female who presents for:    Chief Complaint   Patient presents with     Oncology Clinic Visit     Rtn for AML, LP     Initial Vitals: /78 (BP Location: Left arm, Patient Position: Sitting, Cuff Size: Adult Large)   Pulse 74   Temp 97.8  F (36.6  C) (Oral)   Resp 20   Wt 96.2 kg (212 lb)   SpO2 100%   BMI 37.56 kg/m   Estimated body mass index is 37.56 kg/m  as calculated from the following:    Height as of 11/3/22: 1.6 m (5' 2.99\").    Weight as of this encounter: 96.2 kg (212 lb). Body surface area is 2.07 meters squared.  Data Unavailable Comment: Data Unavailable   No LMP recorded. Patient has had a hysterectomy.  Allergies reviewed: No  Medications reviewed: No    Medications: Medication refills not needed today.  Pharmacy name entered into [facility]: [id number] [address] AT [facility] [address]    Clinical concerns: Vitals taken, provider started visit immediately. Allergies and medications reviewed day previous (11/3/22)      Stella Yanez, EMT            "

## 2022-11-07 NOTE — PROGRESS NOTES
"Blood and Marrow Transplant   Psychosocial Assessment with   Clinical     Assessment completed on 11/7/22 of Pt's living situation, support system, financial status, functional status, coping, stressors, need for resources and social work intervention provided as needed.  Information for this assessment was provided by Pt Rebeka and daughter Darren's report, in addition to medical chart review and consultation with medical team.     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     Contact Information:  Pt's Cell Phone: [phone]  Pt E-mail: [email or web address]  Pt's daughters Phone: [phone]    Presenting Information:  Rebeka is a 62 year old female diagnosed with AML who presents for evaluation for an allogeneic transplant at the [facility] ([facility] Pt was accompanied to today's visit by her daughter Darren.     Decision Making: Self    Health Care Directive: Pt declined a completed Health Care Directive (HCD) at this time. SW provided education. SW encouraged pt to have discussions with their family regarding their health care wishes. SW explained that since she does not have a healthcare directive, legally her daughter Darren would make decisions on her behalf, if she did not have capacity to make her own medical decisions. Pt understood.     Relationship Status: Pt is single.      Special " Needs: None identified at this time. Pt plans to return home following transplant.     Family/Support System: Pt endorsed a good support system including family and close friends who will be available to support pt throughout transplant process.     Family Information:  Children: Daughter Darren Renner.   Siblings: Sister Rosie Dong who lives in [place].  Parents: .  Grandchildren: Grandlubna Leonard (7).   Friends: Friends Cheryl and Cheyenne who may help out with care giving duties. Pt endorsed a good friend support system.    Caregiver: SW discussed with pt and pt's daughter the caregiver role and expectation at length. Pt is agreeable to having a full time caregiver for a minimum of 100 days until cleared by the BMT physician. Pt and pt's daughter confirmed understanding of the caregiver requirement. Pt's primary caregiver will be her daughter Darren/son in law PJ with friends Cheryl and Cheyenne and sister Rosie as a secondary or back-up to assist as needed.     Pt's daughter shared some concern about the weekend care giving plan. Pt's son in law PJ plans to work from home Mon-Fri and share care giving duties with daughter Darren. Daughter reports her family has weekend obligations (such as Orthodoxy/Leonard's hockey) that they would like to keep consistent in their routine. She reports this would be a few hours each day. Pt reports she has reached out to several friends who have offered to help (Cheryl/Cheyenne/Sister Rosie) but their availability during the busy holiday season in unclear at this time. Pt's daughter would like a more concrete plan as she does not want all of the care giving duties to fall on her last minute. Sw recommended pt and daughter connect with the caregivers together (as pt was doing this on her own) to discuss needs and potential availability. They plan to do so.     Pt reviewed and signed the caregiver contract which will be scanned into the EMR. Caregiver education and resources provided. No  "caregiver concerns identified.     Caregiver Contact Information:  Darren Renner: [phone]  Cheryl Carranza: [phone]  Rosie Dong\" [phone]  Cheyenne Telles: [phone]    Transportation Mode: Private Car. Pt is aware of driving restrictions post-BMT and the need for the caregiver is to drive until cleared to drive by the BMT physician. SW provided information on parking info and monthly parking pass options.     Insurance: Pt has [employer] as her health insurance. Pt denied specific insurance concerns at this time. SW reiterated information about the BMT [employer]  should specific insurance questions arise as pt moves through transplant process.     Sources of Income: Pt's source of income is employment. No financial concerns identified at this time.     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 She reports she works 100% remote. Pt has 22 days of STD available to her. Patient plans to look further into her LTD plan.     Pt's daughter does not work at this time but has significant volunteering responsibilities. She reports she is able to continue some of these on a remote basis.     Mental Health: Pt denied a history of mental health concerns, specific diagnoses or medications at this time.     PHQ-9:  Pt scored a 1 which indicates none on the depression severity scale. Pt endorses this is an accurate reflection of her emotional state.    GAD7:  Pt scored a 0 which indicates no sign of anxiety on the Generalized Anxiety Disorder Questionnaire. Pt endorses this is an accurate reflection of her emotional state.    Chemical Use:   Tobacco: None reported.   Alcohol: Pt reports she will drink 1-2 nights a week when she is feeling up for it. She reports she has ceased drinking as of recent.  Marijuana: None reported.   Other Drugs: None reported.   Based on the information provided, there appear to be no specific risks or concerns " "identified at this time.     Trauma/Loss/Abuse History: Multiple losses associated with cancer diagnosis and treatment, including health, employment, changes to physical appearance, etc.     Spirituality: Pt identified as Anabaptist. SW explained that there are Chaplains on the unit and pt can request to meet with a  at anytime.  Pt is unsure if she is interested in having a blessing ceremony - she plans to follow up with sw should she decide to pursue this.     Coping: Pt noted that she is currently feeling \"positive, fearful, hopeful, nervous, excited, and ready to begin\". Pt shared that her main coping mechanisms are talking with friends and family, engaging in prayer/spiritual practices, and using positive self talk and affirmations. Pt noted that she enjoys watching TV, going on walks, playing games with her grandson, and playing board games. SW and pt discussed additional positive coping mechanisms that pt can utilize while in the hospital. While hospitalized, pt plans to read, play games, and enjoy time with visitors.    Caregiver Coping: Pt's daughter noted that she is feeling \"worried and nervous\" at this time. She reports this is due to the uncertainty of the care giving plan at this time. Daughter reports she likes things to be laid out in a clear manner. Pt's daughter noted that she jolene by talking with friends/family, engaging in prayer and spiritual practices, reading books, exercising, journaling, talking with her therapist, and working on her hobbies. Caregiver resources were offered/reviewed.     Education Provided: Transplant process expectations, Caregiver requirements, Caregiver self-care, Financial issues related to transplant, Financial resources/grants available, Common psychosocial stressors pre/post transplant, Support group(s) available, Hospital resources available, Web site information, Social Work role and Resources for children/siblings    Interventions Provided: Psychosocial " Support and Education     Assessment and Recommendations for Team:  Pt is a 62 year old female diagnosed with AML who is here undergoing preparation for a planned allogeneic transplant.     Pt is pleasant, engaging, and able to articulate concerns/coping mechanisms in an appropriate manner. During our meeting pt was alert, she was interactive, affect was full, and she displayed appropriate eye contact, memory and thought processes. Pt feels comfortable communicating with the medical team. Pt has a strong supportive network of family and friends who are involved.     Pt and her daughter will benefit from ongoing psychosocial support in regards to coping with the adjustment to the BMT process as well as discussing ongoing care giving plans. CSW has discussed  psychosocial support options in regards to coping with the adjustment to the BMT process and support groups opportunities.      Pt has a good support system. Pt and her daughter continue to work out the details of their specific care giving plan, however, pt appears to have many people who are available to support her. Pt verbalizes understanding of the transplant process and wanting to proceed. SW provided contact information and encouraged pt to contact SW with questions, concerns, resources and for support.    Per this assessment, SW did not identify any barriers to this patient moving forward with transplant.    Important Information:   Pt may be interested in a stationary bike/treadmill in her bedroom.     Follow up Planned:   Psychosocial support  Spiritual Health referral- pending    JIMENEZ Barker  SOT/BMT/CF Flotiffanie

## 2022-11-07 NOTE — TELEPHONE ENCOUNTER
MEDICAL RECORDS REQUEST   Radiation Oncology  [address] [place] [address]  PHONE: [phone]  Fax: [phone]        FUTURE VISIT INFORMATION                                                   Yahaira CALDERON Alfredo, : 1960 scheduled for future visit at [facility] Radiation Oncology    APPOINTMENT INFORMATION:    Date: 22    Provider:  Dr. Paola Arellano    Reason for Visit/Diagnosis: Personal history of diseases of blood and blood-forming organs [Z86.2];Screening for viral disease [Z11.59]; Acute myeloid leukemia in remission (H) [C92.01]    RECORDS REQUESTED FOR VISIT                                                     NOTES STATUS DETAILS   OFFICE NOTE from referring provider Conor French   OFFICE NOTE from medical oncologist - 10/19/22: Dr. Emmanuel Arshad   MEDICATION LIST [facility]    LABS     PATHOLOGY REPORTS Reports in EPIC Path Consult:  10/20/22: [id number] ([id number]: 22)    Bone Marrow Biopsy:  10/21/22: [id number]   ANYTHING RELATED TO DIAGNOSIS Epic Most recent 22

## 2022-11-07 NOTE — LETTER
11/7/2022         RE: Yahaira Fuentes  [address] [address]        Dear Colleague,    Thank you for referring your patient, Yahaira Fuentes, to the [facility] BLOOD AND MARROW TRANSPLANT PROGRAM [place]. Please see a copy of my visit note below.    BMT Teaching Flowsheet    Yahaira Fuentes is a 62 year old female  Diagnoses of Personal history of diseases of blood and blood-forming organs and Acute myeloid leukemia in remission (H) were pertinent to this visit.    Teaching Topic: Allogeneic MUD transplant with SLAVA conditioning and Post-TX GVHD prevention using sirolimus, MMF and pt-cy    Person(s) involved in teaching: Patient  Motivation Level  Asks Questions: Yes  Eager to Learn: Yes  Cooperative: Yes  Receptive (willing/able to accept information): Yes  Any cultural factors/Orthodox beliefs that may influence understanding or compliance? No    Patient demonstrates understanding of the following:  - Reason for the appointment, diagnosis and treatment plan: Yes  - Knowledge of proper use of medications and conditions for which they are ordered (with special attention to potential side effects or drug interactions): Yes  - Which situations necessitate calling provider and whom to contact: Yes    Teaching concerns addressed: None    Proper use and care of (medical equipment, care aids, etc.) Yes  Pain management techniques: Yes  Patient instructed on hand hygiene: Yes  How and/when to access community resources: Yes    Infection Control:  Patient demonstrates understanding of the following:  Surgical procedure site care taught: No, explain: will have pre and post op site care for line placement  Signs and symptoms of infection taught: Yes  Wound care taught: NA  Central venous catheter care taught: No, explain: will have patient learning center teaching prior to d/c    Instructional Materials Used/Given:   Allo patient teach binder including who and when to call for help.    Time  spent with patient: 90 minutes.  Specific Concerns: No      Sincerely,    BMT Nurse Coordinator

## 2022-11-07 NOTE — LETTER
11/7/2022         RE: Yahaira Fuentes  [address] [address]        Dear Colleague,    Thank you for referring your patient, Yahaira Fuentes, to the [facility] RADIATION ONCOLOGY. Please see a copy of my visit note below.    Radiation Oncology Consult  Patient comes in for initial consultation in the Radiation Oncology Department at the request of Dr. Zoltan French to determine eligibility for TBI prior to transplant.     History of Present Illness:  Yahaira Fuentes is a pleasant 62 year old year old female in no acute.  She initially presented in July 2022 thinking she had a sinus infection.  She had congestion, swollen lymph nodes, and fatigue.  Antibiotics did not help and so white count was done and was 60,000.  She was admitted and bone marrow biopsy on 8/5/2022 showed AML with 87% blasts, positive NPM 1, FLT3.  Flow showed 84% blasts with monocytoid differentiation.  On 8 8 she started induction chemotherapy with cytarabine and idarubicin plus midostaurin.  Bone marrow biopsy 9/6/2022 showed 1% blasts.  She went on to have consolidation treatment with high DAC.  She tolerated treatment very well with some minimal nausea.  She overall feels good now.  Bone marrow biopsy 10/21/2022 is 50% cellular with 2% blasts with no signs of leukemia.  Flow was also negative.    She will be using an unrelated donor with no date for transplant.        Previous Radiation:  None    Previous Chemotherapy:  As above per HPI.      Pregnant: No  No results found for: HCGQUANT  Implanted Cardiac Devices: No    Medications:  Current Outpatient Medications   Medication     acyclovir (ZOVIRAX) 400 MG tablet     Ascorbic Acid (RITESH-C PO)     cholecalciferol 25 MCG (1000 UT) TABS     enoxaparin ANTICOAGULANT (LOVENOX) 100 MG/ML syringe     fluconazole (DIFLUCAN) 200 MG tablet     levofloxacin (LEVAQUIN) 250 MG tablet     multivitamin w/minerals (THERA-VIT-M) tablet     No current facility-administered  medications for this visit.       Allergies:     Allergies   Allergen Reactions     Sulfa Drugs Other (See Comments)     Other reaction(s): Gastrointestinal       Past Medical History:  History reviewed. No pertinent past medical history.    Past Surgical History:  Past Surgical History:   Procedure Laterality Date     APPENDECTOMY       GYN SURGERY         Family History:  family history includes Lymphoma in her father.        Social History:  Patient is not  and lives in [facility].  Employed at [employer] doing work for [employer]..  Has 1 daughter who she currently lives with.     Pain Assessment 0-10:  Patient rates pain at a 0.    Review of Symptoms:  Constitutional: Negative for fever, chills, weight loss and malaise/fatigue.   Overall patient feels well.  HENT: Negative for nosebleeds, congestion, sore throat and neck pain.   Respiratory: Negative for cough, hemoptysis, sputum production, shortness of breath and wheezing.   Cardiovascular: Negative for chest pain, palpitations, claudication, leg swelling and PND.   Gastrointestinal: Negative for heartburn, nausea, vomiting, abdominal pain, diarrhea, constipation and blood in stool.   Genitourinary: Negative for dysuria.   Musculoskeletal: Negative for myalgias and joint pain.   Skin: Negative for itching and rash.   Neurological: Negative for dizziness, tingling, weakness and headaches.   Endo/Heme/Allergies: Does not bruise/bleed easily.   Psychiatric/Behavioral: Negative for depression and suicidal ideas. The patient is not nervous/anxious.     Physical Exam:  Resp 18   Wt 96.2 kg (212 lb)   SpO2 98%   BMI 37.56 kg/m    GENERAL: Well-developed, well-nourished, globally oriented.   HEENT: Normocephalic, atraumatic. Oral cavity and oropharynx without mucosal lesions.   NECK: Supple, full range of motion, no palpable cervical or supraclavicular lymphadenopathy.   LUNGS: Clear to auscultation bilaterally.   CARDIOVASCULAR: Regular rate and rhythm  without murmur.   ABDOMEN: Soft, nondistended, nontender, no hepatosplenomegaly.  EXTREMITIES: Without cyanosis, clubbing or edema. .   LYMPHATICS: No palpable supraclavicular, axillary, inguinal, femoral adenopathy.   NEUROLOGIC: Alert and oriented.  Gait normal.     Labs:  CBC RESULTS:   Recent Labs   Lab Test 11/07/22  1043   WBC 6.4   RBC 3.45*   HGB 10.2*   HCT 33.9*   MCV 98   MCH 29.6   MCHC 30.1*   RDW 18.3*          All pertinent labs, scans, and test results have been reviewed.    EDUCATION:  Patient given verbal and written education on TBI side effects, purpose of treatment and what the radiation experience will be like.  Patient expressed understanding and asked appropriate questions.        Assessment/Plan: AML  Patient currently undergoing work-up for URD NMA transplant per protocol 2015-32 which calls for  cGy.    STAFF RADIATION ONCOLOGIST    I saw the patient who appears to be a suitable candidate for TBI prior to hematopoietic transplant per protocol.  Conditioning for this protocol is nonmyeloablative and includes chemotherapy and total body irradiation given in 1 treatment for a total dose of 200 cGy.      The total body will be treated with patient in incumbent position with compensators.    Patient has no radiation history.    Risks and benefits of TBI were discussed including the potential short term side effects but not limited to nausea, vomiting, mucositis, alopecia, and potential organ damage.  Also discussed potential long term side effects including but not limited to cataracts, sterility, chronic organ dysfunction, neurological effects, hormone insufficiencies, and secondary malignancies.    Patient and family were given a chance to ask questions.  They seem to understand risks and benefits of radiation conditioning prior to transplant.  Informed consent was obtained.  Simulation and measurements were performed under my direction.    If you have any questions or concerns  please do not hesitate to contact me. Patient will be followed by BMT staff after transplant.      Paola Arellano M.D.  Department of Radiation Oncology  [facility]     CC  Patient Care Team:  PhysiciansLyndon as PCP - General  Charlie Worrell MD as MD (Ophthalmology)  Meggan Falcon MD as Assigned Cancer Care Provider  Lorin Monsivais APRN CNP as Nurse Practitioner (Radiation Oncology)  Carmen Purvis RN as Specialty Care Coordinator

## 2022-11-07 NOTE — PROGRESS NOTES
Radiation Oncology Consult  Patient comes in for initial consultation in the Radiation Oncology Department at the request of Dr. Zoltan French to determine eligibility for TBI prior to transplant.     History of Present Illness:  Yahaira Feuntes is a pleasant 62 year old year old female in no acute.  She initially presented in July 2022 thinking she had a sinus infection.  She had congestion, swollen lymph nodes, and fatigue.  Antibiotics did not help and so white count was done and was 60,000.  She was admitted and bone marrow biopsy on 8/5/2022 showed AML with 87% blasts, positive NPM 1, FLT3.  Flow showed 84% blasts with monocytoid differentiation.  On 8 8 she started induction chemotherapy with cytarabine and idarubicin plus midostaurin.  Bone marrow biopsy 9/6/2022 showed 1% blasts.  She went on to have consolidation treatment with high DAC.  She tolerated treatment very well with some minimal nausea.  She overall feels good now.  Bone marrow biopsy 10/21/2022 is 50% cellular with 2% blasts with no signs of leukemia.  Flow was also negative.    She will be using an unrelated donor with no date for transplant.        Previous Radiation:  None    Previous Chemotherapy:  As above per HPI.      Pregnant: No  No results found for: HCGQUANT  Implanted Cardiac Devices: No    Medications:  Current Outpatient Medications   Medication     acyclovir (ZOVIRAX) 400 MG tablet     Ascorbic Acid (RITESH-C PO)     cholecalciferol 25 MCG (1000 UT) TABS     enoxaparin ANTICOAGULANT (LOVENOX) 100 MG/ML syringe     fluconazole (DIFLUCAN) 200 MG tablet     levofloxacin (LEVAQUIN) 250 MG tablet     multivitamin w/minerals (THERA-VIT-M) tablet     No current facility-administered medications for this visit.       Allergies:     Allergies   Allergen Reactions     Sulfa Drugs Other (See Comments)     Other reaction(s): Gastrointestinal       Past Medical History:  History reviewed. No pertinent past medical history.    Past Surgical  History:  Past Surgical History:   Procedure Laterality Date     APPENDECTOMY       GYN SURGERY         Family History:  family history includes Lymphoma in her father.        Social History:  Patient is not  and lives in [facility].  Employed at [employer] doing work for [employer]..  Has 1 daughter who she currently lives with.     Pain Assessment 0-10:  Patient rates pain at a 0.    Review of Symptoms:  Constitutional: Negative for fever, chills, weight loss and malaise/fatigue.   Overall patient feels well.  HENT: Negative for nosebleeds, congestion, sore throat and neck pain.   Respiratory: Negative for cough, hemoptysis, sputum production, shortness of breath and wheezing.   Cardiovascular: Negative for chest pain, palpitations, claudication, leg swelling and PND.   Gastrointestinal: Negative for heartburn, nausea, vomiting, abdominal pain, diarrhea, constipation and blood in stool.   Genitourinary: Negative for dysuria.   Musculoskeletal: Negative for myalgias and joint pain.   Skin: Negative for itching and rash.   Neurological: Negative for dizziness, tingling, weakness and headaches.   Endo/Heme/Allergies: Does not bruise/bleed easily.   Psychiatric/Behavioral: Negative for depression and suicidal ideas. The patient is not nervous/anxious.     Physical Exam:  Resp 18   Wt 96.2 kg (212 lb)   SpO2 98%   BMI 37.56 kg/m    GENERAL: Well-developed, well-nourished, globally oriented.   HEENT: Normocephalic, atraumatic. Oral cavity and oropharynx without mucosal lesions.   NECK: Supple, full range of motion, no palpable cervical or supraclavicular lymphadenopathy.   LUNGS: Clear to auscultation bilaterally.   CARDIOVASCULAR: Regular rate and rhythm without murmur.   ABDOMEN: Soft, nondistended, nontender, no hepatosplenomegaly.  EXTREMITIES: Without cyanosis, clubbing or edema. .   LYMPHATICS: No palpable supraclavicular, axillary, inguinal, femoral adenopathy.   NEUROLOGIC: Alert and oriented.  Gait  normal.     Labs:  CBC RESULTS:   Recent Labs   Lab Test 11/07/22  1043   WBC 6.4   RBC 3.45*   HGB 10.2*   HCT 33.9*   MCV 98   MCH 29.6   MCHC 30.1*   RDW 18.3*          All pertinent labs, scans, and test results have been reviewed.    EDUCATION:  Patient given verbal and written education on TBI side effects, purpose of treatment and what the radiation experience will be like.  Patient expressed understanding and asked appropriate questions.        Assessment/Plan: AML  Patient currently undergoing work-up for [facility] NMA transplant per protocol 2015-32 which calls for  cGy.    STAFF RADIATION ONCOLOGIST    I saw the patient who appears to be a suitable candidate for TBI prior to hematopoietic transplant per protocol.  Conditioning for this protocol is nonmyeloablative and includes chemotherapy and total body irradiation given in 1 treatment for a total dose of 200 cGy.      The total body will be treated with patient in incumbent position with compensators.    Patient has no radiation history.    Risks and benefits of TBI were discussed including the potential short term side effects but not limited to nausea, vomiting, mucositis, alopecia, and potential organ damage.  Also discussed potential long term side effects including but not limited to cataracts, sterility, chronic organ dysfunction, neurological effects, hormone insufficiencies, and secondary malignancies.    Patient and family were given a chance to ask questions.  They seem to understand risks and benefits of radiation conditioning prior to transplant.  Informed consent was obtained.  Simulation and measurements were performed under my direction.    If you have any questions or concerns please do not hesitate to contact me. Patient will be followed by BMT staff after transplant.      Paola Arellano M.D.  Department of Radiation Oncology  [facility]     CC  Patient Care Team:  Lyndon Ross PCP -  General  Charlie Worrell MD as MD (Ophthalmology)  Meggan Falcon MD as Assigned Cancer Care Provider  Lorin Monsivais APRN CNP as Nurse Practitioner (Radiation Oncology)  Carmen Purvis RN as Specialty Care Coordinator  MEGGAN FALCON

## 2022-11-08 NOTE — PROGRESS NOTES
"BMT VISIT    11/08/22    I had the pleasure to meet Soy today in the BMT clinic to review her AML work-up studies in anticipation of allogenic 8/8 HLA matched unrelated donor stem cell transplantation.  SHe is 61yo female with AML, Flt3 pos, now following 1 cycle of consolidation chemotherapy with HiDAC+ Midostaurin. She is in CR1 with MRD based on NGS but negative flow cytometry. Never had CNS disease.    Soy has been doing well.  She has no new complaints or changes in her health.  She continues to feel strong, she is eating well, has no diarrhea or abdominal symptoms, no fevers or infectious symptoms.    She is still tired, sleeps a lot, and she tries to walk around.  Her last chemotherapy was 9/22 with HiDAC and 10/12 midostaurin.     Her onc history is summarized below:     ONC HISTORY:  1. 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], Kit muated,  - Next generation sequencing:  * FLT3-ITD +   FLT-3 ITD: DETECTED  FLT-3 TKD: NOT detected  * NPM1 +   * DNMT3A +  * KIT variant of unknown clinical significance   INduction  Cytarabine and idarubicin (\"7+3\") with midostaurin therapy  - Date of initiation: 8/8/2022  - Midostaurin: 8/15-8/28  HiDAC #1:   *9/22/22  Day 8: 9/29/2022 - Midostaurin start  Day 21: 10/12/2022    -Nice Recovery from HIDAC#1  -with midostaurin on day 8-->21  10/12/2022 was day #21 of Midostaurin for this past cycle  -she is suitable for allo transplant  -will proceed to pre transplant evaln + LP @ [facility] starting 10/21/22 as her Allo date looks promising for later in 10/2022 [LP can be done as part of the evaln @ [facility]]  -She will be repeating BM after HiDAC #1 @ [facility] on 10/21/22 and will need to also send for MRD [AML NGS panel]      PMHX:  h/O total hysterectomy   Lymphedema   Mixed hyperlipidemia (HRC)   Obesity (HRC)   Rectocele   Superficial vein thrombosis   Venous (peripheral) insufficiency       Family:  She has 1 daughter  She has 1 sibling " (66yo)      Current Outpatient Medications   Medication     acyclovir (ZOVIRAX) 400 MG tablet     Ascorbic Acid (RITESH-C PO)     cholecalciferol 25 MCG (1000 UT) TABS     enoxaparin ANTICOAGULANT (LOVENOX) 100 MG/ML syringe     fluconazole (DIFLUCAN) 200 MG tablet     levofloxacin (LEVAQUIN) 250 MG tablet     multivitamin w/minerals (THERA-VIT-M) tablet     No current facility-administered medications for this visit.       Physical Exam:  /85   Pulse 82   Temp 98.2  F (36.8  C) (Oral)   Resp 18   Wt 97.3 kg (214 lb 6.4 oz)   SpO2 97%   BMI 37.99 kg/m    KPS 70%  Patient is ambulating , AOx3, NAD, well appearing patient. She appears chronically ill, obese,   HEENT: No mucositis, MM moist, no thrush or ulcers, buccal mucosa intact  Neck supple, no peripheral adenopathy Thyroid gland midline, not enlarged   Lungs: good air exchange, CTA, no crackles,no wheezing.   Heart RRR S1 S2, no murmur or gallop   Abd soft, NT, ND. Without hepato-splenomegaly, no ascites,    LE symmetrical, no edema   Skin without lesion or rashes. No petechiae or ecchymosis.  Neuro  Intact, AOx3    ASSESSMENT AND PLAN:    In summary,  is  63yo female with AML, Flt3 pos, now following 1 cycle of consolidation chemotherapy with HiDAC+ Midostaurin. She is in CR1 with MRD based on NGS but negative flow cytometry. Never had CNS disease. Based upon her work-up studies, she is in good health and fitness to proceed with MUD HSCT using her donor. HLA was rechecked and while she has DSA, the cross-match is pending and these are usually not relevant for matched donors where non-engraftment is rare.    I reviewed all work-up studies and determined she can proceed with admission and conditioning. I recommended Willem and she is going to think about it. I encourage to give it before admission.       BMT and Cell Therapy Informed Consent Discussion     In today's visit, we discussed in detail the research for which Yahaira Fuentes is  eligible. We discussed the potential risks and potential benefits of each protocol individually. We explained potential alternatives to the protocols discussed. We explained to the patient that participation is voluntary and that consent may be withdrawn at any time.     We discussed:    The rationale for our approach to the disease treatment    The eligibility requirements for treatment in the context of clinical trials    The need for caregiver support and the caregiver's role in recovery    The importance of adherence to the treatment plan and appropriate follow up    The requirements for contraception while undergoing treatment    The potential risks of morbidity and mortality related to this treatment    The requirements for supportive care to reduce the risk of infection and other complications    The role of the dietician and PT/OT to reduce the risk of muscle loss/sarcopenia    Support that is available through our social workers and care team to mitigate distress    The desired outcomes/goals of treatment, including the possibility of long-term disease control    The patient completed the last round of treatment on 10/12 - midostaurin     HCT-CI score: 2. We counseled the patient about the impact of this on the risk of treatment related and overall mortality. The score fit within treatment protocol eligibility criteria.    Karnofsky performance score: 70%         Active infections:  none.    Reproductive status: What methods of birth control does the patient plan to use during the treatment period beginning with conditioning and ending with the discontinuation of immune suppression (indicate with an X all that apply):  _x_ The patient is confirmed to be sterile or post-menopausa  The patient received appropriate reproductive counseling and agreed with the need for effective contraception during the treatment procedures.      Dental health suitable to proceed: Yes        After our detailed discussion above,  the patient signed the following consents for treatment and protocols:    [facility] database    Local BMT database    [place] conset      Known issues that I take into account for medical decisions, with salient changes to the plan considering these complexities noted above.    Patient Active Problem List   Diagnosis     Acute myeloid leukemia in remission (H)         I spent 90 minutes in the care of this patient today, which included time necessary for preparation for the visit, obtaining history, ordering medications/tests/procedures as medically indicated, review of pertinent medical literature, counseling of the patient, communication of recommendations to the care team, and documentation time.    Luann Garrett MD     Relevant Results from BMT Allo Nk For Aml Recipient Work Up Orders from the Lab encounter on 11/07/22:   CBC with platelets and differential     Status: Abnormal    Collection Time: 11/07/22 10:43 AM   Result Value Ref Range    WBC Count 6.4 4.0 - 11.0 10e3/uL    RBC Count 3.45 (L) 3.80 - 5.20 10e6/uL    Hemoglobin 10.2 (L) 11.7 - 15.7 g/dL    Hematocrit 33.9 (L) 35.0 - 47.0 %    MCV 98 78 - 100 fL    MCH 29.6 26.5 - 33.0 pg    MCHC 30.1 (L) 31.5 - 36.5 g/dL    RDW 18.3 (H) 10.0 - 15.0 %    Platelet Count 231 150 - 450 10e3/uL    % Neutrophils 75 %    % Lymphocytes 12 %    % Monocytes 9 %    % Eosinophils 3 %    % Basophils 1 %    % Immature Granulocytes 0 %    NRBCs per 100 WBC 0 <1 /100    Absolute Neutrophils 4.7 1.6 - 8.3 10e3/uL    Absolute Lymphocytes 0.8 0.8 - 5.3 10e3/uL    Absolute Monocytes 0.6 0.0 - 1.3 10e3/uL    Absolute Eosinophils 0.2 0.0 - 0.7 10e3/uL    Absolute Basophils 0.1 0.0 - 0.2 10e3/uL    Absolute Immature Granulocytes 0.0 <=0.4 10e3/uL    Absolute NRBCs 0.0 10e3/uL   Adult Type and Screen     Status: None    Collection Time: 11/07/22 10:43 AM   Result Value Ref Range    ABO/RH(D) AB POS     Antibody Screen Negative Negative    SPECIMEN EXPIRATION DATE [id number]     CBC with platelets differential     Status: Abnormal    Collection Time: 11/07/22 10:43 AM    Narrative    The following orders were created for panel order CBC with platelets differential.  Procedure                               Abnormality         Status                     ---------                               -----------         ------                     CBC with platelets and d...[[id number]]  Abnormal            Final result                 Please view results for these tests on the individual orders.   ABO/Rh type and screen     Status: None    Collection Time: 11/07/22 10:43 AM    Narrative    The following orders were created for panel order ABO/Rh type and screen.  Procedure                               Abnormality         Status                     ---------                               -----------         ------                     Adult Type and Screen[[id number]]                            Final result                 Please view results for these tests on the individual orders.   Relevant Results from BMT Allo Nk For Aml Recipient Work Up Orders from the Office Visit encounter on 11/04/22:   Protein total CSF:     Status: Normal    Collection Time: 11/04/22  3:01 PM   Result Value Ref Range    Protein total CSF 21.8 15.0 - 45.0 mg/dL   Glucose CSF:     Status: Normal    Collection Time: 11/04/22  3:01 PM   Result Value Ref Range    Glucose CSF 65 40 - 70 mg/dL    Narrative    CSF glucose concentrations are about 60 percent of normal plasma glucose.   Relevant Results from BMT Allo Nk For Aml Recipient Work Up Orders from the Ancillary Procedure encounter on 11/04/22:   CT Chest w/o Contrast     Status: None    Collection Time: 11/04/22  9:06 AM    Narrative    CT of the chest without contrast    HISTORY: Screening for viral disease AML in remission    COMPARISON STUDY: Chest x-ray same date    findings: Right IJ Port-A-Cath tip in the low SVC. Heart is not  enlarged. Main pulmonary artery and  aorta are not enlarged. No pleural  or pericardial effusion. Left renal stone without hydronephrosis  measuring 6 mm.    Evaluation of the upper abdomen is limited.    No suspicious bony lesions. Degenerative changes of the spine.      Impression    IMPRESSION: No evidence of infection    STEVE CARLOS MD         SYSTEM ID:  [id number]   Relevant Results from BMT Allo Nk For Aml Recipient Work Up Orders from the Ancillary Procedure encounter on 11/04/22:   X-ray Chest 2 vws*     Status: None    Collection Time: 11/04/22  8:53 AM    Narrative    XR CHEST 2 VIEWS  11/4/2022 8:53 AM     HISTORY:  Preop examination; Personal history of diseases of blood and  blood-forming organs; Screening for viral disease; Acute myeloid  leukemia in remission (H)       COMPARISON:  None    FINDINGS:   Frontal and lateral views of the chest. Right chest wall Port-A-Cath  tip projects over mid SVC. Trachea is midline. Cardiac silhouette is  within normal limits. No focal consolidation, pleural effusion or  appreciable pneumothorax. No acute osseous abnormality. Degenerative  changes of the visualized spine. Visualized upper abdomen is  unremarkable.        Impression    IMPRESSION: No acute airspace disease.    I have personally reviewed the examination and initial interpretation  and I agree with the findings.    STEVE CARLOS MD         SYSTEM ID:  [id number]   Relevant Results from BMT Allo Nk For Aml Recipient Work Up Orders from the Allied Health/Nurse Visit encounter on 11/03/22:   EKG 12-lead complete w/read - Clinics     Status: None    Collection Time: 11/03/22 11:07 AM   Result Value Ref Range    Systolic Blood Pressure  mmHg    Diastolic Blood Pressure  mmHg    Ventricular Rate 60 BPM    Atrial Rate 60 BPM    AZ Interval 138 ms    QRS Duration 86 ms     ms    QTc 440 ms    P Axis 40 degrees    R AXIS 6 degrees    T Axis 33 degrees    Interpretation ECG       Sinus rhythm  Normal ECG  No previous ECGs available  Confirmed  by Colleen Schneider ([id number]) on 11/3/2022 5:11:24 PM     Relevant Results from BMT Allo Nk For Aml Recipient Work Up Orders from the Allied Health/Nurse Visit encounter on 11/03/22:   Routine UA with microscopic     Status: Abnormal    Collection Time: 11/03/22 10:55 AM   Result Value Ref Range    Color Urine Yellow Colorless, Straw, Light Yellow, Yellow    Appearance Urine Clear Clear    Glucose Urine Negative Negative mg/dL    Bilirubin Urine Negative Negative    Ketones Urine Negative Negative mg/dL    Specific Gravity Urine 1.015 1.003 - 1.035    Blood Urine Negative Negative    pH Urine 7.5 (H) 5.0 - 7.0    Protein Albumin Urine Negative Negative mg/dL    Urobilinogen Urine Normal Normal, 2.0 mg/dL    Nitrite Urine Negative Negative    Leukocyte Esterase Urine Negative Negative    Mucus Urine Present (A) None Seen /LPF    RBC Urine <1 <=2 /HPF    WBC Urine 0 <=5 /HPF    Squamous Epithelials Urine <1 <=1 /HPF   HBV HCV HIV WNV by POOJA-BMT recipient     Status: None    Collection Time: 11/03/22 10:30 AM   Result Value Ref Range    See Scanned Result See scanned result.     HEPATITIS B BY POOJA Non-Reactive     HCV by POOJA Non-Reactive     HIV By Pooja Non-Reactive     West Nile Virus By POOJA Non-Reactive     Narrative    Verified by Bruce Chavez on 11/07/2022.   Uric acid     Status: Normal    Collection Time: 11/03/22 10:30 AM   Result Value Ref Range    Uric Acid 5.1 2.4 - 5.7 mg/dL   Trypanosoma Cruzi-BMT recipient     Status: None    Collection Time: 11/03/22 10:30 AM   Result Value Ref Range    See Scanned Result See scanned result.     Trypanosoma Cruzi Non-Reactive     Narrative    Verified by Bruce Chavez on 11/07/2022.   INR     Status: Normal    Collection Time: 11/03/22 10:30 AM   Result Value Ref Range    INR 1.01 0.85 - 1.15   Partial thromboplastin time     Status: Abnormal    Collection Time: 11/03/22 10:30 AM   Result Value Ref Range    aPTT 39 (H) 22 - 38 Seconds   HTLV I and 2 antibody with  reflex-BMT recipient     Status: None    Collection Time: 11/03/22 10:30 AM   Result Value Ref Range    HTLV I/II Antibodies by JAMARI Negative Negative     Comment: Based on the non-reactive anti-HTLV JAMARI screen, the HTLV   Western Blot is not indicated and therefore not performed.  INTERPRETIVE INFORMATION:  HTLV I/II Antibodies w/Reflex                              to Confirm    This assay should not be used for blood donor screening,   associated re-entry protocols, or for screening Human Cell,   Tissues and Cellular and Tissue-Based Products (HCT/P).  Performed by [email or web address]),  [address] [place] [id number] [phone]  [email or web address], Thad Marin MD, PHD, Lab. Director   Hepatitis C antibody     Status: Normal    Collection Time: 11/03/22 10:30 AM   Result Value Ref Range    Hepatitis C Antibody Nonreactive Nonreactive    Narrative    Assay performance characteristics have not been established for newborns, infants, and children.   Hepatitis B surface antigen-BMT recipient     Status: Normal    Collection Time: 11/03/22 10:30 AM   Result Value Ref Range    Hepatitis B Surface Antigen Nonreactive Nonreactive   Hepatitis B core antibody-BMT recipient     Status: Normal    Collection Time: 11/03/22 10:30 AM   Result Value Ref Range    Hepatitis B Core Antibody Total Nonreactive Nonreactive   Comprehensive metabolic panel     Status: Abnormal    Collection Time: 11/03/22 10:30 AM   Result Value Ref Range    Sodium 140 136 - 145 mmol/L    Potassium 4.1 3.4 - 5.3 mmol/L    Chloride 103 98 - 107 mmol/L    Carbon Dioxide (CO2) 27 22 - 29 mmol/L    Anion Gap 10 7 - 15 mmol/L    Urea Nitrogen 9.0 8.0 - 23.0 mg/dL    Creatinine 0.70 0.51 - 0.95 mg/dL    Calcium 9.4 8.8 - 10.2 mg/dL    Glucose 121 (H) 70 - 99 mg/dL    Alkaline Phosphatase 70 35 - 104 U/L    AST 26 10 - 35 U/L    ALT 21 10 - 35 U/L    Protein Total 6.7 6.4 - 8.3 g/dL    Albumin 4.0 3.5 - 5.2 g/dL    Bilirubin Total <0.2 <=1.2 mg/dL    GFR  Estimate >90 >60 mL/min/1.73m2     Comment: Effective December 21, 2021 eGFRcr in adults is calculated using the 2021 CKD-EPI creatinine equation which includes age and gender (Mukesh et al., NEJ, DOI: 10.1056/MHCCbw7200859)   Relevant Results from BMT Allo Nk For Aml Recipient Work Up Orders from the Office Visit encounter on 10/21/22:   Bone marrow biopsy     Status: None    Collection Time: 10/21/22 10:18 AM   Result Value Ref Range    Final Diagnosis       Bone marrow, posterior iliac crest, left decalcified trephine biopsy and touch imprint; direct aspirate smear, and concentrated aspirate smear; and peripheral blood smear:    - No definitive morphologic or immunophenotypic evidence of acute myeloid leukemia  - Slightly hypercellular marrow (cellularity estimated at 50% overall) with trilineage hematopoiesis showing slighlty left shifted granulopoiesis, no overt dysplasia, and 2% blasts  - Peripheral blood showing marked normocytic hypochromic anemia, absolute lymphopenia and slight thrombocytosis.  - See comment      Comment       Concurrent flow cytometry ([id number]) showed no increase in myeloid blasts and no abnormal myeloid blast population. The overall findings are compatible with regeneration related changes. Correlation with all ancillary blood and bone marrow studies, especially molecular studies, and clinical findings is recommended.       Clinical Information       From Epic electronic medical record; a 61 year old female with history of acute myeloid leukemia with monocytic differentiation, mutated NPM1 and FLT3 ITD mutation, s/p chemotherapy.  The current bone marrow biopsy submitted for bone marrow transplant work-up.      Peripheral Hematologic Data       CBC WITH DIFFERENTIAL(10/21/2022 09:59 AM CDT):     RESULT VALUE REF. RANGE UNITS   WBC Count   Hemoglobin    Hematocrit    Platelet Count    RBC Count   MCV  MCH   MCHC    RDW  6.6 (NORMAL)     7.0  ( L )      23.1  ( L )   614  ( H )   2.36   ( L )       98 (NORMAL)     29.7 (NORMAL)      30.3  ( L )      17.2  ( H ) 4.0-11.0  11.7-15.7  35.0-47.0  150-450  3.80-5.20    26.5-33.0  31.5-36.5  10.0-15.0 10e3/uL  g/dL  %  10e3/uL  10e6/uL  fL  pg  g/dL  %   % Neutrophils  % Lymphocytes  % Monocytes  % Eosinophils  % Basophils  % Immature Granulocytes  Absolute Neutrophils   Absolute Lymphocytes   Absolute Monocytes  Absolute Eosinophils  Absolute Basophils  Absolute Immature Granulocytes  NRBCs per 100 WBC  Absolute NRBCs 82  4  11  1  1  1  5.5 (NORMAL)   0.3  ( L )  0.7 (NORMAL)  0.0 (NORMAL)  0.1 (NORMAL)  0.0 (NORMAL)  0 (NORMAL)  0.0 () N/A  N/A  N/A  N/A  N/A  N/A  1.6-8.3  0.8-5.3  0.0-1.3  0.0-0.7  0.0-0.2  <=0.4  <1  <=0.0 %  %  %  %  %  %  10e3/uL  10e3/uL  10e3/uL  10e3/uL  10e3/uL  10e3/uL  /100  10e3/uL         Microscopic Description       PERIPHERAL BLOOD SMEAR MORPHOLOGY:  The red blood cells appear normochromic with a hypochromic subset. Poikilocytosis is minimal and non-specific. Polychromasia is not increased. Rouleaux formation is not definitively increased. The morphology of the platelets is normal.    Lymphocyte morphology is polymorphous with predominantly small and mature forms in the rare lymphocytes identified. Neutrophils displays predominantly normal cytoplasmic granularity and unremarkable nuclear morphology.  No circulating blasts are seen.    Bone marrow aspirates and bone marrow trephine core biopsy touch imprints are reviewed.    BONE MARROW DIFFERENTIAL (500 cells on bone marrow biopsy touch imprints )  Percent Cell (reference range)  2  Blasts (0 - 1)  3  Neutrophil promyelocytes (2- 4)  54  Neutrophils and precursors (54 - 63)  21  Erythroid precursors (18 - 24)  6  Monocytes (1- 1.5)  3  Eosinophils (1 - 3)  0  Basophils (0 - 1)  10  Lymphocytes (8 - 12)  1  Plasma cells (0 - 1.5)    The aspirate smears are hemodilute, therefore the above differential is performed on the bone marrow trephine core biopsy touch  imprints.    Granulocytes are adequate in number with progressive and complete, slightly left shifted maturation; no overt dysplasia seen. Erythrocytes are adequate in number with progressive and complete, slightly left-shifted maturation; no overt dysplasia seen. Megakaryocytes with unremarkable morphology are present.     IRON STAINS:  Dacie iron stain on concentrate smears:  Ring sideroblasts number 1% of erythroblasts.    TREPHINE SECTIONS:  Hematoxylin and eosin stains are reviewed. The quality of the trephine core biopsy is adequate with mild crush artifact and fragmentation artifact. The marrow cellularity is estimated at 50% overall. The cellular composition reflects the the marrow core biopsy differential. Megakaryocytes show normal morphology and distribution. Scattered hemosiderin laden macrophages are present.    IMMUNOHISTOCHEMISTRY:  Immunohistochemical stains are performed on the paraffin-embedded trephine core with appropriate controls.    CD34 highlights rare scattered immature myeloid cells; no aggregates or clusters of CD34 positive cells seen. CD34 correlates with the blast percentage by morphology.     highlights very rare scattered mast cells (strong intensity) and rare scattered immature myeloid cells and a few scattered immature erythroid cells (weak intensity).    CD61 highlights megakaryocytes with spectrum of size and unremarkable morphology and distribution    CD3 and CD2 highlight loose aggregates and scattered increased T lymphocytes.    Mast cell tryptase is essentially negative.    A resident/fellow in an ACGME accredited training program was involved in the initial review, preparation, and/or interpretation of this case.  I, as the senior physician, attest that I: (i) reviewed patient clinical records if indicated; (ii) reviewed relevant lab test results; (iii) examined the relevant preparation(s) for the specimen(s); and (iv) agree with the report, diagnosis(es), and  interpretation as documented by the resident/fellow and edited/confirmed by me. Reporting resident/fellow: Dung Russell MD      Gross Description       Procedure/Gross Description   Aspirate(s) and trephine(s) procured by [facility]EL[facility] Beltran    Specimen sent for Special Studies:         Flow Cytometry: right aspirate        Cytogenetics: right aspirate        Molecular Diagnostics: right aspirate          Biopsy aspiration site: right posterior iliac crest                                                      (Reference Range)          Amount of aspirate           3.65   mL        Fat and P.V. cell layer        0    %               (1 - 3)        Particles                             trace   %        Myeloid-erythroid layer    2    %               (5 - 8)          Clot Section: no    Trephine biopsy site: right posterior iliac crest    Designated right posterior iliac crest is 1 cylinder of gritty tissue, labeled with the patient's name and hospital number, obtained with 11 gauge needle and a length of 12 mm; entirely submitted in 1 cassette; acetic zinc formalin fixed, decalcified, processed, and stained for hematoxylin and eosin per laboratory protocol.        Performing Labs       The technical component of this testing was completed at [facility]

## 2022-11-08 NOTE — LETTER
"    11/8/2022         RE: Yahaira Fuentes  [address] [facility] [address]        Dear Colleague,    Thank you for referring your patient, Yahaira Fuentes, to the [facility] BLOOD AND MARROW TRANSPLANT PROGRAM [place]. Please see a copy of my visit note below.    BMT VISIT    11/08/22    I had the pleasure to meet Soy today in the BMT clinic to review her AML work-up studies in anticipation of allogenic 8/8 HLA matched unrelated donor stem cell transplantation.  SHe is 61yo female with AML, Flt3 pos, now following 1 cycle of consolidation chemotherapy with HiDAC+ Midostaurin. She is in CR1 with MRD based on NGS but negative flow cytometry. Never had CNS disease.    Soy has been doing well.  She has no new complaints or changes in her health.  She continues to feel strong, she is eating well, has no diarrhea or abdominal symptoms, no fevers or infectious symptoms.    She is still tired, sleeps a lot, and she tries to walk around.  Her last chemotherapy was 9/22 with HiDAC and 10/12 midostaurin.     Her onc history is summarized below:     ONC HISTORY:  1. 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], Kit muated,  - Next generation sequencing:  * FLT3-ITD +   FLT-3 ITD: DETECTED  FLT-3 TKD: NOT detected  * NPM1 +   * DNMT3A +  * KIT variant of unknown clinical significance   INduction  Cytarabine and idarubicin (\"7+3\") with midostaurin therapy  - Date of initiation: 8/8/2022  - Midostaurin: 8/15-8/28  HiDAC #1:   *9/22/22  Day 8: 9/29/2022 - Midostaurin start  Day 21: 10/12/2022    -Nice Recovery from HIDAC#1  -with midostaurin on day 8-->21  10/12/2022 was day #21 of Midostaurin for this past cycle  -she is suitable for allo transplant  -will proceed to pre transplant evaln + LP @ [facility] starting 10/21/22 as her Allo date looks promising for later in 10/2022 [LP can be done as part of the evaln @ [facility]]  -She will be repeating BM after HiDAC #1 @ [facility] on 10/21/22 and will " need to also send for MRD [AML NGS panel]      PMHX:  h/O total hysterectomy   Lymphedema   Mixed hyperlipidemia (HRC)   Obesity (HRC)   Rectocele   Superficial vein thrombosis   Venous (peripheral) insufficiency       Family:  She has 1 daughter  She has 1 sibling (68yo)      Current Outpatient Medications   Medication     acyclovir (ZOVIRAX) 400 MG tablet     Ascorbic Acid (RITESH-C PO)     cholecalciferol 25 MCG (1000 UT) TABS     enoxaparin ANTICOAGULANT (LOVENOX) 100 MG/ML syringe     fluconazole (DIFLUCAN) 200 MG tablet     levofloxacin (LEVAQUIN) 250 MG tablet     multivitamin w/minerals (THERA-VIT-M) tablet     No current facility-administered medications for this visit.       Physical Exam:  /85   Pulse 82   Temp 98.2  F (36.8  C) (Oral)   Resp 18   Wt 97.3 kg (214 lb 6.4 oz)   SpO2 97%   BMI 37.99 kg/m    KPS 70%  Patient is ambulating , AOx3, NAD, well appearing patient. She appears chronically ill, obese,   HEENT: No mucositis, MM moist, no thrush or ulcers, buccal mucosa intact  Neck supple, no peripheral adenopathy Thyroid gland midline, not enlarged   Lungs: good air exchange, CTA, no crackles,no wheezing.   Heart RRR S1 S2, no murmur or gallop   Abd soft, NT, ND. Without hepato-splenomegaly, no ascites,    LE symmetrical, no edema   Skin without lesion or rashes. No petechiae or ecchymosis.  Neuro  Intact, AOx3    ASSESSMENT AND PLAN:    In summary,  is  63yo female with AML, Flt3 pos, now following 1 cycle of consolidation chemotherapy with HiDAC+ Midostaurin. She is in CR1 with MRD based on NGS but negative flow cytometry. Never had CNS disease. Based upon her work-up studies, she is in good health and fitness to proceed with MUD HSCT using her donor. HLA was rechecked and while she has DSA, the cross-match is pending and these are usually not relevant for matched donors where non-engraftment is rare.    I reviewed all work-up studies and determined she can proceed with  admission and conditioning. I recommended Willem and she is going to think about it. I encourage to give it before admission.       BMT and Cell Therapy Informed Consent Discussion     In today's visit, we discussed in detail the research for which Yahaira Fuentes is eligible. We discussed the potential risks and potential benefits of each protocol individually. We explained potential alternatives to the protocols discussed. We explained to the patient that participation is voluntary and that consent may be withdrawn at any time.     We discussed:    The rationale for our approach to the disease treatment    The eligibility requirements for treatment in the context of clinical trials    The need for caregiver support and the caregiver's role in recovery    The importance of adherence to the treatment plan and appropriate follow up    The requirements for contraception while undergoing treatment    The potential risks of morbidity and mortality related to this treatment    The requirements for supportive care to reduce the risk of infection and other complications    The role of the dietician and PT/OT to reduce the risk of muscle loss/sarcopenia    Support that is available through our social workers and care team to mitigate distress    The desired outcomes/goals of treatment, including the possibility of long-term disease control    The patient completed the last round of treatment on 10/12 - midostaurin     HCT-CI score: 2. We counseled the patient about the impact of this on the risk of treatment related and overall mortality. The score fit within treatment protocol eligibility criteria.    Karnofsky performance score: 70%         Active infections:  none.    Reproductive status: What methods of birth control does the patient plan to use during the treatment period beginning with conditioning and ending with the discontinuation of immune suppression (indicate with an X all that apply):  _x_ The patient is  confirmed to be sterile or post-menopausa  The patient received appropriate reproductive counseling and agreed with the need for effective contraception during the treatment procedures.      Dental health suitable to proceed: Yes        After our detailed discussion above, the patient signed the following consents for treatment and protocols:    [facility] database    Local BMT database    [place] conset      Known issues that I take into account for medical decisions, with salient changes to the plan considering these complexities noted above.    Patient Active Problem List   Diagnosis     Acute myeloid leukemia in remission (H)         I spent 90 minutes in the care of this patient today, which included time necessary for preparation for the visit, obtaining history, ordering medications/tests/procedures as medically indicated, review of pertinent medical literature, counseling of the patient, communication of recommendations to the care team, and documentation time.    Luann Garrett MD     Relevant Results from BMT Allo Nk For Aml Recipient Work Up Orders from the Lab encounter on 11/07/22:   CBC with platelets and differential     Status: Abnormal    Collection Time: 11/07/22 10:43 AM   Result Value Ref Range    WBC Count 6.4 4.0 - 11.0 10e3/uL    RBC Count 3.45 (L) 3.80 - 5.20 10e6/uL    Hemoglobin 10.2 (L) 11.7 - 15.7 g/dL    Hematocrit 33.9 (L) 35.0 - 47.0 %    MCV 98 78 - 100 fL    MCH 29.6 26.5 - 33.0 pg    MCHC 30.1 (L) 31.5 - 36.5 g/dL    RDW 18.3 (H) 10.0 - 15.0 %    Platelet Count 231 150 - 450 10e3/uL    % Neutrophils 75 %    % Lymphocytes 12 %    % Monocytes 9 %    % Eosinophils 3 %    % Basophils 1 %    % Immature Granulocytes 0 %    NRBCs per 100 WBC 0 <1 /100    Absolute Neutrophils 4.7 1.6 - 8.3 10e3/uL    Absolute Lymphocytes 0.8 0.8 - 5.3 10e3/uL    Absolute Monocytes 0.6 0.0 - 1.3 10e3/uL    Absolute Eosinophils 0.2 0.0 - 0.7 10e3/uL    Absolute Basophils 0.1 0.0 - 0.2 10e3/uL    Absolute  Immature Granulocytes 0.0 <=0.4 10e3/uL    Absolute NRBCs 0.0 10e3/uL   Adult Type and Screen     Status: None    Collection Time: 11/07/22 10:43 AM   Result Value Ref Range    ABO/RH(D) AB POS     Antibody Screen Negative Negative    SPECIMEN EXPIRATION DATE [id number]    CBC with platelets differential     Status: Abnormal    Collection Time: 11/07/22 10:43 AM    Narrative    The following orders were created for panel order CBC with platelets differential.  Procedure                               Abnormality         Status                     ---------                               -----------         ------                     CBC with platelets and d...[[id number]]  Abnormal            Final result                 Please view results for these tests on the individual orders.   ABO/Rh type and screen     Status: None    Collection Time: 11/07/22 10:43 AM    Narrative    The following orders were created for panel order ABO/Rh type and screen.  Procedure                               Abnormality         Status                     ---------                               -----------         ------                     Adult Type and Screen[[id number]]                            Final result                 Please view results for these tests on the individual orders.   Relevant Results from BMT Allo Nk For Aml Recipient Work Up Orders from the Office Visit encounter on 11/04/22:   Protein total CSF:     Status: Normal    Collection Time: 11/04/22  3:01 PM   Result Value Ref Range    Protein total CSF 21.8 15.0 - 45.0 mg/dL   Glucose CSF:     Status: Normal    Collection Time: 11/04/22  3:01 PM   Result Value Ref Range    Glucose CSF 65 40 - 70 mg/dL    Narrative    CSF glucose concentrations are about 60 percent of normal plasma glucose.   Relevant Results from BMT Allo Nk For Aml Recipient Work Up Orders from the Ancillary Procedure encounter on 11/04/22:   CT Chest w/o Contrast     Status: None    Collection  Time: 11/04/22  9:06 AM    Narrative    CT of the chest without contrast    HISTORY: Screening for viral disease AML in remission    COMPARISON STUDY: Chest x-ray same date    findings: Right IJ Port-A-Cath tip in the low SVC. Heart is not  enlarged. Main pulmonary artery and aorta are not enlarged. No pleural  or pericardial effusion. Left renal stone without hydronephrosis  measuring 6 mm.    Evaluation of the upper abdomen is limited.    No suspicious bony lesions. Degenerative changes of the spine.      Impression    IMPRESSION: No evidence of infection    STEVE CARLOS MD         SYSTEM ID:  [id number]   Relevant Results from BMT Allo Nk For Aml Recipient Work Up Orders from the Ancillary Procedure encounter on 11/04/22:   X-ray Chest 2 vws*     Status: None    Collection Time: 11/04/22  8:53 AM    Narrative    XR CHEST 2 VIEWS  11/4/2022 8:53 AM     HISTORY:  Preop examination; Personal history of diseases of blood and  blood-forming organs; Screening for viral disease; Acute myeloid  leukemia in remission (H)       COMPARISON:  None    FINDINGS:   Frontal and lateral views of the chest. Right chest wall Port-A-Cath  tip projects over mid SVC. Trachea is midline. Cardiac silhouette is  within normal limits. No focal consolidation, pleural effusion or  appreciable pneumothorax. No acute osseous abnormality. Degenerative  changes of the visualized spine. Visualized upper abdomen is  unremarkable.        Impression    IMPRESSION: No acute airspace disease.    I have personally reviewed the examination and initial interpretation  and I agree with the findings.    STEVE CARLOS MD         SYSTEM ID:  [id number]   Relevant Results from BMT Allo Nk For Aml Recipient Work Up Orders from the Allied Health/Nurse Visit encounter on 11/03/22:   EKG 12-lead complete w/read - Clinics     Status: None    Collection Time: 11/03/22 11:07 AM   Result Value Ref Range    Systolic Blood Pressure  mmHg    Diastolic Blood Pressure   mmHg    Ventricular Rate 60 BPM    Atrial Rate 60 BPM    WV Interval 138 ms    QRS Duration 86 ms     ms    QTc 440 ms    P Axis 40 degrees    R AXIS 6 degrees    T Axis 33 degrees    Interpretation ECG       Sinus rhythm  Normal ECG  No previous ECGs available  Confirmed by Colleen Schneider ([id number]) on 11/3/2022 5:11:24 PM     Relevant Results from BMT Allo Nk For Aml Recipient Work Up Orders from the Allied Health/Nurse Visit encounter on 11/03/22:   Routine UA with microscopic     Status: Abnormal    Collection Time: 11/03/22 10:55 AM   Result Value Ref Range    Color Urine Yellow Colorless, Straw, Light Yellow, Yellow    Appearance Urine Clear Clear    Glucose Urine Negative Negative mg/dL    Bilirubin Urine Negative Negative    Ketones Urine Negative Negative mg/dL    Specific Gravity Urine 1.015 1.003 - 1.035    Blood Urine Negative Negative    pH Urine 7.5 (H) 5.0 - 7.0    Protein Albumin Urine Negative Negative mg/dL    Urobilinogen Urine Normal Normal, 2.0 mg/dL    Nitrite Urine Negative Negative    Leukocyte Esterase Urine Negative Negative    Mucus Urine Present (A) None Seen /LPF    RBC Urine <1 <=2 /HPF    WBC Urine 0 <=5 /HPF    Squamous Epithelials Urine <1 <=1 /HPF   HBV HCV HIV WNV by POOJA-BMT recipient     Status: None    Collection Time: 11/03/22 10:30 AM   Result Value Ref Range    See Scanned Result See scanned result.     HEPATITIS B BY POOJA Non-Reactive     HCV by POOJA Non-Reactive     HIV By Pooja Non-Reactive     West Nile Virus By POOJA Non-Reactive     Narrative    Verified by Bruce Chavez on 11/07/2022.   Uric acid     Status: Normal    Collection Time: 11/03/22 10:30 AM   Result Value Ref Range    Uric Acid 5.1 2.4 - 5.7 mg/dL   Trypanosoma Cruzi-BMT recipient     Status: None    Collection Time: 11/03/22 10:30 AM   Result Value Ref Range    See Scanned Result See scanned result.     Trypanosoma Cruzi Non-Reactive     Narrative    Verified by Bruce Chavez on 11/07/2022.   INR      Status: Normal    Collection Time: 11/03/22 10:30 AM   Result Value Ref Range    INR 1.01 0.85 - 1.15   Partial thromboplastin time     Status: Abnormal    Collection Time: 11/03/22 10:30 AM   Result Value Ref Range    aPTT 39 (H) 22 - 38 Seconds   HTLV I and 2 antibody with reflex-BMT recipient     Status: None    Collection Time: 11/03/22 10:30 AM   Result Value Ref Range    HTLV I/II Antibodies by JAMARI Negative Negative     Comment: Based on the non-reactive anti-HTLV JAMARI screen, the HTLV   Western Blot is not indicated and therefore not performed.  INTERPRETIVE INFORMATION:  HTLV I/II Antibodies w/Reflex                              to Confirm    This assay should not be used for blood donor screening,   associated re-entry protocols, or for screening Human Cell,   Tissues and Cellular and Tissue-Based Products (HCT/P).  Performed by [facility],  [address] [place] [id number] [phone]  [email or web address], Thad Marin MD, PHD, Lab. Director   Hepatitis C antibody     Status: Normal    Collection Time: 11/03/22 10:30 AM   Result Value Ref Range    Hepatitis C Antibody Nonreactive Nonreactive    Narrative    Assay performance characteristics have not been established for newborns, infants, and children.   Hepatitis B surface antigen-BMT recipient     Status: Normal    Collection Time: 11/03/22 10:30 AM   Result Value Ref Range    Hepatitis B Surface Antigen Nonreactive Nonreactive   Hepatitis B core antibody-BMT recipient     Status: Normal    Collection Time: 11/03/22 10:30 AM   Result Value Ref Range    Hepatitis B Core Antibody Total Nonreactive Nonreactive   Comprehensive metabolic panel     Status: Abnormal    Collection Time: 11/03/22 10:30 AM   Result Value Ref Range    Sodium 140 136 - 145 mmol/L    Potassium 4.1 3.4 - 5.3 mmol/L    Chloride 103 98 - 107 mmol/L    Carbon Dioxide (CO2) 27 22 - 29 mmol/L    Anion Gap 10 7 - 15 mmol/L    Urea Nitrogen 9.0 8.0 - 23.0 mg/dL    Creatinine 0.70  0.51 - 0.95 mg/dL    Calcium 9.4 8.8 - 10.2 mg/dL    Glucose 121 (H) 70 - 99 mg/dL    Alkaline Phosphatase 70 35 - 104 U/L    AST 26 10 - 35 U/L    ALT 21 10 - 35 U/L    Protein Total 6.7 6.4 - 8.3 g/dL    Albumin 4.0 3.5 - 5.2 g/dL    Bilirubin Total <0.2 <=1.2 mg/dL    GFR Estimate >90 >60 mL/min/1.73m2     Comment: Effective December 21, 2021 eGFRcr in adults is calculated using the 2021 CKD-EPI creatinine equation which includes age and gender (Mukesh et al., NEJM, DOI: 10.1056/LIZOrj1360550)   Relevant Results from BMT Allo Nk For Aml Recipient Work Up Orders from the Office Visit encounter on 10/21/22:   Bone marrow biopsy     Status: None    Collection Time: 10/21/22 10:18 AM   Result Value Ref Range    Final Diagnosis       Bone marrow, posterior iliac crest, left decalcified trephine biopsy and touch imprint; direct aspirate smear, and concentrated aspirate smear; and peripheral blood smear:    - No definitive morphologic or immunophenotypic evidence of acute myeloid leukemia  - Slightly hypercellular marrow (cellularity estimated at 50% overall) with trilineage hematopoiesis showing slighlty left shifted granulopoiesis, no overt dysplasia, and 2% blasts  - Peripheral blood showing marked normocytic hypochromic anemia, absolute lymphopenia and slight thrombocytosis.  - See comment      Comment       Concurrent flow cytometry ([id number]) showed no increase in myeloid blasts and no abnormal myeloid blast population. The overall findings are compatible with regeneration related changes. Correlation with all ancillary blood and bone marrow studies, especially molecular studies, and clinical findings is recommended.       Clinical Information       From Epic electronic medical record; a 61 year old female with history of acute myeloid leukemia with monocytic differentiation, mutated NPM1 and FLT3 ITD mutation, s/p chemotherapy.  The current bone marrow biopsy submitted for bone marrow transplant work-up.       Peripheral Hematologic Data       CBC WITH DIFFERENTIAL(10/21/2022 09:59 AM CDT):     RESULT VALUE REF. RANGE UNITS   WBC Count   Hemoglobin    Hematocrit    Platelet Count    RBC Count   MCV  MCH   MCHC    RDW  6.6 (NORMAL)     7.0  ( L )      23.1  ( L )   614  ( H )   2.36  ( L )       98 (NORMAL)     29.7 (NORMAL)      30.3  ( L )      17.2  ( H ) 4.0-11.0  11.7-15.7  35.0-47.0  150-450  3.80-5.20    26.5-33.0  31.5-36.5  10.0-15.0 10e3/uL  g/dL  %  10e3/uL  10e6/uL  fL  pg  g/dL  %   % Neutrophils  % Lymphocytes  % Monocytes  % Eosinophils  % Basophils  % Immature Granulocytes  Absolute Neutrophils   Absolute Lymphocytes   Absolute Monocytes  Absolute Eosinophils  Absolute Basophils  Absolute Immature Granulocytes  NRBCs per 100 WBC  Absolute NRBCs 82  4  11  1  1  1  5.5 (NORMAL)   0.3  ( L )  0.7 (NORMAL)  0.0 (NORMAL)  0.1 (NORMAL)  0.0 (NORMAL)  0 (NORMAL)  0.0 () N/A  N/A  N/A  N/A  N/A  N/A  1.6-8.3  0.8-5.3  0.0-1.3  0.0-0.7  0.0-0.2  <=0.4  <1  <=0.0 %  %  %  %  %  %  10e3/uL  10e3/uL  10e3/uL  10e3/uL  10e3/uL  10e3/uL  /100  10e3/uL         Microscopic Description       PERIPHERAL BLOOD SMEAR MORPHOLOGY:  The red blood cells appear normochromic with a hypochromic subset. Poikilocytosis is minimal and non-specific. Polychromasia is not increased. Rouleaux formation is not definitively increased. The morphology of the platelets is normal.    Lymphocyte morphology is polymorphous with predominantly small and mature forms in the rare lymphocytes identified. Neutrophils displays predominantly normal cytoplasmic granularity and unremarkable nuclear morphology.  No circulating blasts are seen.    Bone marrow aspirates and bone marrow trephine core biopsy touch imprints are reviewed.    BONE MARROW DIFFERENTIAL (500 cells on bone marrow biopsy touch imprints )  Percent Cell (reference range)  2  Blasts (0 - 1)  3  Neutrophil promyelocytes (2- 4)  54  Neutrophils and precursors (54 - 63)  21  Erythroid  precursors (18 - 24)  6  Monocytes (1- 1.5)  3  Eosinophils (1 - 3)  0  Basophils (0 - 1)  10  Lymphocytes (8 - 12)  1  Plasma cells (0 - 1.5)    The aspirate smears are hemodilute, therefore the above differential is performed on the bone marrow trephine core biopsy touch imprints.    Granulocytes are adequate in number with progressive and complete, slightly left shifted maturation; no overt dysplasia seen. Erythrocytes are adequate in number with progressive and complete, slightly left-shifted maturation; no overt dysplasia seen. Megakaryocytes with unremarkable morphology are present.     IRON STAINS:  Dacie iron stain on concentrate smears:  Ring sideroblasts number 1% of erythroblasts.    TREPHINE SECTIONS:  Hematoxylin and eosin stains are reviewed. The quality of the trephine core biopsy is adequate with mild crush artifact and fragmentation artifact. The marrow cellularity is estimated at 50% overall. The cellular composition reflects the the marrow core biopsy differential. Megakaryocytes show normal morphology and distribution. Scattered hemosiderin laden macrophages are present.    IMMUNOHISTOCHEMISTRY:  Immunohistochemical stains are performed on the paraffin-embedded trephine core with appropriate controls.    CD34 highlights rare scattered immature myeloid cells; no aggregates or clusters of CD34 positive cells seen. CD34 correlates with the blast percentage by morphology.     highlights very rare scattered mast cells (strong intensity) and rare scattered immature myeloid cells and a few scattered immature erythroid cells (weak intensity).    CD61 highlights megakaryocytes with spectrum of size and unremarkable morphology and distribution    CD3 and CD2 highlight loose aggregates and scattered increased T lymphocytes.    Mast cell tryptase is essentially negative.    A resident/fellow in an ACGME accredited training program was involved in the initial review, preparation, and/or interpretation of  this case.  I, as the senior physician, attest that I: (i) reviewed patient clinical records if indicated; (ii) reviewed relevant lab test results; (iii) examined the relevant preparation(s) for the specimen(s); and (iv) agree with the report, diagnosis(es), and interpretation as documented by the resident/fellow and edited/confirmed by me. Reporting resident/fellow: Dung Russell MD      Gross Description       Procedure/Gross Description   Aspirate(s) and trephine(s) procured by [facility]    Specimen sent for Special Studies:         Flow Cytometry: right aspirate        Cytogenetics: right aspirate        Molecular Diagnostics: right aspirate          Biopsy aspiration site: right posterior iliac crest                                                      (Reference Range)          Amount of aspirate           3.65   mL        Fat and P.V. cell layer        0    %               (1 - 3)        Particles                             trace   %        Myeloid-erythroid layer    2    %               (5 - 8)          Clot Section: no    Trephine biopsy site: right posterior iliac crest    Designated right posterior iliac crest is 1 cylinder of gritty tissue, labeled with the patient's name and hospital number, obtained with 11 gauge needle and a length of 12 mm; entirely submitted in 1 cassette; acetic zinc formalin fixed, decalcified, processed, and stained for hematoxylin and eosin per laboratory protocol.        Performing Labs       The technical component of this testing was completed at [facility]

## 2022-11-08 NOTE — NURSING NOTE
"Oncology Rooming Note    November 8, 2022 1:15 PM   Yahaira Fuentes is a 62 year old female who presents for:    Chief Complaint   Patient presents with     Oncology Clinic Visit     RTN for [facility] for AML     Initial Vitals: Blood Pressure 121/85   Pulse 82   Temperature 98.2  F (36.8  C) (Oral)   Respiration 18   Weight 97.3 kg (214 lb 6.4 oz)   Oxygen Saturation 97%   Body Mass Index 37.99 kg/m   Estimated body mass index is 37.99 kg/m  as calculated from the following:    Height as of 11/3/22: 1.6 m (5' 2.99\").    Weight as of this encounter: 97.3 kg (214 lb 6.4 oz). Body surface area is 2.08 meters squared.  No Pain (0) Comment: Data Unavailable   No LMP recorded. Patient has had a hysterectomy.  Allergies reviewed: Yes  Medications reviewed: Yes    Medications: Medication refills not needed today.  Pharmacy name entered into [employer]: [id number] - [place], [place] - [address] AT [facility] [address]    Clinical concerns: none       Carmina Price MA            "

## 2022-11-10 NOTE — LETTER
11/10/2022         RE: Yahaira Fuentes  [address] [address]        Dear Colleague,    Thank you for referring your patient, Yahaira Fuentes, to the [facility] RADIATION [facility]COL[facility]. Please see a copy of my visit note below.    This encounter is for billing only.      Again, thank you for allowing me to participate in the care of your patient.        Sincerely,        Paola Arellano MD

## 2022-11-11 NOTE — TELEPHONE ENCOUNTER
Spoke with Rebeka regarding admission planning for transplant. Due to cell arrival itinerary, rad onc scheduling, infusion scheduling and the upcoming Thanksgiving day holiday we will not be able to admit her for transplant until 11/22. She shares frustration and fear with this delay. I attempted to provide reassurance and have provided Dr. Zoltan French with an update of this timeline as well.

## 2022-11-18 NOTE — PROGRESS NOTES
BMT Teaching Flowsheet    Yahaira Fuentes is a 62 year old female  Diagnoses of Personal history of diseases of blood and blood-forming organs and Acute myeloid leukemia in remission (H) were pertinent to this visit.    Teaching Topic: Allogeneic MUD transplant with SLAVA conditioning and Post-TX GVHD prevention using sirolimus, MMF and pt-cy    Person(s) involved in teaching: Patient  Motivation Level  Asks Questions: Yes  Eager to Learn: Yes  Cooperative: Yes  Receptive (willing/able to accept information): Yes  Any cultural factors/Yazidi beliefs that may influence understanding or compliance? No    Patient demonstrates understanding of the following:  - Reason for the appointment, diagnosis and treatment plan: Yes  - Knowledge of proper use of medications and conditions for which they are ordered (with special attention to potential side effects or drug interactions): Yes  - Which situations necessitate calling provider and whom to contact: Yes    Teaching concerns addressed: None    Proper use and care of (medical equipment, care aids, etc.) Yes  Pain management techniques: Yes  Patient instructed on hand hygiene: Yes  How and/when to access community resources: Yes    Infection Control:  Patient demonstrates understanding of the following:  Surgical procedure site care taught: No, explain: will have pre and post op site care for line placement  Signs and symptoms of infection taught: Yes  Wound care taught: NA  Central venous catheter care taught: No, explain: will have patient learning center teaching prior to d/c    Instructional Materials Used/Given:   Allo patient teach binder including who and when to call for help.    Time spent with patient: 90 minutes.  Specific Concerns: No

## 2022-11-18 NOTE — PROGRESS NOTES
Inpatient Admission Information:      Admit Date:  11/22/2022   Diagnosis:  AML   Transplant Type:  SLAVA Allo with MUD   Protocol:  MT 2015-32; following but not ON protocol      Notes:  COVID test 11/21/22       New Eval Work-Up   MD ZOLTAN SAINI Lilliam Shiv Purvis   Date 9/12/2022 11/08/22         Consult Type Date   1 - -   2 - -   3 - -         Long Term Follow-Up   MD Zoltan [facility]      Notified patient of admission to hospital on 11/22/22 for planned Allo MID transplant.  She understands to check-in to the Gold Waiting Room at 730 am with a line time of 9 am.  Via teachback, patient reiterated the instructions provided by writer which included the pre-procedure instructions for the line placement that were provided at the time of IR Line consult.  She verbalized that she has been feeling well, no symptoms of concern at this time.  Patient will be seen in clinic on 11/21 to verify she is feeling well and still appropriate for transplant as she has not been seen in over a week. Patient has no questions or concerns.

## 2022-11-21 NOTE — LETTER
Date:November 21, 2022      Provider requested that no letter be sent. Do not send.       [facility]

## 2022-11-21 NOTE — NURSING NOTE
"Oncology Rooming Note    November 21, 2022 11:29 AM   Yahaira Fuentes is a 62 year old female who presents for:    Chief Complaint   Patient presents with     Port Draw     Labs drawn via port by RN in lab.  VS taken      Oncology Clinic Visit     Acute myeloid leukemia in remission (H)      Initial Vitals: /81   Pulse 67   Temp 98  F (36.7  C) (Oral)   Resp 16   Wt 96.4 kg (212 lb 9.6 oz)   SpO2 99%   BMI 37.67 kg/m   Estimated body mass index is 37.67 kg/m  as calculated from the following:    Height as of 11/3/22: 1.6 m (5' 2.99\").    Weight as of this encounter: 96.4 kg (212 lb 9.6 oz). Body surface area is 2.07 meters squared.  No Pain (0) Comment: Data Unavailable   No LMP recorded. Patient has had a hysterectomy.  Allergies reviewed: Yes  Medications reviewed: Yes    Medications: Medication refills not needed today.  Pharmacy name entered into [employer]: [id number] [address] AT [facility] [address]    Clinical concerns: No new concerns per pt.       Rhianna Ariza CMA            "

## 2022-11-21 NOTE — NURSING NOTE
"Chief Complaint   Patient presents with     Port Draw     Labs drawn via port by RN in lab.  VS taken        Port accessed with 20 gauge 3/4\" gripper needle by RN, labs collected, line flushed with saline and heparin, then de-accessed.  Vitals taken. Pt checked in for appointment(s).    Miriam Mares RN    "

## 2022-11-21 NOTE — LETTER
11/21/2022         RE: Yahaira Fuentes  [address] [address]        Dear Colleague,    Thank you for referring your patient, Yahaira Fuentes, to the [facility] BLOOD AND MARROW TRANSPLANT PROGRAM [place]. Please see a copy of my visit note below.      BMT Progress note    Chief complaint:   Yahaira Fuentes is a 62 year old female with AML in CR1 being admitted later this week for 8/8 MUD.    INTERIM HISTORY: she feels good today. She is ready for admission for transplant tomorrow.  No recent illnesses, fever or sick contacts.      REVIEW OF SYSTEMS: Otherwise unremarkable other than what is noted in the Interim History.   PHYSICAL EXAM:   Vitals:  /81   Pulse 67   Temp 98  F (36.7  C) (Oral)   Resp 16   Wt 96.4 kg (212 lb 9.6 oz)   SpO2 99%   BMI 37.67 kg/m    Wt Readings from Last 4 Encounters:   11/21/22 96.4 kg (212 lb 9.6 oz)   11/08/22 97.3 kg (214 lb 6.4 oz)   11/07/22 96.2 kg (212 lb)   11/04/22 96.2 kg (212 lb)       Patient is ambulating , AOx3, NAD  HEENT: masked; sclera anicteric  Lungs: good air exchange, CTA, no crackles,no wheezing.   Heart RRR S1 S2, no murmur or gallop   Skin without lesion or rashes. No petechiae or ecchymosis.  Neuro  Intact, AOx3    ROUTINE LABS:    Lab Results   Component Value Date    WBC 6.4 11/07/2022    HGB 10.2 (L) 11/07/2022    HCT 33.9 (L) 11/07/2022     11/07/2022     11/03/2022    POTASSIUM 4.1 11/03/2022    CHLORIDE 103 11/03/2022    CO2 27 11/03/2022     (H) 11/03/2022    BUN 9.0 11/03/2022    CR 0.70 11/03/2022    INR 1.01 11/03/2022       ASSESSMENT AND PLAN:    Yahaira Fuentes is a 62 year old female with AML in CR1 being admitted later this week for 8/8 MUD.    1. BMT: to be admitted later this week for transplant  2. Heme: mild anemia from prior therapy  10/19/22 DVT R popliteal vein and posterior tibial veins of the upper and mid calf-- remains on lovenox  3. ID: Afebrile. On prophy  fluc/levo    Dispo: covid pending (pre-admission); ok to proceed with admission tomorrow    Review of the result(s) of each unique test - cbc, cmp  30 minutes spent on the date of the encounter doing chart review, history and exam, documentation and further activities per the note      Rosibel Caballero PA-c  Pager [phone]  11/21/2022  9:47 AM        Again, thank you for allowing me to participate in the care of your patient.        Sincerely,        No name on file

## 2022-11-21 NOTE — PROGRESS NOTES
BMT Progress note    Chief complaint:   Yahaira Fuentes is a 62 year old female with AML in CR1 being admitted later this week for 8/8 MUD.    INTERIM HISTORY: she feels good today. She is ready for admission for transplant tomorrow.  No recent illnesses, fever or sick contacts.      REVIEW OF SYSTEMS: Otherwise unremarkable other than what is noted in the Interim History.   PHYSICAL EXAM:   Vitals:  /81   Pulse 67   Temp 98  F (36.7  C) (Oral)   Resp 16   Wt 96.4 kg (212 lb 9.6 oz)   SpO2 99%   BMI 37.67 kg/m    Wt Readings from Last 4 Encounters:   11/21/22 96.4 kg (212 lb 9.6 oz)   11/08/22 97.3 kg (214 lb 6.4 oz)   11/07/22 96.2 kg (212 lb)   11/04/22 96.2 kg (212 lb)       Patient is ambulating , AOx3, NAD  HEENT: masked; sclera anicteric  Lungs: good air exchange, CTA, no crackles,no wheezing.   Heart RRR S1 S2, no murmur or gallop   Skin without lesion or rashes. No petechiae or ecchymosis.  Neuro  Intact, AOx3    ROUTINE LABS:    Lab Results   Component Value Date    WBC 6.4 11/07/2022    HGB 10.2 (L) 11/07/2022    HCT 33.9 (L) 11/07/2022     11/07/2022     11/03/2022    POTASSIUM 4.1 11/03/2022    CHLORIDE 103 11/03/2022    CO2 27 11/03/2022     (H) 11/03/2022    BUN 9.0 11/03/2022    CR 0.70 11/03/2022    INR 1.01 11/03/2022       ASSESSMENT AND PLAN:    Yahaira Fuentes is a 62 year old female with AML in CR1 being admitted later this week for 8/8 MUD.    1. BMT: to be admitted later this week for transplant  2. Heme: mild anemia from prior therapy  10/19/22 DVT R popliteal vein and posterior tibial veins of the upper and mid calf-- remains on lovenox  3. ID: Afebrile. On prophy fluc/levo    Dispo: covid pending (pre-admission); ok to proceed with admission tomorrow    Review of the result(s) of each unique test - cbc, cmp  30 minutes spent on the date of the encounter doing chart review, history and exam, documentation and further activities per the  note      Rosibel Caballero PA-c  Pager [phone]  11/21/2022  9:47 AM

## 2022-11-22 NOTE — H&P
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   DVT in right lower leg not swollen or tender, was not swollen or tender when diagnosed. Has always had mild edema or thickness to LE and has history of superficial clots while on OCP and still takes aspirin daily. No DVT until 10/2022 at which time she started on lovenox.  No recent sick contacts, no recent infections, fevers or cough/cold symptoms.   She is feeling well today. Reports normal daily bowel movements; no headaches, N/V/D, rash, or swelling.         Diagnosis and Treatment Summary     Disease presentation and baseline characteristics:  Disease presentation and baseline characteristics adopted from Meggan French's note 9/13 and Tami's note 11/8:    61 year old female otherwise healthy female who developed sinus infection 7/2022 with painful swollen lymph nodes felt that it was hard swallowing, generalized fatigue and easy bruising. Treated with antibiotics but malaise symptoms, bruises persisted. PCP checked CBC, wbc= 60. Bmbx 8/5/22 showing acute myeloid leukemia with 87% blasts (predominately monoblasts) with mutated NPM1. A FLT3 ITD mutation detected , ratio 0.58. Flow Positive for increased blast  "population with monocytoid differentiation. 84% of cells are in the monocyte/blast gate (dim to moderate CD45/low to moderate side scatter) and show expression of HLA-DR, partial CD13, CD4, CD64, partial CD14, CD33, CD15, CD11b and possible partial dim MPO.  They are negative for the remaining markers including CD34. CG: normal female karyotype   Variants of Known Clinical Significance  variant frequency close to 50%, it is unclear whether this is a germline or somatic variant. The clinical significance, if any, is uncertain.   Peripheral blood, morphology: Acute myeloid leukemia with 77% blasts. WBC 65042, hgb 11.3, plt 78789    8/8 Started cytarabine and idarubicin (7+3) with midostaurin added 8/15-8/28 9/6/2022 BMB: Bone marrow aspirate, clot section, and trephine core biopsy, showing normocellular marrow with trilinear hematopoiesis, 1% blasts and was negative for circulating blasts/blast equivalents     9/22 Cont on midostaurin alone    Date Treatment Name Response Side Effects / Toxicities   8/8/2022  Cytarabine and idarubicin (\"7+3\") with midostaurin therapy   Midostaurin: 8/15-8/28 9/6/2022 morphologic and immunophenotypic CR -Neutropenic fever cx neg completed cefepime 9/1  -ROHIT-Max creat 1.27.   9/22 Miodostaurin  CR DVT RLE 10/19/2022            Donor Characteristics     Self or Related or Unrelated Donor: Unrelated  Donor Match: 7/8  Donor Age: 26  Donor Sex: F  Donor ABO/Rh: A Neg, minor  Donor CMV Serostatus: Negative    Donor-Specific Antibodies:  Recent Labs   Lab Test 11/02/22  0705   [id number] A:23 B:13 27 47 48 60 61   MD4ALAKHVI A:32B:7 81   AUDRA DR:4 9 DRw:53   TAWANA DR:7         Virtual Crossmatch:    Recent Labs   Lab Test 11/02/22  0705   RESVXMT1 DSA Absent   DSAVXMT1 None   RESVXMB1 DSA Absent   DSAVXMB1 None         Blood Counts       Recent Labs   Lab Test 11/21/22  1057 11/07/22  1043 11/03/22  1030 10/21/22  0936   HGB 11.5* 10.2* 9.5* 7.0*   HCT 37.2 33.9* 31.5* 23.1* "   WBC 5.1 6.4 4.8 6.6   ANEUTAUTO  --  4.7 3.3 5.5   ALYMPAUTO  --  0.8 0.7* 0.3*   AMONOAUTO  --  0.6 0.6 0.7   AEOSAUTO  --  0.2 0.2 0.0   ABSBASO  --  0.1 0.1 0.1   NRBCMAN  --  0.0 0.0 0.0    231 253 614*         Recent Labs   Lab Test 11/07/22  1043   ABORH AB POS         No lab results found.      Chemistries     Basic Panel  Recent Labs   Lab Test 11/21/22  1057 11/03/22  1030    140   POTASSIUM 4.1 4.1   CHLORIDE 103 103   CO2 28 27   BUN 11.2 9.0   CR 0.75 0.70   * 121*        Calcium, Magnesium, Phosphorus  Recent Labs   Lab Test 11/21/22  1057 11/03/22  1030   IRA 9.3 9.4        LFTs  Recent Labs   Lab Test 11/21/22  1057 11/03/22  1030   BILITOTAL <0.2 <0.2   ALKPHOS 62 70   AST 24 26   ALT 18 21   ALBUMIN 4.2 4.0       LDH  No lab results found.    B2-Microglobulin  No lab results found.    Vitamin D  No lab results found.      Urine Studies       Recent Labs   Lab Test 11/03/22  1055   COLOR Yellow   APPEARANCE Clear   URINEGLC Negative   URINEBILI Negative   URINEKETONE Negative   SG 1.015   UBLD Negative   URINEPH 7.5*   PROTEIN Negative   UUROI Normal   NITRITE Negative   LEUKEST Negative   MUCUS Present*   RBCU <1   WBCU 0   USQEI <1       Creatinine Clearance    No lab results found.      Infectious Disease Markers     [facility] IDM    Recent Labs   Lab Test 11/03/22  1030   TCRUZI Non-Reactive       CMV  Recent Labs   Lab Test 11/03/22  1030   CMVIGG No detectable antibody.         EBV    Recent Labs   Lab Test 11/03/22  1030   EBVCAG Positive*       HSV 1/2    Recent Labs   Lab Test 11/03/22  1030   M1VQTHI 35.50*   H1IGG Positive.  IgG antibody to HSV-1 detected.*   P6QYVPR 0.59   H2IGG No HSV-2 IgG antibodies detected.         VZV    No lab results found.      HTLV    No lab results found.      Toxoplasma  (not routinely checked)      COVID    Recent Labs   Lab Test 11/21/22  1057   [id number] Negative         Bone Marrow Biopsy     Bone Marrow Biopsy  10/21/22    Morphology    Bone marrow, posterior iliac crest, left decalcified trephine biopsy and touch imprint; direct aspirate smear, and concentrated aspirate smear; and peripheral blood smear:     - No definitive morphologic or immunophenotypic evidence of acute myeloid leukemia  - Slightly hypercellular marrow (cellularity estimated at 50% overall) with trilineage hematopoiesis showing slighlty left shifted granulopoiesis, no overt dysplasia, and 2% blasts  - Peripheral blood showing marked normocytic hypochromic anemia, absolute lymphopenia and slight thrombocytosis.      Flow Cytometry    No increase in myeloid blasts and no abnormal myeloid blast population      Molecular Studies    No clonal chromosomal abnormality was detected among the 20 metaphase cells analyzed. However, as no clonal chromosomal abnormality was reportedly detected in this patient's bone marrow at initial diagnosis of leukemia (study performed at an outside institution), we cannot rule out with certainty the presence of leukemic cells among these karyotypically normal metaphases.        Chest X-Ray - 2 view     Results for orders placed in visit on 11/04/22    XR CHEST 2 VIEWS    Status: Normal 11/4/2022    Narrative  XR CHEST 2 VIEWS  11/4/2022 8:53 AM    HISTORY:  Preop examination; Personal history of diseases of blood and  blood-forming organs; Screening for viral disease; Acute myeloid  leukemia in remission (H)    COMPARISON:  None    FINDINGS:  Frontal and lateral views of the chest. Right chest wall Port-A-Cath  tip projects over mid SVC. Trachea is midline. Cardiac silhouette is  within normal limits. No focal consolidation, pleural effusion or  appreciable pneumothorax. No acute osseous abnormality. Degenerative  changes of the visualized spine. Visualized upper abdomen is  unremarkable.    Impression  IMPRESSION: No acute airspace disease.    I have personally reviewed the examination and initial interpretation  and I agree  with the findings.    STEVE CARLOS MD      SYSTEM ID:  [id number]        Chest CT without Contrast       Results for orders placed in visit on 22    CT CHEST W/O CONTRAST    Status: Normal 2022    Narrative  CT of the chest without contrast    HISTORY: Screening for viral disease AML in remission    COMPARISON STUDY: Chest x-ray same date    findings: Right IJ Port-A-Cath tip in the low SVC. Heart is not  enlarged. Main pulmonary artery and aorta are not enlarged. No pleural  or pericardial effusion. Left renal stone without hydronephrosis  measuring 6 mm.    Evaluation of the upper abdomen is limited.    No suspicious bony lesions. Degenerative changes of the spine.    Impression  IMPRESSION: No evidence of infection    STEVE CARLOS MD      SYSTEM ID:  [id number]        PFTs     FVC%  Recent Labs   Lab Test 22  1357   54263 101       FEV1%  Recent Labs   Lab Test 22  1357   58956 97       DLCO%  Recent Labs   Lab Test 22  1357   40881 119         EKG       ECG results from 22   EKG 12-lead complete w/read - Clinics     Value    Systolic Blood Pressure     Diastolic Blood Pressure     Ventricular Rate 60    Atrial Rate 60    NV Interval 138    QRS Duration 86        QTc 440    P Axis 40    R AXIS 6    T Axis 33    Interpretation ECG      Sinus rhythm  Normal ECG  No previous ECGs available  Confirmed by Colleen Schneider ([id number]) on 11/3/2022 5:11:24 PM           ECHOCARDIOGRAM       Results for orders placed in visit on 22    ECHOCARDIOGRAM COMPLETE    Status: Normal 2022    Narrative  [id number]  [id number]  [id number]  [id number]^SUNIL CRAFT^FABIÁN    [facility]  Diagnostic and Treamtent3rd Floor  [address] [place] [id number]    Name: REMEDIOS GREEN  MRN: [id number]  : 1960  Study Date: 2022 08:18 AM  Age: 62 yrs  Gender: Female  Patient Location: [facility]  Reason For Study: Preop examination, Personal  history of diseases of blood  Ordering Physician: FABIÁN JORDAN  Referring Physician: FABIÁN JORDAN  Performed By: LENNY Jade Gomez    BSA: 2.0 m2  Height: 63 in  Weight: 212 lb  BP: 116/63 mmHg  ______________________________________________________________________________  Procedure  Echocardiogram with two-dimensional, color and spectral Doppler performed.  ______________________________________________________________________________  Interpretation Summary  3D LVEF volumetric analysis is 64.2%.  Global peak LV longitudinal strain is averaged at -22.3%. This is within  reported normal limits (normal <-18%).  Right ventricular function, chamber size, wall motion, and thickness are  normal.  Both atria appear normal.  Pulmonary artery systolic pressure is normal.  The inferior vena cava is normal.  No pericardial effusion is present.  There is no prior study for direct comparison.  ______________________________________________________________________________  Left Ventricle  3D LVEF volumetric analysis is 64.2%. Left ventricular wall thickness is  normal. Left ventricular size is normal. Global peak LV longitudinal strain is  averaged at -22.3%. This is within reported normal limits (normal <-18%). Left  ventricular diastolic function is indeterminate. Diastolic Doppler findings  (E/E' ratio and/or other parameters) suggest left ventricular filling  pressures are normal.    Right Ventricle  Right ventricular function, chamber size, wall motion, and thickness are  normal.    Atria  Both atria appear normal. The atrial septum is intact as assessed by color  Doppler .    Mitral Valve  The mitral valve is normal. Trace mitral insufficiency is present.    Aortic Valve  The valve leaflets are not well visualized. On Doppler interrogation, there is  no significant stenosis or regurgitation.    Tricuspid Valve  The tricuspid valve is normal. Trace tricuspid insufficiency is present. The  right ventricular systolic  pressure is approximated at 15.9 mmHg plus the  right atrial pressure. Pulmonary artery systolic pressure is normal.    Pulmonic Valve  The pulmonic valve is normal. Trace pulmonic insufficiency is present.    Vessels  The thoracic aorta is normal. The pulmonary artery and bifurcation cannot be  assessed. The inferior vena cava is normal. Ascending aorta 3.8 cm.    Pericardium  No pericardial effusion is present.    Compared to Previous Study  There is no prior study for direct comparison.  ______________________________________________________________________________  MMode/2D Measurements & Calculations    IVSd: 1.0 cm  LVIDd: 5.1 cm  LVIDs: 3.5 cm  LVPWd: 0.99 cm  FS: 31.4 %  LV mass(C)d: 190.0 grams  LV mass(C)dI: 95.9 grams/m2  Ao root diam: 3.6 cm  asc Aorta Diam: 3.8 cm  LVOT diam: 2.4 cm  LVOT area: 4.6 cm2  LA Volume (BP): 53.6 ml  LA Volume Index (BP): 27.1 ml/m2  RWT: 0.39    Doppler Measurements & Calculations  MV E max elsie: 65.1 cm/sec  MV A max elsie: 72.0 cm/sec  MV E/A: 0.90  MV dec slope: 326.6 cm/sec2  MV dec time: 0.20 sec  PA V2 max: 112.7 cm/sec  PA max P.1 mmHg  PA acc time: 0.12 sec  TR max elsie: 199.5 cm/sec  TR max PG: 15.9 mmHg  E/E' avg: 10.7  Lateral E/e': 9.7  Medial E/e': 11.7    QLAB 3DQ Advanced  Stroke Vol: 79.1 ml  10_EDV(3DQA): 123.2 ml  10_ESV(3DQA): 44.1 ml  10_EF(3DQA): 64.2 %  10_Tmsv 3-6: -31.0 msec    10_Tmsv 3-5: -33.0 msec  10_Tmsv 3-6 (%): -2.7 %  10_Tmsv 3-5 (%): -2.8 %  ______________________________________________________________________________  Report approved by: Anushka Jacques 2022 08:59 AM        CSF Studies       Recent Labs   Lab Test 22  1501   CCOL Colorless   CAPP Clear   CWBC 0   CRBC 0   CCOM Essentially acellular specimen. Negative for blasts. Please correlate with flow cytometry case [id number] and cytology case [id number].   Fabio Cavazos MD on 2022 at 9:57 AM   Reviewed by JUVENTINO CORTEZ, Hematopathology Fellow       Recent Labs    Lab Test 11/04/22  1501   CGLU 65       Recent Labs   Lab Test 11/04/22  1501   CTP 21.8         I have assessed all abnormal lab values for their clinical significance and any values considered clinically significant have been addressed in the assessment and plan    Family History:   Family History   Problem Relation Age of Onset     Lymphoma Father        Social History:   Social History     Socioeconomic History     Marital status:      Spouse name: Not on file     Number of children: Not on file     Years of education: Not on file     Highest education level: Not on file   Occupational History     Not on file   Tobacco Use     Smoking status: Never     Smokeless tobacco: Never   Substance and Sexual Activity     Alcohol use: Yes     Drug use: No     Sexual activity: Yes     Partners: Male   Other Topics Concern     Parent/sibling w/ CABG, MI or angioplasty before 65F 55M? Not Asked   Social History Narrative     Not on file     Social Determinants of Health     Financial Resource Strain: Not on file   Food Insecurity: Not on file   Transportation Needs: Not on file   Physical Activity: Not on file   Stress: Not on file   Social Connections: Not on file   Intimate Partner Violence: Not At Risk     Fear of Current or Ex-Partner: No     Emotionally Abused: No     Physically Abused: No     Sexually Abused: No   Housing Stability: Not on file       Past Medical History: History reviewed. No pertinent past medical history.     Past Surgical History:   Past Surgical History:   Procedure Laterality Date     APPENDECTOMY       GYN SURGERY         Allergies:   Allergies   Allergen Reactions     Sulfa Drugs Other (See Comments)     Other reaction(s): Gastrointestinal       Home Medications      Prior to Admission medications    Medication Sig Start Date End Date Taking? Authorizing Provider   acyclovir (ZOVIRAX) 400 MG tablet Take 400 mg by mouth 2 times daily   Yes Reported, Patient   Ascorbic Acid (RITESH-C PO)  "Take 1 tablet by mouth daily \"Vitamin C extra with Zinc\"   Yes Reported, Patient   cholecalciferol 25 MCG (1000 UT) TABS Take 1,000 Units by mouth daily   Yes Reported, Patient   enoxaparin ANTICOAGULANT (LOVENOX) 100 MG/ML syringe Inject 1 mL (100 mg) Subcutaneous 2 times daily 10/19/22  Yes Meggan Falcon MD   levofloxacin (LEVAQUIN) 250 MG tablet Take 250 mg by mouth daily   Yes Reported, Patient   multivitamin w/minerals (THERA-VIT-M) tablet Take 1 tablet by mouth daily   Yes Reported, Patient       Review of Systems    Review of Systems:  CONSTITUTIONAL: NEGATIVE for fever, chills, change in weight  INTEGUMENTARY/SKIN: NEGATIVE for worrisome rashes, moles or lesions  EYES: NEGATIVE for vision changes or irritation  ENT/MOUTH: NEGATIVE for ear, mouth and throat problems  RESP: NEGATIVE for significant cough or SOB  CV: NEGATIVE for chest pain, palpitations or peripheral edema  GI: NEGATIVE for nausea, abdominal pain, heartburn, or change in bowel habits  : NEGATIVE for frequency, dysuria, or hematuria  MUSCULOSKELETAL: NEGATIVE for significant arthralgias or myalgia  NEURO: NEGATIVE for weakness, dizziness or paresthesias  ENDOCRINE: NEGATIVE for temperature intolerance, skin/hair changes  HEME/ALLERGY: NEGATIVE for bleeding problems  PSYCHIATRIC: NEGATIVE for changes in mood or affect    PHYSICAL EXAM      Weight     Wt Readings from Last 3 Encounters:   11/22/22 95.3 kg (210 lb)   11/21/22 96.4 kg (212 lb 9.6 oz)   11/08/22 97.3 kg (214 lb 6.4 oz)        KPS: 90    /59   Pulse 68   Temp 98.8  F (37.1  C) (Oral)   Resp 11   Ht 1.6 m (5' 2.99\")   Wt 95.3 kg (210 lb)   SpO2 96%   BMI 37.21 kg/m       General: NAD   Eyes: SHANIQUE, sclera anicteric   Nose/Mouth/Throat: OP clear, buccal mucosa moist, no ulcerations. Poor dentition, several missing teeth  Lungs: CTA bilaterally  Cardiovascular: RRR, no M/R/G   Abdominal/Rectal: +BS, soft, NT, ND, No HSM   Lymphatics: Bilateral thick ankles, doughy R = " L, no edema. Varicose veins bilaterally.   Skin: No rashes or petechaie  Neuro: A&O   Musculoskeletal: Muscle mass average, obese   Additional Findings: Hammer site NT, no drainage.    Current aGVHD staging:  Skin 0, UGI 0, LGI 0, Liver 0 (keep in note through day +180 for allos)      LABS AND IMAGING: I have assessed all abnormal lab values for their clinical significance and any values considered clinically significant have been addressed in the assessment and plan.        Lab Results   Component Value Date    WBC 5.1 11/21/2022    ANEUTAUTO 4.7 11/07/2022    HGB 11.5 (L) 11/21/2022    HCT 37.2 11/21/2022     11/21/2022     11/21/2022    POTASSIUM 4.1 11/21/2022    CHLORIDE 103 11/21/2022    CO2 28 11/21/2022     (H) 11/21/2022    BUN 11.2 11/21/2022    CR 0.75 11/21/2022    INR 1.01 11/03/2022           SYSTEMS-BASED ASSESSMENT AND PLAN     Yahaira Fuentes is a 62 year old female with AML, undergoing allo HSCT, day -7      BMT/IEC PROTOCOL for 2015-32  - Chemo protocol:   Day -7: start Allopurinol  Day -6: Cytoxan + Fludarabine  Day -5 through -2: Fludarabine  Day -1: TBI x1  Day 0: Transplant  Day +3 and +4: Cytoxan  Day +5: start GCSF, MMF, and Sirolimus  - Restaging plan: per protocol    HEME/COAG  - Risk of cytopenias due to chemotherapy and radiation  - Transfusion parameters: hemoglobin <7, platelets <10  - Relevant thrombosis or bleeding history: superficial thromboses on OCP decades past. Most recent DVT of right LE, 10/2022, on Lovenox until plts <50k    IMMUNOCOMPROMISED  - Vaccination status: Influenza vaccination after day +60, COVID vaccination after day +100, followed by remaining vaccinations at 12, 14, 24, and 26 months.  - Prophylaxis plan: ACV (CMV donor and recipient negative. Patient EBV and HSV1 positive). Flucazole, levofloxacin while neutropenic. Bactrim to start at day +28 (reported stomach upset with past use, per PharmD note will try at day +28. Can hold if GI  "symptoms occur).   - Active infections: None    RISK OF GVHD  - Prophylaxis: PTC days 3 and 4. Siro + MMF, day +5    CARDIOVASCULAR  - Risk of cardiomyopathy:  Baseline EF 60-65%  - Risk of arrhythmia: Baseline EKG showed NSR and normal QTC    RESPIRATORY  - Baseline PFTs: wnl  - Risk of respiratory complications: Frequent ambulation and incentive spirometer.    GI/NUTRITION  - Ulcer prophylaxis: Protonix  - Risk of nausea/vomiting due to chemo/radiation: Ondansetron, Lorazepam and Compazine available prn  - Risk of malnutrition: monitoring    RENAL/ELECTROLYTES/  - Risk of renal injury: IV hydration, cytoxan flush  - Electrolyte management: replace per sliding scale    DIABETES/ENDOCRINE  - Risk of steroid-induced hyperglyemia: Monitor BG, sliding scale if needed    MUSCULOSKELETAL/FRAILTY  - Baseline Frailty Score: 1  - Daily PT/OT as needed while inpatient    SYMPTOM MANAGEMENT  - Nausea from chemo/radiation: Prochlorperazine, ondansetron, lorazepam.  - # Pain Assessment:  Current Pain Score 11/22/2022   Patient currently in pain? denies   Yahaira s pain level was assessed and she currently denies pain.        Known issues that I take into account for medical decisions, with salient changes to the plan considering these complexities noted above.    Patient Active Problem List   Diagnosis     Acute myeloid leukemia in remission (H)     Clinically Significant Risk Factors Present on Admission               # Drug Induced Coagulation Defect: home medication list includes an anticoagulant medication        # Obesity: Estimated body mass index is 37.21 kg/m  as calculated from the following:    Height as of this encounter: 1.6 m (5' 2.99\").    Weight as of this encounter: 95.3 kg (210 lb).              Today's summary: Admission, starting cytoxan flush this evening.     I spent 60 minutes in the care of this patient today, which included time necessary for preparation for the visit, obtaining history, ordering " medications/tests/procedures as medically indicated, review of pertinent medical literature, counseling of the patient, communication of recommendations to the care team, and documentation time.    Patient assessed with MEGAN Orosco PA-C   [phone]    ______________________________________________      BMT ATTENDING NOTE    Yahaira Fuentes is a 62 year old female, admitted on 11/22/2022, who remains hospitalized pending autologout bone marrow transplant for AML.  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  She underwent induction with 7+3 followed by midostaurin and then 1 cycle of HIDAC consolidation at [facility] with Dr. Emmanuel Arshad.  She has completed workup and now presents for NMA MUD 7/8 PBSCT on protocol 2015-32.  She is CMV- and donor is CMV-.  CD34+ dose is 5.27x10^6/kg.  Cardiac function and pulmonary function are adequate when reviewed.  Today on exam, she is feeling well.  No signs or symptoms of infection.  No chest pain, shortness of breath, nausea, vomiting, diarrhea, swelling, or signs/symptoms of URI, sinus tenderness or fullness.  Her questions regarding the procedure and length of stay until engraftment were clarified/answered today during her admission.  Renal and liver function are adequate for standard dose Flu/Cy/TBI conditioning regimen without dose reduction.        I spent 75 minutes in the care of Yahaira today, including an independent face-to-face assessment, review of vitals, medications, laboratory results, and independent review of imaging studies. I personally performed all of the medical decision making associated with this visit, and I edited the above note to reflect my current plan of care. I spent over 50% of my time counseling the patient/family today and coordinating care with the team.    Key management decisions made by me and carried out under my direction include:   -Match reviewed.    -OK to proceed with conditioning regimen at standard dose.    -Renal, liver, pulm, and cardiac function reviewed and OK to proceed.    -Graft adequate to proceed.     Counseling and/or coordination of care performed by me:    -What to expect during admission.    -Conditioning regimen and timing.    -Duration of admission until engraftment.      Ranges for vital signs:    Temp:  [96.9  F (36.1  C)-99.1  F (37.3  C)] 97.9  F (36.6  C)  Pulse:  [68-83] 78  Resp:  [9-18] 14  BP: (100-130)/(51-67) 104/51  SpO2:  [94 %-99 %] 98 %    Infusions:    dextrose 5% and 0.45% NaCl + KCl 20 mEq/L       [START ON 12/1/2022] dextrose 5% and 0.45% NaCl + KCl 20 mEq/L       [START ON 12/4/2022] - MEDICATION INSTRUCTIONS -       sodium chloride Stopped (11/22/22 1120)       Scheduled Medications:    [START ON 11/29/2022] acetaminophen  650 mg Oral Once     [START ON 11/25/2022] acyclovir  800 mg Oral BID     allopurinol  300 mg Oral Daily     [START ON 11/23/2022] Chemotherapy Infusing-Continuous Infusion   Does not apply Q8H     [START ON 12/2/2022] Chemotherapy Infusing-Continuous Infusion   Does not apply Q8H     [START ON 11/23/2022] cyclophosphamide (CYTOXAN) [facility] infusion  50 mg/kg (Treatment Plan Adjusted) Intravenous Once     [START ON 12/2/2022] cyclophosphamide (CYTOXAN) [facility] infusion  50 mg/kg (Treatment Plan Adjusted) Intravenous Q24H     [START ON 11/23/2022] dexamethasone  10 mg Oral Daily     [START ON 11/28/2022] dexamethasone  6 mg Oral Once     [START ON 11/27/2022] dexamethasone  8 mg Oral Once     [START ON 12/4/2022] dextrose 5% water  10-20 mL Intracatheter Daily at 8 pm     [START ON 12/4/2022] dextrose 5% water  10-20 mL Intracatheter Daily at 8 pm     [START ON 11/29/2022] diphenhydrAMINE  25 mg Oral Once     enoxaparin ANTICOAGULANT  100 mg Subcutaneous BID     [START ON 12/4/2022] filgrastim  5 mcg/kg (Treatment Plan Recorded) Intravenous Daily at 8 pm     [START ON 11/23/2022] fluconazole  200 mg Oral  Daily     [START ON 11/23/2022] FLUdarabine (FLUDARA) infusion (in 100 mL)  30 mg/m2 (Treatment Plan Recorded) Intravenous Q24H     heparin  5-10 mL Intracatheter Q28 Days     heparin lock flush  5-10 mL Intracatheter Q24H     [START ON 11/28/2022] levofloxacin  250 mg Oral Daily at 10 am     [START ON 11/23/2022] mesna (MESNEX) infusion  50 mg/kg (Treatment Plan Adjusted) Intravenous Once     [START ON 12/2/2022] mesna (MESNEX) infusion  50 mg/kg (Treatment Plan Adjusted) Intravenous Q24H     [START ON 12/4/2022] mycophenolate mofetil  1,500 mg Intravenous Q12H [facility] (10/22)     [START ON 11/23/2022] ondansetron  8 mg Oral Q8H     [START ON 12/2/2022] ondansetron  8 mg Oral Q8H     pantoprazole  40 mg Oral Daily     [START ON 12/5/2022] sirolimus  4 mg Oral Daily     [START ON 12/4/2022] sirolimus  8 mg Oral Once     [START ON 12/27/2022] sulfamethoxazole-trimethoprim  1 tablet Oral Q Mon Tues BID     ursodiol  300 mg Oral TID         Roderick Monzon MD, PhD  BMT/Cellular Therapy/ Oncology       Securely message with the Vocera Web Console

## 2022-11-22 NOTE — IR NOTE
Patient Name: Yahaira Fuentes  Medical Record Number: [id number]  Today's Date: 11/22/2022    Procedure: Image guided central venous catheter placement  Proceduralist: Dr. Santiago Birmingham  Pathology present: na    Procedure Start: 0927  Procedure end: 1000  Sedation medications administered:    Fentanyl 100 mcg   Versed 2 mg   Sedation time: 31 minutes (0929 - 1000)     Report given to: 5C  : eleni    Other Notes: Pt arrived to IR room 2 from 2A. Consent reviewed. Pt denies any questions or concerns regarding procedure. Pt positioned supine and monitored per protocol.     Pt tolerated procedure without any noted complications.     Line placed under image guidance and ok for immediate use. Both lumens flushed with 2 mls of 100 units/ml of Heparin.     Pt transferred to  to be admitted.

## 2022-11-22 NOTE — PROGRESS NOTES
Patient admitted to:   Admitted from: Home  Arrived by: Car  Reason for admission: Transplant  Patient accompanied by: Self  Belongings: with patient  Teaching: completed per unit routine  Skin double check completed by: Joycelyn Mac RN

## 2022-11-22 NOTE — PHARMACY-ADMISSION MEDICATION HISTORY
"  Admission Medication History Completed by Pharmacy    See [facility] Admission Navigator for allergy information, preferred outpatient pharmacy, prior to admission medications and immunization status.     Medication History Sources:     Patient    Outpatient clinic medication history from 11/3/22    Changes made to PTA medication list (reason):    Added: None    Deleted: None    Changed: None    Additional Information:    Patient last took her fluconazole on 11/16/22    Prior to Admission medications    Medication Sig Last Dose Taking? Auth Provider Long Term End Date   acyclovir (ZOVIRAX) 400 MG tablet Take 400 mg by mouth 2 times daily 11/22/2022 Yes Reported, Patient Yes    Ascorbic Acid (RITESH-C PO) Take 1 tablet by mouth daily \"Vitamin C extra with Zinc\" 11/22/2022 Yes Reported, Patient     cholecalciferol 25 MCG (1000 UT) TABS Take 1,000 Units by mouth daily 11/22/2022 Yes Reported, Patient     enoxaparin ANTICOAGULANT (LOVENOX) 100 MG/ML syringe Inject 1 mL (100 mg) Subcutaneous 2 times daily 11/21/2022 at 0800 Yes Meggan Falcon MD     levofloxacin (LEVAQUIN) 250 MG tablet Take 250 mg by mouth daily 11/22/2022 Yes Reported, Patient     multivitamin w/minerals (THERA-VIT-M) tablet Take 1 tablet by mouth daily 11/22/2022 Yes Reported, Patient         Date completed: 11/22/22    Medication history completed by: Anoop Fitzpatrick, DebbieD            "

## 2022-11-22 NOTE — PRE-PROCEDURE
GENERAL PRE-PROCEDURE:   Procedure:  Tunneled central venous catheter placement    Written consent obtained?: Yes    Risks and benefits: Risks, benefits and alternatives were discussed    Consent given by:  Patient  Patient states understanding of procedure being performed: Yes    Patient's understanding of procedure matches consent: Yes    Procedure consent matches procedure scheduled: Yes    Expected level of sedation:  Moderate  Appropriately NPO:  Yes  ASA Class:  2  Mallampati  :  Grade 2- soft palate, base of uvula, tonsillar pillars, and portion of posterior pharyngeal wall visible  Lungs:  Lungs clear with good breath sounds bilaterally  Heart:  Normal heart sounds and rate  History & Physical reviewed:  History and physical reviewed and no updates needed  Statement of review:  I have reviewed the lab findings, diagnostic data, medications, and the plan for sedation

## 2022-11-22 NOTE — PROGRESS NOTES
BMT RNCC Admission Handoff:    See bookmarked notes under Carmen Purvis/Bety Tubbs    Treatment protocol: 2015-32 (SLAVA allo)    TBI: x1 on 11/28    IR consult needed? No - will have line placed at 9am prior to admission to 5C    URD or related donor: URD    NMDP Repository Sample Needed ([id number])? Yes - order with admission labs   If yes, please order with admission labs.    Covid test 11/21 (pending as of 9pm 11/21)      Bety Tubbs, RN, BSN  RN Care Coordinator - BMT  Ph [phone]  Pg [phone]

## 2022-11-22 NOTE — PLAN OF CARE
"Admitted to  today after CVC placement.  AVSS.  Denies pain & nausea.  Good appetite.  Up ad-donita independently, voiding adequately, labs & VRE sent but need stool for c.Diff r/o.  Will also need CHG teaching completed.  Cytoxan flush starts tonight.  Continue POC.     Problem: Plan of Care - These are the overarching goals to be used throughout the patient stay.    Goal: Plan of Care Review  Description: The Plan of Care Review/Shift note should be completed every shift.  The Outcome Evaluation is a brief statement about your assessment that the patient is improving, declining, or no change.  This information will be displayed automatically on your shift note.  Outcome: Progressing     Problem: Plan of Care - These are the overarching goals to be used throughout the patient stay.    Goal: Patient-Specific Goal (Individualized)  Description: You can add care plan individualizations to a care plan. Examples of Individualization might be:  \"Parent requests to be called daily at 9am for status\", \"I have a hard time hearing out of my right ear\", or \"Do not touch me to wake me up as it startles me\".  Outcome: Progressing     Problem: Plan of Care - These are the overarching goals to be used throughout the patient stay.    Goal: Absence of Hospital-Acquired Illness or Injury  Outcome: Progressing     Problem: Plan of Care - These are the overarching goals to be used throughout the patient stay.    Goal: Absence of Hospital-Acquired Illness or Injury  Intervention: Identify and Manage Fall Risk  Recent Flowsheet Documentation  Taken 11/22/2022 1200 by Snow Escobar RN  Safety Promotion/Fall Prevention:    nonskid shoes/slippers when out of bed    assistive device/personal items within reach    clutter free environment maintained     Problem: Plan of Care - These are the overarching goals to be used throughout the patient stay.    Goal: Absence of Hospital-Acquired Illness or Injury  Intervention: Prevent Skin " Injury  Recent Flowsheet Documentation  Taken 11/22/2022 1200 by Snow Escobar, RN  Body Position: position changed independently     Problem: Plan of Care - These are the overarching goals to be used throughout the patient stay.    Goal: Absence of Hospital-Acquired Illness or Injury  Intervention: Prevent and Manage VTE (Venous Thromboembolism) Risk  Recent Flowsheet Documentation  Taken 11/22/2022 1200 by Snow Escobar, RN  VTE Prevention/Management: SCDs (sequential compression devices) off     Problem: Plan of Care - These are the overarching goals to be used throughout the patient stay.    Goal: Optimal Comfort and Wellbeing  Outcome: Progressing     Problem: Plan of Care - These are the overarching goals to be used throughout the patient stay.    Goal: Readiness for Transition of Care  Outcome: Progressing     Problem: Stem Cell/Bone Marrow Transplant  Goal: Optimal Coping with Transplant  Outcome: Progressing     Problem: Stem Cell/Bone Marrow Transplant  Goal: Symptom-Free Urinary Elimination  Outcome: Progressing     Problem: Stem Cell/Bone Marrow Transplant  Goal: Diarrhea Symptom Control  Outcome: Progressing     Problem: Stem Cell/Bone Marrow Transplant  Goal: Improved Activity Tolerance  Outcome: Progressing     Problem: Stem Cell/Bone Marrow Transplant  Goal: Improved Activity Tolerance  Intervention: Promote Improved Energy  Recent Flowsheet Documentation  Taken 11/22/2022 1200 by Snow Escobar, RN  Activity Management: activity adjusted per tolerance     Problem: Stem Cell/Bone Marrow Transplant  Goal: Blood Counts Within Acceptable Range  Outcome: Progressing     Problem: Stem Cell/Bone Marrow Transplant  Goal: Blood Counts Within Acceptable Range  Intervention: Monitor and Manage Hematologic Symptoms  Recent Flowsheet Documentation  Taken 11/22/2022 1200 by Snow Escobar, RN  Medication Review/Management: medications reviewed     Problem: Stem Cell/Bone Marrow Transplant  Goal: Absence of  Hypersensitivity Reaction  Outcome: Progressing     Problem: Stem Cell/Bone Marrow Transplant  Goal: Absence of Infection  Outcome: Progressing     Problem: Stem Cell/Bone Marrow Transplant  Goal: Improved Oral Mucous Membrane Health and Integrity  Outcome: Progressing     Problem: Stem Cell/Bone Marrow Transplant  Goal: Improved Oral Mucous Membrane Health and Integrity  Intervention: Promote Oral Comfort and Health  Recent Flowsheet Documentation  Taken 11/22/2022 1200 by Snow Escobar RN  Oral Care: oral rinse provided     Problem: Stem Cell/Bone Marrow Transplant  Goal: Nausea and Vomiting Symptom Relief  Outcome: Progressing     Problem: Stem Cell/Bone Marrow Transplant  Goal: Optimal Nutrition Intake  Outcome: Progressing   Goal Outcome Evaluation:

## 2022-11-22 NOTE — PROCEDURES
[facility]    Procedure: IR Procedure Note    Date/Time: 11/22/2022 10:04 AM  Performed by: Noemi Henderson DO  Authorized by: Yohan Lyles MD       UNIVERSAL PROTOCOL   Site Marked: NA  Prior Images Obtained and Reviewed:  Yes  Required items: Required blood products, implants, devices and special equipment available    Patient identity confirmed:  Verbally with patient, arm band, provided demographic data and hospital-assigned identification number  Patient was reevaluated immediately before administering moderate or deep sedation or anesthesia  Confirmation Checklist:  Patient's identity using two indicators, relevant allergies, procedure was appropriate and matched the consent or emergent situation and correct equipment/implants were available  Time out: Immediately prior to the procedure a time out was called    Universal Protocol: the Joint Commission Universal Protocol was followed    Preparation: Patient was prepped and draped in usual sterile fashion    ESBL (mL):  1     ANESTHESIA    Anesthesia: Local infiltration  Local Anesthetic:  Lidocaine 1% without epinephrine  Anesthetic Total (mL):  9      SEDATION  Patient Sedated: Yes    Sedation Type:  Moderate (conscious) sedation  Sedation:  Fentanyl and midazolam  Vital signs: Vital signs monitored during sedation    See dictated procedure note for full details.  Findings: Left sided internal jugular tunneled 9.5 Fr PowerHickman placed and heparin locked.    Specimens: none    Complications: None    Condition: Stable    Plan: -1 hour bedrest  -left internal jugular tunneled catheter ready to use.      PROCEDURE    Patient Tolerance:  Patient tolerated the procedure well with no immediate complications  Length of time physician/provider present for 1:1 monitoring during sedation: 25

## 2022-11-22 NOTE — PROGRESS NOTES
Pt prepped for CVC tunnel line.Pt port accessed and and blood returned and NS started and ABX started.She will go up to 5C after the procedure.Consent signed and questions answered.Pt did the hibiclens scrub last night and again this morning.

## 2022-11-23 NOTE — PLAN OF CARE
"Pt had no complaints and reviewed care plan. Needing cdiff collection and CHG yet today.  Problem: Plan of Care - These are the overarching goals to be used throughout the patient stay.    Goal: Plan of Care Review  Description: The Plan of Care Review/Shift note should be completed every shift.  The Outcome Evaluation is a brief statement about your assessment that the patient is improving, declining, or no change.  This information will be displayed automatically on your shift note.  Outcome: Progressing  Flowsheets (Taken 11/22/2022 1841)  Plan of Care Reviewed With: patient  Overall Patient Progress: no change  Goal: Patient-Specific Goal (Individualized)  Description: You can add care plan individualizations to a care plan. Examples of Individualization might be:  \"Parent requests to be called daily at 9am for status\", \"I have a hard time hearing out of my right ear\", or \"Do not touch me to wake me up as it startles me\".  Outcome: Progressing  Goal: Absence of Hospital-Acquired Illness or Injury  Outcome: Progressing  Intervention: Identify and Manage Fall Risk  Recent Flowsheet Documentation  Taken 11/22/2022 1700 by Mer Abreu RN  Safety Promotion/Fall Prevention:   nonskid shoes/slippers when out of bed   assistive device/personal items within reach   clutter free environment maintained  Intervention: Prevent Skin Injury  Recent Flowsheet Documentation  Taken 11/22/2022 1700 by Mer Abreu RN  Body Position: position changed independently  Intervention: Prevent and Manage VTE (Venous Thromboembolism) Risk  Recent Flowsheet Documentation  Taken 11/22/2022 1700 by Mer Abreu RN  VTE Prevention/Management: SCDs (sequential compression devices) off  Goal: Optimal Comfort and Wellbeing  Outcome: Progressing  Goal: Readiness for Transition of Care  Outcome: Progressing  Intervention: Mutually Develop Transition Plan  Recent Flowsheet Documentation  Taken 11/22/2022 1800 by Mer Abreu" RN  Equipment Currently Used at Home: none     Problem: Stem Cell/Bone Marrow Transplant  Goal: Optimal Coping with Transplant  Outcome: Progressing  Goal: Symptom-Free Urinary Elimination  Outcome: Progressing  Goal: Diarrhea Symptom Control  Outcome: Progressing  Goal: Improved Activity Tolerance  Outcome: Progressing  Intervention: Promote Improved Energy  Recent Flowsheet Documentation  Taken 11/22/2022 1700 by Mer bAreu RN  Activity Management: activity adjusted per tolerance  Goal: Blood Counts Within Acceptable Range  Outcome: Progressing  Intervention: Monitor and Manage Hematologic Symptoms  Recent Flowsheet Documentation  Taken 11/22/2022 1700 by Mer Abreu RN  Medication Review/Management: medications reviewed  Goal: Absence of Hypersensitivity Reaction  Outcome: Progressing  Goal: Absence of Infection  Outcome: Progressing  Goal: Improved Oral Mucous Membrane Health and Integrity  Outcome: Progressing  Intervention: Promote Oral Comfort and Health  Recent Flowsheet Documentation  Taken 11/22/2022 1700 by Mer Abreu RN  Oral Care: oral rinse provided  Goal: Nausea and Vomiting Symptom Relief  Outcome: Progressing  Goal: Optimal Nutrition Intake  Outcome: Progressing   Goal Outcome Evaluation:      Plan of Care Reviewed With: patient    Overall Patient Progress: no changeOverall Patient Progress: no change

## 2022-11-23 NOTE — PLAN OF CARE
"Pt got chemo tolerated well. Was unable to meet parameter for voiding at 1400 and got lasix and then end of shift was up 1700 gave 20 mg lasix. Weight was up 1.2 kg from this am but this am's mya was up 2.1 kg. Pt is voiding well now and hopefully will even out. Flush until 1200. Pt showered and CHG needs to be done.  Problem: Plan of Care - These are the overarching goals to be used throughout the patient stay.    Goal: Plan of Care Review  Description: The Plan of Care Review/Shift note should be completed every shift.  The Outcome Evaluation is a brief statement about your assessment that the patient is improving, declining, or no change.  This information will be displayed automatically on your shift note.  Outcome: Progressing  Flowsheets (Taken 11/23/2022 2516)  Plan of Care Reviewed With: patient  Overall Patient Progress: no change  Goal: Patient-Specific Goal (Individualized)  Description: You can add care plan individualizations to a care plan. Examples of Individualization might be:  \"Parent requests to be called daily at 9am for status\", \"I have a hard time hearing out of my right ear\", or \"Do not touch me to wake me up as it startles me\".  Outcome: Progressing  Goal: Absence of Hospital-Acquired Illness or Injury  Outcome: Progressing  Goal: Optimal Comfort and Wellbeing  Outcome: Progressing  Goal: Readiness for Transition of Care  Outcome: Progressing     Problem: Stem Cell/Bone Marrow Transplant  Goal: Optimal Coping with Transplant  Outcome: Progressing  Goal: Symptom-Free Urinary Elimination  Outcome: Progressing  Goal: Diarrhea Symptom Control  Outcome: Progressing  Goal: Improved Activity Tolerance  Outcome: Progressing  Goal: Blood Counts Within Acceptable Range  Outcome: Progressing  Goal: Absence of Hypersensitivity Reaction  Outcome: Progressing  Goal: Absence of Infection  Outcome: Progressing  Goal: Improved Oral Mucous Membrane Health and Integrity  Outcome: Progressing  Goal: Nausea and " Vomiting Symptom Relief  Outcome: Progressing  Goal: Optimal Nutrition Intake  Outcome: Progressing   Goal Outcome Evaluation:      Plan of Care Reviewed With: patient    Overall Patient Progress: no changeOverall Patient Progress: no change

## 2022-11-23 NOTE — PROGRESS NOTES
"Occupational Therapy Evaluation       11/23/22 0900   Appointment Info   Signing Clinician's Name / Credentials (OT) Reema Camilo OTR/L   Living Environment   People in Home child(mildred), adult;grandchild(mildred)   Current Living Arrangements house   Home Accessibility stairs within home;stairs to enter home   Number of Stairs, Main Entrance 2   Number of Stairs, Within Home, Primary greater than 10 stairs   Stair Railings, Within Home, Primary railings safe and in good condition   Living Environment Comments Pt reported she lives on the lower level of her daugher and son in laws house with bedroom and bathroom on that lower level, kitchen on main floor. Bathroom on lower level has tub shower with grab bar, no shower chair.   Self-Care   Usual Activity Tolerance good   Current Activity Tolerance good   Regular Exercise Yes   Activity/Exercise Type walking   Equipment Currently Used at Home none   Fall history within last six months no   Activity/Exercise/Self-Care Comment Pt reported she is IND with ADL at baseline   Instrumental Activities of Daily Living (IADL)   IADL Comments Pt reported she shares IADLs with family, IND with those she completes.   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\"   Existing Precautions/Restrictions immunosuppressed   Left Upper Extremity (Weight-bearing Status) full weight-bearing (FWB)   Right Upper Extremity (Weight-bearing Status) full weight-bearing (FWB)   Left Lower Extremity (Weight-bearing Status) full weight-bearing (FWB)   Right Lower Extremity (Weight-bearing Status) full weight-bearing (FWB)   General " Observations and Info Activity: Up ad donita   Cognitive Status Examination   Orientation Status orientation to person, place and time   Affect/Mental Status (Cognitive) WNL   Follows Commands WNL   Cognitive Status Comments Appears intact. Pt conversationally appropriate, insight into current functioning, and good safety awareness.   Visual Perception   Visual Impairment/Limitations WFL   Sensory   Sensory Comments Pt reported no numbness or tingling in BUE/BLE   Pain Assessment   Patient Currently in Pain No   Posture   Posture forward head position;protracted shoulders   Range of Motion Comprehensive   General Range of Motion no range of motion deficits identified   Strength Comprehensive (MMT)   General Manual Muscle Testing (MMT) Assessment no strength deficits identified   Coordination   Upper Extremity Coordination No deficits were identified   Bed Mobility   Bed Mobility supine-sit;sit-supine   Supine-Sit [place] (Bed Mobility) independent   Sit-Supine [place] (Bed Mobility) independent   Transfers   Transfers sit-stand transfer;toilet transfer   Sit-Stand Transfer   Sit-Stand [place] (Transfers) independent   Toilet Transfer   Type (Toilet Transfer) stand-sit;sit-stand   [place] Level (Toilet Transfer) independent   Toilet Transfer Comments Per clinical judgement   Balance   Balance Assessment no deficits were identified   Activities of Daily Living   BADL Assessment/Intervention bathing;lower body dressing;grooming;toileting   Bathing Assessment/Intervention   Position (Bathing) supported sitting   [place] Level (Bathing) modified independence   Comment, (Bathing) Per clinical judgement   Lower Body Dressing Assessment/Training   Comment, (Lower Body Dressing) Per clinical judgement   [place] Level (Lower Body Dressing) independent   Grooming Assessment/Training   [place] Level (Grooming) independent   Comment, (Grooming) Per clinical judgement   Toileting   Comment,  (Toileting) Per clinical judgement   [place] Level (Toileting) independent   Clinical Impression   Criteria for Skilled Therapeutic Interventions Met (OT) Yes, treatment indicated   OT Diagnosis at risk for deconditioning impacting ADL and IADL 2/2 prolonged hospital stay   OT Problem List-Impairments impacting ADL problems related to  (prolonged hospitalization)   Assessment of Occupational Performance 1-3 Performance Deficits   Identified Performance Deficits at risk for ADL and IADL impairment due to deconditioning from prolonged hospital stay   Planned Therapy Interventions (OT) ADL retraining;IADL retraining;strengthening;home program guidelines;progressive activity/exercise   Clinical Decision Making Complexity (OT) low complexity   Risk & Benefits of therapy have been explained evaluation/treatment results reviewed;care plan/treatment goals reviewed;risks/benefits reviewed;current/potential barriers reviewed;participants voiced agreement with care plan;participants included;patient   Clinical Impression Comments Pt presents at functional baseline. Pt at risk for deconditining impacting ADL and IADL IND 2/2 prolonged hospitalization. Pt would benefit from continued skilled therapy to address risk of deconditioning and maintain ADL and IADL IND.   OT Total Evaluation Time   OT Eval, Low Complexity Minutes (99604) 12   OT Goals   Therapy Frequency (OT) 1 time/wk   OT Predicted Duration/Target Date for Goal Attainment 12/23/22   OT Goals OT Goal 1;OT Goal 2;Aerobic Activity   OT: Perform aerobic activity with stable cardiovascular response continuous activity;20 minutes   OT: Goal 1 Pt will independently demonstrate understanding of lab values and corresponding activity guidelines.   OT Discharge Planning   OT Plan OT: Nustep or treadmill, hallway ambulation, review lab values   OT Discharge Recommendation (DC Rec) home with assist   OT Rationale for DC Rec Pt presents at functional baseline in ADL and  "functional mobility. Anticipate pt will be safe to discharge home with intermittent assistance as needed. Will continue to assess and update recommendations as appropriate.   OT Brief overview of current status IND   Total Session Time   Total Session Time (sum of timed and untimed services) 12     30-Second Sit to Stand Test:  The test is designed to be conducted with a straight back chair, without armrests, with a 17-inch seat height.  (Chair heights: High back & Folding = 18\", ICU/5C recliner & window seat = 18 1/2\"  Actual height of chair used: 18.5 \"    Patient Score: 13 reps (no use of arms)    The 30 Second Sit to Stand Test is considered a test of fall risk.  Data from [place] LUISA, cosponsored by [place] [facility]:  If must use arms = High Fall Risk regardless of reps  8 or less times = High Fall Risk   9 to 12 times = Moderate Risk  13 or more times = Low Risk    The 30 Second Sit to Stand Test is also considered a test of leg strength and endurance.   Normative Data from Chucho et al,. 2001 for ages 60-94, & from Ying PEPPER, et al. 2017 for ages 3-59  Age                 Reps: Men        Reps: Women  20-29                25-28                      23-25  30-39                23-25                      22-24  40-49                23-25                      21-23  50-59                21-23                      20-22  60-64                15-17                     14-15                               65-69                14-16                     13-14                    70-74                13-15                     12-13              75-79                13-15                  12-13                    80-84                11-13                     10-12  85-89                10-12                      9-11  90-94                 9-10                       7-9    Assessment (rationale for performing, application to patient s function & care plan): Pt demonstrating LE strength and endurance WFL  Outcome measure " performed to establish functional baseline in context of expected prolonged hospitalization and treatment.

## 2022-11-23 NOTE — PROGRESS NOTES
"  BMT/Cell Therapy Daily Progress Note      ID Yahaira Fuentes is a 62 year old female with AML, undergoing a 7/8 matched unrelated allo HSCT, day -6.    HPI: Rebeka noted the overnight was uneventful. No n/v/d. No issues with flush. Feeling well.      Review of Systems    8 point review with pertinent positive and negatives in HPI    PHYSICAL EXAM      Weight     Wt Readings from Last 3 Encounters:   11/23/22 97 kg (213 lb 14.4 oz)   11/21/22 96.4 kg (212 lb 9.6 oz)   11/08/22 97.3 kg (214 lb 6.4 oz)        KPS: 90    /61 (BP Location: Left arm)   Pulse 81   Temp 97.5  F (36.4  C)   Resp 17   Ht 1.6 m (5' 2.99\")   Wt 97 kg (213 lb 14.4 oz)   SpO2 98%   BMI 37.90 kg/m       General: NAD   Eyes: SHANIQUE, sclera anicteric   Nose/Mouth/Throat: OP clear, buccal mucosa moist, no ulcerations. Poor dentition, several missing teeth  Lungs: CTA bilaterally  Cardiovascular: RRR, no M/R/G   Abdominal/Rectal: +BS, soft, NT, ND, No HSM   Lymphatics: Bilateral thick ankles, doughy R = L, no edema. Varicose veins bilaterally.   Skin: No rashes or petechaie  Neuro: A&O   Musculoskeletal: Muscle mass average, obese   Additional Findings: Hammer site NT, no drainage.    Current aGVHD staging:  Skin 0, UGI 0, LGI 0, Liver 0 (keep in note through day +180 for allos)      LABS AND IMAGING: I have assessed all abnormal lab values for their clinical significance and any values considered clinically significant have been addressed in the assessment and plan.        Lab Results   Component Value Date    WBC 5.4 11/23/2022    ANEUTAUTO 4.7 11/07/2022    HGB 9.8 (L) 11/23/2022    HCT 32.0 (L) 11/23/2022     (L) 11/23/2022     11/23/2022    POTASSIUM 3.9 11/23/2022    CHLORIDE 111 (H) 11/23/2022    CO2 26 11/23/2022     (H) 11/23/2022    BUN 12.2 11/23/2022    CR 0.75 11/23/2022    MAG 1.9 11/23/2022    INR 1.06 11/22/2022         SYSTEMS-BASED ASSESSMENT AND PLAN     Yahaira Fuentes is a 62 year old female " with AML, undergoing allo HSCT, day -6      BMT/IEC PROTOCOL for 2015-32  - Chemo protocol:   Day -7: start Allopurinol  Day -6: Cytoxan + Fludarabine  Day -5 through -2: Fludarabine  Day -1: TBI x1  Day 0: Transplant  Day +3 and +4: Cytoxan  Day +5: start GCSF, MMF, and Sirolimus  - Restaging plan: per protocol    HEME/COAG  - Risk of cytopenias due to chemotherapy and radiation  - Transfusion parameters: hemoglobin <7, platelets <10  - Relevant thrombosis or bleeding history: superficial thromboses on OCP decades past. Most recent DVT of right LE, 10/2022, on Lovenox until plts <50k    IMMUNOCOMPROMISED  - Vaccination status: Influenza vaccination after day +60, COVID vaccination after day +100, followed by remaining vaccinations at 12, 14, 24, and 26 months.  - Prophylaxis plan: ACV (CMV donor and recipient negative. Patient EBV and HSV1 positive). Flucazole, levofloxacin while neutropenic. Bactrim to start at day +28 (reported stomach upset with past use, per PharmD note will try at day +28. Can hold if GI symptoms occur).   - Active infections: None    RISK OF GVHD  - Prophylaxis: PTC days 3 and 4. Siro + MMF, day +5    CARDIOVASCULAR  - Risk of cardiomyopathy:  Baseline EF 60-65%  - Risk of arrhythmia: Baseline EKG showed NSR and normal QTC    RESPIRATORY  - Baseline PFTs: wnl  - Risk of respiratory complications: Frequent ambulation and incentive spirometer.    GI/NUTRITION  - Ulcer prophylaxis: Protonix  - Risk of nausea/vomiting due to chemo/radiation: Ondansetron, Lorazepam and Compazine available prn  - Risk of malnutrition: monitoring    RENAL/ELECTROLYTES/  - Risk of renal injury: IV hydration, cytoxan flush  - Electrolyte management: replace per sliding scale    DIABETES/ENDOCRINE  - Risk of steroid-induced hyperglyemia: Monitor BG, sliding scale if needed    MUSCULOSKELETAL/FRAILTY  - Baseline Frailty Score: 1  - Daily PT/OT as needed while inpatient    SYMPTOM MANAGEMENT  - Nausea from  "chemo/radiation: Prochlorperazine, ondansetron, lorazepam.  - # Pain Assessment:  Current Pain Score 11/22/2022   Patient currently in pain? denies   Yahaira s pain level was assessed and she currently denies pain.        Known issues that I take into account for medical decisions, with salient changes to the plan considering these complexities noted above.    Patient Active Problem List   Diagnosis     Acute myeloid leukemia in remission (H)     Clinically Significant Risk Factors                # Thrombocytopenia: Lowest platelets = 113 (Ref range: 150-450) in last 2 days, will monitor for bleeding         # Obesity: Estimated body mass index is 37.9 kg/m  as calculated from the following:    Height as of this encounter: 1.6 m (5' 2.99\").    Weight as of this encounter: 97 kg (213 lb 14.4 oz)., PRESENT ON ADMISSION            Today's summary: Admission, starting cytoxan flush this evening.     DISPOSITION: on discharge patient should be scheduled with Dr. Garrett for first available appointment at the request of Dr. Zoltan French    I spent 30 minutes in the care of this patient today, which included time necessary for preparation for the visit, obtaining history, ordering medications/tests/procedures as medically indicated, review of pertinent medical literature, counseling of the patient, communication of recommendations to the care team, and documentation time.    Patient assessed with MEGAN Orosco PA-C   [phone]    ______________________________________________      BMT ATTENDING NOTE    Yahaira Fuentes is a 62 year old female, admitted on 11/22/2022, who remains hospitalized pending autologout bone marrow transplant for AML.  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  She underwent induction with 7+3 followed by midostaurin and then 1 cycle of HIDAC consolidation at [facility] with Dr. Emmanuel Arshad.  She has completed " workup and now presents for NMA MUD 7/8 PBSCT on protocol 2015-32.  Today is Day -6 and she received Flu/Cy uneventfully.  Fluid up a bit and will have to monitor closely with additional diuresis as needed.  She is up and walking the halls.      I spent 35 minutes in the care of Yahaira today, including an independent face-to-face assessment, review of vitals, medications, laboratory results, and independent review of imaging studies. I personally performed all of the medical decision making associated with this visit, and I edited the above note to reflect my current plan of care. I spent over 50% of my time counseling the patient/family today and coordinating care with the team.    Key management decisions made by me and carried out under my direction include:   -Flu/Cy today.   -Monitor fluid status closely.     Counseling and/or coordination of care performed by me:    -Conditioning regimen.   -Nutrition/activity during admission.      Ranges for vital signs:    Temp:  [96.9  F (36.1  C)-99.1  F (37.3  C)] 97.5  F (36.4  C)  Pulse:  [62-81] 81  Resp:  [14-17] 17  BP: ()/(49-66) 108/61  SpO2:  [94 %-99 %] 98 %    Infusions:    dextrose 5% and 0.45% NaCl + KCl 20 mEq/L 200 mL/hr at 11/23/22 0755     [START ON 12/1/2022] dextrose 5% and 0.45% NaCl + KCl 20 mEq/L       [START ON 12/4/2022] - MEDICATION INSTRUCTIONS -       sodium chloride Stopped (11/22/22 1120)       Scheduled Medications:    [START ON 11/29/2022] acetaminophen  650 mg Oral Once     [START ON 11/25/2022] acyclovir  800 mg Oral BID     allopurinol  300 mg Oral Daily     Chemotherapy Infusing-Continuous Infusion   Does not apply Q8H     [START ON 12/2/2022] Chemotherapy Infusing-Continuous Infusion   Does not apply Q8H     cyclophosphamide (CYTOXAN) [facility] infusion  50 mg/kg (Treatment Plan Adjusted) Intravenous Once     [START ON 12/2/2022] cyclophosphamide (CYTOXAN) [facility] infusion  50 mg/kg (Treatment Plan Adjusted) Intravenous Q24H      dexamethasone  10 mg Oral Daily     [START ON 11/28/2022] dexamethasone  6 mg Oral Once     [START ON 11/27/2022] dexamethasone  8 mg Oral Once     [START ON 12/4/2022] dextrose 5% water  10-20 mL Intracatheter Daily at 8 pm     [START ON 12/4/2022] dextrose 5% water  10-20 mL Intracatheter Daily at 8 pm     [START ON 11/29/2022] diphenhydrAMINE  25 mg Oral Once     enoxaparin ANTICOAGULANT  100 mg Subcutaneous BID     [START ON 12/4/2022] filgrastim  5 mcg/kg (Treatment Plan Recorded) Intravenous Daily at 8 pm     fluconazole  200 mg Oral Daily     FLUdarabine (FLUDARA) infusion (in 100 mL)  30 mg/m2 (Treatment Plan Recorded) Intravenous Q24H     heparin  5-10 mL Intracatheter Q28 Days     heparin lock flush  5-10 mL Intracatheter Q24H     [START ON 11/28/2022] levofloxacin  250 mg Oral Daily at 10 am     mesna (MESNEX) infusion  50 mg/kg (Treatment Plan Adjusted) Intravenous Once     [START ON 12/2/2022] mesna (MESNEX) infusion  50 mg/kg (Treatment Plan Adjusted) Intravenous Q24H     [START ON 12/4/2022] mycophenolate mofetil  1,500 mg Intravenous Q12H [facility] (10/22)     ondansetron  8 mg Oral Q8H     [START ON 12/2/2022] ondansetron  8 mg Oral Q8H     pantoprazole  40 mg Oral Daily     [START ON 12/5/2022] sirolimus  4 mg Oral Daily     [START ON 12/4/2022] sirolimus  8 mg Oral Once     [START ON 12/27/2022] sulfamethoxazole-trimethoprim  1 tablet Oral Q Mon Tues BID     ursodiol  300 mg Oral TID         Roderick Monzon MD, PhD  BMT/Cellular Therapy/ Oncology       Securely message with the Vocera Web Console

## 2022-11-23 NOTE — PLAN OF CARE
"BP 98/49 (BP Location: Right arm)   Pulse 62   Temp 98.3  F (36.8  C) (Axillary)   Resp 17   Ht 1.6 m (5' 2.99\")   Wt 94.9 kg (209 lb 4.8 oz)   SpO2 97%   BMI 37.09 kg/m      BP soft, SBP 90's and DBP 40-50's, asymptomatic, OVSS on RA, afebrile. Pt didn't have any complaints. Pt alert and oriented, independent up to bathroom, large amount UOP, no BM overnight, C.diff stool sample still in need. Chemo flush started at 200 ml/hr from 2200, pt will have Cytoxan and Fludarabine today. In the morning, Hbg 9.8, Plt 113, Potassium 3.9. Mg still pending, otherwise, no replacement needed. Continue with current POC.     Problem: Stem Cell/Bone Marrow Transplant  Goal: Nausea and Vomiting Symptom Relief  Outcome: Progressing     Problem: Plan of Care - These are the overarching goals to be used throughout the patient stay.    Goal: Absence of Hospital-Acquired Illness or Injury  Intervention: Prevent and Manage VTE (Venous Thromboembolism) Risk  Recent Flowsheet Documentation  Taken 11/22/2022 2000 by Adrian Mackenzie, RN  VTE Prevention/Management: SCDs (sequential compression devices) off   Goal Outcome Evaluation:                        "

## 2022-11-23 NOTE — PROGRESS NOTES
..Stop time on MAR & chart I & O  Chemo drug- fludarabine and cytoxan  Tolerated- well  Intervention-na  Response-na  Plan- flush unitl 1200 tomorrow  Lab: SOPHIE Kwok, at 1600  Wt/intervention- at 1600  Shower/luiza/linen- done

## 2022-11-24 NOTE — PROGRESS NOTES
"  BMT/Cell Therapy Daily Progress Note      ID Yahaira Fuentes is a 62 year old female with AML, undergoing a 7/8 matched unrelated allo HSCT, day -5.    HPI: Feeling reasonably well today. Continuing to try to stay active and walk around [place]. No new complaints today apart from being a bit bothered by needing to urinate frequently while on cytoxan washout. Fasting a bit this AM with an intention of eating more this evening.       Review of Systems    8 point review with pertinent positive and negatives in HPI    PHYSICAL EXAM      Weight     Wt Readings from Last 3 Encounters:   11/24/22 100.6 kg (221 lb 11.2 oz)   11/21/22 96.4 kg (212 lb 9.6 oz)   11/08/22 97.3 kg (214 lb 6.4 oz)        KPS: 90    /65 (BP Location: Right arm)   Pulse 70   Temp 97.1  F (36.2  C) (Axillary)   Resp 18   Ht 1.6 m (5' 2.99\")   Wt 100.6 kg (221 lb 11.2 oz)   SpO2 96%   BMI 39.28 kg/m       General: NAD   Eyes: SHANIQUE, sclera anicteric   Nose/Mouth/Throat: OP clear, buccal mucosa moist, no ulcerations. Poor dentition, several missing teeth  Lungs: CTA bilaterally  Cardiovascular: RRR, no M/R/G   Abdominal/Rectal: +BS, soft, NT, ND, No HSM   Lymphatics: Bilateral thick ankles, doughy R = L, no true edema. Varicose veins bilaterally.   Skin: No rashes or petechaie  Neuro: A&O   Musculoskeletal: Muscle mass average, obese   Additional Findings: Hammer site NT, no drainage.    Current aGVHD staging:  Skin 0, UGI 0, LGI 0, Liver 0 (keep in note through day +180 for allos)      LABS AND IMAGING: I have assessed all abnormal lab values for their clinical significance and any values considered clinically significant have been addressed in the assessment and plan.        Lab Results   Component Value Date    WBC 9.7 11/24/2022    ANEUTAUTO 4.7 11/07/2022    HGB 9.5 (L) 11/24/2022    HCT 29.8 (L) 11/24/2022     (L) 11/24/2022     (L) 11/24/2022     (L) 11/24/2022    POTASSIUM 4.0 11/24/2022    POTASSIUM 4.0 " 11/24/2022    CHLORIDE 97 (L) 11/24/2022    CO2 24 11/24/2022     (H) 11/24/2022    BUN 11.5 11/24/2022    CR 0.68 11/24/2022    MAG 1.9 11/23/2022    INR 1.06 11/22/2022         SYSTEMS-BASED ASSESSMENT AND PLAN     Yahaira Fuentes is a 62 year old female with AML, undergoing allo HSCT, day -5    BMT/IEC PROTOCOL for 2015-32  - Chemo protocol:   Day -7: start Allopurinol  Day -6: Cytoxan + Fludarabine  Day -5 through -2: Fludarabine  Day -1: TBI x1  Day 0: Transplant  Day +3 and +4: Cytoxan  Day +5: start GCSF, MMF, and Sirolimus  - Restaging plan: per protocol    HEME/COAG  - Risk of cytopenias due to chemotherapy and radiation  - Transfusion parameters: hemoglobin <7, platelets <10  - Relevant thrombosis or bleeding history: superficial thromboses on OCP decades past. Most recent DVT of right LE, 10/2022, on Lovenox until plts <50k    IMMUNOCOMPROMISED  - Vaccination status: Influenza vaccination after day +60, COVID vaccination after day +100, followed by remaining vaccinations at 12, 14, 24, and 26 months.  - Prophylaxis plan: ACV (CMV donor and recipient negative. Patient EBV and HSV1 positive). Flucazole, levofloxacin while neutropenic. Bactrim to start at day +28 (reported stomach upset with past use, per PharmD note will try at day +28. Can hold if GI symptoms occur).   - Active infections: None    RISK OF GVHD  - Prophylaxis: PTC days 3 and 4. Siro + MMF, day +5    CARDIOVASCULAR  - Risk of cardiomyopathy:  Baseline EF 60-65%  - Risk of arrhythmia: Baseline EKG showed NSR and normal QTC    RESPIRATORY  - Baseline PFTs: wnl  - Risk of respiratory complications: Frequent ambulation and incentive spirometer.    GI/NUTRITION  - Ulcer prophylaxis: Protonix  - Risk of nausea/vomiting due to chemo/radiation: Ondansetron, Lorazepam and Compazine available prn  - Risk of malnutrition: monitoring    RENAL/ELECTROLYTES/  - Hyponatremia: mild and r/t IVF, finishing today, allow to equilibrate, recheck  "PM  - Risk of renal injury: IV hydration, cytoxan flush  - Electrolyte management: replace per sliding scale    DIABETES/ENDOCRINE  - Risk of steroid-induced hyperglyemia: Monitor BG, sliding scale if needed    MUSCULOSKELETAL/FRAILTY  - Baseline Frailty Score: 1  - Daily PT/OT as needed while inpatient    SYMPTOM MANAGEMENT  - Nausea from chemo/radiation: Prochlorperazine, ondansetron, lorazepam.  - Pain Assessment: Denies    Known issues that I take into account for medical decisions, with salient changes to the plan considering these complexities noted above.    Patient Active Problem List   Diagnosis     Acute myeloid leukemia in remission (H)     Clinically Significant Risk Factors         # Hyponatremia: Lowest Na = 130 mmol/L (Ref range: 136-145) in last 2 days, will monitor as appropriate        # Thrombocytopenia: Lowest platelets = 113 (Ref range: 150-450) in last 2 days, will monitor for bleeding         # Obesity: Estimated body mass index is 39.28 kg/m  as calculated from the following:    Height as of this encounter: 1.6 m (5' 2.99\").    Weight as of this encounter: 100.6 kg (221 lb 11.2 oz)., PRESENT ON ADMISSION          Today's summary:   - Completing cytoxan flush, will monitor sodium to ensure hyponatremia is correcting spontaneously. PM recheck per protocol. Would not intervene unless worsening.  - Continuing fludarabine per protocol    DISPOSITION: on discharge patient should be scheduled with Dr. Garrett for first available appointment at the request of Dr. Zoltan French    Patient was discussed with Dr. Monzon.    William Dawn MD PhD  Hematology/Oncology Fellow  Pgr: [phone]    ______________________________________________    BMT ATTENDING NOTE    Yahaira Fuentes is a 62 year old female, admitted on 11/22/2022, who remains hospitalized pending autologout bone marrow transplant for AML.  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  She " underwent induction with 7+3 followed by midostaurin and then 1 cycle of HIDAC consolidation at [facility] with Dr. Emmanuel Arshad.  She has completed workup and now presents for NMA MUD 7/8 PBSCT on protocol 2015-32.  Today is Day -5 and she received Flu.  Fluid up a bit and will have to monitor closely with additional diuresis as needed.  She is up and walking the halls.  Na is a bit low likely from fluid flush, will monitor.      I have seen and personally evaluated the patient today.  I reviewed vitals, medications, laboratory results, and I viewed pertinent imaging studies.  After doing so, I formulated a plan with Dr. Dawn as documented in this note.  I have seen and personally evaluated the patient today.  I spent 35 minutes in the care of Yahaira today, including an independent face-to-face assessment, review of vitals, medications, laboratory results, and independent review of imaging studies.        Key management decisions made by me and carried out under my direction include:   -Flu today.   -Monitor fluid status closely.   -Mild hypoNa likely secondary to Cy fluid flush.  Monitor at this time only as flush is completed.      Counseling and/or coordination of care performed by me:    -Conditioning regimen.   -Nutrition/activity during admission.      Ranges for vital signs:    Temp:  [97.1  F (36.2  C)-98.5  F (36.9  C)] 98.5  F (36.9  C)  Pulse:  [62-80] 62  Resp:  [16-18] 18  BP: (106-121)/(57-74) 121/73  SpO2:  [96 %-98 %] 97 %    Infusions:    [START ON 12/1/2022] dextrose 5% and 0.45% NaCl + KCl 20 mEq/L       [START ON 12/4/2022] - MEDICATION INSTRUCTIONS -       sodium chloride Stopped (11/22/22 1120)       Scheduled Medications:    [START ON 11/29/2022] acetaminophen  650 mg Oral Once     [START ON 11/25/2022] acyclovir  800 mg Oral BID     allopurinol  300 mg Oral Daily     [START ON 12/2/2022] Chemotherapy Infusing-Continuous Infusion   Does not apply Q8H     [START ON 12/2/2022]  cyclophosphamide (CYTOXAN) [facility] infusion  50 mg/kg (Treatment Plan Adjusted) Intravenous Q24H     dexamethasone  10 mg Oral Daily     [START ON 11/28/2022] dexamethasone  6 mg Oral Once     [START ON 11/27/2022] dexamethasone  8 mg Oral Once     [START ON 12/4/2022] dextrose 5% water  10-20 mL Intracatheter Daily at 8 pm     [START ON 12/4/2022] dextrose 5% water  10-20 mL Intracatheter Daily at 8 pm     [START ON 11/29/2022] diphenhydrAMINE  25 mg Oral Once     enoxaparin ANTICOAGULANT  100 mg Subcutaneous BID     [START ON 12/4/2022] filgrastim  5 mcg/kg (Treatment Plan Recorded) Intravenous Daily at 8 pm     FLUdarabine (FLUDARA) infusion (in 100 mL)  30 mg/m2 (Treatment Plan Recorded) Intravenous Q24H     heparin  5-10 mL Intracatheter Q28 Days     heparin lock flush  5-10 mL Intracatheter Q24H     [START ON 11/28/2022] levofloxacin  250 mg Oral Daily at 10 am     [START ON 12/2/2022] mesna (MESNEX) infusion  50 mg/kg (Treatment Plan Adjusted) Intravenous Q24H     micafungin  50 mg Intravenous Q24H     [START ON 12/4/2022] mycophenolate mofetil  1,500 mg Intravenous Q12H [facility] (10/22)     ondansetron  8 mg Oral Q8H     [START ON 12/2/2022] ondansetron  8 mg Oral Q8H     pantoprazole  40 mg Oral Daily     [START ON 12/5/2022] sirolimus  4 mg Oral Daily     [START ON 12/4/2022] sirolimus  8 mg Oral Once     [START ON 12/27/2022] sulfamethoxazole-trimethoprim  1 tablet Oral Q Mon Tues BID     ursodiol  300 mg Oral TID         Roderick Monzon MD, PhD  BMT/Cellular Therapy/ Oncology       Securely message with the Vocera Web Console

## 2022-11-24 NOTE — PLAN OF CARE
"Goal Outcome Evaluation:  Afebrile. VSS on RA. Up independent. Voiding ok; 10 Lasix x2 given for not meeting voiding parameters for Cytoxan flush. Pt has delayed response; 300-400 output after 60 minutes. One small soft BM overnight. Pt offers no complaints besides fatigue and lack of sleep from the flush. No replacements needed. MD paged regarding switching fluids due to low sodium of 130; no new orders. Plan for Fludarabine today.     Problem: Plan of Care - These are the overarching goals to be used throughout the patient stay.    Goal: Plan of Care Review  Description: The Plan of Care Review/Shift note should be completed every shift.  The Outcome Evaluation is a brief statement about your assessment that the patient is improving, declining, or no change.  This information will be displayed automatically on your shift note.  Outcome: Progressing  Goal: Patient-Specific Goal (Individualized)  Description: You can add care plan individualizations to a care plan. Examples of Individualization might be:  \"Parent requests to be called daily at 9am for status\", \"I have a hard time hearing out of my right ear\", or \"Do not touch me to wake me up as it startles me\".  Outcome: Progressing  Goal: Absence of Hospital-Acquired Illness or Injury  Outcome: Progressing  Intervention: Identify and Manage Fall Risk  Recent Flowsheet Documentation  Taken 11/23/2022 2030 by Patti Thompson, RN  Safety Promotion/Fall Prevention:    assistive device/personal items within reach    chemotherapeutic precautions    clutter free environment maintained    fall prevention program maintained    nonskid shoes/slippers when out of bed    patient and family education  Intervention: Prevent Skin Injury  Recent Flowsheet Documentation  Taken 11/23/2022 2030 by Patti Thompson, RN  Body Position: position changed independently  Intervention: Prevent and Manage VTE (Venous Thromboembolism) Risk  Recent Flowsheet Documentation  Taken " 11/23/2022 2030 by Patti Thompson RN  VTE Prevention/Management: SCDs (sequential compression devices) off  Goal: Optimal Comfort and Wellbeing  Outcome: Progressing  Goal: Readiness for Transition of Care  Outcome: Progressing     Problem: Stem Cell/Bone Marrow Transplant  Goal: Optimal Coping with Transplant  Outcome: Progressing  Goal: Symptom-Free Urinary Elimination  Outcome: Progressing  Goal: Diarrhea Symptom Control  Outcome: Progressing  Goal: Improved Activity Tolerance  Outcome: Progressing  Intervention: Promote Improved Energy  Recent Flowsheet Documentation  Taken 11/23/2022 2030 by Patti Thompson RN  Activity Management:    activity adjusted per tolerance    activity encouraged    up ad donita  Goal: Blood Counts Within Acceptable Range  Outcome: Progressing  Intervention: Monitor and Manage Hematologic Symptoms  Recent Flowsheet Documentation  Taken 11/23/2022 2030 by Patti Thompson RN  Medication Review/Management:    medications reviewed    high-risk medications identified  Goal: Absence of Hypersensitivity Reaction  Outcome: Progressing  Goal: Absence of Infection  Outcome: Progressing  Goal: Improved Oral Mucous Membrane Health and Integrity  Outcome: Progressing  Intervention: Promote Oral Comfort and Health  Recent Flowsheet Documentation  Taken 11/23/2022 2030 by Patti Thompson RN  Oral Care:    oral rinse provided    teeth brushed    tongue brushed  Goal: Nausea and Vomiting Symptom Relief  Outcome: Progressing  Goal: Optimal Nutrition Intake  Outcome: Progressing

## 2022-11-24 NOTE — PROGRESS NOTES
Stop time on MAR & chart I & O  Chemo drug Fludarabine  Tolerated well  Intervention antiemetic prior to infusion.  Response no s/s of any reactions. Denies nausea.   Plan continue with POC.

## 2022-11-24 NOTE — PLAN OF CARE
"/73 (BP Location: Right arm)   Pulse 62   Temp 98.5  F (36.9  C) (Axillary)   Resp 18   Ht 1.6 m (5' 2.99\")   Wt 99.1 kg (218 lb 8 oz)   SpO2 97%   BMI 38.72 kg/m      AOVSS. Received Fludarabine without issues. Cytoxan flush finished at noon. Given Lasix x1 this am now self- diuresing. Denies nausea/vomiting. Denies any pain. Potassium this afternoon wnl, Na still low. MD notified, no new orders. Had x3 soft BM today. She is up ad donita. A&o x4. Appetite fair. Have been up walking in the hallway. No other complaints. Continue with POC.         Problem: Plan of Care - These are the overarching goals to be used throughout the patient stay.    Goal: Plan of Care Review  Description: The Plan of Care Review/Shift note should be completed every shift.  The Outcome Evaluation is a brief statement about your assessment that the patient is improving, declining, or no change.  This information will be displayed automatically on your shift note.  Outcome: Met  Goal: Patient-Specific Goal (Individualized)  Description: You can add care plan individualizations to a care plan. Examples of Individualization might be:  \"Parent requests to be called daily at 9am for status\", \"I have a hard time hearing out of my right ear\", or \"Do not touch me to wake me up as it startles me\".  Outcome: Met  Goal: Absence of Hospital-Acquired Illness or Injury  Outcome: Met  Intervention: Identify and Manage Fall Risk  Recent Flowsheet Documentation  Taken 11/24/2022 0800 by George Hartley, RN  Safety Promotion/Fall Prevention: assistive device/personal items within reach  Intervention: Prevent Skin Injury  Recent Flowsheet Documentation  Taken 11/24/2022 0800 by George Hartley, RN  Body Position: position changed independently  Goal: Optimal Comfort and Wellbeing  Outcome: Met  Goal: Readiness for Transition of Care  Outcome: Met     Problem: Stem Cell/Bone Marrow Transplant  Goal: Optimal Coping with Transplant  Outcome: " Met  Goal: Symptom-Free Urinary Elimination  Outcome: Met  Goal: Diarrhea Symptom Control  Outcome: Met  Goal: Improved Activity Tolerance  Outcome: Met  Intervention: Promote Improved Energy  Recent Flowsheet Documentation  Taken 11/24/2022 0800 by George Hartley RN  Activity Management: activity adjusted per tolerance  Goal: Blood Counts Within Acceptable Range  Outcome: Met  Intervention: Monitor and Manage Hematologic Symptoms  Recent Flowsheet Documentation  Taken 11/24/2022 0800 by George Hartley RN  Medication Review/Management:   medications reviewed   high-risk medications identified  Goal: Absence of Hypersensitivity Reaction  Outcome: Met  Goal: Absence of Infection  Outcome: Met  Goal: Improved Oral Mucous Membrane Health and Integrity  Outcome: Met  Intervention: Promote Oral Comfort and Health  Recent Flowsheet Documentation  Taken 11/24/2022 0800 by George Hartley RN  Oral Care:   lip/mouth moisturizer applied   oral rinse provided  Goal: Nausea and Vomiting Symptom Relief  Outcome: Met  Goal: Optimal Nutrition Intake  Outcome: Met   Goal Outcome Evaluation:

## 2022-11-24 NOTE — PROVIDER NOTIFICATION
"MD agreed with fluid change; RN waiting for new order to be placed.    MD Haroon Paz paged: \"Pt on Cytoxan flush. Sodium decreased from 137 to 130. Could we change her fluids to NS? Thanks!\"    "

## 2022-11-25 NOTE — PROGRESS NOTES
..Stop time on MAR & chart I & O  Chemo drug: Fludarabine   Tolerated well   Intervention: decadron and zofran prior to chemo  Response: no nausea/vomiting   Plan: continue to monitor the patient.   Lab: Na 145, K+ 4.1  Wt/intervention: 95.2 kg   Showered and linen changed.

## 2022-11-25 NOTE — PLAN OF CARE
"  Problem: Plan of Care - These are the overarching goals to be used throughout the patient stay.    Goal: Patient-Specific Goal (Individualized)  Description: You can add care plan individualizations to a care plan. Examples of Individualization might be:  \"Parent requests to be called daily at 9am for status\", \"I have a hard time hearing out of my right ear\", or \"Do not touch me to wake me up as it startles me\".  Outcome: Progressing     Problem: Plan of Care - These are the overarching goals to be used throughout the patient stay.    Goal: Optimal Comfort and Wellbeing  Outcome: Progressing   Goal Outcome Evaluation:  Blood pressure 91/57 and 109/60. Received Fludarabine this morning. No nausea. No pain. Weight 95.2 kg. No stool. Walked seven laps in the [place]. Showered. No stool. Independent. Plan is for Fludarabine tomorrow.                         "

## 2022-11-25 NOTE — PLAN OF CARE
"Goal Outcome Evaluation:  Afebrile. Slightly bradycardic (high 50's to low 60's). OVSS on RA. Up independent. Voiding freely and adequately. No BM overnight. Pt offers no complaints besides fatigue and lack of sleep. CHG wipes completed before bed. No replacements needed. Plan for Fludarabine today.     Problem: Plan of Care - These are the overarching goals to be used throughout the patient stay.    Goal: Plan of Care Review  Description: The Plan of Care Review/Shift note should be completed every shift.  The Outcome Evaluation is a brief statement about your assessment that the patient is improving, declining, or no change.  This information will be displayed automatically on your shift note.  Outcome: Progressing  Goal: Patient-Specific Goal (Individualized)  Description: You can add care plan individualizations to a care plan. Examples of Individualization might be:  \"Parent requests to be called daily at 9am for status\", \"I have a hard time hearing out of my right ear\", or \"Do not touch me to wake me up as it startles me\".  Outcome: Progressing  Goal: Absence of Hospital-Acquired Illness or Injury  Outcome: Progressing  Intervention: Identify and Manage Fall Risk  Recent Flowsheet Documentation  Taken 11/24/2022 2000 by Patti Thompson, RN  Safety Promotion/Fall Prevention:    assistive device/personal items within reach    chemotherapeutic precautions    clutter free environment maintained    fall prevention program maintained    nonskid shoes/slippers when out of bed    patient and family education  Intervention: Prevent Skin Injury  Recent Flowsheet Documentation  Taken 11/24/2022 2000 by Patti Thompson, RN  Body Position: position changed independently  Intervention: Prevent and Manage VTE (Venous Thromboembolism) Risk  Recent Flowsheet Documentation  Taken 11/24/2022 2000 by Patti Thompson, RN  VTE Prevention/Management: SCDs (sequential compression devices) off  Goal: Optimal Comfort and " Wellbeing  Outcome: Progressing  Goal: Readiness for Transition of Care  Outcome: Progressing     Problem: Stem Cell/Bone Marrow Transplant  Goal: Optimal Coping with Transplant  Outcome: Progressing  Goal: Symptom-Free Urinary Elimination  Outcome: Progressing  Goal: Diarrhea Symptom Control  Outcome: Progressing  Goal: Improved Activity Tolerance  Outcome: Progressing  Intervention: Promote Improved Energy  Recent Flowsheet Documentation  Taken 11/24/2022 2000 by Patti Thompson, RN  Activity Management: (Pt said she walked 1.5 miles today.)    activity adjusted per tolerance    activity encouraged    ambulated outside room    up ad donita  Goal: Blood Counts Within Acceptable Range  Outcome: Progressing  Intervention: Monitor and Manage Hematologic Symptoms  Recent Flowsheet Documentation  Taken 11/24/2022 2000 by Patti Thompson, RN  Medication Review/Management:    medications reviewed    high-risk medications identified  Goal: Absence of Hypersensitivity Reaction  Outcome: Progressing  Goal: Absence of Infection  Outcome: Progressing  Goal: Improved Oral Mucous Membrane Health and Integrity  Outcome: Progressing  Intervention: Promote Oral Comfort and Health  Recent Flowsheet Documentation  Taken 11/24/2022 2000 by Patti Thompson, RN  Oral Care:    lip/mouth moisturizer applied    oral rinse provided    teeth brushed    tongue brushed  Goal: Nausea and Vomiting Symptom Relief  Outcome: Progressing  Goal: Optimal Nutrition Intake  Outcome: Progressing     Problem: Pain Acute  Goal: Optimal Pain Control and Function  Outcome: Progressing  Intervention: Prevent or Manage Pain  Recent Flowsheet Documentation  Taken 11/24/2022 2000 by Patti Thompson, RN  Medication Review/Management:    medications reviewed    high-risk medications identified     Problem: Fall Injury Risk  Goal: Absence of Fall and Fall-Related Injury  Outcome: Progressing  Intervention: Identify and Manage Contributors  Recent Flowsheet  Documentation  Taken 11/24/2022 2000 by Patti Thompson, RN  Medication Review/Management:    medications reviewed    high-risk medications identified  Intervention: Promote Injury-Free Environment  Recent Flowsheet Documentation  Taken 11/24/2022 2000 by Patti Thompson, RN  Safety Promotion/Fall Prevention:    assistive device/personal items within reach    chemotherapeutic precautions    clutter free environment maintained    fall prevention program maintained    nonskid shoes/slippers when out of bed    patient and family education     Problem: Infection  Goal: Absence of Infection Signs and Symptoms  Outcome: Progressing     Problem: Oral Intake Inadequate  Goal: Improved Oral Intake  Outcome: Progressing

## 2022-11-25 NOTE — PROGRESS NOTES
s-pt states her baseline bp is 120 syst. Has been running soft bp's today-denies dizziness or lighheadedness. States she has been walking [place] independantly without issues. This rn states since her sys bp is 98 its advised to be attentive if she is dizzy - lightheaded and that she is to sit down if becomes so. Ideally to walk with another. Was down 3600 influids yesterday which  may be contributing to the softer bps. Wt is at baseline from admission. Ate full meal--up in room self care. 8pm bp much improved 110s systlic. See vitals flowsheet.   b-day minus 4 induction phase of tx  A-pt self care with minimal report of concerns at this time.   r-enourage oral fluid intake to improve bp - instruct on cares as needed.

## 2022-11-25 NOTE — PROGRESS NOTES
"  BMT/Cell Therapy Daily Progress Note      ID Yahaira Fuentes is a 62 year old female with AML, undergoing a 7/8 matched unrelated allo HSCT, day -4.    HPI: Doing well today. No complaints apart from ongoing need to urinate frequently. Otherwise feeling well. Happy with the [employer] performance yesterday.       Review of Systems    8 point review with pertinent positive and negatives in HPI    PHYSICAL EXAM      Weight     Wt Readings from Last 3 Encounters:   11/25/22 95.2 kg (209 lb 14.4 oz)   11/21/22 96.4 kg (212 lb 9.6 oz)   11/08/22 97.3 kg (214 lb 6.4 oz)        KPS: 90    BP 91/57 (BP Location: Right arm)   Pulse 59   Temp 97.8  F (36.6  C) (Axillary)   Resp 16   Ht 1.6 m (5' 2.99\")   Wt 95.2 kg (209 lb 14.4 oz)   SpO2 97%   BMI 37.19 kg/m       General: NAD   Eyes: SHANIQUE, sclera anicteric   Nose/Mouth/Throat: OP clear, buccal mucosa moist, no ulcerations. Poor dentition, several missing teeth  Lungs: CTA bilaterally  Cardiovascular: RRR, no M/R/G   Abdominal/Rectal: +BS, soft, NT, ND, No HSM   Lymphatics: Bilateral thick ankles, doughy R = L, no true edema. Varicose veins bilaterally.   Skin: No rashes or petechaie  Neuro: A&O   Musculoskeletal: Muscle mass average, obese   Additional Findings: Hammer site NT, no drainage.    Current aGVHD staging:  Skin 0, UGI 0, LGI 0, Liver 0 (keep in note through day +180 for allos)      LABS AND IMAGING: I have assessed all abnormal lab values for their clinical significance and any values considered clinically significant have been addressed in the assessment and plan.        Lab Results   Component Value Date    WBC 5.5 11/25/2022    ANEUTAUTO 4.7 11/07/2022    HGB 10.5 (L) 11/25/2022    HCT 32.3 (L) 11/25/2022     (L) 11/25/2022     11/25/2022     11/25/2022    POTASSIUM 4.1 11/25/2022    POTASSIUM 4.1 11/25/2022    CHLORIDE 108 (H) 11/25/2022    CO2 26 11/25/2022    GLC 94 11/25/2022    BUN 16.4 11/25/2022    CR 0.67 11/25/2022    MAG " 2.2 11/25/2022    INR 1.06 11/22/2022         SYSTEMS-BASED ASSESSMENT AND PLAN     Yahaira Fuentes is a 62 year old female with AML, undergoing allo HSCT, day -4, with complicated admission thus far.    BMT/IEC PROTOCOL for 2015-32  - Chemo protocol:   Day -7: start Allopurinol  Day -6: Cytoxan + Fludarabine  Day -5 through -2: Fludarabine  Day -1: TBI x1  Day 0: Transplant  Day +3 and +4: Cytoxan  Day +5: start GCSF, MMF, and Sirolimus  - Restaging plan: per protocol    HEME/COAG  - Risk of cytopenias due to chemotherapy and radiation  - Transfusion parameters: hemoglobin <7, platelets <10  - Relevant thrombosis or bleeding history: superficial thromboses on OCP decades past. Most recent DVT of right LE, 10/2022, on Lovenox until plts <50k    IMMUNOCOMPROMISED  - Vaccination status: Influenza vaccination after day +60, COVID vaccination after day +100, followed by remaining vaccinations at 12, 14, 24, and 26 months.  - Prophylaxis plan: ACV (CMV donor and recipient negative. Patient EBV and HSV1 positive). Flucazole, levofloxacin while neutropenic. Bactrim to start at day +28 (reported stomach upset with past use, per PharmD note will try at day +28. Can hold if GI symptoms occur).   - Active infections: None    RISK OF GVHD  - Prophylaxis: PTC days 3 and 4. Siro + MMF, day +5    CARDIOVASCULAR  - Risk of cardiomyopathy:  Baseline EF 60-65%  - Risk of arrhythmia: Baseline EKG showed NSR and normal QTC    RESPIRATORY  - Baseline PFTs: wnl  - Risk of respiratory complications: Frequent ambulation and incentive spirometer.    GI/NUTRITION  - Ulcer prophylaxis: Protonix  - Risk of nausea/vomiting due to chemo/radiation: Ondansetron, Lorazepam and Compazine available prn  - Risk of malnutrition: monitoring    RENAL/ELECTROLYTES/  - Hyponatremia -> borderline hypernatremia: both are related to fluids and then diuresis. Will allow to equilibrate. Avoid further diuresis for now.  - Risk of renal injury: IV hydration,  "cytoxan flush  - Electrolyte management: replace per sliding scale    DIABETES/ENDOCRINE  - Risk of steroid-induced hyperglyemia: Monitor BG, sliding scale if needed    MUSCULOSKELETAL/FRAILTY  - Baseline Frailty Score: 1  - Daily PT/OT as needed while inpatient    SYMPTOM MANAGEMENT  - Nausea from chemo/radiation: Prochlorperazine, ondansetron, lorazepam.  - Pain Assessment: Denies    Known issues that I take into account for medical decisions, with salient changes to the plan considering these complexities noted above.    Patient Active Problem List   Diagnosis     Acute myeloid leukemia in remission (H)     Clinically Significant Risk Factors         # Hyponatremia: Lowest Na = 130 mmol/L (Ref range: 136-145) in last 2 days, will monitor as appropriate        # Thrombocytopenia: Lowest platelets = 103 (Ref range: 150-450) in last 2 days, will monitor for bleeding         # Obesity: Estimated body mass index is 37.19 kg/m  as calculated from the following:    Height as of this encounter: 1.6 m (5' 2.99\").    Weight as of this encounter: 95.2 kg (209 lb 14.4 oz)., PRESENT ON ADMISSION          Today's summary:   - Fludarabine per protocol  - Avoid further lasix, follow Na    DISPOSITION: on discharge patient should be scheduled with Dr. Garrett for first available appointment at the request of Dr. Zoltan French    Patient was discussed with Dr. Monzon.    William Dawn MD PhD  Hematology/Oncology Fellow  Pgr: [phone]      ______________________________________________      BMT ATTENDING NOTE    Yahaira Fuentes is a 62 year old female, admitted on 11/22/2022, who remains hospitalized pending autologout bone marrow transplant for AML.  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  She underwent induction with 7+3 followed by midostaurin and then 1 cycle of HIDAC consolidation at [facility] with Dr. Emmanuel Arshad.  She has completed workup and now presents " for NMA MUD 7/8 PBSCT on protocol 2015-32.  Today is Day -4 and she received Flu.  Monitoring fluid status.  No nausea, but wondering why she is on so many nausea medications scheduled.      I have seen and personally evaluated the patient today.  I reviewed vitals, medications, laboratory results, and I viewed pertinent imaging studies.  After doing so, I formulated a plan with Dr. Dawn as documented in this note.  I have seen and personally evaluated the patient today.  I spent 35 minutes in the care of Yahaira today, including an independent face-to-face assessment, review of vitals, medications, laboratory results, and independent review of imaging studies.     Key management decisions made by me and carried out under my direction include:   -Continue conditioning regimen.    -Volume management.     Counseling and/or coordination of care performed by me:    -Need for scheduled anti-emetics due to high intensity chemotherapy regimen.   -Visitors should not be sick or have any recent sick contacts due to infection transmission risk, especially with flu, RSV, COVID surge in [place].     Ranges for vital signs:    Temp:  [96.9  F (36.1  C)-97.9  F (36.6  C)] 97.9  F (36.6  C)  Pulse:  [57-68] 62  Resp:  [16-18] 16  BP: ()/(48-63) 98/59  SpO2:  [95 %-97 %] 96 %    Infusions:    [START ON 12/1/2022] dextrose 5% and 0.45% NaCl + KCl 20 mEq/L       [START ON 12/4/2022] - MEDICATION INSTRUCTIONS -       sodium chloride Stopped (11/22/22 1120)       Scheduled Medications:    [START ON 11/29/2022] acetaminophen  650 mg Oral Once     acyclovir  800 mg Oral BID     allopurinol  300 mg Oral Daily     [START ON 12/2/2022] Chemotherapy Infusing-Continuous Infusion   Does not apply Q8H     [START ON 12/2/2022] cyclophosphamide (CYTOXAN) [facility] infusion  50 mg/kg (Treatment Plan Adjusted) Intravenous Q24H     dexamethasone  10 mg Oral Daily     [START ON 11/28/2022] dexamethasone  6 mg Oral Once     [START ON 11/27/2022]  dexamethasone  8 mg Oral Once     [START ON 12/4/2022] dextrose 5% water  10-20 mL Intracatheter Daily at 8 pm     [START ON 12/4/2022] dextrose 5% water  10-20 mL Intracatheter Daily at 8 pm     [START ON 11/29/2022] diphenhydrAMINE  25 mg Oral Once     enoxaparin ANTICOAGULANT  100 mg Subcutaneous BID     [START ON 12/4/2022] filgrastim  5 mcg/kg (Treatment Plan Recorded) Intravenous Daily at 8 pm     FLUdarabine (FLUDARA) infusion (in 100 mL)  30 mg/m2 (Treatment Plan Recorded) Intravenous Q24H     heparin  5-10 mL Intracatheter Q28 Days     heparin lock flush  5-10 mL Intracatheter Q24H     [START ON 11/28/2022] levofloxacin  250 mg Oral Daily at 10 am     [START ON 12/2/2022] mesna (MESNEX) infusion  50 mg/kg (Treatment Plan Adjusted) Intravenous Q24H     micafungin  50 mg Intravenous Q24H     [START ON 12/4/2022] mycophenolate mofetil  1,500 mg Intravenous Q12H [facility] (10/22)     ondansetron  8 mg Oral Q8H     [START ON 12/2/2022] ondansetron  8 mg Oral Q8H     pantoprazole  40 mg Oral Daily     [START ON 12/5/2022] sirolimus  4 mg Oral Daily     [START ON 12/4/2022] sirolimus  8 mg Oral Once     [START ON 12/27/2022] sulfamethoxazole-trimethoprim  1 tablet Oral Q Mon Tues BID     ursodiol  300 mg Oral TID         Roderick Monzon MD, PhD  BMT/Cellular Therapy/ Oncology       Securely message with the Vocera Web Console

## 2022-11-25 NOTE — CONSULTS
"Psychosocial Assessment completed in the BMT Clinic and copied here for staff review.    Blood and Marrow Transplant   Psychosocial Assessment with   Clinical      Assessment completed on 11/7/22 of Pt's living situation, support system, financial status, functional status, coping, stressors, need for resources and social work intervention provided as needed.  Information for this assessment was provided by Pt Rebeka and daughter Darren's report, in addition to medical chart review and consultation with medical team.      Present at Assessment:   Patient: Yahaira \"Rebeka\" Alfredo  Daughter: Darren Renner  : Yanely Martinze     Diagnosis: Acute Myelogenous Leukemia - AML     Date of Diagnosis: 8/5/22     Transplant type: Allogeneic      Donor: Unrelated allogeneic donor stem cell transplant     Physician: Denia French MD     Nurse Coordinator:  Carmen Purvis RN     Social Workers: KATIE Barker     Long Term : KATIE Reyes, [facility]     Permanent Address:   [address] [place] [address]     Living Situation: Pt recently moved in with her daughter Darren, son in law , and grandson Horacio in their home in [place].      Contact Information:  Pt's Cell Phone: [phone]  Pt E-mail: [email or web address]  Pt's daughters Phone: [phone]     Presenting Information:  Rebeka is a 62 year old female diagnosed with AML who presents for evaluation for an allogeneic transplant at the [facility] ([facility] Pt was accompanied to today's visit by her daughter Darren.      Decision Making: Self     Health Care Directive: Pt declined a completed Health Care Directive (HCD) at this time. SW provided education. SW encouraged pt to have discussions with their family regarding their health care wishes. OTILIA explained that since she does not have a healthcare directive, legally her daughter Darren would make decisions on her behalf, if she did not have capacity " to make her own medical decisions. Pt understood.      Relationship Status: Pt is single.       Special Needs: None identified at this time. Pt plans to return home following transplant.      Family/Support System: Pt endorsed a good support system including family and close friends who will be available to support pt throughout transplant process.      Family Information:  Children: Daughter Darren Renner.   Siblings: Sister Rosie Dong who lives in [place].  Parents: .  Grandchildren: Grandson Horacio (7).   Friends: Friends Cheryl and Cheyenne who may help out with care giving duties. Pt endorsed a good friend support system.     Caregiver: SW discussed with pt and pt's daughter the caregiver role and expectation at length. Pt is agreeable to having a full time caregiver for a minimum of 100 days until cleared by the BMT physician. Pt and pt's daughter confirmed understanding of the caregiver requirement. Pt's primary caregiver will be her daughter Darren/son in law PJ with friends Cheryl and Cheyenne and sister Rosie as a secondary or back-up to assist as needed.      Pt's daughter shared some concern about the weekend care giving plan. Pt's son in law PJ plans to work from home Mon-Fri and share care giving duties with daughter Darren. Daughter reports her family has weekend obligations (such as Jehovah's witness/[employer] hockey) that they would like to keep consistent in their routine. She reports this would be a few hours each day. Pt reports she has reached out to several friends who have offered to help (Cheryl/Cheyenne/Sister Rosie) but their availability during the busy holiday season in unclear at this time. Pt's daughter would like a more concrete plan as she does not want all of the care giving duties to fall on her last minute. Sw recommended pt and daughter connect with the caregivers together (as pt was doing this on her own) to discuss needs and potential availability. They plan to do so.      Pt reviewed and  "signed the caregiver contract which will be scanned into the EMR. Caregiver education and resources provided. No caregiver concerns identified.      Caregiver Contact Information:  Darren Renner: [phone]  Cheryl Carranza: [phone]  Rosie Dong\" [phone]  Cheyenne Telles: [phone]     Transportation Mode: Private Car. Pt is aware of driving restrictions post-BMT and the need for the caregiver is to drive until cleared to drive by the BMT physician. SW provided information on parking info and monthly parking pass options.      Insurance: Pt has [employer] as her health insurance. Pt denied specific insurance concerns at this time. OTILIA reiterated information about the [employer]  should specific insurance questions arise as pt moves through transplant process.      Sources of Income: Pt's source of income is employment. No financial concerns identified at this time.      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 She reports she works 100% remote. Pt has 22 days of STD available to her. Patient plans to look further into her LTD plan.      Pt's daughter does not work at this time but has significant volunteering responsibilities. She reports she is able to continue some of these on a remote basis.      Mental Health: Pt denied a history of mental health concerns, specific diagnoses or medications at this time.      PHQ-9:  Pt scored a 1 which indicates none on the depression severity scale. Pt endorses this is an accurate reflection of her emotional state.     GAD7:  Pt scored a 0 which indicates no sign of anxiety on the Generalized Anxiety Disorder Questionnaire. Pt endorses this is an accurate reflection of her emotional state.     Chemical Use:   Tobacco: None reported.   Alcohol: Pt reports she will drink 1-2 nights a week when she is feeling up for it. She reports she has ceased drinking as of recent.  Marijuana: None reported. " "  Other Drugs: None reported.   Based on the information provided, there appear to be no specific risks or concerns identified at this time.      Trauma/Loss/Abuse History: Multiple losses associated with cancer diagnosis and treatment, including health, employment, changes to physical appearance, etc.      Spirituality: Pt identified as Quaker. SW explained that there are Chaplains on the unit and pt can request to meet with a  at anytime.  Pt is unsure if she is interested in having a blessing ceremony - she plans to follow up with sw should she decide to pursue this.      Coping: Pt noted that she is currently feeling \"positive, fearful, hopeful, nervous, excited, and ready to begin\". Pt shared that her main coping mechanisms are talking with friends and family, engaging in prayer/spiritual practices, and using positive self talk and affirmations. Pt noted that she enjoys watching TV, going on walks, playing games with her grandson, and playing board games. SW and pt discussed additional positive coping mechanisms that pt can utilize while in the hospital. While hospitalized, pt plans to read, play games, and enjoy time with visitors.     Caregiver Coping: Pt's daughter noted that she is feeling \"worried and nervous\" at this time. She reports this is due to the uncertainty of the care giving plan at this time. Daughter reports she likes things to be laid out in a clear manner. Pt's daughter noted that she jolene by talking with friends/family, engaging in prayer and spiritual practices, reading books, exercising, journaling, talking with her therapist, and working on her hobbies. Caregiver resources were offered/reviewed.      Education Provided: Transplant process expectations, Caregiver requirements, Caregiver self-care, Financial issues related to transplant, Financial resources/grants available, Common psychosocial stressors pre/post transplant, Support group(s) available, Hospital resources " available, Web site information, Social Work role and Resources for children/siblings     Interventions Provided: Psychosocial Support and Education      Assessment and Recommendations for Team:  Pt is a 62 year old female diagnosed with AML who is here undergoing preparation for a planned allogeneic transplant.      Pt is pleasant, engaging, and able to articulate concerns/coping mechanisms in an appropriate manner. During our meeting pt was alert, she was interactive, affect was full, and she displayed appropriate eye contact, memory and thought processes. Pt feels comfortable communicating with the medical team. Pt has a strong supportive network of family and friends who are involved.      Pt and her daughter will benefit from ongoing psychosocial support in regards to coping with the adjustment to the BMT process as well as discussing ongoing care giving plans. CSW has discussed  psychosocial support options in regards to coping with the adjustment to the BMT process and support groups opportunities.       Pt has a good support system. Pt and her daughter continue to work out the details of their specific care giving plan, however, pt appears to have many people who are available to support her. Pt verbalizes understanding of the transplant process and wanting to proceed. SW provided contact information and encouraged pt to contact SW with questions, concerns, resources and for support.     Per this assessment, SW did not identify any barriers to this patient moving forward with transplant.     Important Information:   Pt may be interested in a stationary bike/treadmill in her bedroom.      Follow up Planned:   Psychosocial support  Spiritual Health referral- pending     JIMENEZ Barker  SOT/BMT/CF Float            Bette WILSON [facility]    Clinical   11/25/2022  [facility]  Adult Blood and Marrow Transplant and Cellular Therapy Program  [address] [place] [address]   [place] [address]  [email or web address]  [email or web address]    Office: [phone]  Pager: [phone]

## 2022-11-26 NOTE — PLAN OF CARE
Goal Outcome Evaluation:   Patient with no complaints of pain or nausea, eating and drinking OK. Patient up hallways times two to three doing laps.

## 2022-11-26 NOTE — PROGRESS NOTES
"  BMT/Cell Therapy Daily Progress Note      ID Yahaira Fuentes is a 62 year old female with AML, undergoing a 7/8 matched unrelated allo HSCT, day -3.    HPI: Feeling well. Little bit more fatigued and a bit frustrated with cares and interruptions overnight. Triggered sepsis protocol with normal lactate, and wonders why this protocol is in place. No new complaints.      Review of Systems    8 point review with pertinent positive and negatives in HPI    PHYSICAL EXAM      Weight     Wt Readings from Last 3 Encounters:   11/26/22 94.7 kg (208 lb 11.2 oz)   11/21/22 96.4 kg (212 lb 9.6 oz)   11/08/22 97.3 kg (214 lb 6.4 oz)        KPS: 90    BP 98/68 (BP Location: Right arm)   Pulse 57   Temp 97.1  F (36.2  C) (Axillary)   Resp 16   Ht 1.6 m (5' 2.99\")   Wt 94.7 kg (208 lb 11.2 oz)   SpO2 96%   BMI 36.98 kg/m       General: NAD   Eyes: SHANIQUE, sclera anicteric   Nose/Mouth/Throat: OP clear, buccal mucosa moist, no ulcerations. Poor dentition, several missing teeth  Lungs: CTA bilaterally  Cardiovascular: RRR, no M/R/G   Abdominal/Rectal: +BS, soft, NT, ND, No HSM   Lymphatics: Bilateral thick ankles, doughy R = L, no true edema. Varicose veins bilaterally.   Skin: No rashes or petechaie  Neuro: A&O   Musculoskeletal: Muscle mass average, obese   Additional Findings: Hammer site NT, no drainage.    Current aGVHD staging:  Skin 0, UGI 0, LGI 0, Liver 0 (keep in note through day +180 for allos)      LABS AND IMAGING: I have assessed all abnormal lab values for their clinical significance and any values considered clinically significant have been addressed in the assessment and plan.        Lab Results   Component Value Date    WBC 3.7 (L) 11/26/2022    ANEUTAUTO 4.7 11/07/2022    HGB 10.0 (L) 11/26/2022    HCT 32.1 (L) 11/26/2022    PLT 72 (L) 11/26/2022     11/26/2022    POTASSIUM 4.2 11/26/2022    CHLORIDE 106 11/26/2022    CO2 25 11/26/2022    GLC 89 11/26/2022    BUN 19.4 11/26/2022    CR 0.66 " 11/26/2022    MAG 2.3 11/26/2022    INR 1.06 11/22/2022         SYSTEMS-BASED ASSESSMENT AND PLAN     Yahaira Fuentes is a 62 year old female with AML, undergoing allo HSCT, day -3, with uncomplicated admission thus far.    BMT/IEC PROTOCOL for 2015-32  - Chemo protocol:   Day -7: start Allopurinol  Day -6: Cytoxan + Fludarabine  Day -5 through -2: Fludarabine  Day -1: TBI x1  Day 0: Transplant  Day +3 and +4: Cytoxan  Day +5: start GCSF, MMF, and Sirolimus  - Restaging plan: per protocol    HEME/COAG  - Risk of cytopenias due to chemotherapy and radiation  - Transfusion parameters: hemoglobin <7, platelets <10  - Relevant thrombosis or bleeding history: superficial thromboses on OCP decades past. Most recent DVT of right LE, 10/2022, on Lovenox until plts <50k    IMMUNOCOMPROMISED  - Vaccination status: Influenza vaccination after day +60, COVID vaccination after day +100, followed by remaining vaccinations at 12, 14, 24, and 26 months.  - Prophylaxis plan: ACV (CMV donor and recipient negative. Patient EBV and HSV1 positive). Flucazole, levofloxacin while neutropenic. Bactrim to start at day +28 (reported stomach upset with past use, per PharmD note will try at day +28. Can hold if GI symptoms occur).   - Active infections: None    RISK OF GVHD  - Prophylaxis: PTC days 3 and 4. Siro + MMF, day +5    CARDIOVASCULAR  - Asymptomatic mild hypotension:  mL bolus today, monitor  - Risk of cardiomyopathy:  Baseline EF 60-65%  - Risk of arrhythmia: Baseline EKG showed NSR and normal QTC    RESPIRATORY  - Baseline PFTs: wnl  - Risk of respiratory complications: Frequent ambulation and incentive spirometer.    GI/NUTRITION  - Ulcer prophylaxis: Protonix  - Risk of nausea/vomiting due to chemo/radiation: Ondansetron, Lorazepam and Compazine available prn  - Risk of malnutrition: monitoring    RENAL/ELECTROLYTES/  - Hyponatremia -> borderline hypernatremia: resolved spontaneously and related to fluid shifts with  "chemo flush protocol  - Risk of renal injury: IV hydration, cytoxan flush  - Electrolyte management: replace per sliding scale    DIABETES/ENDOCRINE  - Risk of steroid-induced hyperglyemia: Monitor BG, sliding scale if needed    MUSCULOSKELETAL/FRAILTY  - Baseline Frailty Score: 1  - Daily PT/OT as needed while inpatient    SYMPTOM MANAGEMENT  - Nausea from chemo/radiation: Prochlorperazine, ondansetron, lorazepam.  - Pain Assessment: Denies    Known issues that I take into account for medical decisions, with salient changes to the plan considering these complexities noted above.    Patient Active Problem List   Diagnosis     Acute myeloid leukemia in remission (H)     Clinically Significant Risk Factors         # Hyponatremia: Lowest Na = 132 mmol/L (Ref range: 136-145) in last 2 days, will monitor as appropriate        # Thrombocytopenia: Lowest platelets = 72 (Ref range: 150-450) in last 2 days, will monitor for bleeding         # Obesity: Estimated body mass index is 36.98 kg/m  as calculated from the following:    Height as of this encounter: 1.6 m (5' 2.99\").    Weight as of this encounter: 94.7 kg (208 lb 11.2 oz)., PRESENT ON ADMISSION          Today's summary:   - Softer BP, provide 500ml LR    DISPOSITION: on discharge patient should be scheduled with Dr. Garrett for first available appointment at the request of Dr. Zoltan French    Patient was discussed with Dr. Monzon.    William Dawn MD PhD  Hematology/Oncology Fellow  Pgr: [phone]      ______________________________________________      BMT ATTENDING NOTE    Yahaira Fuentes is a 62 year old female, admitted on 11/22/2022, who remains hospitalized pending allogeneic BMT for AML with SLAVA 7/8 URD PBSCT.      I have seen and personally evaluated the patient today.  I reviewed vitals, medications, laboratory results, and I viewed pertinent imaging studies.  After doing so, I formulated a plan with Dr. Dawn as documented in this note.  I have seen " and personally evaluated the patient today.  I spent 30 minutes in the care of Yahaira today, including an independent face-to-face assessment, review of vitals, medications, laboratory results, and independent review of imaging studies.     Key management decisions made by me and carried out under my direction include:   -Continue Fludarabine conditioning.    -Continue nausea regimen as scheduled.   -Continue nutrition and activity.  Walking the halls often.     Counseling and/or coordination of care performed by me:   -Nutrition/activity.   -Discussed sepsis protocol and why it is done, with checks often on BMT floor.     Ranges for vital signs:    Temp:  [96.6  F (35.9  C)-97.9  F (36.6  C)] 97.9  F (36.6  C)  Pulse:  [53-62] 57  Resp:  [16] 16  BP: ()/(53-68) 114/62  SpO2:  [96 %-97 %] 97 %    Infusions:    [START ON 12/1/2022] dextrose 5% and 0.45% NaCl + KCl 20 mEq/L       [START ON 12/4/2022] - MEDICATION INSTRUCTIONS -       sodium chloride Stopped (11/22/22 1120)       Scheduled Medications:    [START ON 11/29/2022] acetaminophen  650 mg Oral Once     acyclovir  800 mg Oral BID     allopurinol  300 mg Oral Daily     [START ON 12/2/2022] Chemotherapy Infusing-Continuous Infusion   Does not apply Q8H     [START ON 12/2/2022] cyclophosphamide (CYTOXAN) [facility] infusion  50 mg/kg (Treatment Plan Adjusted) Intravenous Q24H     [START ON 11/28/2022] dexamethasone  6 mg Oral Once     [START ON 11/27/2022] dexamethasone  8 mg Oral Once     [START ON 12/4/2022] dextrose 5% water  10-20 mL Intracatheter Daily at 8 pm     [START ON 12/4/2022] dextrose 5% water  10-20 mL Intracatheter Daily at 8 pm     [START ON 11/29/2022] diphenhydrAMINE  25 mg Oral Once     enoxaparin ANTICOAGULANT  100 mg Subcutaneous BID     [START ON 12/4/2022] filgrastim  5 mcg/kg (Treatment Plan Recorded) Intravenous Daily at 8 pm     FLUdarabine (FLUDARA) infusion (in 100 mL)  30 mg/m2 (Treatment Plan Recorded) Intravenous Q24H     heparin   5-10 mL Intracatheter Q28 Days     heparin lock flush  5-10 mL Intracatheter Q24H     [START ON 11/28/2022] levofloxacin  250 mg Oral Daily at 10 am     [START ON 12/2/2022] mesna (MESNEX) infusion  50 mg/kg (Treatment Plan Adjusted) Intravenous Q24H     micafungin  50 mg Intravenous Q24H     [START ON 12/4/2022] mycophenolate mofetil  1,500 mg Intravenous Q12H [facility] (10/22)     ondansetron  8 mg Oral Q8H     [START ON 12/2/2022] ondansetron  8 mg Oral Q8H     pantoprazole  40 mg Oral Daily     [START ON 12/5/2022] sirolimus  4 mg Oral Daily     [START ON 12/4/2022] sirolimus  8 mg Oral Once     [START ON 12/27/2022] sulfamethoxazole-trimethoprim  1 tablet Oral Q Mon Tues BID     ursodiol  300 mg Oral TID         Roderick Monzon MD, PhD  BMT/Cellular Therapy/ Oncology       Securely message with the Vocera Web Console

## 2022-11-26 NOTE — PROGRESS NOTES
Stop time on MAR & chart I & O  Chemo drug Fludarabine  Tolerated Well  Intervention Monitored infusion  Response No side effects noted.  Plan given last dose tomorrow

## 2022-11-26 NOTE — PLAN OF CARE
Problem: Pain Acute  Goal: Optimal Pain Control and Function  Intervention: Prevent or Manage Pain  Recent Flowsheet Documentation  Taken 11/26/2022 1600 by Airam Soot RN  Medication Review/Management: medications reviewed    Provided pt care x 4 hrs.   S-pt reports no pain-walking tenorio freq-self care. Iv fluid bolus given--presently down in fluids for the day by 780cc approx. Steady gait. BP wnl. Lungs clear.   B-induction phse of tx  A-self care at this time  R-intermitant assessment with focus on chemo induction phase of tx

## 2022-11-26 NOTE — PLAN OF CARE
VSS on RA. A&Ox4. Up independently in room. Voiding spontaneously and no BM this shift. Denies pain or nausea. RCVC hep locked. Sepsis protocol triggered and lactic acid 0.9. No replacements needed this AM.    Problem: Stem Cell/Bone Marrow Transplant  Goal: Optimal Coping with Transplant  Outcome: Progressing    Goal Outcome Evaluation:      Plan of Care Reviewed With: patient    Overall Patient Progress: no change

## 2022-11-27 NOTE — PROGRESS NOTES
Stop time on MAR & chart I & O  Chemo drug fludarabine  Tolerated well  Intervention monitored infusion  Response  Plan TBI tomorrow

## 2022-11-27 NOTE — PLAN OF CARE
Goal Outcome Evaluation:    Patient has no complaints, eating and drinking well. Showered after her chemo infusion was done and walking the halls multiple times this shift.

## 2022-11-27 NOTE — PROGRESS NOTES
"  BMT/Cell Therapy Daily Progress Note      ID Yahaira Fuentes is a 62 year old female with AML, undergoing a 7/8 matched unrelated allo HSCT, day -2.    HPI: Continues to feel well. No complaints today. Continuing [place] and feeling reasonably energetic. Eating well. Has not had a bowel movement in a few days, but states that this is typical for her. We agreed to use a PRN by this evening or tomorrow if still without a BM.      Review of Systems    8 point review with pertinent positive and negatives in HPI    PHYSICAL EXAM      Weight     Wt Readings from Last 3 Encounters:   11/27/22 94.3 kg (207 lb 14.4 oz)   11/21/22 96.4 kg (212 lb 9.6 oz)   11/08/22 97.3 kg (214 lb 6.4 oz)        KPS: 90    /61 (BP Location: Right arm)   Pulse 97   Temp 96.9  F (36.1  C) (Axillary)   Resp 16   Ht 1.6 m (5' 2.99\")   Wt 94.3 kg (207 lb 14.4 oz)   SpO2 98%   BMI 36.84 kg/m       General: NAD   Eyes: SHANIQUE, sclera anicteric   Nose/Mouth/Throat: No oral lesions.  Lungs: CTA bilaterally  Cardiovascular: RRR, no M/R/G   Abdominal/Rectal: +BS, soft, NT, ND, No HSM   Lymphatics: Bilateral thick ankles, doughy R = L, no true edema. Varicose veins bilaterally.   Skin: No rashes or petechaie, a few scattered ecchymoses  Neuro: A&O   Musculoskeletal: Muscle mass average, obese   Additional Findings: Hammer site NT, no drainage.    Current aGVHD staging:  Skin 0, UGI 0, LGI 0, Liver 0 (keep in note through day +180 for allos)      LABS AND IMAGING: I have assessed all abnormal lab values for their clinical significance and any values considered clinically significant have been addressed in the assessment and plan.        Lab Results   Component Value Date    WBC 2.2 (L) 11/27/2022    ANEUTAUTO 4.7 11/07/2022    HGB 10.0 (L) 11/27/2022    HCT 31.4 (L) 11/27/2022    PLT 55 (L) 11/27/2022     11/27/2022    POTASSIUM 4.1 11/27/2022    CHLORIDE 107 11/27/2022    CO2 25 11/27/2022    GLC 85 11/27/2022 "    BUN 15.9 11/27/2022    CR 0.60 11/27/2022    MAG 2.3 11/26/2022    INR 1.06 11/22/2022         SYSTEMS-BASED ASSESSMENT AND PLAN     Yahaira Fuentes is a 62 year old female with AML, undergoing allo HSCT, day -2, with uncomplicated admission thus far.    BMT/IEC PROTOCOL for 2015-32  - Chemo protocol:   Day -7: start Allopurinol  Day -6: Cytoxan + Fludarabine  Day -5 through -2: Fludarabine  Day -1: TBI x1  Day 0: Transplant  Day +3 and +4: Cytoxan  Day +5: start GCSF, MMF, and Sirolimus  - Restaging plan: per protocol    HEME/COAG  - Risk of cytopenias due to chemotherapy and radiation  - Transfusion parameters: hemoglobin <7, platelets <10  - Relevant thrombosis or bleeding history: superficial thromboses on OCP decades past. Most recent DVT of right LE (distal veins), 10/19/2022, holding lovenox given thrombocytopenia    IMMUNOCOMPROMISED  - Vaccination status: Influenza vaccination after day +60, COVID vaccination after day +100, followed by remaining vaccinations at 12, 14, 24, and 26 months.  - Prophylaxis plan: ACV (CMV donor and recipient negative. Patient EBV and HSV1 positive). Flucazole, levofloxacin while neutropenic. Bactrim to start at day +28 (reported stomach upset with past use, per PharmD note will try at day +28. Can hold if GI symptoms occur).   - Active infections: None    RISK OF GVHD  - Prophylaxis: PTC days 3 and 4. Siro + MMF, day +5    CARDIOVASCULAR  - Asymptomatic mild hypotension, resolved with IVF, none indicated today  - Risk of cardiomyopathy:  Baseline EF 60-65%  - Risk of arrhythmia: Baseline EKG showed NSR and normal QTC    RESPIRATORY  - Baseline PFTs: wnl  - Risk of respiratory complications: Frequent ambulation and incentive spirometer.    GI/NUTRITION  - Ulcer prophylaxis: Protonix  - Risk of nausea/vomiting due to chemo/radiation: Ondansetron, Lorazepam and Compazine available prn  - Risk of malnutrition: monitoring    RENAL/ELECTROLYTES/  - Hyponatremia -> borderline  "hypernatremia: resolved spontaneously and related to fluid shifts with chemo flush protocol  - Risk of renal injury: IV hydration, cytoxan flush  - Electrolyte management: replace per sliding scale    DIABETES/ENDOCRINE  - Risk of steroid-induced hyperglyemia: Monitor BG, sliding scale if needed    MUSCULOSKELETAL/FRAILTY  - Baseline Frailty Score: 1  - Daily PT/OT as needed while inpatient    SYMPTOM MANAGEMENT  - Nausea from chemo/radiation: Prochlorperazine, ondansetron, lorazepam.  - Pain Assessment: Denies    Known issues that I take into account for medical decisions, with salient changes to the plan considering these complexities noted above.    Patient Active Problem List   Diagnosis     Acute myeloid leukemia in remission (H)     Clinically Significant Risk Factors                # Thrombocytopenia: Lowest platelets = 55 (Ref range: 150-450) in last 2 days, will monitor for bleeding         # Obesity: Estimated body mass index is 36.84 kg/m  as calculated from the following:    Height as of this encounter: 1.6 m (5' 2.99\").    Weight as of this encounter: 94.3 kg (207 lb 14.4 oz).           Today's summary:   - Cont fludarabine per protocol, TBI tomorrow  - Hold lovenox, will need to restart once plts have recovered    DISPOSITION: on discharge patient should be scheduled with Dr. Garrett for first available appointment at the request of Dr. Zoltan French    Patient was discussed with Dr. Monzon.    William Dawn MD PhD  Hematology/Oncology Fellow  Pgr: [phone]  ______________________________________________      BMT ATTENDING NOTE    Yahaira Fuentes is a 62 year old female, admitted on 11/22/2022, who remains hospitalized pending allogeneic BMT for AML with SLAVA 7/8 URD PBSCT. She is currently Day -2.   She is doing well and is up and walking the halls frequently.  Tolerating conditioning regimen without issues.  We will try a PRN for bowels this evening.  Nutrition and PO intake have been good " thus far.      I have seen and personally evaluated the patient today.  I reviewed vitals, medications, laboratory results, and I viewed pertinent imaging studies.  After doing so, I formulated a plan with Dr. Dawn as documented in this note.  I have seen and personally evaluated the patient today.  I spent 25 minutes in the care of Yahaira today, including an independent face-to-face assessment, review of vitals, medications, laboratory results, and independent review of imaging studies.     Key management decisions made by me and carried out under my direction include:   -Continue conditioning.   -PRN for bowels this evening. Constipation likely 2/2 antiemetics.     Counseling and/or coordination of care performed by me:    -Nutrition/activity.     Ranges for vital signs:    Temp:  [96.8  F (36  C)-97.9  F (36.6  C)] 96.8  F (36  C)  Pulse:  [57-97] 58  Resp:  [14-16] 16  BP: (107-124)/(52-70) 112/52  SpO2:  [94 %-99 %] 94 %    Infusions:    [START ON 12/1/2022] dextrose 5% and 0.45% NaCl + KCl 20 mEq/L       [START ON 12/4/2022] - MEDICATION INSTRUCTIONS -       sodium chloride Stopped (11/22/22 1120)       Scheduled Medications:    [START ON 11/29/2022] acetaminophen  650 mg Oral Once     acyclovir  800 mg Oral BID     allopurinol  300 mg Oral Daily     [START ON 12/2/2022] Chemotherapy Infusing-Continuous Infusion   Does not apply Q8H     [START ON 12/2/2022] cyclophosphamide (CYTOXAN) [facility] infusion  50 mg/kg (Treatment Plan Adjusted) Intravenous Q24H     [START ON 11/28/2022] dexamethasone  6 mg Oral Once     [START ON 12/4/2022] dextrose 5% water  10-20 mL Intracatheter Daily at 8 pm     [START ON 12/4/2022] dextrose 5% water  10-20 mL Intracatheter Daily at 8 pm     [START ON 11/29/2022] diphenhydrAMINE  25 mg Oral Once     [START ON 12/4/2022] filgrastim  5 mcg/kg (Treatment Plan Recorded) Intravenous Daily at 8 pm     heparin  5-10 mL Intracatheter Q28 Days     heparin lock flush  5-10 mL Intracatheter  Q24H     [START ON 11/28/2022] levofloxacin  250 mg Oral Daily at 10 am     [START ON 12/2/2022] mesna (MESNEX) infusion  50 mg/kg (Treatment Plan Adjusted) Intravenous Q24H     micafungin  50 mg Intravenous Q24H     [START ON 12/4/2022] mycophenolate mofetil  1,500 mg Intravenous Q12H [facility] (10/22)     ondansetron  8 mg Oral Q8H     [START ON 12/2/2022] ondansetron  8 mg Oral Q8H     pantoprazole  40 mg Oral Daily     [START ON 12/5/2022] sirolimus  4 mg Oral Daily     [START ON 12/4/2022] sirolimus  8 mg Oral Once     [START ON 12/27/2022] sulfamethoxazole-trimethoprim  1 tablet Oral Q Mon Tues BID     ursodiol  300 mg Oral TID         Roderick Monzon MD, PhD  BMT/Cellular Therapy/ Oncology       Securely message with the Vocera Web Console

## 2022-11-27 NOTE — PLAN OF CARE
"/70 (BP Location: Right arm)   Pulse 58   Temp 96.9  F (36.1  C) (Axillary)   Resp 14   Ht 1.6 m (5' 2.99\")   Wt 94.7 kg (208 lb 11.2 oz)   SpO2 98%   BMI 36.98 kg/m      Afebrile, slightly bradycardic, HR upper 50s. OVSS on RA. Pt denies pain, N/V, diarrhea, SOB, dizziness. Independent and steady on feet. No BM since 11/24 per pt. Voiding spontaneously. No replacements needed per labs. Pt slept well between cares and is up walking the halls this morning. Plan for fludarabine today, and continue POC.    Problem: Plan of Care - These are the overarching goals to be used throughout the patient stay.    Goal: Plan of Care Review  Description: The Plan of Care Review/Shift note should be completed every shift.  The Outcome Evaluation is a brief statement about your assessment that the patient is improving, declining, or no change.  This information will be displayed automatically on your shift note.  Outcome: Progressing  Goal: Optimal Comfort and Wellbeing  Outcome: Progressing     Problem: Stem Cell/Bone Marrow Transplant  Goal: Symptom-Free Urinary Elimination  Outcome: Progressing  Goal: Diarrhea Symptom Control  Outcome: Progressing  Goal: Improved Activity Tolerance  Outcome: Progressing  Intervention: Promote Improved Energy  Recent Flowsheet Documentation  Taken 11/26/2022 2000 by Maria R Blakely RN  Activity Management:    up ad donita    activity adjusted per tolerance    activity encouraged  Goal: Nausea and Vomiting Symptom Relief  Outcome: Progressing  Goal: Optimal Nutrition Intake  Outcome: Progressing     Problem: Fall Injury Risk  Goal: Absence of Fall and Fall-Related Injury  Outcome: Progressing  Intervention: Identify and Manage Contributors  Recent Flowsheet Documentation  Taken 11/26/2022 2000 by Maria R Blakely RN  Medication Review/Management: medications reviewed  Intervention: Promote Injury-Free Environment  Recent Flowsheet Documentation  Taken 11/26/2022 2000 by Maria R Blakely, " RN  Safety Promotion/Fall Prevention:    assistive device/personal items within reach    clutter free environment maintained    fall prevention program maintained    nonskid shoes/slippers when out of bed    patient and family education    safety round/check completed     Problem: Oral Intake Inadequate  Goal: Improved Oral Intake  Outcome: Progressing

## 2022-11-28 NOTE — PROGRESS NOTES
"  BMT/Cell Therapy Daily Progress Note      ID Yahaira Fuentes is a 62 year old female with AML, undergoing a 7/8 matched unrelated allo HSCT, day -1.    HPI: Continues to feel well. Walking the halls. Eating well without n/v/d. Already went to TBI this morning, returns without focal complaints.      Review of Systems    8 point review with pertinent positive and negatives in HPI    PHYSICAL EXAM      Weight     Wt Readings from Last 3 Encounters:   11/28/22 95 kg (209 lb 8 oz)   11/21/22 96.4 kg (212 lb 9.6 oz)   11/08/22 97.3 kg (214 lb 6.4 oz)        KPS: 90    /70 (BP Location: Right arm)   Pulse 58   Temp 97.9  F (36.6  C) (Axillary)   Resp 16   Ht 1.6 m (5' 2.99\")   Wt 95 kg (209 lb 8 oz)   SpO2 97%   BMI 37.12 kg/m       General: NAD   Eyes: SHANIQUE, sclera anicteric   Nose/Mouth/Throat: No oral lesions.  Lungs: CTA bilaterally  Cardiovascular: RRR, no M/R/G   Abdominal/Rectal: +BS, soft, NT, ND, No HSM   Lymphatics: Bilateral thick ankles, doughy R = L, no true edema. Varicose veins bilaterally.   Skin: No rashes or petechaie, a few scattered ecchymoses  Neuro: A&O   Musculoskeletal: Muscle mass average, obese   Additional Findings: Hammer site NT, no drainage.    Current aGVHD staging:  Skin 0, UGI 0, LGI 0, Liver 0 (keep in note through day +180 for allos)      LABS AND IMAGING: I have assessed all abnormal lab values for their clinical significance and any values considered clinically significant have been addressed in the assessment and plan.        Lab Results   Component Value Date    WBC 1.7 (L) 11/28/2022    ANEUTAUTO 4.7 11/07/2022    HGB 9.8 (L) 11/28/2022    HCT 31.3 (L) 11/28/2022    PLT 47 (LL) 11/28/2022     11/28/2022    POTASSIUM 3.9 11/28/2022    CHLORIDE 107 11/28/2022    CO2 25 11/28/2022    GLC 82 11/28/2022    BUN 18.2 11/28/2022    CR 0.65 11/28/2022    MAG 2.3 11/28/2022    INR 1.01 11/28/2022         SYSTEMS-BASED ASSESSMENT AND PLAN     Yahaira Fuentes is a 62 " year old female with AML, undergoing allo HSCT, day -1, with uncomplicated admission thus far.    BMT/IEC PROTOCOL for 2015-32  - Chemo protocol:   Day -7: start Allopurinol  Day -6: Cytoxan + Fludarabine  Day -5 through -2: Fludarabine  Day -1: TBI x1  Day 0: Transplant  Day +3 and +4: Cytoxan  Day +5: start GCSF, MMF, and Sirolimus  - Restaging plan: per protocol    HEME/COAG  - Risk of cytopenias due to chemotherapy and radiation  - Transfusion parameters: hemoglobin <7, platelets <10  - Relevant thrombosis or bleeding history: superficial thromboses on OCP decades past. Most recent DVT of right LE (distal veins), 10/19/2022, holding lovenox given thrombocytopenia    IMMUNOCOMPROMISED  - Vaccination status: Influenza vaccination after day +60, COVID vaccination after day +100, followed by remaining vaccinations at 12, 14, 24, and 26 months.  - Prophylaxis plan: ACV (CMV donor and recipient negative. Patient EBV and HSV1 positive). Flucazole, levofloxacin while neutropenic. Bactrim to start at day +28 (reported stomach upset with past use, per PharmD note will try at day +28. Can hold if GI symptoms occur).   - Active infections: None    RISK OF GVHD  - Prophylaxis: PTC days 3 and 4. Siro + MMF, day +5    CARDIOVASCULAR  - Asymptomatic mild hypotension, resolved with IVF, none indicated today  - Risk of cardiomyopathy:  Baseline EF 60-65%  - Risk of arrhythmia: Baseline EKG showed NSR and normal QTC    RESPIRATORY  - Baseline PFTs: wnl  - Risk of respiratory complications: Frequent ambulation and incentive spirometer.    GI/NUTRITION  - Ulcer prophylaxis: Protonix  - Risk of nausea/vomiting due to chemo/radiation: Ondansetron, Lorazepam and Compazine available prn  - Risk of malnutrition: monitoring    RENAL/ELECTROLYTES/  - Risk of renal injury: IV hydration, cytoxan flush  - Electrolyte management: replace per sliding scale    DIABETES/ENDOCRINE  - Risk of steroid-induced hyperglyemia: Monitor BG, sliding  "scale if needed    MUSCULOSKELETAL/FRAILTY  - Baseline Frailty Score: 1  - Daily PT/OT as needed while inpatient    SYMPTOM MANAGEMENT  - Nausea from chemo/radiation: Prochlorperazine, ondansetron, lorazepam.  - Pain Assessment: Denies    Known issues that I take into account for medical decisions, with salient changes to the plan considering these complexities noted above.    Patient Active Problem List   Diagnosis     Acute myeloid leukemia in remission (H)     Clinically Significant Risk Factors                # Thrombocytopenia: Lowest platelets = 47 (Ref range: 150-450) in last 2 days, will monitor for bleeding         # Obesity: Estimated body mass index is 37.12 kg/m  as calculated from the following:    Height as of this encounter: 1.6 m (5' 2.99\").    Weight as of this encounter: 95 kg (209 lb 8 oz).           Today's summary:   - TBI tomorrow today, transplant tomorrow    DISPOSITION: on discharge patient should be scheduled with Dr. Garrett for first available appointment at the request of Dr. Zoltan French    Patient was discussed with Dr. Monzon.    Aleyda Amado [facility]  [phone]  ______________________________________________          "

## 2022-11-28 NOTE — PROGRESS NOTES
"CLINICAL NUTRITION SERVICES - ASSESSMENT NOTE     Nutrition Prescription    RECOMMENDATIONS FOR MDs/PROVIDERS TO ORDER:  None at this time    Malnutrition Status:    Patient does not meet two of the established criteria necessary for diagnosing malnutrition but is at risk for malnutrition    Recommendations already ordered by Registered Dietitian (RD):  Continue high protein/high calorie diet and encourage supplement use PRN    Future/Additional Recommendations:  Continue to monitor oral PO and need for nutrition support    If TPN is needed:  Dosing weight: 62.9 kg (adjusted)   Access: Central Line   Begin with minimal volume (1200 mL) or max concentration per phamD with initial dextrose of 135 g (GIR= 1.49 mg/kg/min), 100 g AA (1.5 g/kg) and 250 mL 20% clinolipid 6x per week = 1287 kcal (20 kcal/kg)  -Once patient receives 100% of initial PN volume with K>/=3, Mg>/=1.5, and Po4>/=2, advance dextrose by 50-55 g every 1-2 days to goal of 240  (GIR= 2.64 mg/kg/min)  --TPN at goal concentration provides: 240 g dex (815  kcal), 100 g AA (1.5 g/kg) and 250 mL 20% Clinolipid 6x per week =  1644 kcal (26 kcal/kg) and 26% kcal from fat   --Electrolytes/Additives per pharmD, recommend infuvite daily and MTE-4  --Monitor bili, LFTs, TG at the start and weekly thereafter while on TPN  --Monitor for onset of hyperglycemia and need for addition of insulin per pharmD/MD     REASON FOR ASSESSMENT  Yahaira Fuentes is a/an 62 year old female assessed by the dietitian for Nutrition Risk Monitoring.    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     NUTRITION HISTORY  Rebeka was sitting up at edge of bed and ready to complete laps to work toward \"marrow-a-thon\". States she has been consuming 3 meals per day and likely eating a bit more here than at home.  She has been consuming fruit and omelets in the morning or bagel with peanut butter. " " She reported she has been trying to increase carbohydrate intake for energy. Denied any nutrition issues such as nausea, diarrhea, constipation, abdominal pain.     CURRENT NUTRITION ORDERS  Diet: High Kcal/High Protein  Intake/Tolerance: 100%, ordering 2-3 times/d    LABS  WBC: 1.7 (down trending)     MEDICATIONS  Acyclovir  Allopurinol  Decadron  Fludarabine  Protonix  Zofran  Ursodiol    ANTHROPOMETRICS  Height: 160 cm (5' 2.992\")  Most Recent Weight: 95 kg (209 lb 8 oz)    IBW: 52.4 kg (182%)  BMI: Obesity Grade II BMI 35-39.9  Weight History:   11/28/22 95 kg (209 lb 8 oz)   11/22/22 94.9 kg (209 lb 4.8 oz) - admission    11/21/22 96.4 kg (212 lb 9.6 oz)   11/08/22 97.3 kg (214 lb 6.4 oz)   11/07/22 96.2 kg (212 lb)   11/04/22 96.2 kg (212 lb)   11/03/22 96.3 kg (212 lb 4.9 oz)   11/03/22 96.3 kg (212 lb 4.8 oz)   10/21/22 97.3 kg (214 lb 6.4 oz)   09/13/22 97.7 kg (215 lb 4.8 oz)   03/30/14 96.9 kg (213 lb 9.6 oz)     Dosing Weight: 62.9 kg    ASSESSED NUTRITION NEEDS  Estimated Energy Needs: 1573 - 1887 kcals/day (25 - 30 kcals/kg)  Justification: Maintenance  Estimated Protein Needs: 94 grams protein/day (1.5 grams of pro/kg)  Justification: Increased needs  Estimated Fluid Needs: 1573 - 1887 mL/day (1 mL/kcal)   Justification: Maintenance    PHYSICAL FINDINGS  See malnutrition section below.    MALNUTRITION  % Intake: No decreased intake noted  % Weight Loss: None noted  Subcutaneous Fat Loss: None observed  Muscle Loss: Dorsal hand:  mild  Fluid Accumulation/Edema: Trace   Malnutrition Diagnosis: Patient does not meet two of the established criteria necessary for diagnosing malnutrition but is at risk for malnutrition    NUTRITION DIAGNOSIS  Predicted inadequate nutrient intake (energy-protein) related to upcoming BMT as evidenced by day -1 and potential for nutrition side effects to limit intake.    INTERVENTIONS  Implementation  Nutrition Education: Discussed role of dietitian, high protein foods/food " menu items, food safety, supplement use  Collaboration with other providers - 5C rounds    Goals  Patient to consume % of nutritionally adequate meal trays TID, or the equivalent with supplements/snacks.     Monitoring/Evaluation  Progress toward goals will be monitored and evaluated per protocol.    Charito Taylor  Dietetic Intern    Cosigns: Lynette Licona RD, LD  5C/BMT pager: [phone]

## 2022-11-28 NOTE — LETTER
11/28/2022      RE: Yahaira Fuentes  [address] [facility] [address]       No notes on file    Paola Arellano MD

## 2022-11-28 NOTE — LETTER
11/28/2022      RE: Yahaira Fuentes  [address] [facility] [address]       No notes on file    Paola Arellano MD

## 2022-11-28 NOTE — LETTER
11/28/2022         RE: Yahaira Fuentes  [address] [facility] [address]        Dear Colleague,    Thank you for referring your patient, Yahaira Fuentes, to the [facility] RADIATION ONCOLOGY. Please see a copy of my visit note below.    No notes on file    Again, thank you for allowing me to participate in the care of your patient.        Sincerely,        Paola Arellano MD

## 2022-11-28 NOTE — PLAN OF CARE
"/59 (BP Location: Right arm)   Pulse 54   Temp 97.6  F (36.4  C) (Axillary)   Resp 17   Ht 1.6 m (5' 2.99\")   Wt 94.3 kg (207 lb 14.4 oz)   SpO2 98%   BMI 36.84 kg/m      [id number]  Bradycardia, HR between 52-54, OVSS on RA, afebrile. Pt alert and oriented x 4, independent up and donita. Pt denied any pain/nausea/vomiting/SOB/chest pain. Adequate UOP, no BM overnight. In the morning, Hbg 9.8, Plt 47, Potassium 3.9, Mg 2.3, Phos 4.3, no replacement needed. Today is TBI day. Continue with current POC.     Problem: Stem Cell/Bone Marrow Transplant  Goal: Diarrhea Symptom Control  Outcome: Progressing     Problem: Fall Injury Risk  Goal: Absence of Fall and Fall-Related Injury  Outcome: Progressing   Goal Outcome Evaluation:                        "

## 2022-11-28 NOTE — LETTER
11/28/2022         RE: Yahaira Fuentes  [address] [facility] [address]        Dear Colleague,    Thank you for referring your patient, Yahaira Fuentes, to the [facility] RADIATION ONCOLOGY. Please see a copy of my visit note below.    No notes on file    Again, thank you for allowing me to participate in the care of your patient.        Sincerely,        Paola Arellano MD

## 2022-11-29 NOTE — PROGRESS NOTES
Type of transplant: Donor: Allogeneic - Unrelated  Product:   BMT INFUSION DOCUMENTATION (last 48 hours)     BMT/Cellular Product Infusion     Row Name 11/29/22 0800                [REMOVED] Product 11/29/22 1015 HPC, Apheresis    Product Details Product Release Date: 11/29/22  -CV Product Release Time: 1015  -LL Product Type: HPC, Apheresis  -CV DIN: [id number]  -CV Product Description Code: [id number]  -CV Volume Dispensed (mL): 136 mL  -CV Completion Date (RN to complete): 11/29/22  -KG Completion Time (RN to complete): 1134  -KG    Checked by (Patient RN) Mer Abreu RN  -KG       Checked by (Witness) Shelly Guillen RN  -LR       Product Volume Infused (mL) 136 mL  -KG       Flush Volume (mL) 30 mL  -KG       Volume Dispensed (mL) --          [REMOVED] Product 11/29/22 1015 HPC, Apheresis    Product Details Product Release Date: 11/29/22  -CV Product Release Time: 1015 -LL Product Type: HPC, Apheresis  -CV DIN: [id number]  -CV Product Description Code: [id number]  -CV Volume Dispensed (mL): 136 mL  -CV Completion Date (RN to complete): 11/29/22  -KG Completion Time (RN to complete): 1145  -KG    Checked by (Patient RN) Mer Abreu RN  -KG       Checked by (Witness) Shelly Guillen RN  -LR       Product Volume Infused (mL) 136 mL  -KG       Flush Volume (mL) 30 mL  -KG       Volume Dispensed (mL) --          [REMOVED] Product 11/29/22 1015 HPC, Apheresis    Product Details Product Release Date: 11/29/22  -CV Product Release Time: 1015  -LL Product Type: HPC, Apheresis  -CV DIN: [id number]  -CV Product Description Code: [id number]  -CV Volume Dispensed (mL): 74 mL  -CV Completion Date (RN to complete): 11/29/22  -KG Completion Time (RN to complete): 1153  -KG    Checked by (Patient RN) Mer Abreu RN  -KG       Checked by (Witness) Shelly Guillen RN  -LR       Product Volume Infused (mL) 74 mL  -KG       Flush Volume (mL) 30 mL  -KG       Volume Dispensed (mL) --             User Key  (r) = Recorded By, (t) =  Taken By, (c) = Cosigned By    Initials Name Effective Dates    CV Lorrie Stern 11/01/22 -     KG Mer Abreu RN 04/29/14 -     LL JuVandana Mir 07/11/21 -     LR Shelly Guillen RN 04/29/14 -               Preparation: RN Documentation  Patient was premedicated as ordered: yes  Line Type: central line, left  Patient Stable Prior to Infusion: yes  Time Infusion Started: 1125  Teaching: side effects/monitoring  Tolerated/Reaction: Patient tolerance of product infusion  Immediate suspected transfusion reaction to the product: hypotension, bradycardia  Time of reaction: 1132  Did patient have prior history of similar signs/symptoms during this hospitalization?: no  Did the patient tolerate the infusion well: yes  Medications and treatment for symptoms: na  Did the symptoms resolve?: yes  Flush until:1700  Plan:monitor vitals at 1252 and walk with pt when she needs to use restroom- which she did right after transplant and was steady and no complaints of lightheadedness

## 2022-11-29 NOTE — PLAN OF CARE
"/66 (BP Location: Left arm)   Pulse 65   Temp 97.9  F (36.6  C) (Axillary)   Resp 16   Ht 1.6 m (5' 2.99\")   Wt 95 kg (209 lb 8 oz)   SpO2 95%   BMI 37.12 kg/m      Bradycardiac (50s), OVSS on RA. A&Ox4. Up independently. Denies pain, nausea, and diarrhea. Voiding well and had 1 BM this shift. Weekly COVID test was negative. Got TBI this am. Continue with POC.    Plan: 3 bag transplant at 11 am tomorrow 11/29.    Problem: Plan of Care - These are the overarching goals to be used throughout the patient stay.    Goal: Absence of Hospital-Acquired Illness or Injury  Outcome: Progressing  Intervention: Identify and Manage Fall Risk  Recent Flowsheet Documentation  Taken 11/28/2022 0852 by Jossue Hackett RN  Safety Promotion/Fall Prevention:    assistive device/personal items within reach    clutter free environment maintained    fall prevention program maintained    nonskid shoes/slippers when out of bed    patient and family education  Intervention: Prevent Skin Injury  Recent Flowsheet Documentation  Taken 11/28/2022 0852 by Jossue Hackett RN  Body Position: position changed independently  Goal: Optimal Comfort and Wellbeing  Outcome: Progressing  Goal: Readiness for Transition of Care  Outcome: Progressing     Problem: Stem Cell/Bone Marrow Transplant  Goal: Optimal Coping with Transplant  Outcome: Progressing     Problem: Fall Injury Risk  Goal: Absence of Fall and Fall-Related Injury  Outcome: Progressing  Intervention: Identify and Manage Contributors  Recent Flowsheet Documentation  Taken 11/28/2022 0852 by Jossue Hackett RN  Medication Review/Management: medications reviewed  Intervention: Promote Injury-Free Environment  Recent Flowsheet Documentation  Taken 11/28/2022 0852 by Jossue Hackett, [place]  Safety Promotion/Fall Prevention:    assistive device/personal items within reach    clutter free environment maintained    fall prevention program maintained    nonskid shoes/slippers when out of bed    " patient and family education

## 2022-11-29 NOTE — PROGRESS NOTES
BMT/Cellular Allogeneic Product Infusion       Patient Vitals for the past 24 hrs:   Temp Temp src Pulse Resp BP   11/28/22 1521 97.9  F (36.6  C) Axillary 65 16 122/66   11/28/22 1923 97.6  F (36.4  C) Axillary 58 16 118/72   11/28/22 2343 97.6  F (36.4  C) Axillary 54 17 109/50   11/29/22 0430 97.9  F (36.6  C) Axillary 58 17 112/46   11/29/22 0726 97.8  F (36.6  C) Axillary 57 18 116/53   11/29/22 1112 98.2  F (36.8  C) Axillary 67 18 100/48   11/29/22 1132 -- -- (!) 47 -- --   11/29/22 1134 97.9  F (36.6  C) Axillary 63 18 (!) 86/43   11/29/22 1138 -- -- 71 -- 105/57   11/29/22 1145 98.4  F (36.9  C) Axillary 72 16 110/55   11/29/22 1152 98  F (36.7  C) Axillary 67 16 116/62      BMT INFUSION DOCUMENTATION (last 48 hours)     BMT/Cellular Product Infusion     Row Name 11/29/22 0800                [REMOVED] Product 11/29/22 1015 HPC, Apheresis    Product Details Product Release Date: 11/29/22  -CV Product Release Time: 1015  -LL Product Type: HPC, Apheresis  -CV DIN: [id number]  -CV Product Description Code: [id number]  -CV Volume Dispensed (mL): 136 mL  -CV Completion Date (RN to complete): 11/29/22  -KG Completion Time (RN to complete): 1134  -KG    Checked by (Patient RN) Mer Abreu RN  -KG       Checked by (Witness) Shelly Guillen RN  -LR       Product Volume Infused (mL) 136 mL  -KG       Flush Volume (mL) 30 mL  -KG       Volume Dispensed (mL) --          [REMOVED] Product 11/29/22 1015 HPC, Apheresis    Product Details Product Release Date: 11/29/22  -CV Product Release Time: 1015  -LL Product Type: HPC, Apheresis  -CV DIN: [id number]  -CV Product Description Code: [id number]  -CV Volume Dispensed (mL): 136 mL  -CV Completion Date (RN to complete): 11/29/22  -KG Completion Time (RN to complete): 1145  -KG    Checked by (Patient RN) Mer Abreu RN  -KG       Checked by (Witness) Shelly Guillen RN  -LR       Product Volume Infused (mL) 136 mL  -KG       Flush Volume (mL) 30 mL  -KG       Volume Dispensed  (mL) --          [REMOVED] Product 11/29/22 1015 HPC, Apheresis    Product Details Product Release Date: 11/29/22  -CV Product Release Time: 1015  -LL Product Type: HPC, Apheresis  -CV DIN: [id number]  -CV Product Description Code: [id number]  -CV Volume Dispensed (mL): 74 mL  -CV Completion Date (RN to complete): 11/29/22  -KG Completion Time (RN to complete): 1153  -KG    Checked by (Patient RN) Mer Abreu RN  -KG       Checked by (Witness) Shelly Guillen RN  -LR       Product Volume Infused (mL) 74 mL  -KG       Flush Volume (mL) 30 mL  -KG       Volume Dispensed (mL) --             User Key  (r) = Recorded By, (t) = Taken By, (c) = Cosigned By    Initials Name Effective Dates    CV Lorrie Stern 11/01/22 -     KG Mer Abreu RN 04/29/14 -     LL Vandana Dodd 07/11/21 -     LR Shelly Guillen RN 04/29/14 -               Allogeneic Donor Eligibility Determination and Summary of Records: Ineligible  Special release form completed: yes  Comment: Special Release signed by Dr. Garrett  Type of Infusion: Allogeneic      Baseline Pre-Infusion Evaluation (to be completed by Provider):   Dyspnea: Grade 0 - none  Hypoxia: Grade 0 - not present  Fever: Grade 0 - afebrile  Chills: Grade 0 - none  Febrile Neutropenia: Grade 0 - not present  Sinus Bradycardia: Grade 1 - asymptomatic, intervention not indicated  Hypertension: Grade 0 - none  Hypotension: Grade 1 - asymptomatic; intervention not indicated  Chest Pain: Grade 0 - none  Bronchospasm: Grade 0 - none  Pain: Grade 0 - none  Rash: Grade 0 - None  Neurologic Specify: none    If adverse reactions, events or complications occur (fever greater than 2 degrees fahrenheit increase, and severe reactions of the following types: chills, dyspnea, bronchospasm, hyper/hypotension, hypoxia, bradycardia, chest pain, back/flank pain, hypoxia, and any other reaction deemed severe or life threatening; any instance of product bag breakage or unusual product appearance)    Any  other events that are >= grade 3, then immediately contact the BMT Attending physician, the Cell Therapy Laboratory Medical Director (pager [phone]) and the Cell Therapy Laboratory ([phone]).  After midnight, holidays & weekends contact the [facility] on the appropriate campus ([facility] [place]: [phone]; [facility] [place]: [phone]).    Aleyda Amado PA-C

## 2022-11-29 NOTE — PROGRESS NOTES
BMT/Cellular Allogeneic Product Infusion       Patient Vitals for the past 24 hrs:   Temp Temp src Pulse Resp BP   11/28/22 1148 97.5  F (36.4  C) Axillary 67 16 114/60   11/28/22 1521 97.9  F (36.6  C) Axillary 65 16 122/66   11/28/22 1923 97.6  F (36.4  C) Axillary 58 16 118/72   11/28/22 2343 97.6  F (36.4  C) Axillary 54 17 109/50   11/29/22 0430 97.9  F (36.6  C) Axillary 58 17 112/46   11/29/22 0726 97.8  F (36.6  C) Axillary 57 18 116/53      BMT INFUSION DOCUMENTATION (last 48 hours)     BMT/Cellular Product Infusion     Row Name                  Product 11/29/22 HPC, Apheresis    Product Details Product Release Date: 11/29/22  -CV Product Type: HPC, Apheresis  -CV DIN: [id number]  -CV Product Description Code: [id number]  -CV Volume Dispensed (mL): 136 mL  -CV    Checked by (Patient RN) --       Checked by (Witness) --       Product Volume Infused (mL) --       Flush Volume (mL) --       Volume Dispensed (mL) --          Product 11/29/22 HPC, Apheresis    Product Details Product Release Date: 11/29/22  -CV Product Type: HPC, Apheresis  -CV DIN: [id number]  -CV Product Description Code: [id number]  -CV Volume Dispensed (mL): 136 mL  -CV    Checked by (Patient RN) --       Checked by (Witness) --       Product Volume Infused (mL) --       Flush Volume (mL) --       Volume Dispensed (mL) --          Product 11/29/22 HPC, Apheresis    Product Details Product Release Date: 11/29/22  -CV Product Type: HPC, Apheresis  -CV DIN: [id number]  -CV Product Description Code: [id number]  -CV Volume Dispensed (mL): 74 mL  -CV    Checked by (Patient RN) --       Checked by (Witness) --       Product Volume Infused (mL) --       Flush Volume (mL) --       Volume Dispensed (mL) --             User Key  (r) = Recorded By, (t) = Taken By, (c) = Cosigned By    Initials Name Effective Dates    CV Leena, Lorrie 11/01/22 -               Allogeneic Donor Eligibility Determination and Summary of Records: Ineligible  Special  release form completed: yes  Comment: Special Release signed by Dr. Garrett  Type of Infusion: Allogeneic      Baseline Pre-Infusion Evaluation (to be completed by Provider):   Dyspnea: Grade 0 - none  Hypoxia: Grade 0 - not present  Fever: Grade 0 - afebrile  Chills: Grade 0 - none  Febrile Neutropenia: Grade 0 - not present  Sinus Bradycardia: Grade 1 - asymptomatic   Hypertension: Grade 1 - prehypertension (systolic -139 mm Hg or diastolic BP 80-89 mm Hg)  Hypotension: Grade 0 - none  Chest Pain: Grade 0 - none  Bronchospasm: Grade 0 - none  Pain: Grade 0 - none  Rash: Grade 0 - None  Neurologic Specify: none    If adverse reactions, events or complications occur (fever greater than 2 degrees fahrenheit increase, and severe reactions of the following types: chills, dyspnea, bronchospasm, hyper/hypotension, hypoxia, bradycardia, chest pain, back/flank pain, hypoxia, and any other reaction deemed severe or life threatening; any instance of product bag breakage or unusual product appearance)    Any other events that are >= grade 3, then immediately contact the BMT Attending physician, the Cell Therapy Laboratory Medical Director (pager [phone]) and the Cell Therapy Laboratory ([phone]).  After midnight, holidays & weekends contact the [facility] on the appropriate campus ([facility] [place]: [phone]; [facility] [place]: [phone]).    Aleyda Amado PA-C

## 2022-11-29 NOTE — PROCEDURES
Radiotherapy Treatment Summary     Date of Report: 2022     PATIENT: REMEDIOS GREEN  MEDICAL RECORD NO: [id number]    : 1960     DIAGNOSIS: C92.01 Acute myeloblastic leukemia, in remission     INTENT OF RADIOTHERAPY:  Part of conditioning regimen for stem cell transplantation        Details of the treatments summarized below are found in records kept in the Department of Radiation Oncology at [facility].     Treatment Summary:  Radiation Oncology - Course: 1 Protocol:   Treatment Site    Dose          Modality From To Elapsed Days Fx.  1 TBI    200 cGy 18 X 11/28/2022 2022   1             Dose per Fraction: 200 cGy  Total Dose: 200 cGy                  COMMENTS:   Patient tolerated total body radiation without any acute toxicity.     PAIN MANAGEMENT:   Patient did not require any pain management related to total body radiation.  Pain otherwise managed   by inpatient team.     FOLLOW UP PLAN: Total Body Irradiation was well tolerated.  After discharge from the hospital   the patient will be followed in the Bone Marrow Transplant Clinic.     Nurse Practitioner: Lorin Monsivais NP   Staff Physician: Paola Arellano M.D.        CC:  Meggan French MD                                    Radiation Oncology:  [facility] [address] [place] [address]

## 2022-11-29 NOTE — PLAN OF CARE
"/46 (BP Location: Right arm)   Pulse 58   Temp 97.9  F (36.6  C) (Axillary)   Resp 17   Ht 1.6 m (5' 2.99\")   Wt 95 kg (209 lb 8 oz)   SpO2 98%   BMI 37.12 kg/m    [id number]  Bradycardia, HR between 54-58, OVSS on RA, afebrile. Pt denied any complaints, alert and oriented x 4, independent up and donita. Large amount UOP, no BM overnight. In the morning, no replacement needed. Today is transplant day, 3 bag at 11 am. Continue with current POC.     Problem: Stem Cell/Bone Marrow Transplant  Goal: Blood Counts Within Acceptable Range  Intervention: Monitor and Manage Hematologic Symptoms  Recent Flowsheet Documentation  Taken 11/29/2022 0000 by Adrian Mackenzie RN  Bleeding Precautions: monitored for signs of bleeding  Medication Review/Management: medications reviewed   Goal Outcome Evaluation:                        "

## 2022-11-29 NOTE — PROCEDURES
REMEDIOS GREEN  [facility] Rec #: [id number]  Diagnosis: C92.01 Acute myeloblastic leukemia, in remission        Total Body Irradiation Physician CLAUDIA Note  November 28, 2022             Under my direction a Single fraction of 200cGy TBI was delivered after the set up   parameters were checked in the treatment position in the treatment room. The chart and   set up information were reviewed. The patient's questions were answered.     Objective: Patient appeared relaxed and ready for TBI.  Nausea well controlled.  Assessment: Tolerated the TBI.    Plan: Proceed as per BMT program      Treatment Summary:  Radiation Oncology - Course: 1 Protocol: 2015-32  Treatment Site Current Dose Modality From To Elapsed Days   Fx.  1 TBI    200 cGy 18 X 11/28/2022 11/28/2022   1                I have checked the following parameters prior to the first treatment:  Patient Identification  Protocol  SSD, Correct placement of the field, correct body position  Prescription thickness  Compensator and beam spoiler placement  Dose prescription, dose rate and energy  OSLs were ordered for verification during the treatment     Paola Arellano MD           Radiation Oncology:  [facility] [address], [address] [place] [id number]

## 2022-11-29 NOTE — PROGRESS NOTES
"  BMT/Cell Therapy Daily Progress Note      ID Yahaira Fuentes is a 62 year old female with AML, undergoing a 7/8 matched unrelated allo HSCT, day 0.    HPI: Yahaira had an uneventful night. She denies any GI symptoms.  Walked two miles in halls yesterday! No URI sx or other focal complaints. Anticipating transplant and \"second birthday\" today.      Review of Systems    8 point review with pertinent positive and negatives in HPI    PHYSICAL EXAM      Weight     Wt Readings from Last 3 Encounters:   11/29/22 94.1 kg (207 lb 6.4 oz)   11/21/22 96.4 kg (212 lb 9.6 oz)   11/08/22 97.3 kg (214 lb 6.4 oz)        KPS: 90    /53 (BP Location: Right arm)   Pulse 57   Temp 97.8  F (36.6  C) (Axillary)   Resp 18   Ht 1.6 m (5' 2.99\")   Wt 94.1 kg (207 lb 6.4 oz)   SpO2 98%   BMI 36.75 kg/m       General: NAD   Eyes: SHANIQUE, sclera anicteric   Nose/Mouth/Throat: No oral lesions.  Lungs: CTA bilaterally  Cardiovascular: RRR, no M/R/G   Abdominal/Rectal: +BS, soft, NT, ND, No HSM   Lymphatics: Bilateral thick ankles, doughy R = L, no true edema. Varicose veins bilaterally.   Skin: No rashes or petechaie, a few scattered ecchymoses  Neuro: A&O   Musculoskeletal: Muscle mass average, obese   Additional Findings: Hammer site NT, no drainage.    Current aGVHD staging:  Skin 0, UGI 0, LGI 0, Liver 0 (keep in note through day +180 for allos)      LABS AND IMAGING: I have assessed all abnormal lab values for their clinical significance and any values considered clinically significant have been addressed in the assessment and plan.        Lab Results   Component Value Date    WBC 1.4 (L) 11/29/2022    ANEUTAUTO 4.7 11/07/2022    HGB 10.0 (L) 11/29/2022    HCT 31.7 (L) 11/29/2022    PLT 46 (LL) 11/29/2022     11/29/2022    POTASSIUM 4.1 11/29/2022    CHLORIDE 106 11/29/2022    CO2 26 11/29/2022    GLC 84 11/29/2022    BUN 20.1 11/29/2022    CR 0.64 11/29/2022    MAG 2.3 11/29/2022    INR 1.01 11/28/2022 "         SYSTEMS-BASED ASSESSMENT AND PLAN     Yahaira Fuentes is a 62 year old female with AML, undergoing allo HSCT, day 0, with uncomplicated admission thus far.    BMT/IEC PROTOCOL for 2015-32  - Chemo protocol:   Day -7: start Allopurinol  Day -6: Cytoxan + Fludarabine  Day -5 through -2: Fludarabine  Day -1: TBI x1  Day 0: Transplant  Day +3 and +4: Cytoxan  Day +5: start GCSF, MMF, and Sirolimus  - Restaging plan: per protocol    HEME/COAG  - Risk of cytopenias due to chemotherapy and radiation  - Transfusion parameters: hemoglobin <7, platelets <10  - Relevant thrombosis or bleeding history: superficial thromboses on OCP decades past. Most recent DVT of right LE (distal veins), 10/19/2022, holding lovenox given thrombocytopenia    IMMUNOCOMPROMISED  - Vaccination status: Influenza vaccination after day +60, COVID vaccination after day +100, followed by remaining vaccinations at 12, 14, 24, and 26 months.  - Prophylaxis plan: ACV (CMV donor and recipient negative) Flucazole, levofloxacin while neutropenic. Bactrim to start at day +28 (reported stomach upset with past use, per PharmD note will try at day +28. Can hold if GI symptoms occur).   - Active infections: None    RISK OF GVHD  - Prophylaxis: PTC days 3 and 4. Siro + MMF, day +5    CARDIOVASCULAR  - Risk of cardiomyopathy:  Baseline EF 60-65%  - Risk of arrhythmia: Baseline EKG showed NSR and normal QTC    GI/NUTRITION  - Ulcer prophylaxis: Protonix  - Risk of nausea/vomiting due to chemo/radiation: Ondansetron, Lorazepam and Compazine available prn  - Risk of malnutrition: monitoring    RENAL/ELECTROLYTES/  - Risk of renal injury: IV hydration, cytoxan flush  - Electrolyte management: replace per sliding scale    DIABETES/ENDOCRINE  - Risk of steroid-induced hyperglyemia: Monitor BG, sliding scale if needed    MUSCULOSKELETAL/FRAILTY  - Baseline Frailty Score: 1  - Daily PT/OT as needed while inpatient    SYMPTOM MANAGEMENT  - Nausea from  "chemo/radiation: Prochlorperazine, ondansetron, lorazepam.  - Pain Assessment: Denies    Known issues that I take into account for medical decisions, with salient changes to the plan considering these complexities noted above.    Patient Active Problem List   Diagnosis     Acute myeloid leukemia in remission (H)     Clinically Significant Risk Factors                # Thrombocytopenia: Lowest platelets = 46 (Ref range: 150-450) in last 2 days, will monitor for bleeding         # Obesity: Estimated body mass index is 36.75 kg/m  as calculated from the following:    Height as of this encounter: 1.6 m (5' 2.99\").    Weight as of this encounter: 94.1 kg (207 lb 6.4 oz).           Today's summary:   Transplant today at 11:00, 3 bags    DISPOSITION: on discharge patient should be scheduled with Dr. Garrett for first available appointment at the request of Dr. Zoltan French    Patient was discussed with Dr. Monzon.    Aleyda Amado [facility]  [phone]  ______________________________________________          "

## 2022-11-29 NOTE — PROGRESS NOTES
Provided pt care x 4hrs. Pt up adlibe in room and tenorio with steady gait. On phone talking with family/friend. Reviewed iv flush to start 0800 am 11-29 with anticipated tx time of approx 11am. Pt with no further questions or concerns at this time. CHG wipes done by pt independantly.

## 2022-11-29 NOTE — PLAN OF CARE
"Pt was fluid flushed for transplant from 8am to 5pm. Pt doing well eating 3 meals per day. Pt had a vasal vagal response in bed during first bag with jan into 40 and bp low 80/40 and a few seconds of unresponsiveness but eyes open and left pupil slightly larger than right and it corrected self within a minute and attending md walked in just as she had cleared and pt was alert and oriented and was calling to place a lunch order. Later PA informed.  Problem: Plan of Care - These are the overarching goals to be used throughout the patient stay.    Goal: Plan of Care Review  Description: The Plan of Care Review/Shift note should be completed every shift.  The Outcome Evaluation is a brief statement about your assessment that the patient is improving, declining, or no change.  This information will be displayed automatically on your shift note.  Outcome: Progressing  Flowsheets (Taken 11/29/2022 1707)  Overall Patient Progress: no change  Goal: Patient-Specific Goal (Individualized)  Description: You can add care plan individualizations to a care plan. Examples of Individualization might be:  \"Parent requests to be called daily at 9am for status\", \"I have a hard time hearing out of my right ear\", or \"Do not touch me to wake me up as it startles me\".  Outcome: Progressing  Goal: Absence of Hospital-Acquired Illness or Injury  Outcome: Progressing  Intervention: Identify and Manage Fall Risk  Recent Flowsheet Documentation  Taken 11/29/2022 0800 by Mer Abreu, RN  Safety Promotion/Fall Prevention:   assistive device/personal items within reach   lighting adjusted   nonskid shoes/slippers when out of bed  Intervention: Prevent Skin Injury  Recent Flowsheet Documentation  Taken 11/29/2022 0800 by Mer Abreu RN  Body Position: position changed independently  Goal: Optimal Comfort and Wellbeing  Outcome: Progressing  Goal: Readiness for Transition of Care  Outcome: Progressing     Problem: Stem Cell/Bone " Marrow Transplant  Goal: Optimal Coping with Transplant  Outcome: Progressing  Goal: Symptom-Free Urinary Elimination  Outcome: Progressing  Goal: Diarrhea Symptom Control  Outcome: Progressing  Goal: Improved Activity Tolerance  Outcome: Progressing  Intervention: Promote Improved Energy  Recent Flowsheet Documentation  Taken 11/29/2022 0800 by Mer Abreu RN  Activity Management:   activity adjusted per tolerance   ambulated to bathroom  Goal: Blood Counts Within Acceptable Range  Outcome: Progressing  Intervention: Monitor and Manage Hematologic Symptoms  Recent Flowsheet Documentation  Taken 11/29/2022 0800 by Mer Abreu RN  Medication Review/Management: medications reviewed  Goal: Absence of Hypersensitivity Reaction  Outcome: Progressing  Goal: Absence of Infection  Outcome: Progressing  Goal: Improved Oral Mucous Membrane Health and Integrity  Outcome: Progressing  Goal: Nausea and Vomiting Symptom Relief  Outcome: Progressing  Goal: Optimal Nutrition Intake  Outcome: Progressing     Problem: Pain Acute  Goal: Optimal Pain Control and Function  Outcome: Progressing  Intervention: Prevent or Manage Pain  Recent Flowsheet Documentation  Taken 11/29/2022 0800 by Mer Abreu RN  Medication Review/Management: medications reviewed     Problem: Fall Injury Risk  Goal: Absence of Fall and Fall-Related Injury  Outcome: Progressing  Intervention: Identify and Manage Contributors  Recent Flowsheet Documentation  Taken 11/29/2022 0800 by Mer Abreu RN  Medication Review/Management: medications reviewed  Intervention: Promote Injury-Free Environment  Recent Flowsheet Documentation  Taken 11/29/2022 0800 by Mer Abreu RN  Safety Promotion/Fall Prevention:   assistive device/personal items within reach   lighting adjusted   nonskid shoes/slippers when out of bed     Problem: Infection  Goal: Absence of Infection Signs and Symptoms  Outcome: Progressing     Problem: Oral Intake  Inadequate  Goal: Improved Oral Intake  Outcome: Progressing   Goal Outcome Evaluation:           Overall Patient Progress: no changeOverall Patient Progress: no change

## 2022-11-30 NOTE — PROVIDER NOTIFICATION
Haroon Brody MD    Pt spiked first fever 100.7. blood cultures drawn. Tylenol administered. Cefepime started. Please order abx scheduled. Please order chest xray, pt does not have conditional order for it. Thanks!    Response: placed orders for abx q 8 hrs scheduled. Also ordered chest x-ray, peripheral blood cultures, and UC.

## 2022-11-30 NOTE — PLAN OF CARE
"Collected UAUC and blood cultures from port-a-cath. Dressing and caps changed.  Problem: Plan of Care - These are the overarching goals to be used throughout the patient stay.    Goal: Plan of Care Review  Description: The Plan of Care Review/Shift note should be completed every shift.  The Outcome Evaluation is a brief statement about your assessment that the patient is improving, declining, or no change.  This information will be displayed automatically on your shift note.  Outcome: Not Progressing  Goal: Patient-Specific Goal (Individualized)  Description: You can add care plan individualizations to a care plan. Examples of Individualization might be:  \"Parent requests to be called daily at 9am for status\", \"I have a hard time hearing out of my right ear\", or \"Do not touch me to wake me up as it startles me\".  Outcome: Not Progressing  Goal: Absence of Hospital-Acquired Illness or Injury  Outcome: Not Progressing  Intervention: Identify and Manage Fall Risk  Recent Flowsheet Documentation  Taken 11/30/2022 0900 by Samson Vicente  Safety Promotion/Fall Prevention:   activity supervised   assistive device/personal items within reach   clutter free environment maintained   fall prevention program maintained   nonskid shoes/slippers when out of bed   safety round/check completed  Intervention: Prevent Skin Injury  Recent Flowsheet Documentation  Taken 11/30/2022 0900 by Samson Vicente  Body Position: position changed independently  Goal: Optimal Comfort and Wellbeing  Outcome: Not Progressing  Goal: Readiness for Transition of Care  Outcome: Not Progressing     Problem: Stem Cell/Bone Marrow Transplant  Goal: Optimal Coping with Transplant  Outcome: Not Progressing  Goal: Symptom-Free Urinary Elimination  Outcome: Not Progressing  Goal: Diarrhea Symptom Control  Outcome: Not Progressing  Goal: Improved Activity Tolerance  Outcome: Not Progressing  Intervention: Promote Improved Energy  Recent Flowsheet " Documentation  Taken 11/30/2022 0900 by Samson Vicente  Activity Management: activity adjusted per tolerance  Goal: Blood Counts Within Acceptable Range  Outcome: Not Progressing  Intervention: Monitor and Manage Hematologic Symptoms  Recent Flowsheet Documentation  Taken 11/30/2022 0900 by Samson Vicente  Medication Review/Management: medications reviewed  Goal: Absence of Hypersensitivity Reaction  Outcome: Not Progressing  Goal: Absence of Infection  Outcome: Not Progressing  Intervention: Prevent and Manage Infection  Recent Flowsheet Documentation  Taken 11/30/2022 0900 by Samson Vicente  Isolation Precautions:   enteric precautions maintained   protective environment maintained  Goal: Improved Oral Mucous Membrane Health and Integrity  Outcome: Not Progressing  Intervention: Promote Oral Comfort and Health  Recent Flowsheet Documentation  Taken 11/30/2022 0900 by Samson Vicente  Oral Care:   oral rinse provided   mouth wash rinse  Goal: Nausea and Vomiting Symptom Relief  Outcome: Not Progressing  Goal: Optimal Nutrition Intake  Outcome: Not Progressing     Problem: Pain Acute  Goal: Optimal Pain Control and Function  Outcome: Not Progressing  Intervention: Prevent or Manage Pain  Recent Flowsheet Documentation  Taken 11/30/2022 0900 by Samson Vicente  Medication Review/Management: medications reviewed     Problem: Fall Injury Risk  Goal: Absence of Fall and Fall-Related Injury  Outcome: Not Progressing  Intervention: Identify and Manage Contributors  Recent Flowsheet Documentation  Taken 11/30/2022 0900 by Samson Vicente  Medication Review/Management: medications reviewed  Intervention: Promote Injury-Free Environment  Recent Flowsheet Documentation  Taken 11/30/2022 0900 by Samson Vicente  Safety Promotion/Fall Prevention:   activity supervised   assistive device/personal items within reach   clutter free environment maintained   fall prevention program maintained   nonskid  shoes/slippers when out of bed   safety round/check completed     Problem: Infection  Goal: Absence of Infection Signs and Symptoms  Outcome: Not Progressing  Intervention: Prevent or Manage Infection  Recent Flowsheet Documentation  Taken 11/30/2022 0900 by Samson Vicente  Isolation Precautions:   enteric precautions maintained   protective environment maintained     Problem: Oral Intake Inadequate  Goal: Improved Oral Intake  Outcome: Not Progressing   Goal Outcome Evaluation:

## 2022-11-30 NOTE — PROGRESS NOTES
"Hospital Medicine  Spiked first fever.  Cefepime (Maxipime ) had been ordered \"ONCE PRN\" already.  Blood culture(s), CXR, urine culture ordered.  Not quite neutropenic - soon ? Today ANC may be < 1000.  Plan:  Continue cefepime (Maxipime ) 2gm Q8 hours.  Tera Paz MD    "

## 2022-11-30 NOTE — PROGRESS NOTES
"  BMT/Cell Therapy Daily Progress Note      ID Yahaira Fuentes is a 62 year old female with AML, undergoing a 7/8 matched unrelated allo HSCT, day +1.    HPI: Yahaira had fever overnight. No focal complaints of dysuria, no cough or congestion. Great appetite, no n/v/d. No rashes, bruises or bleeding.      Review of Systems    8 point review with pertinent positive and negatives in HPI    PHYSICAL EXAM      Weight     Wt Readings from Last 3 Encounters:   11/30/22 94.8 kg (209 lb 1.6 oz)   11/21/22 96.4 kg (212 lb 9.6 oz)   11/08/22 97.3 kg (214 lb 6.4 oz)        KPS: 90    /57   Pulse 78   Temp 100.1  F (37.8  C) (Axillary)   Resp 17   Ht 1.6 m (5' 2.99\")   Wt 94.8 kg (209 lb 1.6 oz)   SpO2 98%   BMI 37.05 kg/m       General: NAD   Eyes: SHANIQUE, sclera anicteric   Nose/Mouth/Throat: No oral lesions.  Lungs: CTA bilaterally  Cardiovascular: RRR, no M/R/G   Abdominal/Rectal: +BS, soft, NT, ND, No HSM   Lymphatics: Bilateral thick ankles, doughy R = L, no true edema. Varicose veins bilaterally.   Skin: No rashes or petechaie, a few scattered ecchymoses  Neuro: A&O   Musculoskeletal: Muscle mass average, obese   Additional Findings: Hammer site NT, no drainage.    Current aGVHD staging:  Skin 0, UGI 0, LGI 0, Liver 0 (keep in note through day +180 for allos)      LABS AND IMAGING: I have assessed all abnormal lab values for their clinical significance and any values considered clinically significant have been addressed in the assessment and plan.        Lab Results   Component Value Date    WBC 1.1 (L) 11/30/2022    ANEUTAUTO 4.7 11/07/2022    HGB 9.6 (L) 11/30/2022    HCT 30.9 (L) 11/30/2022    PLT 32 (LL) 11/30/2022     11/30/2022    POTASSIUM 4.2 11/30/2022    CHLORIDE 105 11/30/2022    CO2 23 11/30/2022    GLC 86 11/30/2022    BUN 16.2 11/30/2022    CR 0.66 11/30/2022    MAG 2.0 11/30/2022    INR 1.01 11/28/2022         SYSTEMS-BASED ASSESSMENT AND PLAN     Yahaira Fuentes is a 62 year old " female with AML, undergoing allo HSCT, day +1, with uncomplicated admission thus far.    BMT/IEC PROTOCOL for 2015-32  - Chemo protocol:   Day -7: start Allopurinol  Day -6: Cytoxan + Fludarabine  Day -5 through -2: Fludarabine  Day -1: TBI x1  Day 0: Transplant  Day +3 and +4: Cytoxan  Day +5: start GCSF, MMF, and Sirolimus  - Restaging plan: per protocol    HEME/COAG  # chemotherapy induced pancytopenia   - cytopenias due to chemotherapy and radiation  - Transfusion parameters: hemoglobin <7, platelets <10  - Relevant thrombosis or bleeding history: superficial thromboses on OCP decades past. Most recent DVT of right LE (distal veins), 10/19/2022, holding lovenox given thrombocytopenia    ID  Fever (ANC 1.1), cefepime started. Bld cx in process; ua, cxr neg. No focal findings  - Vaccination status: Influenza vaccination after day +60, COVID vaccination after day +100, followed by remaining vaccinations at 12, 14, 24, and 26 months.  - Prophylaxis plan: ACV (CMV donor and recipient negative) Flucazole, (hold levaquin while on cefepime).   Bactrim to start at day +28 (reported stomach upset with past use, per PharmD note will try at day +28. Can hold if GI symptoms occur).   - Active infections: None    RISK OF GVHD  - Prophylaxis: PTC days 3 and 4. Siro + MMF, day +5    CARDIOVASCULAR  - 11/29 Around time of transplant, pt had episode of bradycardia with vaso-vagal low BP. Not repeated.  - Risk of cardiomyopathy:  Baseline EF 60-65%  - Risk of arrhythmia: Baseline EKG showed NSR and normal QTC    GI/NUTRITION  - Ulcer prophylaxis: Protonix  - Risk of nausea/vomiting due to chemo/radiation: Ondansetron, Lorazepam and Compazine available prn  - Risk of malnutrition: monitoring    RENAL/ELECTROLYTES/  - Risk of renal injury: IV hydration, cytoxan flush  - Electrolyte management: replace per sliding scale    DIABETES/ENDOCRINE  - Risk of steroid-induced hyperglyemia: Monitor BG, sliding scale if  "needed    MUSCULOSKELETAL/FRAILTY  - Baseline Frailty Score: 1  - Daily PT/OT as needed while inpatient    SYMPTOM MANAGEMENT  - Nausea from chemo/radiation: Prochlorperazine, ondansetron, lorazepam.  - Pain Assessment: Denies    Known issues that I take into account for medical decisions, with salient changes to the plan considering these complexities noted above.    Patient Active Problem List   Diagnosis     Acute myeloid leukemia in remission (H)     Clinically Significant Risk Factors                # Thrombocytopenia: Lowest platelets = 32 (Ref range: 150-450) in last 2 days, will monitor for bleeding         # Obesity: Estimated body mass index is 37.05 kg/m  as calculated from the following:    Height as of this encounter: 1.6 m (5' 2.99\").    Weight as of this encounter: 94.8 kg (209 lb 1.6 oz).               DISPOSITION: on discharge patient should be scheduled with Dr. Garrett for first available appointment at the request of Dr. Zoltan French    Patient was discussed with Dr. Monzon.    Aleyda Amado [facility]  [phone]    ______________________________________________      BMT ATTENDING NOTE    Yahaira Fuentes is a 62 year old female, admitted on 11/22/2022, who remains hospitalized pending engraftment after allogeneic BMT for AML.  She is currently D+1.  She continues to do well and is eating/drinking and walking the halls regularly.  She is developing chemotherapy induced pancytopenias as anticipated with her treatment.  She developed her first neutropenic fever overnight on 11/30 and is on cefepime.        I spent 30 minutes in the care of Yahaira today, including an independent face-to-face assessment, review of vitals, medications, laboratory results, and independent review of imaging studies. I personally performed all of the medical decision making associated with this visit, and I edited the above note to reflect my current plan of care. I spent over 50% of my time counseling the patient/family " today and coordinating care with the team.    Key management decisions made by me and carried out under my direction include:   -Continue supportive cares for BMT.    -Cefepime for neutropenic fevers.   -Transfuse Hgb>7, Plt>10.      Counseling and/or coordination of care performed by me:    -Time course for engraftment.    -Nutrition/activity.      Ranges for vital signs:    Temp:  [97.2  F (36.2  C)-100.7  F (38.2  C)] 99.1  F (37.3  C)  Pulse:  [] 100  Resp:  [17-18] 18  BP: (105-128)/(44-64) 128/64  SpO2:  [95 %-99 %] 97 %    Infusions:    [START ON 12/1/2022] dextrose 5% and 0.45% NaCl + KCl 20 mEq/L       [START ON 12/4/2022] - MEDICATION INSTRUCTIONS -         Scheduled Medications:    acyclovir  800 mg Oral BID     [START ON 12/2/2022] Chemotherapy Infusing-Continuous Infusion   Does not apply Q8H     [START ON 12/2/2022] cyclophosphamide (CYTOXAN) [facility] infusion  50 mg/kg (Treatment Plan Adjusted) Intravenous Q24H     [START ON 12/4/2022] dextrose 5% water  10-20 mL Intracatheter Daily at 8 pm     [START ON 12/4/2022] dextrose 5% water  10-20 mL Intracatheter Daily at 8 pm     [START ON 12/4/2022] filgrastim  5 mcg/kg (Treatment Plan Recorded) Intravenous Daily at 8 pm     heparin  5-10 mL Intracatheter Q28 Days     heparin lock flush  5-10 mL Intracatheter Q24H     levofloxacin  250 mg Oral Daily at 10 am     [START ON 12/2/2022] mesna (MESNEX) infusion  50 mg/kg (Treatment Plan Adjusted) Intravenous Q24H     micafungin  50 mg Intravenous Q24H     [START ON 12/4/2022] mycophenolate mofetil  1,500 mg Intravenous Q12H [facility] (10/22)     [START ON 12/2/2022] ondansetron  8 mg Oral Q8H     pantoprazole  40 mg Oral Daily     [START ON 12/5/2022] sirolimus  4 mg Oral Daily     [START ON 12/4/2022] sirolimus  8 mg Oral Once     [START ON 12/27/2022] sulfamethoxazole-trimethoprim  1 tablet Oral Q Mon Tues BID     ursodiol  300 mg Oral TID         Roderick Monzon MD, PhD  BMT/Cellular Therapy/ Oncology        Securely message with the Vocera Web Console

## 2022-11-30 NOTE — PLAN OF CARE
"Assumed cares [id number]. Pt rounded on hourly.     ./44 (BP Location: Right arm)   Pulse 71   Temp 99.6  F (37.6  C) (Axillary)   Resp 17   Ht 1.6 m (5' 2.99\")   Wt 94.1 kg (207 lb 6.4 oz)   SpO2 96%   BMI 36.75 kg/m      Pt Aox4, able to make needs known. Max temp this shift 100.7. First fever work up done- blood cultures done, cefepime started, tylenol administered, chest xray done, still needs UA/UC. Provider paged and peripheral blood cultures also ordered- 1 set drawn. Triggered sepsis BPA- lactic 1.2. Voiding spontaneously and adequately. No BM this shift. Denies pain, chest pains, SOB, and N/V. Caps changed. Up ad donita. No replacements needed this shift. Resting in between cares.     Plan: still needs UA/UC. continue to follow plan of care and notify MD with changes in condition.     Goal Outcome Evaluation:      Plan of Care Reviewed With: patient    Overall Patient Progress: no changeOverall Patient Progress: no change           "

## 2022-12-01 NOTE — PROVIDER NOTIFICATION
"\" fever 102.2, blood cultures drawn. CXR and UA done yesterday. ANC 1.1, pt day +2 from BMT. Should we start cefepime?  "

## 2022-12-01 NOTE — PROGRESS NOTES
Brief [place] note:    Called for fever of 102.2.  BMT note from today states she is on cefepime; however order is now .  Received one dose today at 5am (now ~16 hrs later).  Will resume cefepime.  BCx, UCx, and CXR done earlier today.    Judi Nelson MD

## 2022-12-01 NOTE — PROGRESS NOTES
"  BMT/Cell Therapy Daily Progress Note      ID Yahaira Fuentes is a 62 year old female with AML, undergoing a 7/8 matched unrelated allo HSCT, day +2.    HPI: Yahaira had fever again overnight. Endorses night sweats, no cough, no dysuria or other concerns. Vomited x1 last night, skipped dinner. Had eaten well otherwise yesterday, eating fine this morning without n/v. No diarrhea.      Review of Systems    8 point review with pertinent positive and negatives in HPI    PHYSICAL EXAM      Weight     Wt Readings from Last 3 Encounters:   12/01/22 93.6 kg (206 lb 5.6 oz)   11/21/22 96.4 kg (212 lb 9.6 oz)   11/08/22 97.3 kg (214 lb 6.4 oz)        KPS: 90    /53 (BP Location: Right arm)   Pulse 81   Temp 98.9  F (37.2  C) (Oral)   Resp 16   Ht 1.6 m (5' 2.99\")   Wt 93.6 kg (206 lb 5.6 oz)   SpO2 96%   BMI 36.56 kg/m       General: NAD   Eyes: SHANIQUE, sclera anicteric   Nose/Mouth/Throat: No oral lesions.  Lungs: CTA bilaterally  Cardiovascular: RRR, no M/R/G   Abdominal/Rectal: +BS, soft, NT, ND, No HSM   Lymphatics: Bilateral thick ankles, doughy R = L, no true edema. Varicose veins bilaterally.   Skin: No rashes or petechaie, a few scattered ecchymoses  Neuro: A&O   Musculoskeletal: Muscle mass average, obese   Additional Findings: Hammer site NT, no drainage.    Current aGVHD staging:  Skin 0, UGI 0, LGI 0, Liver 0 (keep in note through day +180 for allos)      LABS AND IMAGING: I have assessed all abnormal lab values for their clinical significance and any values considered clinically significant have been addressed in the assessment and plan.        Lab Results   Component Value Date    WBC 1.0 (L) 12/01/2022    ANEUTAUTO 4.7 11/07/2022    HGB 9.7 (L) 12/01/2022    HCT 30.6 (L) 12/01/2022    PLT 20 (LL) 12/01/2022     12/01/2022    POTASSIUM 4.1 12/01/2022    CHLORIDE 103 12/01/2022    CO2 24 12/01/2022     (H) 12/01/2022    BUN 11.5 12/01/2022    CR 0.62 12/01/2022    MAG 2.1 12/01/2022 "    INR 1.01 11/28/2022         SYSTEMS-BASED ASSESSMENT AND PLAN     Yahaira Fuentes is a 62 year old female with AML, undergoing allo HSCT, day +2, with uncomplicated admission thus far.    BMT/IEC PROTOCOL for 2015-32  - Chemo protocol:   Day -7: start Allopurinol  Day -6: Cytoxan + Fludarabine  Day -5 through -2: Fludarabine  Day -1: TBI x1  Day 0: Transplant  Day +3 and +4: Cytoxan  Day +5: start GCSF, MMF, and Sirolimus  - Restaging plan: per protocol    HEME/COAG  # chemotherapy induced pancytopenia   - cytopenias due to chemotherapy and radiation  - Transfusion parameters: hemoglobin <7, platelets <10  - Relevant thrombosis or bleeding history: superficial thromboses on OCP decades past. Most recent DVT of right LE (distal veins), 10/19/2022, holding lovenox given thrombocytopenia    ID  11/30 and 12/1 Fever: cefepime started. Bld cx ngtd /in process; ua, cxr neg. No focal findings, lactic acid wnl  - Vaccination status: Influenza vaccination after day +60, COVID vaccination after day +100, followed by remaining vaccinations at 12, 14, 24, and 26 months.  - Prophylaxis plan: ACV (CMV donor and recipient negative) Flucazole, (hold levaquin while on cefepime).   Bactrim to start at day +28 (reported stomach upset with past use, per PharmD note will try at day +28. Can hold if GI symptoms occur).   - Active infections: None    RISK OF GVHD  - Prophylaxis: PTC days 3 and 4. Siro + MMF, day +5    CARDIOVASCULAR  - 11/29 Around time of transplant, pt had episode of bradycardia with vaso-vagal low BP. Not repeated.  - Risk of cardiomyopathy:  Baseline EF 60-65%  - Risk of arrhythmia: Baseline EKG showed NSR and normal QTC    GI/NUTRITION: vomiting x1 11/30, zofran scheduled.  - Ulcer prophylaxis: Protonix  - Risk of nausea/vomiting due to chemo/radiation: Ondansetron, Lorazepam and Compazine available prn  - Risk of malnutrition: monitoring    RENAL/ELECTROLYTES/  - Risk of renal injury: IV hydration, cytoxan  "flush  - Electrolyte management: replace per sliding scale    DIABETES/ENDOCRINE  - Risk of steroid-induced hyperglyemia: Monitor BG, sliding scale if needed    MUSCULOSKELETAL/FRAILTY  - Baseline Frailty Score: 1  - Daily PT/OT as needed while inpatient    SYMPTOM MANAGEMENT  - Nausea from chemo/radiation: Prochlorperazine, ondansetron, lorazepam.  - Pain Assessment: Denies    Known issues that I take into account for medical decisions, with salient changes to the plan considering these complexities noted above.    Patient Active Problem List   Diagnosis     Acute myeloid leukemia in remission (H)     Clinically Significant Risk Factors                # Thrombocytopenia: Lowest platelets = 20 (Ref range: 150-450) in last 2 days, will monitor for bleeding         # Obesity: Estimated body mass index is 36.56 kg/m  as calculated from the following:    Height as of this encounter: 1.6 m (5' 2.99\").    Weight as of this encounter: 93.6 kg (206 lb 5.6 oz).               DISPOSITION: on discharge patient should be scheduled with Dr. Garrett for first available appointment at the request of Dr. Zoltan French    Patient was discussed with Dr. Monzon.    Aleyda Amado [facility]  [phone]    ______________________________________________      BMT ATTENDING NOTE    Yahaira Fuentes is a 62 year old female, admitted on 11/22/2022, who remains hospitalized pending engraftment after allogeneic BMT for AML.  She is currently D+2.  She is feeling a little more tired with the fevers, but is still eating/drinking well and walking the halls.  She is developing chemotherapy induced pancytopenias as anticipated with her treatment.  She developed her first neutropenic fever overnight on 11/30 and is on cefepime, although this may be early CRS from her new graft.  We will see if these resolve with PTCy.      I spent 30 minutes in the care of Yahaira today, including an independent face-to-face assessment, review of vitals, medications, " laboratory results, and independent review of imaging studies. I personally performed all of the medical decision making associated with this visit, and I edited the above note to reflect my current plan of care. I spent over 50% of my time counseling the patient/family today and coordinating care with the team.    Key management decisions made by me and carried out under my direction include:   -Continue supportive cares for BMT.    -Cefepime for neutropenic fevers.   -PTCy as planned for D+3/4.   -Transfuse Hgb>7, Plt>10.      Counseling and/or coordination of care performed by me:    -Time course for engraftment.    -Nutrition/activity.      Ranges for vital signs:    Temp:  [98.9  F (37.2  C)-102.2  F (39  C)] 98.9  F (37.2  C)  Pulse:  [] 81  Resp:  [16-20] 16  BP: (106-128)/(43-78) 117/53  SpO2:  [96 %-100 %] 96 %    Infusions:    dextrose 5% and 0.45% NaCl + KCl 20 mEq/L       [START ON 12/4/2022] - MEDICATION INSTRUCTIONS -         Scheduled Medications:    acyclovir  800 mg Oral BID     ceFEPIme  2 g Intravenous Q8H     [START ON 12/2/2022] Chemotherapy Infusing-Continuous Infusion   Does not apply Q8H     [START ON 12/2/2022] cyclophosphamide (CYTOXAN) [facility] infusion  50 mg/kg (Treatment Plan Adjusted) Intravenous Q24H     [START ON 12/4/2022] dextrose 5% water  10-20 mL Intracatheter Daily at 8 pm     [START ON 12/4/2022] dextrose 5% water  10-20 mL Intracatheter Daily at 8 pm     [START ON 12/4/2022] filgrastim  5 mcg/kg (Treatment Plan Recorded) Intravenous Daily at 8 pm     heparin  5-10 mL Intracatheter Q28 Days     heparin lock flush  5-10 mL Intracatheter Q24H     [Held by provider] levofloxacin  250 mg Oral Daily at 10 am     [START ON 12/2/2022] mesna (MESNEX) infusion  50 mg/kg (Treatment Plan Adjusted) Intravenous Q24H     micafungin  50 mg Intravenous Q24H     [START ON 12/4/2022] mycophenolate mofetil  1,500 mg Intravenous Q12H [facility] (10/22)     ondansetron  8 mg Oral Q8H     pantoprazole   40 mg Oral Daily     [START ON 12/5/2022] sirolimus  4 mg Oral Daily     [START ON 12/4/2022] sirolimus  8 mg Oral Once     [START ON 12/27/2022] sulfamethoxazole-trimethoprim  1 tablet Oral Q Mon Tues BID     ursodiol  300 mg Oral TID         Roderick Monzon MD, PhD  BMT/Cellular Therapy/ Oncology       Securely message with the Vocera Web Console

## 2022-12-01 NOTE — PLAN OF CARE
"/43 (BP Location: Right arm)   Pulse 94   Temp (!) 100.5  F (38.1  C) (Axillary)   Resp 16   Ht 1.6 m (5' 2.99\")   Wt 94.8 kg (209 lb 1.6 oz)   SpO2 100%   BMI 37.05 kg/m      Tmax 100.5, other VSS. Tylenol given x2 and BC drawn. Pt denies pain/nausea. Up independently. Voiding adequately. No BMs reported. L CVC HL. No replacements needed this am. Lactic 0.9. Will continue with POC.   "

## 2022-12-02 NOTE — PROVIDER NOTIFICATION
12/02/22 0600   Call Information   Date of Call 12/02/22   Time of Call 0602   Name of person requesting the team Ashley MCCORMACK   Title of person requesting team RN   RRT Arrival time 0605   Time RRT ended 0623   Reason for call   Type of RRT Adult   Primary reason for call Sepsis suspected   Sepsis Suspected Elevated Lactate level;BMT/Oncology patient with WBC<0.5 or >12 or ANC <500   Was patient transferred from the ED, ICU, or PACU within last 24 hours prior to RRT call? No   SBAR   Situation LA 2.1   Background 62 year old female with AML, undergoing a 7/8 matched unrelated allo HSCT, day +1   Notable History/Conditions Cancer   Assessment A+Ox4, VSS   Interventions Labs   Adjustments to Recommend repeat LA in 4hrs   Patient Outcome   Patient Outcome Stabilized on unit   RRT Team   Attending/Primary/Covering Physician BMT   Date Attending Physician notified 12/02/22   Time Attending Physician notified 0602   Physician(s) Lilliam Randall NP   Lead RN Timur MCCORMACK   RT n/a   Post RRT Intervention Assessment   Post RRT Assessment Stable/Improved   Date Follow Up Done 12/02/22   Time Follow Up Done 0911   Comments Remains afebrile with vss.  RA with 02 sats upper 90's.  Continue to monitor closely for s/s sepsis.

## 2022-12-02 NOTE — PROVIDER NOTIFICATION
Provider (Osiris Yanes) notified via vocera   'FYI-temp 101.3. tylenol given. Blood cultures drawn this morning. On IV cefepime.'

## 2022-12-02 NOTE — PLAN OF CARE
"Tmax: 101.3 MD notified. BC x2 drawn and peripheral BC ordered. CXR and UA/UC done. Currently on IV cefepime. Tylenol given x2 for fevers. Lactic 2.1 this morning rapid called. Planning to recheck lactic at 1030 am, no other orders at this time. Planning for cytoxan this morning. Cytoxan flush started last night, D5 1/2 NS x/ 20 K+ infusing at 200 ml/hr. No replacements needed this morning. Denies pain. Denies n/v. Voiding well. 2 loose stools reported overnight. Unfortunately did not get a good night of sleep because of fevers and being interrupted for things. Independent. Continue plan of care.           Problem: Plan of Care - These are the overarching goals to be used throughout the patient stay.    Goal: Plan of Care Review  Description: The Plan of Care Review/Shift note should be completed every shift.  The Outcome Evaluation is a brief statement about your assessment that the patient is improving, declining, or no change.  This information will be displayed automatically on your shift note.  Outcome: Not Progressing  Goal: Patient-Specific Goal (Individualized)  Description: You can add care plan individualizations to a care plan. Examples of Individualization might be:  \"Parent requests to be called daily at 9am for status\", \"I have a hard time hearing out of my right ear\", or \"Do not touch me to wake me up as it startles me\".  Outcome: Not Progressing  Goal: Absence of Hospital-Acquired Illness or Injury  Outcome: Not Progressing  Goal: Optimal Comfort and Wellbeing  Outcome: Not Progressing  Goal: Readiness for Transition of Care  Outcome: Not Progressing     Problem: Stem Cell/Bone Marrow Transplant  Goal: Optimal Coping with Transplant  Outcome: Not Progressing  Goal: Symptom-Free Urinary Elimination  Outcome: Not Progressing  Goal: Diarrhea Symptom Control  Outcome: Not Progressing  Goal: Improved Activity Tolerance  Outcome: Not Progressing  Goal: Blood Counts Within Acceptable Range  Outcome: Not " Progressing  Goal: Absence of Hypersensitivity Reaction  Outcome: Not Progressing  Goal: Absence of Infection  Outcome: Not Progressing  Goal: Improved Oral Mucous Membrane Health and Integrity  Outcome: Not Progressing  Goal: Nausea and Vomiting Symptom Relief  Outcome: Not Progressing  Goal: Optimal Nutrition Intake  Outcome: Not Progressing     Problem: Pain Acute  Goal: Optimal Pain Control and Function  Outcome: Not Progressing     Problem: Fall Injury Risk  Goal: Absence of Fall and Fall-Related Injury  Outcome: Not Progressing     Problem: Infection  Goal: Absence of Infection Signs and Symptoms  Outcome: Not Progressing     Problem: Oral Intake Inadequate  Goal: Improved Oral Intake  Outcome: Not Progressing   Goal Outcome Evaluation:

## 2022-12-02 NOTE — CODE/RAPID RESPONSE
Rapid Response Team Note    Assessment   A rapid response was called on Yahaira Fuentes due to lactic acidosis.   Febrile last evening, UA and chest x-ray completed and blood cultures ordered for this AM, peripheral and central. Patient is alert and oriented, no new symptoms. Denies new pain and no increase in oxygen requirements.      Plan   -  Continue cefepime  - recheck lactic acid 1030    -  The Internal Medicine primary team was able to be reached and they are in agreement with the above plan.  -  Disposition: The patient will remain on the current unit. We will continue to monitor this patient closely.  -  Reassessment and plan follow-up will be performed by the primary team      BEV Riojas CNP  [facility] RRT [facility] Job Code Contact #[phone]  [facility] Paging/Directory    Hospital Course   Brief Summary of events leading to rapid response:   RRT for sepsis trigger    LACT 2.1 (H) 12/02/2022 05:32 AM   Temp: 100.5  F (38.1  C) (12/02 0606)  Pulse: 89 (12/02 0606)  Resp: 18 (12/02 0606)  BP: 112/56 (12/02 0612)        Lab Results   Component Value Date     WBC 0.3 12/02/2022          Predictive Model Details               8 (High)   Factor Value     Calculated 12/2/2022 06:24 22% SIRS temperature criterion met     Early Detection of Sepsis Model 19% Number of active cephalosporin orders 3       12% SIRS WBC criterion met       11% Age 62       7% Lymphocytes low (0.0 10e3/uL)       6% Number of active electrolyte maintenance orders 2       4% MCHC low (30.9 g/dL)       3% Number of active fluoroquinolone orders 1       2% Hematocrit low (28.5 %)       2% Diagnosis of obesity present       2% Creatinine normal (0.63 mg/dL)       2% Number of active analgesic antipyretic orders 2       2% Number of portacaths 1       1% Number of active antifungal orders 1       1% Number of active glucocorticoid orders 3       1% RDW high (15.1 %)       1% Number of active leukocyte stimulant orders 1       1%  Number of active analgesic narcotic orders 1       1% Number of CVC multiple lumen 1       1% Number of active saline orders 5       0% Platelet count low (14 10e3          Admission Diagnosis:   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  Cv: regular rate and rhythm    Significant Results and Procedures   Lactic Acid:   Recent Labs   Lab Test 22  0532 22  0613 22  0557   LACT 2.1* 0.9 1.2     CBC:   Recent Labs   Lab Test 22  0307 22  0318 22  0455   WBC 0.3* 1.0* 1.1*   HGB 8.8* 9.7* 9.6*   HCT 28.5* 30.6* 30.9*   PLT 14* 20* 32*        Sepsis Evaluation   Sepsis work-up in progress

## 2022-12-02 NOTE — PLAN OF CARE
"/66 (BP Location: Right arm)   Pulse 96   Temp 99.4  F (37.4  C) (Axillary)   Resp 18   Ht 1.6 m (5' 2.99\")   Wt 93.6 kg (206 lb 5.6 oz)   SpO2 97%   BMI 36.56 kg/m       Patient afebrile, vital signs stable, no pain or nausea reported. On enteric precautions for C-diff. Patient had a 1 BM today. Assist level independent, alert and oriented x4. Continue with plan of care    Problem: Stem Cell/Bone Marrow Transplant  Goal: Improved Activity Tolerance  Outcome: Progressing  Intervention: Promote Improved Energy  Recent Flowsheet Documentation  Taken 12/1/2022 0900 by Agus May RN  Activity Management:    activity adjusted per tolerance    up ad donita  Goal: Improved Oral Mucous Membrane Health and Integrity  Outcome: Progressing     Problem: Plan of Care - These are the overarching goals to be used throughout the patient stay.    Goal: Absence of Hospital-Acquired Illness or Injury  Intervention: Identify and Manage Fall Risk  Recent Flowsheet Documentation  Taken 12/1/2022 0900 by Agus May RN  Safety Promotion/Fall Prevention:    clutter free environment maintained    fall prevention program maintained  Intervention: Prevent Skin Injury  Recent Flowsheet Documentation  Taken 12/1/2022 0900 by Agus May RN  Body Position:    position changed independently    supine     Problem: Stem Cell/Bone Marrow Transplant  Goal: Improved Activity Tolerance  Intervention: Promote Improved Energy  Recent Flowsheet Documentation  Taken 12/1/2022 0900 by Agus May RN  Activity Management:    activity adjusted per tolerance    up ad donita  Goal: Blood Counts Within Acceptable Range  Intervention: Monitor and Manage Hematologic Symptoms  Recent Flowsheet Documentation  Taken 12/1/2022 0900 by Agus May RN  Medication Review/Management: medications reviewed  Goal: Absence of Infection  Intervention: Prevent and Manage Infection  Recent Flowsheet Documentation  Taken 12/1/2022 0900 " by Agus May RN  Isolation Precautions: enteric precautions maintained     Problem: Plan of Care - These are the overarching goals to be used throughout the patient stay.    Goal: Absence of Hospital-Acquired Illness or Injury  Intervention: Identify and Manage Fall Risk  Recent Flowsheet Documentation  Taken 12/1/2022 0900 by Agus May RN  Safety Promotion/Fall Prevention:    clutter free environment maintained    fall prevention program maintained  Intervention: Prevent Skin Injury  Recent Flowsheet Documentation  Taken 12/1/2022 0900 by Agus May RN  Body Position:    position changed independently    supine     Problem: Stem Cell/Bone Marrow Transplant  Goal: Blood Counts Within Acceptable Range  Intervention: Monitor and Manage Hematologic Symptoms  Recent Flowsheet Documentation  Taken 12/1/2022 0900 by Agus May RN  Medication Review/Management: medications reviewed  Goal: Absence of Infection  Intervention: Prevent and Manage Infection  Recent Flowsheet Documentation  Taken 12/1/2022 0900 by Agus May RN  Isolation Precautions: enteric precautions maintained     Problem: Pain Acute  Goal: Optimal Pain Control and Function  Intervention: Prevent or Manage Pain  Recent Flowsheet Documentation  Taken 12/1/2022 0900 by Agus May RN  Medication Review/Management: medications reviewed     Problem: Fall Injury Risk  Goal: Absence of Fall and Fall-Related Injury  Intervention: Identify and Manage Contributors  Recent Flowsheet Documentation  Taken 12/1/2022 0900 by Agus May RN  Medication Review/Management: medications reviewed  Intervention: Promote Injury-Free Environment  Recent Flowsheet Documentation  Taken 12/1/2022 0900 by Agus May RN  Safety Promotion/Fall Prevention:    clutter free environment maintained    fall prevention program maintained     Problem: Infection  Goal: Absence of Infection Signs and Symptoms  Intervention: Prevent  or Manage Infection  Recent Flowsheet Documentation  Taken 12/1/2022 0900 by Agus May, RN  Isolation Precautions: enteric precautions maintained   Goal Outcome Evaluation:

## 2022-12-02 NOTE — PROGRESS NOTES
"  BMT/Cell Therapy Daily Progress Note      ID Yahaira Fuentes is a 62 year old female with AML, undergoing a 7/8 matched unrelated allo HSCT, day +3.    HPI: Yahaira still is having fevers. No symptoms or focal complaints aside from night sweats. No n/v/d. No rashes, bruises, bleeding or edema.      Review of Systems    8 point review with pertinent positive and negatives in HPI    PHYSICAL EXAM      Weight     Wt Readings from Last 3 Encounters:   12/02/22 94.2 kg (207 lb 10.8 oz)   11/21/22 96.4 kg (212 lb 9.6 oz)   11/08/22 97.3 kg (214 lb 6.4 oz)        KPS: 90    /44 (BP Location: Right arm)   Pulse 89   Temp 97.9  F (36.6  C) (Axillary)   Resp 16   Ht 1.6 m (5' 2.99\")   Wt 94.2 kg (207 lb 10.8 oz)   SpO2 97%   BMI 36.80 kg/m       General: NAD   Eyes: SHANIQUE, sclera anicteric   Nose/Mouth/Throat: No oral lesions.  Lungs: CTA bilaterally  Cardiovascular: RRR, no M/R/G   Abdominal/Rectal: +BS, soft, NT, ND, No HSM   Lymphatics: Bilateral thick ankles, doughy R = L, no true edema. Varicose veins bilaterally.   Skin: No rashes or petechaie, a few scattered ecchymoses  Neuro: A&O   Musculoskeletal: Muscle mass average, obese   Additional Findings: Hammer site NT, no drainage.    Current aGVHD staging:  Skin 0, UGI 0, LGI 0, Liver 0 (keep in note through day +180 for allos)      LABS AND IMAGING: I have assessed all abnormal lab values for their clinical significance and any values considered clinically significant have been addressed in the assessment and plan.        Lab Results   Component Value Date    WBC 0.3 (LL) 12/02/2022    ANEUTAUTO 4.7 11/07/2022    HGB 8.8 (L) 12/02/2022    HCT 28.5 (L) 12/02/2022    PLT 14 (LL) 12/02/2022     12/02/2022    POTASSIUM 4.1 12/02/2022    CHLORIDE 106 12/02/2022    CO2 24 12/02/2022     (H) 12/02/2022    BUN 9.7 12/02/2022    CR 0.63 12/02/2022    MAG 2.1 12/02/2022    INR 1.01 11/28/2022         SYSTEMS-BASED ASSESSMENT AND PLAN     Yahaira CALDERON" Alfredo is a 62 year old female with AML, undergoing allo HSCT, day +3, with uncomplicated admission thus far.    BMT/IEC PROTOCOL for 2015-32  - Chemo protocol:   Day -7: start Allopurinol  Day -6: Cytoxan + Fludarabine  Day -5 through -2: Fludarabine  Day -1: TBI x1  Day 0: Transplant  Day +3 and +4: Cytoxan  Day +5: start GCSF, MMF, and Sirolimus  - Restaging plan: per protocol. Day 21 bmbx not yet scheduled     HEME/COAG  # chemotherapy induced pancytopenia   - cytopenias due to chemotherapy and radiation  - Transfusion parameters: hemoglobin <7, platelets <10  - Relevant thrombosis or bleeding history: superficial thromboses on OCP decades past. Most recent DVT of right LE (distal veins), 10/19/2022, holding lovenox given thrombocytopenia    ID  11/30-12/2 daily fevers: cefepime started. Bld cx ngtd /in process; ua, cxr neg. No focal findings, lactic acid wnl. Likely 2/2 t-cells activation that may well be addressed with post transplant cytoxan.  - po vanco for diarrhea, c dif colonized  - Vaccination status: Influenza vaccination after day +60, COVID vaccination after day +100, followed by remaining vaccinations at 12, 14, 24, and 26 months.  - Prophylaxis plan: ACV (CMV donor and recipient negative) Flucazole, (hold levaquin while on cefepime).   Bactrim to start at day +28 (reported stomach upset with past use, per PharmD note will try at day +28. Can hold if GI symptoms occur).   - Active infections: None    RISK OF GVHD  - Prophylaxis: PTC days 3 and 4. Siro + MMF, day +5    CARDIOVASCULAR  - 11/29 Around time of transplant, pt had episode of bradycardia with vaso-vagal low BP. Not repeated.  - Risk of cardiomyopathy:  Baseline EF 60-65%  - Risk of arrhythmia: Baseline EKG showed NSR and normal QTC    GI/NUTRITION: vomiting x1 11/30, zofran scheduled.  - Ulcer prophylaxis: Protonix  - Risk of nausea/vomiting due to chemo/radiation: Ondansetron, Lorazepam and Compazine available prn  - Risk of  "malnutrition: monitoring  - Diarrhea, x3 watery stools 12/2, c dif colonized. Start vanco po    RENAL/ELECTROLYTES/  - Risk of renal injury: IV hydration, cytoxan flush  - Electrolyte management: replace per sliding scale    DIABETES/ENDOCRINE  - Risk of steroid-induced hyperglyemia: Monitor BG, sliding scale if needed    MUSCULOSKELETAL/FRAILTY  - Baseline Frailty Score: 1  - Daily PT/OT as needed while inpatient    SYMPTOM MANAGEMENT  - Nausea from chemo/radiation: Prochlorperazine, ondansetron, lorazepam.  - Pain Assessment: Denies    Known issues that I take into account for medical decisions, with salient changes to the plan considering these complexities noted above.    Patient Active Problem List   Diagnosis     Acute myeloid leukemia in remission (H)     Clinically Significant Risk Factors          # Hypocalcemia: Lowest Ca = 8 mg/dL (Ref range: 8.5 - 10.1 mg/dL) in last 2 days, will monitor and replace as appropriate       # Thrombocytopenia: Lowest platelets = 14 (Ref range: 150-450) in last 2 days, will monitor for bleeding         # Obesity: Estimated body mass index is 36.8 kg/m  as calculated from the following:    Height as of this encounter: 1.6 m (5' 2.99\").    Weight as of this encounter: 94.2 kg (207 lb 10.8 oz).             DISPOSITION: on discharge patient should be scheduled with Dr. Garrett for first available appointment at the request of Dr. Zoltan French    Patient was discussed with Dr. Monzon.    Aleyda Amado [facility]  [phone]    ______________________________________________      BMT ATTENDING NOTE    Yahaira Fuentes is a 62 year old female, admitted on 11/22/2022, who remains hospitalized pending engraftment after allogeneic BMT for AML.  She is currently D+3.  She is feeling a little more tired with the fevers, but is still eating/drinking well and walking the halls.  She is developing chemotherapy induced pancytopenias as anticipated with her treatment.  She developed her first " neutropenic fever overnight on 11/30 and is on cefepime, although this may be early CRS from her new graft. She is feelign a bit more wiped out on D+3 due to fevers.  We will see if these resolve with PTCy.      I spent 30 minutes in the care of Yahaira today, including an independent face-to-face assessment, review of vitals, medications, laboratory results, and independent review of imaging studies. I personally performed all of the medical decision making associated with this visit, and I edited the above note to reflect my current plan of care. I spent over 50% of my time counseling the patient/family today and coordinating care with the team.    Key management decisions made by me and carried out under my direction include:   -Continue supportive cares for BMT.    -Cefepime for neutropenic fevers.   -PTCy as planned for D+3/4.   -Transfuse Hgb>7, Plt>10.    -No additional fluids for lactic acidosis for now (will get significant fluid load with cyclophosphamide flush on 12/2-3).     Counseling and/or coordination of care performed by me:    -Time course for engraftment.    -Nutrition/activity.      Ranges for vital signs:    Temp:  [97.9  F (36.6  C)-101.3  F (38.5  C)] 97.9  F (36.6  C)  Pulse:  [75-99] 89  Resp:  [16-18] 16  BP: (103-126)/() 111/44  SpO2:  [95 %-99 %] 97 %    Infusions:    dextrose 5% and 0.45% NaCl + KCl 20 mEq/L 155 mL/hr at 12/02/22 0818     [START ON 12/4/2022] - MEDICATION INSTRUCTIONS -         Scheduled Medications:    acyclovir  800 mg Oral BID     ceFEPIme  2 g Intravenous Q8H     Chemotherapy Infusing-Continuous Infusion   Does not apply Q8H     cyclophosphamide (CYTOXAN) [facility] infusion  50 mg/kg (Treatment Plan Adjusted) Intravenous Q24H     [START ON 12/4/2022] dextrose 5% water  10-20 mL Intracatheter Daily at 8 pm     [START ON 12/4/2022] dextrose 5% water  10-20 mL Intracatheter Daily at 8 pm     [START ON 12/4/2022] filgrastim  5 mcg/kg (Treatment Plan Recorded)  Intravenous Daily at 8 pm     heparin  5-10 mL Intracatheter Q28 Days     heparin lock flush  5-10 mL Intracatheter Q24H     [Held by provider] levofloxacin  250 mg Oral Daily at 10 am     mesna (MESNEX) infusion  50 mg/kg (Treatment Plan Adjusted) Intravenous Q24H     micafungin  50 mg Intravenous Q24H     [START ON 12/4/2022] mycophenolate mofetil  1,500 mg Intravenous Q12H [facility] (10/22)     ondansetron  8 mg Oral Q8H     pantoprazole  40 mg Oral Daily     [START ON 12/5/2022] sirolimus  4 mg Oral Daily     [START ON 12/4/2022] sirolimus  8 mg Oral Once     [START ON 12/27/2022] sulfamethoxazole-trimethoprim  1 tablet Oral Q Mon Tues BID     ursodiol  300 mg Oral TID         Roderick Monzon MD, PhD  BMT/Cellular Therapy/ Oncology       Securely message with the Vocera Web Console

## 2022-12-02 NOTE — PROGRESS NOTES
Medicine Cross Coverage Note  - Given reduction of sodium to 134 from 138, and the need for IV fluid rehydration, I switched D5 with 0.45 sodium chloride to 0.9% sodium chloride based on request of nursing and pharmacy.

## 2022-12-02 NOTE — PROGRESS NOTES
Cross cover note     Febrile this evening   Hemodynamically stable   Continue cefepime   Repeat UA and CXR now   Repeat blood culture ordered for AM (24 hours from last)

## 2022-12-03 NOTE — PROGRESS NOTES
Medicine cross cover note  -I ordered an updated chest x-ray given fever.  Blood cultures and urine analysis were obtained on 12/20.  I will hold off on initiation of vancomycin given hemodynamic stability.  - Followed Chest xray without new consolidation

## 2022-12-03 NOTE — PLAN OF CARE
"Vital signs:  Temp: 98.4  F (36.9  C) Temp src: Axillary BP: 110/55 Pulse: 81   Resp: 16 SpO2: 98 % O2 Device: None (Room air) Oxygen Delivery: 2 LPM Height: 160 cm (5' 2.99\") Weight: 94.8 kg (208 lb 14.4 oz)  Estimated body mass index is 37.01 kg/m  as calculated from the following:    Height as of this encounter: 1.6 m (5' 2.99\").    Weight as of this encounter: 94.8 kg (208 lb 14.4 oz).      Pt denies pain or nausea. Temp max 101.1, tylenol given. Lactic 1.1. 4 loose stools today, added vanco for c.diff. Buttocks sore from stooling, pt given supplies to clean and barrier paste to protect. cytoxan given without complications. Flush changed to NS this evening due to low sodium. NS infusing at 155 ml/hr, mesna infusing at 45.3 ml/hr. Lasix 20mg given x1 for being I>O by more than a liter. Pt showered this afternoon and felt weak and light headed afterward, better with rest.     Problem: Plan of Care - These are the overarching goals to be used throughout the patient stay.    Goal: Plan of Care Review  Description: The Plan of Care Review/Shift note should be completed every shift.  The Outcome Evaluation is a brief statement about your assessment that the patient is improving, declining, or no change.  This information will be displayed automatically on your shift note.  Recent Flowsheet Documentation  Taken 12/2/2022 1851 by Paula De La Cruz, RN  Plan of Care Reviewed With: patient  Overall Patient Progress: no change  Goal: Absence of Hospital-Acquired Illness or Injury  Intervention: Identify and Manage Fall Risk  Recent Flowsheet Documentation  Taken 12/2/2022 0800 by Paula De La Cruz, RN  Safety Promotion/Fall Prevention:   assistive device/personal items within reach   clutter free environment maintained   fall prevention program maintained   nonskid shoes/slippers when out of bed   room organization consistent   safety round/check completed  Intervention: Prevent Skin Injury  Recent Flowsheet Documentation  Taken " 12/2/2022 0800 by Paula De La Cruz RN  Body Position: position changed independently     Problem: Plan of Care - These are the overarching goals to be used throughout the patient stay.    Goal: Plan of Care Review  Description: The Plan of Care Review/Shift note should be completed every shift.  The Outcome Evaluation is a brief statement about your assessment that the patient is improving, declining, or no change.  This information will be displayed automatically on your shift note.  Outcome: Progressing  Flowsheets (Taken 12/2/2022 1851)  Plan of Care Reviewed With: patient  Overall Patient Progress: no change  Goal: Absence of Hospital-Acquired Illness or Injury  Outcome: Not Progressing  Intervention: Identify and Manage Fall Risk  Recent Flowsheet Documentation  Taken 12/2/2022 0800 by Paula De La Cruz RN  Safety Promotion/Fall Prevention:   assistive device/personal items within reach   clutter free environment maintained   fall prevention program maintained   nonskid shoes/slippers when out of bed   room organization consistent   safety round/check completed  Intervention: Prevent Skin Injury  Recent Flowsheet Documentation  Taken 12/2/2022 0800 by Paula De La Cruz RN  Body Position: position changed independently  Goal: Optimal Comfort and Wellbeing  Outcome: Not Progressing   Goal Outcome Evaluation:      Plan of Care Reviewed With: patient    Overall Patient Progress: no changeOverall Patient Progress: no change

## 2022-12-03 NOTE — PLAN OF CARE
"/51   Pulse 88   Temp (!) 102.4  F (39.1  C)   Resp 18   Ht 1.6 m (5' 2.99\")   Wt 94.8 kg (208 lb 14.4 oz)   SpO2 98%   BMI 37.01 kg/m    Patient slept off and on through the night  Temp max 102.4, down to 98.5 this morning.   Continues to have loose stools  CTX flush running' lasix X1 for not meeting voiding parameters.   No replacements this morning  Continue with current POC  Problem: Plan of Care - These are the overarching goals to be used throughout the patient stay.    Goal: Optimal Comfort and Wellbeing  Outcome: Progressing     Problem: Stem Cell/Bone Marrow Transplant  Goal: Optimal Coping with Transplant  Outcome: Progressing     Problem: Stem Cell/Bone Marrow Transplant  Goal: Symptom-Free Urinary Elimination  Outcome: Progressing     Problem: Stem Cell/Bone Marrow Transplant  Goal: Optimal Nutrition Intake  Outcome: Progressing     Problem: Fall Injury Risk  Goal: Absence of Fall and Fall-Related Injury  Outcome: Progressing     Problem: Infection  Goal: Absence of Infection Signs and Symptoms  Outcome: Progressing     Problem: Oral Intake Inadequate  Goal: Improved Oral Intake  Outcome: Progressing   Goal Outcome Evaluation:                        "

## 2022-12-03 NOTE — PROGRESS NOTES
"  BMT/Cell Therapy Daily Progress Note      ID Yahaira Fuentes is a 62 year old female with AML, undergoing a 7/8 matched unrelated allo HSCT, day +4, receiving Cytoxan today    HPI: Yahaira still is having fevers. No symptoms or focal complaints aside from night sweats. No n/v/d. No rashes, bruises, bleeding or edema.      Review of Systems    8 point review with pertinent positive and negatives in HPI    PHYSICAL EXAM      Weight     Wt Readings from Last 3 Encounters:   12/02/22 94.8 kg (208 lb 14.4 oz)   11/21/22 96.4 kg (212 lb 9.6 oz)   11/08/22 97.3 kg (214 lb 6.4 oz)        KPS: 90    /60 (BP Location: Right arm)   Pulse 83   Temp 98.6  F (37  C) (Axillary)   Resp 18   Ht 1.6 m (5' 2.99\")   Wt 94.8 kg (208 lb 14.4 oz)   SpO2 99%   BMI 37.01 kg/m       General: NAD   Eyes: SHANIQUE, sclera anicteric   Nose/Mouth/Throat: No oral lesions.  Lungs: CTA bilaterally  Cardiovascular: RRR, no M/R/G   Abdominal/Rectal: +BS, soft, NT, ND, No HSM   Lymphatics: Bilateral thick ankles, doughy R = L, no true edema. Varicose veins bilaterally.   Skin: No rashes or petechaie, a few scattered ecchymoses  Neuro: A&O   Musculoskeletal: Muscle mass average, obese   Additional Findings: Hammer site NT, no drainage.    Current aGVHD staging:  Skin 0, UGI 0, LGI 0, Liver 0 (keep in note through day +180 for allos)      LABS AND IMAGING: I have assessed all abnormal lab values for their clinical significance and any values considered clinically significant have been addressed in the assessment and plan.        Lab Results   Component Value Date    WBC <0.1 (LL) 12/03/2022    ANEUTAUTO 4.7 11/07/2022    HGB 8.8 (L) 12/03/2022    HCT 27.7 (L) 12/03/2022    PLT 11 (LL) 12/03/2022     (L) 12/03/2022     (L) 12/03/2022    POTASSIUM 3.6 12/03/2022    POTASSIUM 3.6 12/03/2022    CHLORIDE 102 12/03/2022    CO2 21 (L) 12/03/2022     (H) 12/03/2022    BUN 8.7 12/03/2022    CR 0.72 12/03/2022    MAG 2.1 " 12/02/2022    INR 1.01 11/28/2022         SYSTEMS-BASED ASSESSMENT AND PLAN     Yahaira Fuentes is a 62 year old female with AML, undergoing allo HSCT, day +4, admission complicated by ongoing intermittent fevers since 11/30/22.    BMT/IEC PROTOCOL for 2015-32  - Chemo protocol:   Day -7: start Allopurinol  Day -6: Cytoxan + Fludarabine  Day -5 through -2: Fludarabine  Day -1: TBI x1  Day 0: Transplant  Day +3 and +4: Cytoxan  Day +5: start GCSF, MMF, and Sirolimus  - Restaging plan: per protocol. Day 21 bmbx not yet scheduled     HEME/COAG  # chemotherapy induced pancytopenia   - cytopenias due to chemotherapy and radiation  - Transfusion parameters: hemoglobin <7, platelets <10  - Relevant thrombosis or bleeding history: superficial thromboses on OCP decades past. Most recent DVT of right LE (distal veins), 10/19/2022, holding lovenox given thrombocytopenia    ID/Neutropenic fevers  11/30-12/3 daily fevers: cefepime started. Bld cx ngtd /in process; ua, cxr repeatedly neg (last 12/02). No focal findings, lactic acid back down (peaked at 2.1 on 12/02 early a.m.). May be related to t-cell activation that should improve with post transplant cytoxan.  - Continue Cefepime.   - po vanco for diarrhea, c dif colonized  - Vaccination status: Influenza vaccination after day +60, COVID vaccination after day +100, followed by remaining vaccinations at 12, 14, 24, and 26 months.  - Prophylaxis plan: ACV (CMV donor and recipient negative) Fluconazole, (hold levaquin while on cefepime).   Bactrim to start at day +28 (reported stomach upset with past use, per PharmD note will try at day +28. Can hold if GI symptoms occur).   - Active infections: None    RISK OF GVHD  - Prophylaxis: PTC days 3 and 4. Siro + MMF, day +5    CARDIOVASCULAR  - 11/29 Around time of transplant, pt had episode of bradycardia with vaso-vagal low BP. Not repeated.  - Risk of cardiomyopathy:  Baseline EF 60-65%  - Risk of arrhythmia: Baseline EKG showed  "NSR and normal QTC    GI/NUTRITION: vomiting x1 11/30, zofran scheduled.  - Ulcer prophylaxis: Protonix  - Risk of nausea/vomiting due to chemo/radiation: Ondansetron, Lorazepam and Compazine available prn  - Risk of malnutrition: monitoring  - Diarrhea, x3 watery stools 12/2, c dif colonized. Start vanco po. Diarrhea persists with stools X6 yesterday    RENAL/ELECTROLYTES/  - Risk of renal injury: IV hydration, cytoxan flush  - Electrolyte management: replace per sliding scale    DIABETES/ENDOCRINE  - Risk of steroid-induced hyperglyemia: Monitor BG, sliding scale if needed    MUSCULOSKELETAL/FRAILTY  - Baseline Frailty Score: 1  - Daily PT/OT as needed while inpatient    SYMPTOM MANAGEMENT  - Nausea from chemo/radiation: Prochlorperazine, ondansetron, lorazepam.  - Pain Assessment: Denies    Known issues that I take into account for medical decisions, with salient changes to the plan considering these complexities noted above.    Patient Active Problem List   Diagnosis     Acute myeloid leukemia in remission (H)     Clinically Significant Risk Factors         # Hyponatremia: Lowest Na = 134 mmol/L (Ref range: 136-145) in last 2 days, will monitor as appropriate        # Thrombocytopenia: Lowest platelets = 11 (Ref range: 150-450) in last 2 days, will monitor for bleeding         # Obesity: Estimated body mass index is 37.01 kg/m  as calculated from the following:    Height as of this encounter: 1.6 m (5' 2.99\").    Weight as of this encounter: 94.8 kg (208 lb 14.4 oz).             DISPOSITION: on discharge patient should be scheduled with Dr. Garrett for first available appointment at the request of Dr. Zoltan French    Patient was discussed with Dr. Monzon.    Aleyda Amado [facility]  [phone]    ______________________________________________      BMT ATTENDING NOTE    Yahaira Fuentes is a 62 year old female, admitted on 11/22/2022, who remains hospitalized pending engraftment after allogeneic BMT for AML.  She is " currently D+4.  She is feeling a little more tired with the fevers, but is still eating/drinking well and walking the halls.  She is developing chemotherapy induced pancytopenias as anticipated with her treatment.  She developed her first neutropenic fever overnight on 11/30 and remains cefepime, although this may be early CRS from her new graft. She continues to be a bit more wiped out on D+4 due to fevers.  We will see if these resolve with PTCy.  Lactic acidosis resolved with PTCy fluid flush.     I spent 30 minutes in the care of Yahaira today, including an independent face-to-face assessment, review of vitals, medications, laboratory results, and independent review of imaging studies. I personally performed all of the medical decision making associated with this visit, and I edited the above note to reflect my current plan of care. I spent over 50% of my time counseling the patient/family today and coordinating care with the team.    Key management decisions made by me and carried out under my direction include:   -Continue supportive cares for BMT.    -Cefepime for neutropenic fevers.   -PTCy as planned for D+3/4.   -Transfuse Hgb>7, Plt>10.      Counseling and/or coordination of care performed by me:    -Time course for engraftment.    -normal course of transplant fatigue/fevers.     Ranges for vital signs:    Temp:  [97.9  F (36.6  C)-102.4  F (39.1  C)] 98.6  F (37  C)  Pulse:  [] 83  Resp:  [16-22] 18  BP: (106-130)/(44-71) 116/60  SpO2:  [97 %-99 %] 99 %    Infusions:    [START ON 12/4/2022] - MEDICATION INSTRUCTIONS -       sodium chloride 155 mL/hr at 12/02/22 1899       Scheduled Medications:    acyclovir  800 mg Oral BID     ceFEPIme  2 g Intravenous Q8H     Chemotherapy Infusing-Continuous Infusion   Does not apply Q8H     cyclophosphamide (CYTOXAN) [facility] infusion  50 mg/kg (Treatment Plan Adjusted) Intravenous Q24H     [START ON 12/4/2022] dextrose 5% water  10-20 mL Intracatheter Daily at 8 pm      [START ON 12/4/2022] dextrose 5% water  10-20 mL Intracatheter Daily at 8 pm     [START ON 12/4/2022] filgrastim  5 mcg/kg (Treatment Plan Recorded) Intravenous Daily at 8 pm     heparin  5-10 mL Intracatheter Q28 Days     heparin lock flush  5-10 mL Intracatheter Q24H     [Held by provider] levofloxacin  250 mg Oral Daily at 10 am     mesna (MESNEX) infusion  50 mg/kg (Treatment Plan Adjusted) Intravenous Q24H     micafungin  50 mg Intravenous Q24H     [START ON 12/4/2022] mycophenolate mofetil  1,500 mg Intravenous Q12H [facility] (10/22)     ondansetron  8 mg Oral Q8H     pantoprazole  40 mg Oral Daily     [START ON 12/5/2022] sirolimus  4 mg Oral Daily     [START ON 12/4/2022] sirolimus  8 mg Oral Once     [START ON 12/27/2022] sulfamethoxazole-trimethoprim  1 tablet Oral Q Mon Tues BID     ursodiol  300 mg Oral TID     vancomycin  125 mg Oral 4x Daily         Roderick Monzon MD, PhD  BMT/Cellular Therapy/ Oncology       Securely message with the Vocera Web Console

## 2022-12-03 NOTE — PLAN OF CARE
"/61   Pulse 92   Temp (!) 100.6  F (38.1  C) (Axillary)   Resp 18   Ht 1.6 m (5' 2.99\")   Wt 97.3 kg (214 lb 6.4 oz)   SpO2 99%   BMI 37.99 kg/m    Stop time on MAR & chart I & O  Chemo drug: Cytoxan  Tolerated:Well  Intervention:NA  Response:NA  Plan:Continue I & 0, voiding, NaK  Lab:K+ 3.1- KCL 20meq x2 with recheck at 2130  Wt/intervention:increase 2kg-Lasix ( I>O, not meeting Q2hr void parameter).  Shower/drsg/linen-Ref. Shower, linen changed.    Cytoxan Primer  Cytoxan infused at 0926  [place] infusing at 45.3 ml/hr and ends 12/4  Flush infusing at 155 ml/hrand ends 1200 12/4    Tmax 100.6, other VSS.  Pt denies pain/discomfort/nausea. Up independent in room and halls.  Incontinent stool x1.  Didn't meet void parameter x2-Lasix x2.  Eating/drinking fair.      Problem: Plan of Care - These are the overarching goals to be used throughout the patient stay.    Goal: Plan of Care Review  Description: The Plan of Care Review/Shift note should be completed every shift.  The Outcome Evaluation is a brief statement about your assessment that the patient is improving, declining, or no change.  This information will be displayed automatically on your shift note.  Outcome: Progressing     Problem: Plan of Care - These are the overarching goals to be used throughout the patient stay.    Goal: Optimal Comfort and Wellbeing  Outcome: Progressing     Problem: Stem Cell/Bone Marrow Transplant  Goal: Diarrhea Symptom Control  Outcome: Progressing     Problem: Stem Cell/Bone Marrow Transplant  Goal: Nausea and Vomiting Symptom Relief  Outcome: Progressing     Problem: Stem Cell/Bone Marrow Transplant  Goal: Optimal Nutrition Intake  Outcome: Progressing   Goal Outcome Evaluation:                        "

## 2022-12-04 NOTE — PLAN OF CARE
"/57   Pulse 88   Temp 99.3  F (37.4  C) (Axillary)   Resp 18   Ht 1.6 m (5' 2.99\")   Wt 97.3 kg (214 lb 6.4 oz)   SpO2 97%   BMI 37.99 kg/m    Patient slept off and on through the night  Continues with the CTX flush, meeting voiding parameters  Continues to have diarrhea  No complaints of pain  One episode of nausea  Platelets replaced this morning  Continue with current POC  Problem: Plan of Care - These are the overarching goals to be used throughout the patient stay.    Goal: Optimal Comfort and Wellbeing  Outcome: Progressing     Problem: Stem Cell/Bone Marrow Transplant  Goal: Optimal Coping with Transplant  Outcome: Progressing     Problem: Stem Cell/Bone Marrow Transplant  Goal: Symptom-Free Urinary Elimination  Outcome: Progressing     Problem: Stem Cell/Bone Marrow Transplant  Goal: Diarrhea Symptom Control  Outcome: Progressing     Problem: Stem Cell/Bone Marrow Transplant  Goal: Blood Counts Within Acceptable Range  Outcome: Progressing     Problem: Stem Cell/Bone Marrow Transplant  Goal: Nausea and Vomiting Symptom Relief  Outcome: Progressing     Problem: Stem Cell/Bone Marrow Transplant  Goal: Optimal Nutrition Intake  Outcome: Progressing   Goal Outcome Evaluation:                        "

## 2022-12-04 NOTE — PLAN OF CARE
"/68 (BP Location: Right arm)   Pulse 86   Temp 98.7  F (37.1  C) (Axillary)   Resp 16   Ht 1.6 m (5' 2.99\")   Wt 95.8 kg (211 lb 1.6 oz)   SpO2 97%   BMI 37.40 kg/m    Pt denies any pain/nausea this shift.  Pt up in chair, walked in halls this shift.  Continues to focus on eating, drinking.  Loose BMs x3, no incontinence.  Voiding well.  Lasix x1 this am, not meeting Q2hr void.  NaK WNL.  Continue POC.  Problem: Plan of Care - These are the overarching goals to be used throughout the patient stay.    Goal: Plan of Care Review  Description: The Plan of Care Review/Shift note should be completed every shift.  The Outcome Evaluation is a brief statement about your assessment that the patient is improving, declining, or no change.  This information will be displayed automatically on your shift note.  Outcome: Progressing     Problem: Stem Cell/Bone Marrow Transplant  Goal: Optimal Coping with Transplant  Outcome: Progressing     Problem: Stem Cell/Bone Marrow Transplant  Goal: Diarrhea Symptom Control  Outcome: Progressing     Problem: Stem Cell/Bone Marrow Transplant  Goal: Absence of Infection  Outcome: Progressing     Problem: Stem Cell/Bone Marrow Transplant  Goal: Nausea and Vomiting Symptom Relief  Outcome: Progressing     Problem: Stem Cell/Bone Marrow Transplant  Goal: Optimal Nutrition Intake  Outcome: Progressing   Goal Outcome Evaluation:                        "

## 2022-12-04 NOTE — PROGRESS NOTES
"  BMT/Cell Therapy Daily Progress Note      ID Yahaira Fuentes is a 62 year old female with AML, undergoing a 7/8 matched unrelated allo HSCT, day +5    HPI: Fever curve improving with no fevers overnight, post completion of day 3 &$ cytoxan.  Last fever 100.6 at 1530 yesterday. No symptoms or focal complaints aside from night sweats. Intermittent nausea, no vomiting.  Continues with loose stools/diarrhea.   No rashes, bruises, bleeding or edema.      Review of Systems    8 point review with pertinent positive and negatives in HPI    PHYSICAL EXAM      Weight     Wt Readings from Last 3 Encounters:   12/03/22 97.3 kg (214 lb 6.4 oz)   11/21/22 96.4 kg (212 lb 9.6 oz)   11/08/22 97.3 kg (214 lb 6.4 oz)        KPS: 90    /59   Pulse 86   Temp 98.3  F (36.8  C) (Axillary)   Resp 16   Ht 1.6 m (5' 2.99\")   Wt 97.3 kg (214 lb 6.4 oz)   SpO2 97%   BMI 37.99 kg/m       General: NAD   Eyes: SHANIQUE, sclera anicteric   Nose/Mouth/Throat: No oral lesions.  Lungs: CTA bilaterally  Cardiovascular: RRR, no M/R/G   Abdominal/Rectal: +BS, soft, NT, ND, No HSM   Lymphatics: Bilateral thick ankles, nonpitting edema. Varicose veins bilaterally.   Skin: No rashes or petechaie, a few scattered ecchymoses  Neuro: A&O   Musculoskeletal: Muscle mass average, obese   Additional Findings: Hmamer site NT, no drainage.    Current aGVHD staging:  Skin 0, UGI 0, LGI 0, Liver 0 (keep in note through day +180 for allos)      LABS AND IMAGING: I have assessed all abnormal lab values for their clinical significance and any values considered clinically significant have been addressed in the assessment and plan.        Lab Results   Component Value Date    WBC <0.1 (LL) 12/04/2022    ANEUTAUTO 4.7 11/07/2022    HGB 7.6 (L) 12/04/2022    HCT 24.8 (L) 12/04/2022    PLT 8 (LL) 12/04/2022     12/04/2022     12/04/2022    POTASSIUM 3.8 12/04/2022    POTASSIUM 3.8 12/04/2022    CHLORIDE 112 (H) 12/04/2022    CO2 21 (L) 12/04/2022 "     (H) 12/04/2022    BUN 7.2 (L) 12/04/2022    CR 0.59 12/04/2022    MAG 2.1 12/02/2022    INR 1.01 11/28/2022         SYSTEMS-BASED ASSESSMENT AND PLAN     Yahaira Fuentes is a 62 year old female with AML, undergoing allo HSCT, day +5, admission complicated by ongoing intermittent fevers since 11/30/22.    BMT/IEC PROTOCOL for 2015-32  - Chemo protocol:   Day -7: start Allopurinol  Day -6: Cytoxan + Fludarabine  Day -5 through -2: Fludarabine  Day -1: TBI x1  Day 0: Transplant  Day +3 and +4: Cytoxan  Day +5: start GCSF, MMF, and Sirolimus  - Restaging plan: per protocol. Day 21 bmbx not yet scheduled     HEME/COAG  # chemotherapy induced pancytopenia   - cytopenias due to chemotherapy and radiation  - Transfusion parameters: hemoglobin <7, platelets <10  - Relevant thrombosis or bleeding history: superficial thromboses on OCP decades past. Most recent DVT of right LE (distal veins), 10/19/2022, holding lovenox given thrombocytopenia    ID/Neutropenic fevers  11/30-12/3 daily fevers, now improving: cefepime started. Bld cx ngtd /in process; ua, cxr repeatedly neg (last 12/02). No focal findings, lactic acid back down (peaked at 2.1 on 12/02 early a.m.). May be related to t-cell activation - improved with post transplant cytoxan as expected.  - Continue Cefepime.   - po vanco for diarrhea, c dif colonized  - Vaccination status: Influenza vaccination after day +60, COVID vaccination after day +100, followed by remaining vaccinations at 12, 14, 24, and 26 months.  - Prophylaxis plan: ACV (CMV donor and recipient negative) Fluconazole, (hold levaquin while on cefepime).   Bactrim to start at day +28 (reported stomach upset with past use, per PharmD note will try at day +28. Can hold if GI symptoms occur).   - Active infections: None    RISK OF GVHD  - Prophylaxis: PTC days 3 and 4. Siro + MMF, day +5    CARDIOVASCULAR  - 11/29 Around time of transplant, pt had episode of bradycardia with vaso-vagal low BP.  "Not repeated.  - Risk of cardiomyopathy:  Baseline EF 60-65%  - Risk of arrhythmia: Baseline EKG showed NSR and normal QTC    GI/NUTRITION:   - Ulcer prophylaxis: Protonix  - Risk of nausea/vomiting due to chemo/radiation: Ondansetron scheduled w/ emesis on 11/30, Lorazepam and Compazine available prn  - Risk of malnutrition: monitoring.    - Diarrhea, x3 watery stools 12/2, c dif colonized. Started vanco po. Diarrhea persists with stools X6 12/02 and X 4 yesterday.    RENAL/ELECTROLYTES/  - Risk of renal injury: Off cytoxan flush.  Monitor need for IVF.  - Electrolyte management: replace per sliding scale    DIABETES/ENDOCRINE  - Risk of steroid-induced hyperglyemia: Monitor BG, sliding scale if needed    MUSCULOSKELETAL/FRAILTY  - Baseline Frailty Score: 1  - Daily PT/OT as needed while inpatient    SYMPTOM MANAGEMENT  - Nausea from chemo/radiation: Prochlorperazine, ondansetron, lorazepam.  - Pain Assessment: Denies    Known issues that I take into account for medical decisions, with salient changes to the plan considering these complexities noted above.    Patient Active Problem List   Diagnosis     Acute myeloid leukemia in remission (H)     Clinically Significant Risk Factors        # Hypokalemia: Lowest K = 3.1 mmol/L (Ref range: 3.4-5.3) in last 2 days, will replace as needed  # Hyponatremia: Lowest Na = 134 mmol/L (Ref range: 136-145) in last 2 days, will monitor as appropriate        # Thrombocytopenia: Lowest platelets = 8 (Ref range: 150-450) in last 2 days, will monitor for bleeding         # Obesity: Estimated body mass index is 37.99 kg/m  as calculated from the following:    Height as of this encounter: 1.6 m (5' 2.99\").    Weight as of this encounter: 97.3 kg (214 lb 6.4 oz).             DISPOSITION: on discharge patient should be scheduled with Dr. Garrett for first available appointment at the request of Dr. Zoltan French    Patient was discussed with Dr. Monzon.    Aleyda Amado " PAC  [phone]    ______________________________________________      BMT ATTENDING NOTE    Yahaira Fuentes is a 62 year old female, admitted on 11/22/2022, who remains hospitalized pending engraftment after allogeneic BMT for AML.  She is currently D+5.  She is feeling a little more tired with the fevers, but is still eating/drinking well and walking the halls.  She is developing chemotherapy induced pancytopenias as anticipated with her treatment.  She developed her first neutropenic fever overnight on 11/30 and remains cefepime, but fevers resolved AM of D+5 likely from PTCy treatment of CRS from graft.  She is having looser stools.  PO intake less the past few days.  Monitor for TPN needs.       I spent 30 minutes in the care of Yahaira today, including an independent face-to-face assessment, review of vitals, medications, laboratory results, and independent review of imaging studies. I personally performed all of the medical decision making associated with this visit, and I edited the above note to reflect my current plan of care. I spent over 50% of my time counseling the patient/family today and coordinating care with the team.    Key management decisions made by me and carried out under my direction include:   -Continue supportive cares for BMT.    -Cefepime for neutropenic fevers.   -Transfuse Hgb>7, Plt>10.      Counseling and/or coordination of care performed by me:    -Rationale and timing for MMF, siro.   -PTCy use and rationale.     Ranges for vital signs:    Temp:  [97.3  F (36.3  C)-100.6  F (38.1  C)] 98.3  F (36.8  C)  Pulse:  [83-92] 86  Resp:  [16-18] 16  BP: ()/(55-67) 112/59  SpO2:  [96 %-99 %] 97 %    Infusions:    - MEDICATION INSTRUCTIONS -       sodium chloride 155 mL/hr at 12/03/22 0801       Scheduled Medications:    acyclovir  800 mg Oral BID     ceFEPIme  2 g Intravenous Q8H     dextrose 5% water  10-20 mL Intracatheter Daily at 8 pm     dextrose 5% water  10-20 mL  Intracatheter Daily at 8 pm     filgrastim  5 mcg/kg (Treatment Plan Recorded) Intravenous Daily at 8 pm     heparin  5-10 mL Intracatheter Q28 Days     heparin lock flush  5-10 mL Intracatheter Q24H     [Held by provider] levofloxacin  250 mg Oral Daily at 10 am     mesna (MESNEX) infusion  50 mg/kg (Treatment Plan Adjusted) Intravenous Q24H     micafungin  50 mg Intravenous Q24H     mycophenolate mofetil  1,500 mg Intravenous Q12H [facility] (10/22)     ondansetron  8 mg Oral Q8H     pantoprazole  40 mg Oral Daily     [START ON 12/5/2022] sirolimus  4 mg Oral Daily     [START ON 12/27/2022] sulfamethoxazole-trimethoprim  1 tablet Oral Q Mon Tues BID     ursodiol  300 mg Oral TID     vancomycin  125 mg Oral 4x Daily         Roderick Monzon MD, PhD  BMT/Cellular Therapy/ Oncology       Securely message with the Vocera Web Console

## 2022-12-05 NOTE — PLAN OF CARE
A/Ox4, VSS, afebrile, denies pain, nausea and vomiting. Pt experiencing diarrhea early on in shift. Complains of an irritated bottom. Skin intact with redness. Diarrhea is improving this evening. RN offered barrier cream. Pt had a good appetite for lunch.  Ambulated in halls. Replaced phosphorus.    Problem: Stem Cell/Bone Marrow Transplant  Goal: Diarrhea Symptom Control  Outcome: Not Progressing     Problem: Plan of Care - These are the overarching goals to be used throughout the patient stay.    Goal: Absence of Hospital-Acquired Illness or Injury  Intervention: Prevent Skin Injury  Recent Flowsheet Documentation  Taken 12/5/2022 0800 by Obed Schultz RN  Body Position: position changed independently     Problem: Stem Cell/Bone Marrow Transplant  Goal: Optimal Nutrition Intake  Outcome: Progressing     Problem: Oral Intake Inadequate  Goal: Improved Oral Intake  Outcome: Progressing

## 2022-12-05 NOTE — PROGRESS NOTES
"  BMT/Cell Therapy Daily Progress Note      ID Yahaira Fuentes is a 62 year old female with AML, undergoing a 7/8 matched unrelated allo HSCT, day +6.    HPI: Rebeka is doing ok this morning. No fevers in 48 hours. No infectious symptoms, cultures negative. Denies N/V- wants to try q12 Zofran. Diarrhea 4x yesterday, maybe improving, less liquidy.       Review of Systems    8 point review with pertinent positive and negatives in HPI    PHYSICAL EXAM      Weight     Wt Readings from Last 3 Encounters:   12/05/22 94.1 kg (207 lb 8 oz)   11/21/22 96.4 kg (212 lb 9.6 oz)   11/08/22 97.3 kg (214 lb 6.4 oz)        KPS: 80    /63 (BP Location: Right arm)   Pulse 80   Temp 98.5  F (36.9  C) (Axillary)   Resp 16   Ht 1.6 m (5' 2.99\")   Wt 94.1 kg (207 lb 8 oz)   SpO2 97%   BMI 36.77 kg/m       General: NAD   Eyes: SHANIQUE, sclera anicteric   Nose/Mouth/Throat: No oral lesions.  Lungs: CTA bilaterally  Cardiovascular: RRR, no M/R/G   Abdominal/Rectal: +BS, soft, NT, ND   Lymphatics: Bilateral thick ankles, nonpitting edema. Varicose veins bilaterally.   Skin: No rashes or petechaie, a few scattered ecchymoses  Neuro: A&O x4  Musculoskeletal: Muscle mass average, obese   Additional Findings: Hammer site NT, no drainage.    Current aGVHD staging:  Skin 0, UGI 0, LGI 0, Liver 0 (keep in note through day +180 for allos)      LABS AND IMAGING: I have assessed all abnormal lab values for their clinical significance and any values considered clinically significant have been addressed in the assessment and plan.        Lab Results   Component Value Date    WBC <0.1 (LL) 12/05/2022    ANEUTAUTO 4.7 11/07/2022    HGB 7.7 (L) 12/05/2022    HCT 24.4 (L) 12/05/2022    PLT 15 (LL) 12/05/2022     12/05/2022     12/05/2022    POTASSIUM 3.4 12/05/2022    POTASSIUM 3.4 12/05/2022    CHLORIDE 108 (H) 12/05/2022    CO2 24 12/05/2022    GLC 93 12/05/2022    BUN 10.1 12/05/2022    CR 0.56 12/05/2022    MAG 2.0 12/05/2022    " INR 0.97 12/05/2022         SYSTEMS-BASED ASSESSMENT AND PLAN     Yahaira Fuentes is a 62 year old female with AML, undergoing allo HSCT, day +6, admission complicated by ongoing intermittent fevers since 11/30/22.    BMT/IEC PROTOCOL for 2015-32  - Chemo protocol:   Day -7: start Allopurinol  Day -6: Cytoxan + Fludarabine  Day -5 through -2: Fludarabine  Day -1: TBI x1  Day 0: Transplant  Day +3 and +4: Cytoxan  Day +5: start GCSF, MMF, and Sirolimus  - Restaging plan: per protocol. Day 21 bmbx not yet scheduled     HEME/COAG  #Chemotherapy induced pancytopenia   - cytopenias due to chemotherapy and radiation  - Transfusion parameters: hemoglobin <7, platelets <10  - Relevant thrombosis or bleeding history: superficial thromboses on OCP decades past. Most recent DVT of right LE (distal veins), 10/19/2022, holding lovenox given thrombocytopenia    ID/Neutropenic fevers  Neutropenic Fevers: likely T-cell activation from graft, improved following PT Cytoxan. Cefepime started 11/30-x (consider deescalating tomorrow as long as remains afebrile.    *Infectious work up unremarkable (BC, CXR, UC)  - po vanco for diarrhea, c dif colonized (12/2-x)  - Vaccination status: Influenza vaccination after day +60, COVID vaccination after day +100, followed by remaining vaccinations at 12, 14, 24, and 26 months.  - Prophylaxis plan: ACV (CMV donor and recipient negative) Fluconazole, (hold levaquin while on cefepime).   Bactrim to start at day +28 (reported stomach upset with past use, per PharmD note will try at day +28. Can hold if GI symptoms occur).     RISK OF GVHD  - Prophylaxis: PTC days 3 and 4.   -Siro day +5, level check 12/6  - MMF, day +5    CARDIOVASCULAR  - Vasovagal episode with bradycardia: 11/29 occurred around transplant time, improved without intervention  - Risk of cardiomyopathy:  Baseline EF 60-65%  - Risk of arrhythmia: Baseline EKG showed NSR and normal QTC    GI/NUTRITION:   - Ulcer prophylaxis:  "Protonix  - Risk of nausea/vomiting due to chemo/radiation: Ondansetron q12 (patient does not want q8, consider tapering back as tolerated), Lorazepam and Compazine available prn  - Risk of malnutrition: monitoring.    - Diarrhea, x3 watery stools 12/2, c dif colonized. PO Vanco.    RENAL/ELECTROLYTES/  - Risk of renal injury: Off cytoxan flush.  Monitor need for IVF.  - Electrolyte management: replace per sliding scale    DIABETES/ENDOCRINE  - Risk of steroid-induced hyperglyemia: Monitor BG, sliding scale if needed    MUSCULOSKELETAL/FRAILTY  - Baseline Frailty Score: 1  - Daily PT/OT as needed while inpatient    Clinically Significant Risk Factors        # Hypokalemia: Lowest K = 3.1 mmol/L (Ref range: 3.4-5.3) in last 2 days, will replace as needed       # Hypoalbuminemia: Lowest albumin = 3.1 g/dL (Ref range: 3.5-5.2) at 12/5/2022  2:43 AM, will monitor as appropriate   # Thrombocytopenia: Lowest platelets = 8 (Ref range: 150-450) in last 2 days, will monitor for bleeding         # Obesity: Estimated body mass index is 36.77 kg/m  as calculated from the following:    Height as of this encounter: 1.6 m (5' 2.99\").    Weight as of this encounter: 94.1 kg (207 lb 8 oz).             DISPOSITION: on discharge patient should be scheduled with Dr. Garrett for first available appointment at the request of Dr. Zoltan French    Patient was discussed with Dr. Monzon.    Nolvia Asif PA-C   [phone]    ______________________________________________      BMT ATTENDING NOTE    Yahaira Fuentes is a 62 year old female, admitted on 11/22/2022, who remains hospitalized pending engraftment after allogeneic BMT for AML.  She is currently D+6.  Appetite and energy are down a bit, but feeling better with resolution of fevers after PTCy.  Will consider discontinuing cefepime if afebrile 72 hours and switch back to ppx levaquin.  She is otherwise tolerating treatments well.         I spent 30 minutes in the care of Yahaira today, " including an independent face-to-face assessment, review of vitals, medications, laboratory results, and independent review of imaging studies. I personally performed all of the medical decision making associated with this visit, and I edited the above note to reflect my current plan of care. I spent over 50% of my time counseling the patient/family today and coordinating care with the team.    Key management decisions made by me and carried out under my direction include:   -Continue supportive cares for BMT.    -Cefepime for neutropenic fevers.   -Transfuse Hgb>7, Plt>10.      Counseling and/or coordination of care performed by me:    -Nausea management.   -PO intake and activity goals.  OK to not have every day be the best.      Ranges for vital signs:    Temp:  [97.4  F (36.3  C)-98.7  F (37.1  C)] 98.5  F (36.9  C)  Pulse:  [74-89] 80  Resp:  [15-17] 16  BP: (110-148)/(53-85) 126/63  SpO2:  [96 %-99 %] 97 %    Infusions:      Scheduled Medications:    acyclovir  800 mg Oral BID     ceFEPIme  2 g Intravenous Q8H     dextrose 5% water  10-20 mL Intracatheter Daily at 8 pm     dextrose 5% water  10-20 mL Intracatheter Daily at 8 pm     filgrastim  5 mcg/kg (Treatment Plan Recorded) Intravenous Daily at 8 pm     heparin  5-10 mL Intracatheter Q28 Days     heparin lock flush  5-10 mL Intracatheter Q24H     [Held by provider] levofloxacin  250 mg Oral Daily at 10 am     micafungin  50 mg Intravenous Q24H     mycophenolate mofetil  1,500 mg Intravenous Q12H [facility] (10/22)     ondansetron  8 mg Oral Q12H     pantoprazole  40 mg Oral Daily     sodium phosphate  15 mmol Intravenous Once     sirolimus  4 mg Oral Daily     [START ON 12/27/2022] sulfamethoxazole-trimethoprim  1 tablet Oral Q Mon Tues BID     ursodiol  300 mg Oral TID     vancomycin  125 mg Oral 4x Daily         Roderick Monzon MD, PhD  BMT/Cellular Therapy/ Oncology       Securely message with the Vocera Web Console

## 2022-12-05 NOTE — PLAN OF CARE
"Assumed cares 9172-4043. Pt rounded on hourly.     ../83 (BP Location: Right arm)   Pulse 89   Temp 97.8  F (36.6  C) (Axillary)   Resp 15   Ht 1.6 m (5' 2.99\")   Wt 95.8 kg (211 lb 1.6 oz)   SpO2 96%   BMI 37.40 kg/m      Pt Aox4, able to make needs known. VSS on RA. Denies chest pains, SOB, N/V. Up ad donita. Afebrile this shift. No BM's this shift. Voiding spontaneously and adequately. No blood products needed this AM. K+ 3.4, replaced and will need recheck at 1000. Sleeping in between cares.       Plan: continue to follow plan of care and notify MD with changes in condition.     Goal Outcome Evaluation:      Plan of Care Reviewed With: patient    Overall Patient Progress: no changeOverall Patient Progress: no change           "

## 2022-12-06 NOTE — PROGRESS NOTES
CLINICAL NUTRITION SERVICES - REASSESSMENT NOTE     Nutrition Prescription    RECOMMENDATIONS FOR MDs/PROVIDERS TO ORDER:  Encourage oral intake     Malnutrition Status:    Patient does not meet two of the established criteria necessary for diagnosing malnutrition but is at risk for malnutrition    Recommendations already ordered by Registered Dietitian (RD):  Encourage oral intake and use of supplements PRN     Future/Additional Recommendations:  If TPN is needed:  Dosing weight: 62.9 kg (adjusted)   Access: Central Line   Begin with minimal volume (1200 mL) or max concentration per phamD with initial dextrose of 135 g (GIR= 1.49 mg/kg/min), 100 g AA (1.5 g/kg) and 250 mL 20% clinolipid 6x per week = 1287 kcal (20 kcal/kg)  -Once patient receives 100% of initial PN volume with K>/=3, Mg>/=1.5, and Po4>/=2, advance dextrose by 50-55 g every 1-2 days to goal of 240  (GIR= 2.64 mg/kg/min)  --TPN at goal concentration provides: 240 g dex (815  kcal), 100 g AA (1.5 g/kg) and 250 mL 20% Clinolipid 6x per week =  1644 kcal (26 kcal/kg) and 26% kcal from fat   --Electrolytes/Additives per pharmD, recommend infuvite daily and MTE-4  --Monitor bili, LFTs, TG at the start and weekly thereafter while on TPN  --Monitor for onset of hyperglycemia and need for addition of insulin per pharmD/MD     EVALUATION OF THE PROGRESS TOWARD GOALS   Diet: High Kcal/High Protein + snacks/supplements PRN   Intake: %, mostly 50-75%      NEW FINDINGS   Day +7   Rebeka reported she is doing ok- tired today and feeling very sleep deprived due to night time care interruptions. She thinks her appetite is fair, foods are not really sounding good but forcing herself to eat. She has been consuming yogurt, raisin bran and fruit in the morning- lunch and dinner variable, family brought in [employer] yesterday and she has some chicken, fries and ice cream.     Weight Trends:   12/05/22 0800 94.1 kg (207 lb 8 oz) Standing scale   12/02/22 1647 94.8 kg  (208 lb 14.4 oz) Standing scale   11/29/22 0726 94.1 kg (207 lb 6.4 oz) Standing scale   11/25/22 0900 95.2 kg (209 lb 14.4 oz) Standing scale   11/24/22 1544 99.1 kg (218 lb 8 oz) Standing scale   11/23/22 0821 97 kg (213 lb 14.4 oz) Standing scale   11/22/22  94.9 kg (209 lb 4.8 oz) Standing scale     GI:  Intermittent nausea but denied concerns. Last bowel movement, 12/5 report soft stools. No mouth pain or sores   Skin: Kvng 21, no rash noted.   Labs:   Na 140, K +3.5, Mg 2, Po4 2.6  (WNL)  Glucose 90 (WNL)  WBC <0.1 (L)    Medications:   Acyclovir  Mycophenolate  Zofran  Protonix  Sirolimus  Ursodiol    MALNUTRITION  % Intake: Decreased intake does not meet criteria   % Weight Loss: None noted  Subcutaneous Fat Loss: None observed  Muscle Loss: Temporal:  mild and Upper arm (bicep, tricep):  Mild   Fluid Accumulation/Edema: None noted  Malnutrition Diagnosis: Patient does not meet two of the established criteria necessary for diagnosing malnutrition but is at risk for malnutrition    Previous Goals   Patient to consume % of nutritionally adequate meal trays TID, or the equivalent with supplements/snacks.  Evaluation: Not met    Previous Nutrition Diagnosis  Predicted inadequate nutrient intake (energy-protein) related to upcoming BMT as evidenced by day -1 and potential for nutrition side effects to limit intake.  Evaluation: Modified     CURRENT NUTRITION DIAGNOSIS  Predicted inadequate nutrient intake (energy/protien) related to reduced appetite and increased metabolic demand as evidenced by more variable intakes of %,  report of food not appealing but currently able to maintain weight     INTERVENTIONS  Implementation  Collaboration with other providers  Medical Food Supplement     Goals  Patient to consume % of nutritionally adequate meal trays TID, or the equivalent with supplements/snacks.    Maintain weight of 94 kg     Monitoring/Evaluation  Progress toward goals will be monitored  and evaluated per protocol.    Lynette Licona RD, LD  5C/BMT pager: [phone]

## 2022-12-06 NOTE — PROGRESS NOTES
"  BMT/Cell Therapy Daily Progress Note      ID Yahaira Fuentes is a 62 year old female with AML, undergoing a 7/8 matched unrelated allo HSCT, day +7.    HPI: Rebeka tearful this morning as she understandably did not sleep well last night due to intermittent cares. Has remained afebrile. No infectious symptoms, cultures negative. Denies N/V- reports being able to eat well yesterday. Stools are more formed, less liquidy.       Review of Systems    8 point review with pertinent positive and negatives in HPI    PHYSICAL EXAM      Weight     Wt Readings from Last 3 Encounters:   12/06/22 95.5 kg (210 lb 8 oz)   11/21/22 96.4 kg (212 lb 9.6 oz)   11/08/22 97.3 kg (214 lb 6.4 oz)        KPS: 80    /62 (BP Location: Right arm)   Pulse 82   Temp 98.2  F (36.8  C) (Axillary)   Resp 18   Ht 1.6 m (5' 2.99\")   Wt 95.5 kg (210 lb 8 oz)   SpO2 96%   BMI 37.30 kg/m       General: NAD   Eyes: SHANIQUE, sclera anicteric   Lungs: CTA bilaterally  Cardiovascular: RRR, no M/R/G   Abdominal/Rectal: +BS, soft, NT, ND   Lymphatics: Bilateral thick ankles, nonpitting edema. Varicose veins bilaterally.   Skin: No rashes or petechaie, a few scattered ecchymoses  Neuro: A&O   Musculoskeletal: Muscle mass average, obese   Additional Findings: Hammer site NT, no drainage.    Current aGVHD staging:  Skin 0, UGI 0, LGI 0, Liver 0 (keep in note through day +180 for allos)      LABS AND IMAGING: I have assessed all abnormal lab values for their clinical significance and any values considered clinically significant have been addressed in the assessment and plan.        Lab Results   Component Value Date    WBC <0.1 (LL) 12/06/2022    ANEUTAUTO 4.7 11/07/2022    HGB 7.5 (L) 12/06/2022    HCT 23.9 (L) 12/06/2022    PLT 6 (LL) 12/06/2022     12/06/2022    POTASSIUM 3.5 12/06/2022    CHLORIDE 107 12/06/2022    CO2 24 12/06/2022    GLC 90 12/06/2022    BUN 8.1 12/06/2022    CR 0.51 12/06/2022    MAG 2.0 12/06/2022    INR 0.97 " 12/05/2022         SYSTEMS-BASED ASSESSMENT AND PLAN     Yahaira Fuentes is a 62 year old female with AML, undergoing allo HSCT, day +7, admission complicated by ongoing intermittent fevers 11/30/22-12/4/22.    BMT/IEC PROTOCOL for 2015-32  - Chemo protocol:   Day -7: start Allopurinol  Day -6: Cytoxan + Fludarabine  Day -5 through -2: Fludarabine  Day -1: TBI x1  Day 0: Transplant  Day +3 and +4: Cytoxan  Day +5: start GCSF, MMF, and Sirolimus  - Restaging plan: per protocol. Day 21 bmbx not yet scheduled     HEME/COAG  #Chemotherapy induced pancytopenia   - cytopenias due to chemotherapy and radiation  - Transfusion parameters: hemoglobin <7, platelets <10  - Relevant thrombosis or bleeding history: superficial thromboses on OCP decades past. Most recent DVT of right LE (distal veins), 10/19/2022, holding lovenox given thrombocytopenia    ID/Neutropenic fevers  - Neutropenic Fevers: likely T-cell activation from graft, improved following PT Cytoxan. Cefepime 11/30-12/6 (discontinued today). Infectious work up unremarkable (BC, CXR, UC)  - po vanco for diarrhea, c dif colonized (12/2-x)  - Vaccination status: Influenza vaccination after day +60, COVID vaccination after day +100, followed by remaining vaccinations at 12, 14, 24, and 26 months.  - Prophylaxis plan: ACV (CMV donor and recipient negative) Fluconazole, Levaquin (held while on cefepime). Bactrim to start at day +28 (reported stomach upset with past use, per PharmD note will try at day +28. Can hold if GI symptoms occur).     RISK OF GVHD  - Prophylaxis: PTC days 3 and 4.   -Siro day +5, level check 12/6  - MMF, day +5    CARDIOVASCULAR  - Vasovagal episode with bradycardia: 11/29 occurred around transplant time, improved without intervention  - Risk of cardiomyopathy:  Baseline EF 60-65%  - Risk of arrhythmia: Baseline EKG showed NSR and normal QTC    GI/NUTRITION:   - Ulcer prophylaxis: Protonix  - Risk of nausea/vomiting due to chemo/radiation:  "Ondansetron q12 (patient does not want q8, consider tapering back as tolerated), Lorazepam and Compazine available prn  - Risk of malnutrition: monitoring.    - Diarrhea: x3 watery stools 12/2, c dif colonized. PO Vanco. Improving.    RENAL/ELECTROLYTES/  - Risk of renal injury: Off cytoxan flush. Monitor need for IVF.  - Electrolyte management: replace per sliding scale    DIABETES/ENDOCRINE  - Risk of steroid-induced hyperglyemia: Monitor BG, sliding scale if needed    MUSCULOSKELETAL/FRAILTY  - Baseline Frailty Score: 1  - Daily PT/OT as needed while inpatient    Clinically Significant Risk Factors              # Hypoalbuminemia: Lowest albumin = 3.1 g/dL (Ref range: 3.5-5.2) at 12/5/2022  2:43 AM, will monitor as appropriate   # Thrombocytopenia: Lowest platelets = 6 (Ref range: 150-450) in last 2 days, will monitor for bleeding         # Obesity: Estimated body mass index is 37.3 kg/m  as calculated from the following:    Height as of this encounter: 1.6 m (5' 2.99\").    Weight as of this encounter: 95.5 kg (210 lb 8 oz).          Today:   - Discontinue Cefepime and resume Levaquin.    DISPOSITION: on discharge patient should be scheduled with Dr. Garrett for first available appointment at the request of Dr. Zoltan French    Patient was discussed with Dr. Monzon.    Holly Allen PA-C   [phone]    ______________________________________________      BMT ATTENDING NOTE    Yahaira Fuentes is a 62 year old female, admitted on 11/22/2022, who remains hospitalized pending engraftment after allogeneic BMT for AML.  She is currently D+7.  Doing better today. Up and walking the halls multiple times.  Appetite is improving.  Stop cefepime due to ~72 hours afebrile and high suspicion for fevers from CRS rather than infection.  Restart levaquin ppx dose.     I spent 30 minutes in the care of Yahaira today, including an independent face-to-face assessment, review of vitals, medications, laboratory results, and independent " review of imaging studies. I personally performed all of the medical decision making associated with this visit, and I edited the above note to reflect my current plan of care. I spent over 50% of my time counseling the patient/family today and coordinating care with the team.    Key management decisions made by me and carried out under my direction include:   -Continue supportive cares for BMT.    -Stop cefepime, restart ppx levaquin.   -Transfuse Hgb>7, Plt>10.      Counseling and/or coordination of care performed by me:    -Count return timing.    -Protocol for fevers/abnormal vitals.     Ranges for vital signs:    Temp:  [96.7  F (35.9  C)-98.5  F (36.9  C)] 98.2  F (36.8  C)  Pulse:  [77-86] 82  Resp:  [16-20] 18  BP: (102-130)/(58-92) 120/62  SpO2:  [96 %-99 %] 96 %    Infusions:      Scheduled Medications:    acyclovir  800 mg Oral BID     dextrose 5% water  10-20 mL Intracatheter Daily at 8 pm     dextrose 5% water  10-20 mL Intracatheter Daily at 8 pm     filgrastim  5 mcg/kg (Treatment Plan Recorded) Intravenous Daily at 8 pm     heparin  5-10 mL Intracatheter Q28 Days     heparin lock flush  5-10 mL Intracatheter Q24H     levofloxacin  250 mg Oral Daily at 10 am     micafungin  50 mg Intravenous Q24H     mycophenolate mofetil  1,500 mg Intravenous Q12H [facility] (10/22)     ondansetron  8 mg Oral Q12H     pantoprazole  40 mg Oral Daily     sirolimus  4 mg Oral Daily     [START ON 12/27/2022] sulfamethoxazole-trimethoprim  1 tablet Oral Q Mon Tues BID     ursodiol  300 mg Oral TID     vancomycin  125 mg Oral 4x Daily         Rodeirck Monzon MD, PhD  BMT/Cellular Therapy/ Oncology       Securely message with the Vocera Web Console

## 2022-12-06 NOTE — PLAN OF CARE
VSS  Denies any nausea, vomiting, or pain.  Pt requested for her AM labs and vitals to be at 0430 or later.  Up ad donita, steady gait and balance.  Sirolimus needed this AM.  No electrolyte replacement needed this AM.  Platelets started.  Lactic  0.7  Small stool in AM.      Problem: Infection  Goal: Absence of Infection Signs and Symptoms  Intervention: Prevent or Manage Infection  Recent Flowsheet Documentation  Taken 12/5/2022 2010 by Teodoro Vides RN  Isolation Precautions:    enteric precautions maintained    protective environment maintained     Problem: Fall Injury Risk  Goal: Absence of Fall and Fall-Related Injury  Intervention: Promote Injury-Free Environment  Recent Flowsheet Documentation  Taken 12/5/2022 2010 by Teodoro Vides, RN  Safety Promotion/Fall Prevention:    nonskid shoes/slippers when out of bed    lighting adjusted    increase visualization of patient    increased rounding and observation    clutter free environment maintained    fall prevention program maintained     Problem: Stem Cell/Bone Marrow Transplant  Goal: Improved Oral Mucous Membrane Health and Integrity  Intervention: Promote Oral Comfort and Health  Recent Flowsheet Documentation  Taken 12/5/2022 2010 by Teodoro Vides RN  Oral Care: oral rinse provided     Problem: Stem Cell/Bone Marrow Transplant  Goal: Improved Oral Mucous Membrane Health and Integrity  Intervention: Promote Oral Comfort and Health  Recent Flowsheet Documentation  Taken 12/5/2022 2010 by Teodoro Vides RN  Oral Care: oral rinse provided     Problem: Plan of Care - These are the overarching goals to be used throughout the patient stay.    Goal: Absence of Hospital-Acquired Illness or Injury  Intervention: Prevent Skin Injury  Recent Flowsheet Documentation  Taken 12/5/2022 2010 by Teodoro Vides RN  Body Position: position changed independently   Goal Outcome Evaluation:

## 2022-12-07 NOTE — PLAN OF CARE
"Patient remains hospitalized  following stem cell transplant, currently day +7. Awaiting return of counts. Continues on daily Neupogen. Received 1 unit of plt early this AM. Continues on ppx antimicrobials. Continues on IV MMF & PO sirolimus - level checked today and low, additional dose of sirolimus given. Appetite good, improving daily. Denied nausea this shift - continues on scheduled PO Zofran. 3 loose/soft BMs this shift. Denied pain. Ambulating independently, out in halls frequently today. VSS.     Problem: Stem Cell/Bone Marrow Transplant  Goal: Blood Counts Within Acceptable Range  Outcome: Not Progressing  Intervention: Monitor and Manage Hematologic Symptoms  Recent Flowsheet Documentation  Taken 12/6/2022 0800 by Sachi Monge, RN  Medication Review/Management: medications reviewed     Problem: Plan of Care - These are the overarching goals to be used throughout the patient stay.    Goal: Plan of Care Review  Description: The Plan of Care Review/Shift note should be completed every shift.  The Outcome Evaluation is a brief statement about your assessment that the patient is improving, declining, or no change.  This information will be displayed automatically on your shift note.  Outcome: Progressing  Goal: Patient-Specific Goal (Individualized)  Description: You can add care plan individualizations to a care plan. Examples of Individualization might be:  \"Parent requests to be called daily at 9am for status\", \"I have a hard time hearing out of my right ear\", or \"Do not touch me to wake me up as it startles me\".  Outcome: Progressing  Goal: Absence of Hospital-Acquired Illness or Injury  Outcome: Progressing  Intervention: Identify and Manage Fall Risk  Recent Flowsheet Documentation  Taken 12/6/2022 0800 by Sachi Monge, RN  Safety Promotion/Fall Prevention:   assistive device/personal items within reach   clutter free environment maintained   lighting adjusted   nonskid shoes/slippers when out of " bed   patient and family education   room organization consistent   safety round/check completed  Goal: Optimal Comfort and Wellbeing  Outcome: Progressing  Goal: Readiness for Transition of Care  Outcome: Progressing     Problem: Stem Cell/Bone Marrow Transplant  Goal: Optimal Coping with Transplant  Outcome: Progressing  Goal: Symptom-Free Urinary Elimination  Outcome: Progressing  Goal: Diarrhea Symptom Control  Outcome: Progressing  Goal: Improved Activity Tolerance  Outcome: Progressing  Goal: Absence of Hypersensitivity Reaction  Outcome: Progressing  Goal: Absence of Infection  Outcome: Progressing  Intervention: Prevent and Manage Infection  Recent Flowsheet Documentation  Taken 12/6/2022 0800 by Sachi Monge RN  Isolation Precautions:   enteric precautions maintained   protective environment maintained  Goal: Improved Oral Mucous Membrane Health and Integrity  Outcome: Progressing  Goal: Nausea and Vomiting Symptom Relief  Outcome: Progressing  Goal: Optimal Nutrition Intake  Outcome: Progressing     Problem: Pain Acute  Goal: Optimal Pain Control and Function  Outcome: Progressing  Intervention: Prevent or Manage Pain  Recent Flowsheet Documentation  Taken 12/6/2022 0800 by Sachi Monge RN  Medication Review/Management: medications reviewed     Problem: Fall Injury Risk  Goal: Absence of Fall and Fall-Related Injury  Outcome: Progressing  Intervention: Identify and Manage Contributors  Recent Flowsheet Documentation  Taken 12/6/2022 0800 by Sachi Monge RN  Medication Review/Management: medications reviewed  Intervention: Promote Injury-Free Environment  Recent Flowsheet Documentation  Taken 12/6/2022 0800 by Sachi Monge RN  Safety Promotion/Fall Prevention:   assistive device/personal items within reach   clutter free environment maintained   lighting adjusted   nonskid shoes/slippers when out of bed   patient and family education   room organization consistent   safety round/check  completed     Problem: Infection  Goal: Absence of Infection Signs and Symptoms  Outcome: Progressing  Intervention: Prevent or Manage Infection  Recent Flowsheet Documentation  Taken 12/6/2022 0800 by Sachi Monge, RN  Isolation Precautions:   enteric precautions maintained   protective environment maintained     Problem: Oral Intake Inadequate  Goal: Improved Oral Intake  Outcome: Progressing   Goal Outcome Evaluation:

## 2022-12-07 NOTE — PLAN OF CARE
"  Problem: Plan of Care - These are the overarching goals to be used throughout the patient stay.    Goal: Patient-Specific Goal (Individualized)  Description: You can add care plan individualizations to a care plan. Examples of Individualization might be:  \"Parent requests to be called daily at 9am for status\", \"I have a hard time hearing out of my right ear\", or \"Do not touch me to wake me up as it startles me\".  Outcome: Progressing     Problem: Plan of Care - These are the overarching goals to be used throughout the patient stay.    Goal: Patient-Specific Goal (Individualized)  Description: You can add care plan individualizations to a care plan. Examples of Individualization might be:  \"Parent requests to be called daily at 9am for status\", \"I have a hard time hearing out of my right ear\", or \"Do not touch me to wake me up as it startles me\".  Outcome: Progressing   Goal Outcome Evaluation:  Vss. No pain. No nausea. Ate raisin bran, yogurt with granola, banana, chocolate chip cookie, turkey sandwich, chicken noodle soup, chips and icecream. Showered. Two small loose stools. Buttocks is sore and using barrier paste. Independent. Sirolimus level drawn.                       "

## 2022-12-07 NOTE — PLAN OF CARE
Pt A/O x4. VSS on RA. Denies pain. Afebrile. Pt reported having difficulty sleeping at night and cares clustered for minimal interruptions to assist in sleep between cares. Ambulating in room and up Independently. No replenishments needed per Electrolyte Supplementation protocol. Several loose stools over night. Sirolimus level due at 0800. Enteric precautions maintained. Call light within reach.    Problem: Plan of Care - These are the overarching goals to be used throughout the patient stay.    Goal: Plan of Care Review  Outcome: Progressing  Goal: Patient-Specific Goal (Individualized)  Outcome: Progressing  Goal: Absence of Hospital-Acquired Illness or Injury  Outcome: Progressing  Intervention: Identify and Manage Fall Risk  Recent Flowsheet Documentation  Taken 12/6/2022 1943 by Stephanie Garcia RN  Safety Promotion/Fall Prevention:    assistive device/personal items within reach    clutter free environment maintained    lighting adjusted    nonskid shoes/slippers when out of bed    patient and family education    room organization consistent    safety round/check completed  Intervention: Prevent Skin Injury  Recent Flowsheet Documentation  Taken 12/6/2022 1943 by Stephanie Garcia RN  Body Position: position changed independently  Intervention: Prevent and Manage VTE (Venous Thromboembolism) Risk  Recent Flowsheet Documentation  Taken 12/6/2022 1943 by Stephanie Garcia RN  VTE Prevention/Management: (ambulating) other (see comments)  Goal: Optimal Comfort and Wellbeing  Outcome: Progressing  Goal: Readiness for Transition of Care  Outcome: Progressing     Problem: Stem Cell/Bone Marrow Transplant  Goal: Optimal Coping with Transplant  Outcome: Progressing  Goal: Symptom-Free Urinary Elimination  Outcome: Progressing  Goal: Diarrhea Symptom Control  Outcome: Progressing  Goal: Improved Activity Tolerance  Outcome: Progressing  Intervention: Promote Improved Energy  Recent Flowsheet Documentation  Taken  12/6/2022 1943 by Stephanie Garcia RN  Activity Management:    activity adjusted per tolerance    activity encouraged    ambulated in room    up ad donita  Goal: Blood Counts Within Acceptable Range  Outcome: Progressing  Intervention: Monitor and Manage Hematologic Symptoms  Recent Flowsheet Documentation  Taken 12/6/2022 1943 by Stephanie Garcia RN  Bleeding Precautions: monitored for signs of bleeding  Medication Review/Management: medications reviewed  Goal: Absence of Hypersensitivity Reaction  Outcome: Progressing  Goal: Absence of Infection  Outcome: Progressing  Intervention: Prevent and Manage Infection  Recent Flowsheet Documentation  Taken 12/6/2022 1943 by Stephanie Garcia RN  Isolation Precautions:    enteric precautions maintained    protective environment maintained  Goal: Improved Oral Mucous Membrane Health and Integrity  Outcome: Progressing  Intervention: Promote Oral Comfort and Health  Recent Flowsheet Documentation  Taken 12/6/2022 1943 by Stephanie Garcia RN  Oral Care: oral rinse provided  Goal: Nausea and Vomiting Symptom Relief  Outcome: Progressing  Goal: Optimal Nutrition Intake  Outcome: Progressing     Problem: Pain Acute  Goal: Optimal Pain Control and Function  Outcome: Progressing  Intervention: Prevent or Manage Pain  Recent Flowsheet Documentation  Taken 12/6/2022 1943 by Stephanie Garcia RN  Medication Review/Management: medications reviewed     Problem: Fall Injury Risk  Goal: Absence of Fall and Fall-Related Injury  Outcome: Progressing  Intervention: Identify and Manage Contributors  Recent Flowsheet Documentation  Taken 12/6/2022 1943 by Stephanie Garcia RN  Medication Review/Management: medications reviewed  Intervention: Promote Injury-Free Environment  Recent Flowsheet Documentation  Taken 12/6/2022 1943 by Stephanie Garcia RN  Safety Promotion/Fall Prevention:    assistive device/personal items within reach    clutter free environment maintained    lighting  adjusted    nonskid shoes/slippers when out of bed    patient and family education    room organization consistent    safety round/check completed     Problem: Infection  Goal: Absence of Infection Signs and Symptoms  Outcome: Progressing  Intervention: Prevent or Manage Infection  Recent Flowsheet Documentation  Taken 12/6/2022 1943 by Stephanie Garcia, RN  Isolation Precautions:    enteric precautions maintained    protective environment maintained     Problem: Oral Intake Inadequate  Goal: Improved Oral Intake  Outcome: Progressing   Goal Outcome Evaluation:  Progressing

## 2022-12-07 NOTE — PROGRESS NOTES
"  BMT/Cell Therapy Daily Progress Note      ID Yahaira Fuentes is a 62 year old female with AML, undergoing a 7/8 matched unrelated allo HSCT, day +8.    HPI: Rebeka is doing well this morning. Stools remain loose / \"soft.\"  Denies N/V- would like to try no Zofran today. States she continues to eat well; appetite decreased in the morning, but good at lunch.        Review of Systems    8 point review with pertinent positive and negatives in HPI    PHYSICAL EXAM      Weight     Wt Readings from Last 3 Encounters:   12/07/22 95.6 kg (210 lb 11.2 oz)   11/21/22 96.4 kg (212 lb 9.6 oz)   11/08/22 97.3 kg (214 lb 6.4 oz)        KPS: 80    /66 (BP Location: Right arm)   Pulse 78   Temp 98.7  F (37.1  C) (Axillary)   Resp 20   Ht 1.6 m (5' 2.99\")   Wt 95.6 kg (210 lb 11.2 oz)   SpO2 98%   BMI 37.33 kg/m       General: NAD   Eyes: SHANIQUE, sclera anicteric   Lungs: CTA bilaterally  Cardiovascular: RRR, no M/R/G   Abdominal/Rectal: +BS, soft, NT, ND   Lymphatics: Bilateral thick ankles, nonpitting edema. Varicose veins bilaterally.   Skin: No rashes or petechaie, a few scattered ecchymoses  Neuro: A&O   Musculoskeletal: Muscle mass average, obese   Additional Findings: Hammer site NT, no drainage.    Current aGVHD staging:  Skin 0, UGI 0, LGI 0, Liver 0 (keep in note through day +180 for allos)      LABS AND IMAGING: I have assessed all abnormal lab values for their clinical significance and any values considered clinically significant have been addressed in the assessment and plan.        Lab Results   Component Value Date    WBC <0.1 (LL) 12/07/2022    ANEUTAUTO 4.7 11/07/2022    HGB 7.8 (L) 12/07/2022    HCT 24.6 (L) 12/07/2022    PLT 17 (LL) 12/07/2022     12/07/2022    POTASSIUM 3.7 12/07/2022    CHLORIDE 110 (H) 12/07/2022    CO2 26 12/07/2022    GLC 96 12/07/2022    BUN 8.2 12/07/2022    CR 0.48 (L) 12/07/2022    MAG 2.0 12/07/2022    INR 0.97 12/05/2022         SYSTEMS-BASED ASSESSMENT AND PLAN "     Yahaira Fuentes is a 62 year old female with AML, undergoing allo HSCT, day +8, admission complicated by ongoing intermittent fevers 11/30/22-12/4/22.    BMT/IEC PROTOCOL for 2015-32  - Chemo protocol:   Day -7: start Allopurinol  Day -6: Cytoxan + Fludarabine  Day -5 through -2: Fludarabine  Day -1: TBI x1  Day 0: Transplant  Day +3 and +4: Cytoxan  Day +5: start GCSF, MMF, and Sirolimus  - Restaging plan: per protocol. Day 21 bmbx not yet scheduled     HEME/COAG  #Chemotherapy induced pancytopenia   - cytopenias due to chemotherapy and radiation  - Transfusion parameters: hemoglobin <7, platelets <10  - Relevant thrombosis or bleeding history: superficial thromboses on OCP decades past. Most recent DVT of right LE (distal veins), 10/19/2022, holding lovenox given thrombocytopenia    ID/Neutropenic fevers  - Neutropenic Fevers: likely T-cell activation from graft, improved following PT Cytoxan. Cefepime 11/30-12/6. Infectious work up unremarkable (BC, CXR, UC)  - po vanco for diarrhea, c dif colonized (12/2-x)  - Vaccination status: Influenza vaccination after day +60, COVID vaccination after day +100, followed by remaining vaccinations at 12, 14, 24, and 26 months.  - Prophylaxis plan: ACV (CMV donor and recipient negative) Fluconazole, Levaquin (held while on cefepime). Bactrim to start at day +28 (reported stomach upset with past use, per PharmD note will try at day +28. Can hold if GI symptoms occur).     RISK OF GVHD  - Prophylaxis: PTC days 3 and 4.   - Siro day +5, level low 12/6; re-check today pending   - MMF, day +5    CARDIOVASCULAR  - Vasovagal episode with bradycardia: 11/29 occurred around transplant time, improved without intervention  - Risk of cardiomyopathy:  Baseline EF 60-65%  - Risk of arrhythmia: Baseline EKG showed NSR and normal QTC    GI/NUTRITION:   - Ulcer prophylaxis: Protonix  - Risk of nausea/vomiting due to chemo/radiation: Ondansetron q12 (patient requesting to try no Zofran  "today), Lorazepam and Compazine available prn  - Risk of malnutrition: monitoring.    - Diarrhea, c dif colonized: PO Vanco (12/2-x). Improving.    RENAL/ELECTROLYTES/  - Risk of renal injury: Off cytoxan flush. Monitor need for IVF.  - Electrolyte management: replace per sliding scale    DIABETES/ENDOCRINE  - Risk of steroid-induced hyperglyemia: Monitor BG, sliding scale if needed    MUSCULOSKELETAL/FRAILTY  - Baseline Frailty Score: 1  - Daily PT/OT as needed while inpatient      Clinically Significant Risk Factors              # Hypoalbuminemia: Lowest albumin = 3.1 g/dL (Ref range: 3.5-5.2) at 12/5/2022  2:43 AM, will monitor as appropriate   # Thrombocytopenia: Lowest platelets = 6 (Ref range: 150-450) in last 2 days, will monitor for bleeding         # Obesity: Estimated body mass index is 37.33 kg/m  as calculated from the following:    Height as of this encounter: 1.6 m (5' 2.99\").    Weight as of this encounter: 95.6 kg (210 lb 11.2 oz).          Today:   - Trial no zofran and monitor nausea / appetite.    DISPOSITION: on discharge patient should be scheduled with Dr. Garrett for first available appointment at the request of Dr. Zoltan French    Patient was discussed with Dr. Cartwright.    PHIL WaltersC   [phone]    Attending Summary  The patient was seen and examined by me separate from the midlevel provider.The note above reflects my assessment and plan. I have personally reviewed today's labs,vital and radiology results. The points of care that were added by me are:     History and physical  Day +8 s/p 7/8 MMUD alloHCT for AML.    On exam:  Head/mouth/neck: Oropharynx clear.  No lymphadenopathy.  Heart: Regular rate and rhythm.  No murmurs rubs or gallops.  Lungs: Lungs are clear bilaterally.  Abdomen: Abdomen is soft nontender nondistended.  Intact bowel sounds.  Ext: No edema.  Skin: No rash.    Pertinent Labs:  Lab Results   Component Value Date    WBC <0.1 12/07/2022     Lab Results   Component " Value Date    RBC 2.59 12/07/2022     Lab Results   Component Value Date    HGB 7.8 12/07/2022     Lab Results   Component Value Date    HCT 24.6 12/07/2022     No components found for: MCT  Lab Results   Component Value Date    MCV 95 12/07/2022     Lab Results   Component Value Date    MCH 30.1 12/07/2022     Lab Results   Component Value Date    MCHC 31.7 12/07/2022     Lab Results   Component Value Date    RDW 14.6 12/07/2022     Lab Results   Component Value Date    PLT 17 12/07/2022     ASSESSMENT AND PLAN  1.  AML: Day +8 s/p 7/8 alloHCT. Nausea improved.  2.  GVHD prophylaxis: PTCy/Siro/MMF.    Zion Cartwright MD

## 2022-12-07 NOTE — PROGRESS NOTES
BMT CLINICAL SOCIAL WORK NOTE:    Focus: Supportive Counseling/Resources/Discharge Planning    Data: Pt is a  62 year old female with AML, undergoing a 7/8 matched unrelated allo HSCT, day +8    Interventions: BMT Social Work Intern met with Pt to assess coping, provide supportive counseling and assist with resources as needed. Pt shared that she is doing well and feeling fine today.  Pt processed that yesterday was a bit harder for her and she was feeling emotional. She shared the lack of sleep will make her more emotional and that she is someone who needs her sleep. She shared that she has been filling her time with word searchers and Lif[employer] movies. She showed writer her word search book, which only had about 3 puzzles left. Her sister ordered her some more that should be here soon. She shared she has enjoyed most of the movies that are playing and that it is fun to see actors from older shows/movies be featured in some of these newer movies. Rebeka shared that she does not want visitors while she is here because she will see people a lot when she leaves and requires a caregiver. She also said it is hard to have people visit when there are constantly people in and out of the rooms.  CSW intern provided empathic listening, validation of concerns, and encouragement. CSW intern encouraged Pt to contact CSW for support, questions and/or resources.     Assessment: Pt presented as engaged and pleasant.  Pt appears to be coping well at this time. Pt continues to be supported by her sister and daughter.     Plan: CSW will continue to provide supportive counseling and assistance with resources as needed. CSW will continue to collaborate with multidisciplinary team regarding Pt's plan of care.     DAVIN Edouard.  BMT Social Work Intern

## 2022-12-08 NOTE — PROGRESS NOTES
"  BMT/Cell Therapy Daily Progress Note      ID Yahaira Fuentes is a 62 year old female with AML, undergoing a 7/8 matched unrelated allo HSCT, day +9.    HPI: Rebeak slept well overnight. Stools remain soft, but frequency continues. Rectum has become tender, especially when sitting. Reports using barrier cream and spray + dry wipes after bowel movements for the past day. Tolerated no Zofran yesterday; denies any nausea and continues to eat well.       Review of Systems    8 point review with pertinent positive and negatives in HPI    PHYSICAL EXAM      Weight     Wt Readings from Last 3 Encounters:   12/08/22 94.9 kg (209 lb 4.8 oz)   11/21/22 96.4 kg (212 lb 9.6 oz)   11/08/22 97.3 kg (214 lb 6.4 oz)        KPS: 80    /80 (BP Location: Right arm)   Pulse 75   Temp 98.3  F (36.8  C) (Axillary)   Resp 18   Ht 1.6 m (5' 2.99\")   Wt 94.9 kg (209 lb 4.8 oz)   SpO2 96%   BMI 37.09 kg/m       General: NAD   Eyes: SHANIQUE, sclera anicteric   Lungs: CTA bilaterally  Cardiovascular: RRR, no M/R/G   Abdominal/Rectal: +BS, soft, NT, ND. Skin surrounding anus denuded and erythematous. Barrier cream in place.   Lymphatics: Bilateral thick ankles, nonpitting edema. Varicose veins bilaterally.   Skin: No rashes or petechaie, a few scattered ecchymoses  Neuro: A&O   Musculoskeletal: Muscle mass average, obese   Additional Findings: Hammer site NT, no drainage.    Current aGVHD staging:  Skin 0, UGI 0, LGI 0, Liver 0 (keep in note through day +180 for allos)      LABS AND IMAGING: I have assessed all abnormal lab values for their clinical significance and any values considered clinically significant have been addressed in the assessment and plan.        Lab Results   Component Value Date    WBC <0.1 (LL) 12/08/2022    ANEUTAUTO 4.7 11/07/2022    HGB 7.9 (L) 12/08/2022    HCT 25.0 (L) 12/08/2022    PLT 11 (LL) 12/08/2022     12/08/2022    POTASSIUM 3.7 12/08/2022    CHLORIDE 108 (H) 12/08/2022    CO2 26 12/08/2022 "    GLC 91 12/08/2022    BUN 8.5 12/08/2022    CR 0.47 (L) 12/08/2022    MAG 2.0 12/08/2022    INR 0.97 12/05/2022         SYSTEMS-BASED ASSESSMENT AND PLAN     Yahaira Fuentes is a 62 year old female with AML, undergoing allo HSCT, day +9, admission complicated by ongoing intermittent fevers 11/30/22-12/4/22.    BMT/IEC PROTOCOL for 2015-32  Chemo protocol:   Day -7: start Allopurinol  Day -6: Cytoxan + Fludarabine  Day -5 through -2: Fludarabine  Day -1: TBI x1  Day 0: Transplant  Day +3 and +4: Cytoxan  Day +5: start GCSF, MMF, and Sirolimus  - Restaging plan: per protocol. Day 21 bmbx not yet scheduled    HEME/COAG  #Chemotherapy induced pancytopenia   - cytopenias due to chemotherapy and radiation  - Transfusion parameters: hemoglobin <7, platelets <10  - Relevant thrombosis or bleeding history: superficial thromboses on OCP decades past. Most recent DVT of right LE (distal veins), 10/19/2022, holding lovenox given thrombocytopenia    ID/Neutropenic fevers  - Neutropenic Fevers: likely T-cell activation from graft, improved following PT Cytoxan. Cefepime 11/30-12/6. Infectious work up unremarkable (BC, CXR, UC)  - po vanco for diarrhea, c dif colonized (12/2-x) [see GI below]  - Vaccination status: Influenza vaccination after day +60, COVID vaccination after day +100, followed by remaining vaccinations at 12, 14, 24, and 26 months.  - Prophylaxis plan: ACV (CMV donor and recipient negative) Fluconazole, Levaquin (held while on cefepime). Bactrim to start at day +28 (reported stomach upset with past use, per PharmD note will try at day +28. Can hold if GI symptoms occur).     RISK OF GVHD  - Prophylaxis: PTC days 3 and 4.   - Siro day +5, level check tomorrow  - MMF, day +5    CARDIOVASCULAR  - Vasovagal episode with bradycardia: 11/29 occurred around transplant time, improved without intervention  - Risk of cardiomyopathy:  Baseline EF 60-65%  - Risk of arrhythmia: Baseline EKG showed NSR and normal  "QTC    GI/NUTRITION:   - Ulcer prophylaxis: Protonix  - Risk of nausea/vomiting due to chemo/radiation: Ondansetron, Lorazepam and Compazine available prn (scheduled zofran discontinued 12/8)  - Risk of malnutrition: monitoring.    - Diarrhea, c dif colonized: PO Vanco (12/2-x). Stool soft, but frequency continues. Daily metamucil started 12/8. Patient experiencing rectal tenderness; surrounding skin appears denuded. Topical lidocaine available. Monitoring.     RENAL/ELECTROLYTES/  - Risk of renal injury: Off cytoxan flush. Monitor need for IVF.  - Electrolyte management: replace per sliding scale    DIABETES/ENDOCRINE  - Risk of steroid-induced hyperglyemia: Monitor BG, sliding scale if needed    MUSCULOSKELETAL/FRAILTY  - Baseline Frailty Score: 1  - Daily PT/OT as needed while inpatient      Clinically Significant Risk Factors          # Hypocalcemia: Lowest Ca = 8.1 mg/dL (Ref range: 8.5 - 10.1 mg/dL) in last 2 days, will monitor and replace as appropriate     # Hypoalbuminemia: Lowest albumin = 3.1 g/dL (Ref range: 3.5-5.2) at 12/5/2022  2:43 AM, will monitor as appropriate   # Thrombocytopenia: Lowest platelets = 11 (Ref range: 150-450) in last 2 days, will monitor for bleeding         # Obesity: Estimated body mass index is 37.09 kg/m  as calculated from the following:    Height as of this encounter: 1.6 m (5' 2.99\").    Weight as of this encounter: 94.9 kg (209 lb 4.8 oz).          Today:   - Discontinued scheduled zofran, now available prn.  - Monitoring rectum given frequency of stools and rectal tenderness.    DISPOSITION: on discharge patient should be scheduled with Dr. Garrett for first available appointment at the request of Dr. Zoltan French    Patient was discussed with Dr. Cartwright.    Holly Allen, PA-C   [phone]    Attending Summary  The patient was seen and examined by me separate from the midlevel provider.The note above reflects my assessment and plan. I have personally reviewed today's " labs,vital and radiology results. The points of care that were added by me are:     History and physical  Day +9 s/p 7/8 MMUD alloHCT for AML.    On exam:  Head/mouth/neck: Oropharynx clear.  No lymphadenopathy.  Heart: Regular rate and rhythm.  No murmurs rubs or gallops.  Lungs: Lungs are clear bilaterally.  Abdomen: Abdomen is soft nontender nondistended.  Intact bowel sounds.  Ext: No edema.  Skin: No rash.    Pertinent Labs:  Lab Results   Component Value Date    WBC <0.1 12/08/2022     Lab Results   Component Value Date    RBC 2.66 12/08/2022     Lab Results   Component Value Date    HGB 7.9 12/08/2022     Lab Results   Component Value Date    HCT 25.0 12/08/2022     No components found for: MCT  Lab Results   Component Value Date    MCV 94 12/08/2022     Lab Results   Component Value Date    MCH 29.7 12/08/2022     Lab Results   Component Value Date    MCHC 31.6 12/08/2022     Lab Results   Component Value Date    RDW 14.5 12/08/2022     Lab Results   Component Value Date    PLT 11 12/08/2022     ASSESSMENT AND PLAN  1.  AML: Day +9 s/p 7/8 alloHCT. Nausea improved.  2.  GVHD prophylaxis: PTCy/Siro/MMF.    Zion Cartwright MD

## 2022-12-08 NOTE — PROGRESS NOTES
"  BMT/Cell Therapy Daily Progress Note      ID Yahaira Fuentes is a 62 year old female with AML, undergoing a 7/8 matched unrelated allo HSCT, day +9.    HPI: Rebeka slept well overnight. Stools remain soft, but frequency continues. Rectum has become tender, especially when sitting. Reports using barrier cream and spray + dry wipes after bowel movements for the past day. Tolerated no Zofran yesterday; denies any nausea and continues to eat well.       Review of Systems    8 point review with pertinent positive and negatives in HPI    PHYSICAL EXAM      Weight     Wt Readings from Last 3 Encounters:   12/08/22 94.9 kg (209 lb 4.8 oz)   11/21/22 96.4 kg (212 lb 9.6 oz)   11/08/22 97.3 kg (214 lb 6.4 oz)        KPS: 80    /59 (BP Location: Right arm)   Pulse 84   Temp 98  F (36.7  C) (Axillary)   Resp 16   Ht 1.6 m (5' 2.99\")   Wt 94.9 kg (209 lb 4.8 oz)   SpO2 98%   BMI 37.09 kg/m       General: NAD   Eyes: SHANIQUE, sclera anicteric   Lungs: CTA bilaterally  Cardiovascular: RRR, no M/R/G   Abdominal/Rectal: +BS, soft, NT, ND. Skin surrounding anus denuded and erythematous. Barrier cream in place.   Lymphatics: Bilateral thick ankles, nonpitting edema. Varicose veins bilaterally.   Skin: No rashes or petechaie, a few scattered ecchymoses  Neuro: A&O   Musculoskeletal: Muscle mass average, obese   Additional Findings: Hammer site NT, no drainage.    Current aGVHD staging:  Skin 0, UGI 0, LGI 0, Liver 0 (keep in note through day +180 for allos)      LABS AND IMAGING: I have assessed all abnormal lab values for their clinical significance and any values considered clinically significant have been addressed in the assessment and plan.        Lab Results   Component Value Date    WBC <0.1 (LL) 12/08/2022    ANEUTAUTO 4.7 11/07/2022    HGB 7.9 (L) 12/08/2022    HCT 25.0 (L) 12/08/2022    PLT 11 (LL) 12/08/2022     12/08/2022    POTASSIUM 3.7 12/08/2022    CHLORIDE 108 (H) 12/08/2022    CO2 26 12/08/2022    " GLC 91 12/08/2022    BUN 8.5 12/08/2022    CR 0.47 (L) 12/08/2022    MAG 2.0 12/08/2022    INR 0.97 12/05/2022         SYSTEMS-BASED ASSESSMENT AND PLAN     Yahaira Fuentes is a 62 year old female with AML, undergoing allo HSCT, day +9, admission complicated by ongoing intermittent fevers 11/30/22-12/4/22.    BMT/IEC PROTOCOL for 2015-32  Chemo protocol:   Day -7: start Allopurinol  Day -6: Cytoxan + Fludarabine  Day -5 through -2: Fludarabine  Day -1: TBI x1  Day 0: Transplant  Day +3 and +4: Cytoxan  Day +5: start GCSF, MMF, and Sirolimus  - Restaging plan: per protocol. Day 21 bmbx not yet scheduled    HEME/COAG  #Chemotherapy induced pancytopenia   - cytopenias due to chemotherapy and radiation  - Transfusion parameters: hemoglobin <7, platelets <10  - Relevant thrombosis or bleeding history: superficial thromboses on OCP decades past. Most recent DVT of right LE (distal veins), 10/19/2022, holding lovenox given thrombocytopenia    ID/Neutropenic fevers  - Neutropenic Fevers: likely T-cell activation from graft, improved following PT Cytoxan. Cefepime 11/30-12/6. Infectious work up unremarkable (BC, CXR, UC)  - po vanco for diarrhea, c dif colonized (12/2-x) [see GI below]  - Vaccination status: Influenza vaccination after day +60, COVID vaccination after day +100, followed by remaining vaccinations at 12, 14, 24, and 26 months.  - Prophylaxis plan: ACV (CMV donor and recipient negative) Fluconazole, Levaquin (held while on cefepime). Bactrim to start at day +28 (reported stomach upset with past use, per PharmD note will try at day +28. Can hold if GI symptoms occur).     RISK OF GVHD  - Prophylaxis: PTC days 3 and 4.   - Siro day +5, level check tomorrow  - MMF, day +5    CARDIOVASCULAR  - Vasovagal episode with bradycardia: 11/29 occurred around transplant time, improved without intervention  - Risk of cardiomyopathy:  Baseline EF 60-65%  - Risk of arrhythmia: Baseline EKG showed NSR and normal  "QTC    GI/NUTRITION:   - Ulcer prophylaxis: Protonix  - Risk of nausea/vomiting due to chemo/radiation: Ondansetron, Lorazepam and Compazine available prn (scheduled zofran discontinued 12/8)  - Risk of malnutrition: monitoring.    - Diarrhea, c dif colonized: PO Vanco (12/2-x). Stool soft, but frequency continues. Daily metamucil started 12/8. Patient experiencing rectal tenderness; surrounding skin appears denuded. Topical lidocaine available. Monitoring.     RENAL/ELECTROLYTES/  - Risk of renal injury: Off cytoxan flush. Monitor need for IVF.  - Electrolyte management: replace per sliding scale    DIABETES/ENDOCRINE  - Risk of steroid-induced hyperglyemia: Monitor BG, sliding scale if needed    MUSCULOSKELETAL/FRAILTY  - Baseline Frailty Score: 1  - Daily PT/OT as needed while inpatient      Clinically Significant Risk Factors          # Hypocalcemia: Lowest Ca = 8.1 mg/dL in last 2 days, will monitor and replace as appropriate     # Hypoalbuminemia: Lowest albumin = 3.1 g/dL at 12/5/2022  2:43 AM, will monitor as appropriate   # Thrombocytopenia: Lowest platelets = 11 in last 2 days, will monitor for bleeding         # Obesity: Estimated body mass index is 37.09 kg/m  as calculated from the following:    Height as of this encounter: 1.6 m (5' 2.99\").    Weight as of this encounter: 94.9 kg (209 lb 4.8 oz).          Today:   - Discontinued scheduled zofran, now available prn.  - Monitoring rectum given frequency of stools and rectal tenderness.    DISPOSITION: on discharge patient should be scheduled with Dr. Garrett for first available appointment at the request of Dr. Zoltan French    Patient was discussed with Dr. Cartwright.    Holly Allen, PA-C   [phone]    Attending Summary  The patient was seen and examined by me separate from the midlevel provider.The note above reflects my assessment and plan. I have personally reviewed today's labs,vital and radiology results. The points of care that were added by me are:   "   History and physical  Day +8 s/p 7/8 MMUD alloHCT for AML.    On exam:  Head/mouth/neck: Oropharynx clear.  No lymphadenopathy.  Heart: Regular rate and rhythm.  No murmurs rubs or gallops.  Lungs: Lungs are clear bilaterally.  Abdomen: Abdomen is soft nontender nondistended.  Intact bowel sounds.  Ext: No edema.  Skin: No rash.    Pertinent Labs:  Lab Results   Component Value Date    WBC <0.1 12/07/2022     Lab Results   Component Value Date    RBC 2.59 12/07/2022     Lab Results   Component Value Date    HGB 7.8 12/07/2022     Lab Results   Component Value Date    HCT 24.6 12/07/2022     No components found for: MCT  Lab Results   Component Value Date    MCV 95 12/07/2022     Lab Results   Component Value Date    MCH 30.1 12/07/2022     Lab Results   Component Value Date    MCHC 31.7 12/07/2022     Lab Results   Component Value Date    RDW 14.6 12/07/2022     Lab Results   Component Value Date    PLT 17 12/07/2022     ASSESSMENT AND PLAN  1.  AML: Day +8 s/p 7/8 alloHCT. Nausea improved.  2.  GVHD prophylaxis: PTCy/Siro/MMF.    Zion Cartwright MD

## 2022-12-08 NOTE — PLAN OF CARE
"30-Second Sit to Stand Test:  The test is designed to be conducted with a straight back chair, without armrests, with a 17-inch seat height.  (Chair heights: High back & Folding = 18\", ICU/5C recliner & window seat = 18 1/2\"  Actual height of chair used: 18 \"    Patient Score: 10 reps (no use of arms)    The 30 Second Sit to Stand Test is considered a test of fall risk.  Data from JULIO CESAR MORAN, cosponsored by [place] [facility]:  If must use arms = High Fall Risk regardless of reps  8 or less times = High Fall Risk   9 to 12 times = Moderate Risk  13 or more times = Low Risk    The 30 Second Sit to Stand Test is also considered a test of leg strength and endurance.   Normative Data from Chucho et al,. 2001 for ages 60-94, & from Ying PEPPER, et al. 2017 for ages 3-59  Age                 Reps: Men        Reps: Women  20-29                25-28                      23-25  30-39                23-25                      22-24  40-49                23-25                      21-23  50-59                21-23                      20-22  60-64                15-17                     14-15                               65-69                14-16                     13-14                    70-74                13-15                     12-13              75-79                13-15                  12-13                    80-84                11-13                     10-12  85-89                10-12                      9-11  90-94                 9-10                       7-9  "

## 2022-12-08 NOTE — PLAN OF CARE
Pt A/O x4. VSS on RA. Denies pain, N, V. Afebrile. Cares clustered for minimal interruptions for sleep per Pt request. Ambulating in room Independently. No replenishments needed per Electrolyte Supplementation protocol. x5 small soft stools over night and barrier cream applied x1 assist per Pt request x4. Enteric precautions maintained. Pt able to sleep between cares. Call light within reach.     Problem: Stem Cell/Bone Marrow Transplant  Goal: Diarrhea Symptom Control  Outcome: Not Progressing     Problem: Plan of Care - These are the overarching goals to be used throughout the patient stay.    Goal: Plan of Care Review  Outcome: Progressing  Goal: Absence of Hospital-Acquired Illness or Injury  Outcome: Progressing  Intervention: Identify and Manage Fall Risk  Recent Flowsheet Documentation  Taken 12/7/2022 1951 by Stephanie Garcia RN  Safety Promotion/Fall Prevention:    assistive device/personal items within reach    clutter free environment maintained    lighting adjusted    nonskid shoes/slippers when out of bed    patient and family education    room organization consistent    safety round/check completed  Intervention: Prevent Skin Injury  Recent Flowsheet Documentation  Taken 12/7/2022 2326 by Stephanie Garcia RN  Body Position: position changed independently  Taken 12/7/2022 1951 by Stephanie Garcia RN  Body Position: position changed independently  Taken 12/7/2022 1946 by Stephanie Garcia RN  Body Position: position changed independently  Intervention: Prevent and Manage VTE (Venous Thromboembolism) Risk  Recent Flowsheet Documentation  Taken 12/7/2022 1951 by Stephanie Garcia RN  VTE Prevention/Management: (ambulating) other (see comments)  Goal: Optimal Comfort and Wellbeing  Outcome: Progressing     Problem: Stem Cell/Bone Marrow Transplant  Goal: Optimal Coping with Transplant  Outcome: Progressing  Goal: Improved Activity Tolerance  Outcome: Progressing  Intervention: Promote Improved  Energy  Recent Flowsheet Documentation  Taken 12/7/2022 1951 by Stephanie Garcia RN  Activity Management:    activity adjusted per tolerance    activity encouraged    ambulated in room    up ad donita  Taken 12/7/2022 1946 by Stephanie Garcia RN  Activity Management:    activity adjusted per tolerance    activity encouraged    ambulated in room    ambulated to bathroom    back to bed  Goal: Absence of Infection  Outcome: Progressing  Intervention: Prevent and Manage Infection  Recent Flowsheet Documentation  Taken 12/7/2022 1951 by Stephanie Garcia RN  Isolation Precautions:    enteric precautions maintained    protective environment maintained  Goal: Nausea and Vomiting Symptom Relief  Outcome: Progressing  Intervention: Prevent and Manage Nausea and Vomiting  Recent Flowsheet Documentation  Taken 12/7/2022 1951 by Stephanie Garcia RN  Nausea/Vomiting Interventions:    stimuli minimized    slow deep breathing encouraged  Goal: Optimal Nutrition Intake  Outcome: Progressing     Problem: Pain Acute  Goal: Optimal Pain Control and Function  Outcome: Progressing  Intervention: Prevent or Manage Pain  Recent Flowsheet Documentation  Taken 12/7/2022 1951 by Stephanie Garcia RN  Medication Review/Management: medications reviewed     Problem: Fall Injury Risk  Goal: Absence of Fall and Fall-Related Injury  Outcome: Progressing  Intervention: Identify and Manage Contributors  Recent Flowsheet Documentation  Taken 12/7/2022 1951 by Stephanie Garcia RN  Medication Review/Management: medications reviewed  Intervention: Promote Injury-Free Environment  Recent Flowsheet Documentation  Taken 12/7/2022 1951 by Stephanie Garcia RN  Safety Promotion/Fall Prevention:    assistive device/personal items within reach    clutter free environment maintained    lighting adjusted    nonskid shoes/slippers when out of bed    patient and family education    room organization consistent    safety round/check completed     Problem:  Infection  Goal: Absence of Infection Signs and Symptoms  Outcome: Progressing  Intervention: Prevent or Manage Infection  Recent Flowsheet Documentation  Taken 12/7/2022 1951 by Stephanie Garcia, RN  Isolation Precautions:    enteric precautions maintained    protective environment maintained     Problem: Oral Intake Inadequate  Goal: Improved Oral Intake  Outcome: Progressing      sudden onset

## 2022-12-08 NOTE — PLAN OF CARE
"AVSS. Pt still having stools and a sore bottom. Cream applied and lido gel available. Eating well, walking halls. Continue POC.   Problem: Plan of Care - These are the overarching goals to be used throughout the patient stay.    Goal: Plan of Care Review  Description: The Plan of Care Review/Shift note should be completed every shift.  The Outcome Evaluation is a brief statement about your assessment that the patient is improving, declining, or no change.  This information will be displayed automatically on your shift note.  Outcome: Progressing  Goal: Patient-Specific Goal (Individualized)  Description: You can add care plan individualizations to a care plan. Examples of Individualization might be:  \"Parent requests to be called daily at 9am for status\", \"I have a hard time hearing out of my right ear\", or \"Do not touch me to wake me up as it startles me\".  Outcome: Progressing  Goal: Absence of Hospital-Acquired Illness or Injury  Outcome: Progressing  Intervention: Identify and Manage Fall Risk  Recent Flowsheet Documentation  Taken 12/8/2022 0800 by Radha Monge RN  Safety Promotion/Fall Prevention: assistive device/personal items within reach  Intervention: Prevent Skin Injury  Recent Flowsheet Documentation  Taken 12/8/2022 0800 by Radha Monge, RN  Body Position: position changed independently  Goal: Optimal Comfort and Wellbeing  Outcome: Progressing  Goal: Readiness for Transition of Care  Outcome: Progressing     Problem: Stem Cell/Bone Marrow Transplant  Goal: Optimal Coping with Transplant  Outcome: Progressing  Goal: Symptom-Free Urinary Elimination  Outcome: Progressing  Goal: Diarrhea Symptom Control  Outcome: Progressing  Goal: Improved Activity Tolerance  Outcome: Progressing  Intervention: Promote Improved Energy  Recent Flowsheet Documentation  Taken 12/8/2022 0800 by Radha Monge, RN  Activity Management: activity adjusted per tolerance  Goal: Blood Counts Within Acceptable " Range  Outcome: Progressing  Intervention: Monitor and Manage Hematologic Symptoms  Recent Flowsheet Documentation  Taken 12/8/2022 0800 by Radha Monge RN  Bleeding Precautions: monitored for signs of bleeding  Medication Review/Management: medications reviewed  Goal: Absence of Hypersensitivity Reaction  Outcome: Progressing  Goal: Absence of Infection  Outcome: Progressing  Goal: Improved Oral Mucous Membrane Health and Integrity  Outcome: Progressing  Goal: Nausea and Vomiting Symptom Relief  Outcome: Progressing  Goal: Optimal Nutrition Intake  Outcome: Progressing     Problem: Pain Acute  Goal: Optimal Pain Control and Function  Outcome: Progressing  Intervention: Prevent or Manage Pain  Recent Flowsheet Documentation  Taken 12/8/2022 0800 by Radha Monge RN  Medication Review/Management: medications reviewed     Problem: Fall Injury Risk  Goal: Absence of Fall and Fall-Related Injury  Outcome: Progressing  Intervention: Identify and Manage Contributors  Recent Flowsheet Documentation  Taken 12/8/2022 0800 by Radha Monge RN  Medication Review/Management: medications reviewed  Intervention: Promote Injury-Free Environment  Recent Flowsheet Documentation  Taken 12/8/2022 0800 by Radha Monge RN  Safety Promotion/Fall Prevention: assistive device/personal items within reach     Problem: Infection  Goal: Absence of Infection Signs and Symptoms  Outcome: Progressing     Problem: Oral Intake Inadequate  Goal: Improved Oral Intake  Outcome: Progressing   Goal Outcome Evaluation:

## 2022-12-08 NOTE — PROGRESS NOTES
"  BMT/Cell Therapy Daily Progress Note      ID Yahaira Fuentes is a 62 year old female with AML, undergoing a 7/8 matched unrelated allo HSCT, day +9.    HPI: Rebeka slept well overnight. Stools remain soft, but frequency continues. Rectum has become tender, especially when sitting in chair. Reports using barrier cream and spray + dry wipes after bowel movements for the past day. Tolerated no Zofran yesterday; denies any nausea and continues to eat well.       Review of Systems    8 point review with pertinent positive and negatives in HPI    PHYSICAL EXAM      Weight     Wt Readings from Last 3 Encounters:   12/08/22 94.9 kg (209 lb 4.8 oz)   11/21/22 96.4 kg (212 lb 9.6 oz)   11/08/22 97.3 kg (214 lb 6.4 oz)        KPS: 80    /59 (BP Location: Right arm)   Pulse 84   Temp 98  F (36.7  C) (Axillary)   Resp 16   Ht 1.6 m (5' 2.99\")   Wt 94.9 kg (209 lb 4.8 oz)   SpO2 98%   BMI 37.09 kg/m       General: NAD   Eyes: SHANIQUE, sclera anicteric   Lungs: CTA bilaterally  Cardiovascular: RRR, no M/R/G   Abdominal/Rectal: +BS, soft, NT, ND. Skin surrounding anus denuded and erythematous. Barrier cream in place.   Lymphatics: Bilateral thick ankles, nonpitting edema. Varicose veins bilaterally.   Skin: No rashes or petechaie, a few scattered ecchymoses  Neuro: A&O   Musculoskeletal: Muscle mass average, obese   Additional Findings: Hammer site NT, no drainage.    Current aGVHD staging:  Skin 0, UGI 0, LGI 0, Liver 0 (keep in note through day +180 for allos)      LABS AND IMAGING: I have assessed all abnormal lab values for their clinical significance and any values considered clinically significant have been addressed in the assessment and plan.        Lab Results   Component Value Date    WBC <0.1 (LL) 12/08/2022    ANEUTAUTO 4.7 11/07/2022    HGB 7.9 (L) 12/08/2022    HCT 25.0 (L) 12/08/2022    PLT 11 (LL) 12/08/2022     12/08/2022    POTASSIUM 3.7 12/08/2022    CHLORIDE 108 (H) 12/08/2022    CO2 26 " 12/08/2022    GLC 91 12/08/2022    BUN 8.5 12/08/2022    CR 0.47 (L) 12/08/2022    MAG 2.0 12/08/2022    INR 0.97 12/05/2022         SYSTEMS-BASED ASSESSMENT AND PLAN     Yahaira Fuentes is a 62 year old female with AML, undergoing allo HSCT, day +9, admission complicated by ongoing intermittent fevers 11/30/22-12/4/22.    BMT/IEC PROTOCOL for 2015-32  Chemo protocol:   Day -7: start Allopurinol  Day -6: Cytoxan + Fludarabine  Day -5 through -2: Fludarabine  Day -1: TBI x1  Day 0: Transplant  Day +3 and +4: Cytoxan  Day +5: start GCSF, MMF, and Sirolimus  - Restaging plan: per protocol. Day 21 bmbx not yet scheduled    HEME/COAG  #Chemotherapy induced pancytopenia   - cytopenias due to chemotherapy and radiation  - Transfusion parameters: hemoglobin <7, platelets <10  - Relevant thrombosis or bleeding history: superficial thromboses on OCP decades past. Most recent DVT of right LE (distal veins), 10/19/2022, holding lovenox given thrombocytopenia    ID/Neutropenic fevers  - Neutropenic Fevers: likely T-cell activation from graft, improved following PT Cytoxan. Cefepime 11/30-12/6. Infectious work up unremarkable (BC, CXR, UC)  - po vanco for diarrhea, c dif colonized (12/2-x) [see GI below]  - Vaccination status: Influenza vaccination after day +60, COVID vaccination after day +100, followed by remaining vaccinations at 12, 14, 24, and 26 months.  - Prophylaxis plan: ACV (CMV donor and recipient negative) Fluconazole, Levaquin (held while on cefepime). Bactrim to start at day +28 (reported stomach upset with past use, per PharmD note will try at day +28. Can hold if GI symptoms occur).     RISK OF GVHD  - Prophylaxis: PTC days 3 and 4.   - Siro day +5, level check tomorrow  - MMF, day +5    CARDIOVASCULAR  - Vasovagal episode with bradycardia: 11/29 occurred around transplant time, improved without intervention  - Risk of cardiomyopathy:  Baseline EF 60-65%  - Risk of arrhythmia: Baseline EKG showed NSR and  "normal QTC    GI/NUTRITION:   - Ulcer prophylaxis: Protonix  - Risk of nausea/vomiting due to chemo/radiation: Ondansetron, Lorazepam and Compazine available prn (scheduled zofran discontinued 12/8)  - Risk of malnutrition: monitoring.    - Diarrhea, c dif colonized: PO Vanco (12/2-x). Stool soft, but frequency continues. Daily metamucil started 12/8. Patient experiencing rectal tenderness; surrounding skin appears denuded. Topical lidocaine available. Monitoring.     RENAL/ELECTROLYTES/  - Risk of renal injury: Off cytoxan flush. Monitor need for IVF.  - Electrolyte management: replace per sliding scale    DIABETES/ENDOCRINE  - Risk of steroid-induced hyperglyemia: Monitor BG, sliding scale if needed    MUSCULOSKELETAL/FRAILTY  - Baseline Frailty Score: 1  - Daily PT/OT as needed while inpatient      Clinically Significant Risk Factors          # Hypocalcemia: Lowest Ca = 8.1 mg/dL in last 2 days, will monitor and replace as appropriate     # Hypoalbuminemia: Lowest albumin = 3.1 g/dL at 12/5/2022  2:43 AM, will monitor as appropriate   # Thrombocytopenia: Lowest platelets = 11 in last 2 days, will monitor for bleeding         # Obesity: Estimated body mass index is 37.09 kg/m  as calculated from the following:    Height as of this encounter: 1.6 m (5' 2.99\").    Weight as of this encounter: 94.9 kg (209 lb 4.8 oz).          Today:   - Discontinued scheduled zofran, now available prn.  - Monitoring rectum given frequency of stools and rectal tenderness.    DISPOSITION: on discharge patient should be scheduled with Dr. Garrett for first available appointment at the request of Dr. Zoltan French    Patient was discussed with Dr. Cartwright.    Holly Allen, PA-C   [phone]    Attending Summary  The patient was seen and examined by me separate from the midlevel provider.The note above reflects my assessment and plan. I have personally reviewed today's labs,vital and radiology results. The points of care that were added by me " are:     History and physical  Day +8 s/p 7/8 MMUD alloHCT for AML.    On exam:  Head/mouth/neck: Oropharynx clear.  No lymphadenopathy.  Heart: Regular rate and rhythm.  No murmurs rubs or gallops.  Lungs: Lungs are clear bilaterally.  Abdomen: Abdomen is soft nontender nondistended.  Intact bowel sounds.  Ext: No edema.  Skin: No rash.    Pertinent Labs:  Lab Results   Component Value Date    WBC <0.1 12/07/2022     Lab Results   Component Value Date    RBC 2.59 12/07/2022     Lab Results   Component Value Date    HGB 7.8 12/07/2022     Lab Results   Component Value Date    HCT 24.6 12/07/2022     No components found for: MCT  Lab Results   Component Value Date    MCV 95 12/07/2022     Lab Results   Component Value Date    MCH 30.1 12/07/2022     Lab Results   Component Value Date    MCHC 31.7 12/07/2022     Lab Results   Component Value Date    RDW 14.6 12/07/2022     Lab Results   Component Value Date    PLT 17 12/07/2022     ASSESSMENT AND PLAN  1.  AML: Day +8 s/p 7/8 alloHCT. Nausea improved.  2.  GVHD prophylaxis: PTCy/Siro/MMF.    Zion Cartwright MD

## 2022-12-08 NOTE — PROGRESS NOTES
"SPIRITUAL HEALTH SERVICES  [facility] ([place]) 5C  REFERRAL SOURCE: Length of stay     Introduced spiritual health services. Pt stated \"I'm fine,\" and declined visit     PLAN: No further follow-up     Rev. Amanda Paez MDiv, [facility]  Staff    Pager [phone]  * [facility] remains available 24/7 for emergent requests/referrals, either by having the switchboard page the on-call  or by entering an ASAP/STAT consult in Epic (this will also page the on-call ).*      "

## 2022-12-09 NOTE — PLAN OF CARE
"/63   Pulse 102   Temp 97.4  F (36.3  C) (Axillary)   Resp 16   Ht 1.6 m (5' 2.99\")   Wt 94.9 kg (209 lb 4.8 oz)   SpO2 98%   BMI 37.09 kg/m      Afebrile. Intermittently tachy. Other VSS. Pt reports pain from her sore bottom but declines PRNs. Using barrier cream and keeping open to air. Denies nausea. Up independently. Voiding adequately. X1 loose BM. L CVC running plts. No other replacements needed this am. Will continue with POC.       "

## 2022-12-09 NOTE — PLAN OF CARE
"Pt has had soft loose BM's pt using barrier cream and zinc oxide and tyler while in room will go pantless and use a fan to dry bottom. Pt showered and will do CHG wipes. Pt eating some but not large amounts.  Problem: Plan of Care - These are the overarching goals to be used throughout the patient stay.    Goal: Plan of Care Review  Description: The Plan of Care Review/Shift note should be completed every shift.  The Outcome Evaluation is a brief statement about your assessment that the patient is improving, declining, or no change.  This information will be displayed automatically on your shift note.  Outcome: Progressing  Flowsheets (Taken 12/9/2022 1742)  Plan of Care Reviewed With: patient  Overall Patient Progress: no change  Goal: Patient-Specific Goal (Individualized)  Description: You can add care plan individualizations to a care plan. Examples of Individualization might be:  \"Parent requests to be called daily at 9am for status\", \"I have a hard time hearing out of my right ear\", or \"Do not touch me to wake me up as it startles me\".  Outcome: Progressing  Goal: Absence of Hospital-Acquired Illness or Injury  Outcome: Progressing  Intervention: Identify and Manage Fall Risk  Recent Flowsheet Documentation  Taken 12/9/2022 0800 by Mer Abreu RN  Safety Promotion/Fall Prevention:   clutter free environment maintained   fall prevention program maintained   increase visualization of patient   lighting adjusted   nonskid shoes/slippers when out of bed   patient and family education  Intervention: Prevent Skin Injury  Recent Flowsheet Documentation  Taken 12/9/2022 0800 by Mer Abreu RN  Body Position:   position changed independently   left   side-lying  Goal: Optimal Comfort and Wellbeing  Outcome: Progressing  Goal: Readiness for Transition of Care  Outcome: Progressing     Problem: Stem Cell/Bone Marrow Transplant  Goal: Optimal Coping with Transplant  Outcome: Progressing  Goal: Symptom-Free " Urinary Elimination  Outcome: Progressing  Goal: Diarrhea Symptom Control  Outcome: Progressing  Goal: Improved Activity Tolerance  Outcome: Progressing  Intervention: Promote Improved Energy  Recent Flowsheet Documentation  Taken 12/9/2022 0800 by Mer Abreu RN  Activity Management:   activity adjusted per tolerance   up ad donita  Goal: Blood Counts Within Acceptable Range  Outcome: Progressing  Intervention: Monitor and Manage Hematologic Symptoms  Recent Flowsheet Documentation  Taken 12/9/2022 0800 by Mer Abreu RN  Medication Review/Management: medications reviewed  Goal: Absence of Hypersensitivity Reaction  Outcome: Progressing  Goal: Absence of Infection  Outcome: Progressing  Intervention: Prevent and Manage Infection  Recent Flowsheet Documentation  Taken 12/9/2022 0800 by Mer Abreu RN  Isolation Precautions:   enteric precautions maintained   protective environment maintained   cytotoxic precautions maintained  Goal: Improved Oral Mucous Membrane Health and Integrity  Outcome: Progressing  Intervention: Promote Oral Comfort and Health  Recent Flowsheet Documentation  Taken 12/9/2022 1300 by Mer Abreu RN  Oral Care: mouth wash rinse  Taken 12/9/2022 0833 by Mer Abreu RN  Oral Care: mouth wash rinse  Taken 12/9/2022 0800 by Mer Abreu RN  Oral Care: oral rinse provided  Goal: Nausea and Vomiting Symptom Relief  Outcome: Progressing  Goal: Optimal Nutrition Intake  Outcome: Progressing     Problem: Pain Acute  Goal: Optimal Pain Control and Function  Outcome: Progressing  Intervention: Prevent or Manage Pain  Recent Flowsheet Documentation  Taken 12/9/2022 0800 by Mer Abreu RN  Medication Review/Management: medications reviewed     Problem: Fall Injury Risk  Goal: Absence of Fall and Fall-Related Injury  Outcome: Progressing  Intervention: Identify and Manage Contributors  Recent Flowsheet Documentation  Taken 12/9/2022 0800 by Mer Abreu  RN  Medication Review/Management: medications reviewed  Intervention: Promote Injury-Free Environment  Recent Flowsheet Documentation  Taken 12/9/2022 0800 by Mer Abreu RN  Safety Promotion/Fall Prevention:   clutter free environment maintained   fall prevention program maintained   increase visualization of patient   lighting adjusted   nonskid shoes/slippers when out of bed   patient and family education     Problem: Infection  Goal: Absence of Infection Signs and Symptoms  Outcome: Progressing  Intervention: Prevent or Manage Infection  Recent Flowsheet Documentation  Taken 12/9/2022 0800 by Mer Abreu RN  Isolation Precautions:   enteric precautions maintained   protective environment maintained   cytotoxic precautions maintained     Problem: Oral Intake Inadequate  Goal: Improved Oral Intake  Outcome: Progressing   Goal Outcome Evaluation:      Plan of Care Reviewed With: patient    Overall Patient Progress: no changeOverall Patient Progress: no change

## 2022-12-09 NOTE — PROGRESS NOTES
"  BMT/Cell Therapy Daily Progress Note      ID Yahaira Fuentes is a 62 year old female with AML, undergoing a 7/8 matched unrelated allo HSCT, day +10.    HPI: Rebeka slept well overnight with rectal pain feeling much improved. This morning it is feeling tender again after having a bowel movement. Stools remain soft, formed. Barrier cream and spray + dry wipes after bowel movements has been helpful. Is considering trying topical lidocaine gel for the pain today. Denies any nausea and continues to eat well.       Review of Systems    8 point review with pertinent positive and negatives in HPI    PHYSICAL EXAM      Weight     Wt Readings from Last 3 Encounters:   12/08/22 94.9 kg (209 lb 4.8 oz)   11/21/22 96.4 kg (212 lb 9.6 oz)   11/08/22 97.3 kg (214 lb 6.4 oz)        KPS: 80    /64   Pulse 99   Temp 99.1  F (37.3  C) (Axillary)   Resp 16   Ht 1.6 m (5' 2.99\")   Wt 94.9 kg (209 lb 4.8 oz)   SpO2 96%   BMI 37.09 kg/m       General: NAD   Eyes: SHANIQUE, sclera anicteric   Lungs: CTA bilaterally  Cardiovascular: RRR, no M/R/G   Abdominal/Rectal: +BS, soft, NT, ND. Skin surrounding anus denuded and erythematous. Barrier cream in place.   Lymphatics: Bilateral thick ankles, nonpitting edema. Varicose veins bilaterally.   Skin: No rashes or petechaie, a few scattered ecchymoses  Neuro: A&O   Musculoskeletal: Muscle mass average, obese   Additional Findings: Hammer site NT, no drainage.    Current aGVHD staging:  Skin 0, UGI 0, LGI 0, Liver 0 (keep in note through day +180 for allos)      LABS AND IMAGING: I have assessed all abnormal lab values for their clinical significance and any values considered clinically significant have been addressed in the assessment and plan.        Lab Results   Component Value Date    WBC <0.1 (LL) 12/09/2022    ANEUTAUTO 4.7 11/07/2022    HGB 7.4 (L) 12/09/2022    HCT 23.2 (L) 12/09/2022    PLT 5 (LL) 12/09/2022     12/09/2022    POTASSIUM 3.9 12/09/2022    CHLORIDE 108 " (H) 12/09/2022    CO2 25 12/09/2022    GLC 96 12/09/2022    BUN 9.1 12/09/2022    CR 0.47 (L) 12/09/2022    MAG 2.0 12/09/2022    INR 0.97 12/05/2022         SYSTEMS-BASED ASSESSMENT AND PLAN     Yahaira Fuentes is a 62 year old female with AML, undergoing allo HSCT, day +10, admission complicated by ongoing intermittent fevers 11/30/22-12/4/22.    BMT/IEC PROTOCOL for 2015-32  Chemo protocol:   Day -7: start Allopurinol  Day -6: Cytoxan + Fludarabine  Day -5 through -2: Fludarabine  Day -1: TBI x1  Day 0: Transplant  Day +3 and +4: Cytoxan  Day +5: start GCSF, MMF, and Sirolimus  - Restaging plan: per protocol. Day 21 bmbx not yet scheduled    HEME/COAG  #Chemotherapy induced pancytopenia   - cytopenias due to chemotherapy and radiation  - Transfusion parameters: hemoglobin <7, platelets <10  - Relevant thrombosis or bleeding history: superficial thromboses on OCP decades past. Most recent DVT of right LE (distal veins), 10/19/2022, holding lovenox given thrombocytopenia    ID/Neutropenic fevers  - Neutropenic Fevers: likely T-cell activation from graft, improved following PT Cytoxan. Cefepime 11/30-12/6. Infectious work up unremarkable (BC, CXR, UC)  - po vanco for diarrhea, c dif colonized (12/2-x) [see GI below]  - Vaccination status: Influenza vaccination after day +60, COVID vaccination after day +100, followed by remaining vaccinations at 12, 14, 24, and 26 months.  - Prophylaxis plan: ACV (CMV donor and recipient negative), Micafungin - transitioned to Fluconazole 12/10, Levaquin (held while on cefepime). Bactrim to start at day +28 (reported stomach upset with past use, per PharmD note will try at day +28. Can hold if GI symptoms occur).     RISK OF GVHD  - Prophylaxis: PTC days 3 and 4.   - Siro day +5, level check today pending  - MMF, day +5    CARDIOVASCULAR  - Vasovagal episode with bradycardia: 11/29 occurred around transplant time, improved without intervention  - Risk of cardiomyopathy:  Baseline  "EF 60-65%  - Risk of arrhythmia: Baseline EKG showed NSR and normal QTC    GI/NUTRITION:   - Ulcer prophylaxis: Protonix  - Risk of nausea/vomiting due to chemo/radiation: Ondansetron, Lorazepam and Compazine available prn   - Risk of malnutrition: monitoring.    - Diarrhea, c dif colonized: PO Vanco x14 days (12/2 - ordered to complete 12/16). Daily metamucil started 12/8. Patient experiencing rectal tenderness; surrounding skin denuded. Topical lidocaine and zinc oxide ointment available. Monitoring.     RENAL/ELECTROLYTES/  - Risk of renal injury: Off cytoxan flush. Monitor need for IVF.  - Electrolyte management: replace per sliding scale    DIABETES/ENDOCRINE  - Risk of steroid-induced hyperglyemia: Monitor BG, sliding scale if needed    MUSCULOSKELETAL/FRAILTY  - Baseline Frailty Score: 1  - Daily PT/OT as needed while inpatient      Clinically Significant Risk Factors          # Hypocalcemia: Lowest Ca = 8.1 mg/dL in last 2 days, will monitor and replace as appropriate     # Hypoalbuminemia: Lowest albumin = 3.1 g/dL at 12/5/2022  2:43 AM, will monitor as appropriate   # Thrombocytopenia: Lowest platelets = 5 in last 2 days, will monitor for bleeding         # Obesity: Estimated body mass index is 37.09 kg/m  as calculated from the following:    Height as of this encounter: 1.6 m (5' 2.99\").    Weight as of this encounter: 94.9 kg (209 lb 4.8 oz).          Today:   - Monitoring rectum given frequency of stools and rectal tenderness.    DISPOSITION: on discharge patient should be scheduled with Dr. Garrett for first available appointment at the request of Dr. Zoltan French      Patient was discussed with Dr. Cartwright.    Holly Allen, PA-C   [phone]    Attending Summary  The patient was seen and examined by me separate from the midlevel provider.The note above reflects my assessment and plan. I have personally reviewed today's labs,vital and radiology results. The points of care that were added by me are:   "   History and physical  Day +10 s/p 7/8 MMUD alloHCT for AML. Stable but ongoing perirectal irritation for frequent stools.    On exam:  Head/mouth/neck: Oropharynx clear.  No lymphadenopathy.  Heart: Regular rate and rhythm.  No murmurs rubs or gallops.  Lungs: Lungs are clear bilaterally.  Abdomen: Abdomen is soft nontender nondistended.  Intact bowel sounds.  Ext: No edema.  Skin: No rash.    Pertinent Labs:  Lab Results   Component Value Date    WBC <0.1 12/09/2022     Lab Results   Component Value Date    RBC 2.49 12/09/2022     Lab Results   Component Value Date    HGB 7.4 12/09/2022     Lab Results   Component Value Date    HCT 23.2 12/09/2022     No components found for: MCT  Lab Results   Component Value Date    MCV 93 12/09/2022     Lab Results   Component Value Date    MCH 29.7 12/09/2022     Lab Results   Component Value Date    MCHC 31.9 12/09/2022     Lab Results   Component Value Date    RDW 14.2 12/09/2022     Lab Results   Component Value Date    PLT 5 12/09/2022       ASSESSMENT AND PLAN  1.  AML: Day +10 s/p 7/8 alloHCT. Nausea improved. Barrier creams for perirectal irritation.  2.  GVHD prophylaxis: PTCy/Siro/MMF.    Zion Cartwright MD

## 2022-12-10 NOTE — PLAN OF CARE
"/65 (BP Location: Right arm)   Pulse 99   Temp 100  F (37.8  C) (Axillary)   Resp 16   Ht 1.6 m (5' 2.99\")   Wt 95.1 kg (209 lb 9.6 oz)   SpO2 96%   BMI 37.14 kg/m      AVSS on room air. Tmax 100.0 degrees, pt denies feeling warm, chilled or flushed. Reports rectal pain with stools. Had 2 soft stools overnight. Rotating between zinc oxide, critic aide barrier cream and aquaphor. Skin is red and denuded. Declined any pain medications. A&O x4. Up independently in room. Clustering cares for sleep promotion. No blood products this morning. Continue with plan of care.       Goal Outcome Evaluation:      Plan of Care Reviewed With: patient    Overall Patient Progress: no changeOverall Patient Progress: no change           "

## 2022-12-10 NOTE — PLAN OF CARE
"Started oral fluconazole. Has had 3 bm's and continuing to apply creams.  Problem: Plan of Care - These are the overarching goals to be used throughout the patient stay.    Goal: Plan of Care Review  Description: The Plan of Care Review/Shift note should be completed every shift.  The Outcome Evaluation is a brief statement about your assessment that the patient is improving, declining, or no change.  This information will be displayed automatically on your shift note.  Outcome: Progressing  Goal: Patient-Specific Goal (Individualized)  Description: You can add care plan individualizations to a care plan. Examples of Individualization might be:  \"Parent requests to be called daily at 9am for status\", \"I have a hard time hearing out of my right ear\", or \"Do not touch me to wake me up as it startles me\".  Outcome: Progressing  Goal: Absence of Hospital-Acquired Illness or Injury  Outcome: Progressing  Intervention: Identify and Manage Fall Risk  Recent Flowsheet Documentation  Taken 12/10/2022 0808 by Samson Vicente  Safety Promotion/Fall Prevention:   clutter free environment maintained   fall prevention program maintained   increase visualization of patient   lighting adjusted   nonskid shoes/slippers when out of bed   patient and family education   safety round/check completed   room organization consistent  Intervention: Prevent Skin Injury  Recent Flowsheet Documentation  Taken 12/10/2022 0808 by Samson Vicente  Body Position: position changed independently  Goal: Optimal Comfort and Wellbeing  Outcome: Progressing  Goal: Readiness for Transition of Care  Outcome: Progressing     Problem: Stem Cell/Bone Marrow Transplant  Goal: Optimal Coping with Transplant  Outcome: Progressing  Goal: Symptom-Free Urinary Elimination  Outcome: Progressing  Goal: Diarrhea Symptom Control  Outcome: Progressing  Goal: Improved Activity Tolerance  Outcome: Progressing  Intervention: Promote Improved Energy  Recent Flowsheet " Documentation  Taken 12/10/2022 0808 by Samson Vicente  Activity Management:   activity adjusted per tolerance   up ad donita  Goal: Blood Counts Within Acceptable Range  Outcome: Progressing  Intervention: Monitor and Manage Hematologic Symptoms  Recent Flowsheet Documentation  Taken 12/10/2022 0808 by Smason Vicente  Medication Review/Management: medications reviewed  Goal: Absence of Hypersensitivity Reaction  Outcome: Progressing  Goal: Absence of Infection  Outcome: Progressing  Intervention: Prevent and Manage Infection  Recent Flowsheet Documentation  Taken 12/10/2022 0808 by Samson Vicente  Isolation Precautions:   enteric precautions maintained   protective environment maintained   cytotoxic precautions maintained  Goal: Improved Oral Mucous Membrane Health and Integrity  Outcome: Progressing  Intervention: Promote Oral Comfort and Health  Recent Flowsheet Documentation  Taken 12/10/2022 0808 by Samson Vicente  Oral Care: mouth wash rinse  Goal: Nausea and Vomiting Symptom Relief  Outcome: Progressing  Goal: Optimal Nutrition Intake  Outcome: Progressing     Problem: Pain Acute  Goal: Optimal Pain Control and Function  Outcome: Progressing  Intervention: Prevent or Manage Pain  Recent Flowsheet Documentation  Taken 12/10/2022 0808 by Samson Vicente  Medication Review/Management: medications reviewed     Problem: Fall Injury Risk  Goal: Absence of Fall and Fall-Related Injury  Outcome: Progressing  Intervention: Identify and Manage Contributors  Recent Flowsheet Documentation  Taken 12/10/2022 0808 by Samson Vicente  Medication Review/Management: medications reviewed  Intervention: Promote Injury-Free Environment  Recent Flowsheet Documentation  Taken 12/10/2022 0808 by Samson Vicente  Safety Promotion/Fall Prevention:   clutter free environment maintained   fall prevention program maintained   increase visualization of patient   lighting adjusted   nonskid shoes/slippers when out of bed    patient and family education   safety round/check completed   room organization consistent     Problem: Infection  Goal: Absence of Infection Signs and Symptoms  Outcome: Progressing  Intervention: Prevent or Manage Infection  Recent Flowsheet Documentation  Taken 12/10/2022 0808 by Samson Vicente  Isolation Precautions:   enteric precautions maintained   protective environment maintained   cytotoxic precautions maintained     Problem: Oral Intake Inadequate  Goal: Improved Oral Intake  Outcome: Progressing   Goal Outcome Evaluation:

## 2022-12-10 NOTE — PROGRESS NOTES
"  BMT/Cell Therapy Daily Progress Note      ID Yahaira Fuentes is a 62 year old female with AML, undergoing a 7/8 matched unrelated allo HSCT, day +11.    HPI: Rebeka is doing well this morning. Continues with rectal pain w/ bowel movements but topical creams are helping. Denies fevers. No nausea or vomiting. Eating and drinking well. Urinating normally. No chest pain or shortness of breath.    Review of Systems    8 point review with pertinent positive and negatives in HPI    PHYSICAL EXAM      Weight     Wt Readings from Last 3 Encounters:   12/10/22 95.5 kg (210 lb 6.9 oz)   11/21/22 96.4 kg (212 lb 9.6 oz)   11/08/22 97.3 kg (214 lb 6.4 oz)        KPS: 80    /81 (BP Location: Right arm)   Pulse 87   Temp 98.2  F (36.8  C) (Axillary)   Resp 16   Ht 1.6 m (5' 2.99\")   Wt 95.5 kg (210 lb 6.9 oz)   SpO2 100%   BMI 37.29 kg/m       General: NAD   Eyes: SHANIQUE, sclera anicteric   Lungs: CTA bilaterally, normal respiratory effort  Cardiovascular: RRR, no M/R/G   Abdominal/Rectal: +BS, soft, NT, ND.   Lymphatics: Bilateral thick ankles, nonpitting edema. Varicose veins bilaterally.   Skin: No rashes or petechaie, a few scattered ecchymoses  Neuro: A&O   Musculoskeletal: Muscle mass average, obese   Additional Findings: Hammer site NT, no drainage.    Current aGVHD staging:  Skin 0, UGI 0, LGI 0, Liver 0 (keep in note through day +180 for allos)      LABS AND IMAGING: I have assessed all abnormal lab values for their clinical significance and any values considered clinically significant have been addressed in the assessment and plan.        Lab Results   Component Value Date    WBC <0.1 (LL) 12/10/2022    ANEUTAUTO 4.7 11/07/2022    HGB 7.2 (L) 12/10/2022    HCT 23.3 (L) 12/10/2022    PLT 20 (LL) 12/10/2022     12/10/2022    POTASSIUM 3.9 12/10/2022    CHLORIDE 107 12/10/2022    CO2 26 12/10/2022    GLC 99 12/10/2022    BUN 9.0 12/10/2022    CR 0.48 (L) 12/10/2022    MAG 2.0 12/10/2022    INR 0.97 " 12/05/2022         SYSTEMS-BASED ASSESSMENT AND PLAN     Yahaira Fuentes is a 62 year old female with AML, undergoing allo HSCT, day +11, admission complicated by ongoing intermittent fevers 11/30/22-12/4/22.    BMT/IEC PROTOCOL for 2015-32  Chemo protocol:   Day -7: start Allopurinol  Day -6: Cytoxan + Fludarabine  Day -5 through -2: Fludarabine  Day -1: TBI x1  Day 0: Transplant  Day +3 and +4: Cytoxan  Day +5: start GCSF, MMF, and Sirolimus  - Restaging plan: per protocol. Day 21 bmbx not yet scheduled    HEME/COAG  #Chemotherapy induced pancytopenia   - cytopenias due to chemotherapy and radiation  - Transfusion parameters: hemoglobin <7, platelets <10  - Relevant thrombosis or bleeding history: superficial thromboses on OCP decades past. Most recent DVT of right LE (distal veins), 10/19/2022, holding lovenox given thrombocytopenia    ID/Neutropenic fevers  - Neutropenic Fevers: likely T-cell activation from graft, improved following PT Cytoxan. Cefepime 11/30-12/6. Infectious work up unremarkable (BC, CXR, UC)  - po vanco for diarrhea, c dif colonized (12/2-x) [see GI below]  - Vaccination status: Influenza vaccination after day +60, COVID vaccination after day +100, followed by remaining vaccinations at 12, 14, 24, and 26 months.  - Prophylaxis plan: ACV (CMV donor and recipient negative), Micafungin --> transitioned to Fluconazole today, Levaquin (held while on cefepime). Bactrim to start at day +28 (reported stomach upset with past use, per PharmD note will try at day +28. Can hold if GI symptoms occur).     RISK OF GVHD  - Prophylaxis: PTC days 3 and 4.   - Siro day +5, level check 12/9 7.1, will leave at same dose given she is starting fluconazole today  - MMF, day +5    CARDIOVASCULAR  - Vasovagal episode with bradycardia: 11/29 occurred around transplant time, improved without intervention  - Risk of cardiomyopathy:  Baseline EF 60-65%  - Risk of arrhythmia: Baseline EKG showed NSR and normal  "QTC    GI/NUTRITION:   - Ulcer prophylaxis: Protonix  - Risk of nausea/vomiting due to chemo/radiation: Ondansetron, Lorazepam and Compazine available prn   - Risk of malnutrition: monitoring.    - Diarrhea, c dif colonized: PO Vanco x14 days (12/2 - ordered to complete 12/16). Daily metamucil started 12/8. Patient experiencing rectal tenderness; surrounding skin denuded. Topical lidocaine and zinc oxide ointment available. Monitoring.     RENAL/ELECTROLYTES/  - Risk of renal injury: Off cytoxan flush. Monitor need for IVF.  - Electrolyte management: replace per sliding scale    DIABETES/ENDOCRINE  - Risk of steroid-induced hyperglyemia: Monitor BG, sliding scale if needed    MUSCULOSKELETAL/FRAILTY  - Baseline Frailty Score: 1  - Daily PT/OT as needed while inpatient      Clinically Significant Risk Factors              # Hypoalbuminemia: Lowest albumin = 3.1 g/dL at 12/5/2022  2:43 AM, will monitor as appropriate   # Thrombocytopenia: Lowest platelets = 5 in last 2 days, will monitor for bleeding         # Obesity: Estimated body mass index is 37.29 kg/m  as calculated from the following:    Height as of this encounter: 1.6 m (5' 2.99\").    Weight as of this encounter: 95.5 kg (210 lb 6.9 oz).          DISPOSITION: on discharge patient should be scheduled with Dr. Garrett for first available appointment at the request of Dr. Zoltan French    Patient was discussed with Dr. Cartwright.    PHIL StephensonC  [phone]    Attending Summary  The patient was seen and examined by me separate from the midlevel provider.The note above reflects my assessment and plan. I have personally reviewed today's labs,vital and radiology results. The points of care that were added by me are:     History and physical  Day +11 s/p 7/8 MMUD alloHCT for AML. Afebrile.  On exam:  Head/mouth/neck: Oropharynx clear.  No lymphadenopathy.  Heart: Regular rate and rhythm.  No murmurs rubs or gallops.  Lungs: Lungs are clear " bilaterally.  Abdomen: Abdomen is soft nontender nondistended.  Intact bowel sounds.  Ext: No edema.  Skin: No rash.    Pertinent Labs:  Lab Results   Component Value Date    WBC <0.1 12/10/2022     Lab Results   Component Value Date    RBC 2.46 12/10/2022     Lab Results   Component Value Date    HGB 7.2 12/10/2022     Lab Results   Component Value Date    HCT 23.3 12/10/2022     No components found for: MCT  Lab Results   Component Value Date    MCV 95 12/10/2022     Lab Results   Component Value Date    MCH 29.3 12/10/2022     Lab Results   Component Value Date    MCHC 30.9 12/10/2022     Lab Results   Component Value Date    RDW 14.1 12/10/2022     Lab Results   Component Value Date    PLT 20 12/10/2022     ASSESSMENT AND PLAN  1.  AML: Day +11 s/p 7/8 alloHCT. Nausea improved. Barrier creams for perirectal irritation.  2.  GVHD prophylaxis: PTCy/Siro/MMF.  3. Chemotherapy induced pancytopenia: Transfuse per protocol. GCSF per protocol.    Zion Cartwright MD

## 2022-12-11 NOTE — PLAN OF CARE
"/73 (BP Location: Right arm)   Pulse 89   Temp 99.8  F (37.7  C) (Oral)   Resp 16   Ht 1.6 m (5' 2.99\")   Wt 95.5 kg (210 lb 6.9 oz)   SpO2 94%   BMI 37.29 kg/m      T max 100.4, other vital signs stable. Patient reports pain with bowel movements. Using barrier cream, which seems to provide relief. Only one bowel movement overnight. Up independently in room. Clustering cares for sleep promotion. Caps changed. Will need a blood transfusion today. Blood bank is running a type and screen, they will call the unit prior to sending up the unit. No electrolyte replacements needed. Continue with plan of care.     Goal Outcome Evaluation:      Plan of Care Reviewed With: patient    Overall Patient Progress: no changeOverall Patient Progress: no change       Problem: Oral Intake Inadequate  Goal: Improved Oral Intake  Outcome: Not Progressing     Problem: Plan of Care - These are the overarching goals to be used throughout the patient stay.    Goal: Plan of Care Review  Description: The Plan of Care Review/Shift note should be completed every shift.  The Outcome Evaluation is a brief statement about your assessment that the patient is improving, declining, or no change.  This information will be displayed automatically on your shift note.  Outcome: Progressing  Flowsheets (Taken 12/11/2022 5622)  Plan of Care Reviewed With: patient  Overall Patient Progress: no change     Problem: Stem Cell/Bone Marrow Transplant  Goal: Diarrhea Symptom Control  Outcome: Progressing         "

## 2022-12-11 NOTE — PLAN OF CARE
"Pt had blood cultures drawn from port-a-cath and CVC, urine culture collected, respiratory panel and COVID-19 swab collected. Pt received RBC replacement. Had x-ray and CT scans completed. Discontinued levofloxacin and started on cefepime for a temp of 100.4 on nights. Afebrile today.  Problem: Plan of Care - These are the overarching goals to be used throughout the patient stay.    Goal: Plan of Care Review  Description: The Plan of Care Review/Shift note should be completed every shift.  The Outcome Evaluation is a brief statement about your assessment that the patient is improving, declining, or no change.  This information will be displayed automatically on your shift note.  12/11/2022 1359 by Samson Vicente  Outcome: Not Progressing  Flowsheets (Taken 12/11/2022 1359)  Plan of Care Reviewed With: patient  Overall Patient Progress: no change  12/11/2022 1359 by Samson Vicente  Outcome: Not Progressing  Flowsheets (Taken 12/11/2022 1359)  Plan of Care Reviewed With: patient  Overall Patient Progress: no change  12/11/2022 1357 by Samson Vicente  Outcome: Not Progressing  Goal: Patient-Specific Goal (Individualized)  Description: You can add care plan individualizations to a care plan. Examples of Individualization might be:  \"Parent requests to be called daily at 9am for status\", \"I have a hard time hearing out of my right ear\", or \"Do not touch me to wake me up as it startles me\".  12/11/2022 1359 by Samson Vicente  Outcome: Not Progressing  12/11/2022 1359 by Samson Vicente  Outcome: Not Progressing  12/11/2022 1357 by Samson Vicente  Outcome: Not Progressing  Goal: Absence of Hospital-Acquired Illness or Injury  12/11/2022 1359 by Samson Vicente  Outcome: Not Progressing  12/11/2022 1359 by Samson Vicente  Outcome: Not Progressing  12/11/2022 1357 by Samson Vicente  Outcome: Not Progressing  Intervention: Identify and Manage Fall Risk  Recent Flowsheet Documentation  Taken 12/11/2022 " 0759 by Samson Vicente  Safety Promotion/Fall Prevention:    clutter free environment maintained    fall prevention program maintained    increase visualization of patient    lighting adjusted    nonskid shoes/slippers when out of bed    patient and family education    safety round/check completed    room organization consistent    assistive device/personal items within reach  Intervention: Prevent Skin Injury  Recent Flowsheet Documentation  Taken 12/11/2022 0759 by Samson Vicente  Body Position: position changed independently  Goal: Optimal Comfort and Wellbeing  12/11/2022 1359 by Samson Vicente  Outcome: Not Progressing  12/11/2022 1359 by Samson Vicente  Outcome: Not Progressing  12/11/2022 1357 by Samson Vicente  Outcome: Not Progressing  Goal: Readiness for Transition of Care  12/11/2022 1359 by Samson Vicente  Outcome: Not Progressing  12/11/2022 1359 by Samson Vicente  Outcome: Not Progressing  12/11/2022 1357 by Samson Vicente  Outcome: Not Progressing     Problem: Stem Cell/Bone Marrow Transplant  Goal: Optimal Coping with Transplant  12/11/2022 1359 by Samson Vicente  Outcome: Not Progressing  12/11/2022 1359 by Samson Vicente  Outcome: Not Progressing  12/11/2022 1357 by Samson Vicente  Outcome: Not Progressing  Goal: Symptom-Free Urinary Elimination  12/11/2022 1359 by Samson Vicente  Outcome: Not Progressing  12/11/2022 1359 by Samson Vicente  Outcome: Not Progressing  12/11/2022 1357 by Samson Vicente  Outcome: Not Progressing  Goal: Diarrhea Symptom Control  12/11/2022 1359 by Samson Vicente  Outcome: Not Progressing  12/11/2022 1359 by Samson Vicente  Outcome: Not Progressing  12/11/2022 1357 by Samson Vicente  Outcome: Not Progressing  Goal: Improved Activity Tolerance  12/11/2022 1359 by Samson Vicente  Outcome: Not Progressing  12/11/2022 1359 by Samson Vicente  Outcome: Not Progressing  12/11/2022 1357 by Samson Vicente  Outcome: Not  Progressing  Intervention: Promote Improved Energy  Recent Flowsheet Documentation  Taken 12/11/2022 0759 by Samson Vicente  Activity Management:    activity adjusted per tolerance    up ad donita  Goal: Blood Counts Within Acceptable Range  12/11/2022 1359 by Samson Vicente  Outcome: Not Progressing  12/11/2022 1359 by Samson Vicente  Outcome: Not Progressing  12/11/2022 1357 by Samson Vicente  Outcome: Not Progressing  Intervention: Monitor and Manage Hematologic Symptoms  Recent Flowsheet Documentation  Taken 12/11/2022 0759 by Samson Vicente  Medication Review/Management: medications reviewed  Goal: Absence of Hypersensitivity Reaction  12/11/2022 1359 by Samson Vicente  Outcome: Not Progressing  12/11/2022 1359 by Samson Vicente  Outcome: Not Progressing  12/11/2022 1357 by Samson Vicente  Outcome: Not Progressing  Goal: Absence of Infection  12/11/2022 1359 by Samson Vicente  Outcome: Not Progressing  12/11/2022 1359 by Samson Vicente  Outcome: Not Progressing  12/11/2022 1357 by Samson Vicente  Outcome: Not Progressing  Intervention: Prevent and Manage Infection  Recent Flowsheet Documentation  Taken 12/11/2022 0759 by Samson Vicente  Isolation Precautions:    enteric precautions maintained    protective environment maintained    cytotoxic precautions maintained  Goal: Improved Oral Mucous Membrane Health and Integrity  12/11/2022 1359 by Samson Vicente  Outcome: Not Progressing  12/11/2022 1359 by Samson Vicente  Outcome: Not Progressing  12/11/2022 1357 by Samson Vicente  Outcome: Not Progressing  Intervention: Promote Oral Comfort and Health  Recent Flowsheet Documentation  Taken 12/11/2022 0759 by Samson Vicente  Oral Care: mouth wash rinse  Goal: Nausea and Vomiting Symptom Relief  12/11/2022 1359 by Samson Vicente  Outcome: Not Progressing  12/11/2022 1359 by Samson Vicente  Outcome: Not Progressing  12/11/2022 1357 by Samson Vicente  Outcome: Not  Progressing  Goal: Optimal Nutrition Intake  12/11/2022 1359 by Samson Vicente  Outcome: Not Progressing  12/11/2022 1359 by Samson Vicente  Outcome: Not Progressing  12/11/2022 1357 by Samson Vicente  Outcome: Not Progressing     Problem: Pain Acute  Goal: Optimal Pain Control and Function  12/11/2022 1359 by Samson Vicente  Outcome: Not Progressing  12/11/2022 1359 by Samson Vicente  Outcome: Not Progressing  12/11/2022 1357 by Samson Vicente  Outcome: Not Progressing  Intervention: Prevent or Manage Pain  Recent Flowsheet Documentation  Taken 12/11/2022 0759 by Samson Vicente  Medication Review/Management: medications reviewed     Problem: Fall Injury Risk  Goal: Absence of Fall and Fall-Related Injury  12/11/2022 1359 by Samson Vicente  Outcome: Not Progressing  12/11/2022 1359 by Samson Vicente  Outcome: Not Progressing  12/11/2022 1357 by Samson Vicente  Outcome: Not Progressing  Intervention: Identify and Manage Contributors  Recent Flowsheet Documentation  Taken 12/11/2022 0759 by Samson Vicente  Medication Review/Management: medications reviewed  Intervention: Promote Injury-Free Environment  Recent Flowsheet Documentation  Taken 12/11/2022 0759 by Samson Vicente  Safety Promotion/Fall Prevention:    clutter free environment maintained    fall prevention program maintained    increase visualization of patient    lighting adjusted    nonskid shoes/slippers when out of bed    patient and family education    safety round/check completed    room organization consistent    assistive device/personal items within reach     Problem: Infection  Goal: Absence of Infection Signs and Symptoms  12/11/2022 1359 by Samson Vicente  Outcome: Not Progressing  12/11/2022 1359 by Samson Vicente  Outcome: Not Progressing  12/11/2022 1357 by Samson Vicente  Outcome: Not Progressing  Intervention: Prevent or Manage Infection  Recent Flowsheet Documentation  Taken 12/11/2022 0759 by  Samson Vicente  Isolation Precautions:    enteric precautions maintained    protective environment maintained    cytotoxic precautions maintained     Problem: Oral Intake Inadequate  Goal: Improved Oral Intake  12/11/2022 1359 by Samson Vicente  Outcome: Not Progressing  12/11/2022 1359 by Samson Vicente  Outcome: Not Progressing  12/11/2022 1357 by Samson Vicente  Outcome: Not Progressing   Goal Outcome Evaluation:      Plan of Care Reviewed With: patient    Overall Patient Progress: no changeOverall Patient Progress: no change

## 2022-12-11 NOTE — PROGRESS NOTES
"  BMT/Cell Therapy Daily Progress Note    Yahaira Fuentes is a 62 year old female with AML, undergoing a 7/8 matched unrelated allo HSCT, day +12.    HPI: Doing ok this morning. Frustrated with spiking fever and needing extra tests including cultures and chest x-ray. She does have an intermittent cough which is occasionally productive of phlegm. No hemoptysis. Continues with rectal pain w/ BMs. Stools are soft but no overt diarrhea. No nausea or vomiting. No chest pain or shortness of breath.    Review of Systems    8 point review with pertinent positive and negatives in HPI    PHYSICAL EXAM      Weight     Wt Readings from Last 3 Encounters:   12/10/22 95.5 kg (210 lb 6.9 oz)   11/21/22 96.4 kg (212 lb 9.6 oz)   11/08/22 97.3 kg (214 lb 6.4 oz)        KPS: 80    /68   Pulse 95   Temp 99  F (37.2  C)   Resp 16   Ht 1.6 m (5' 2.99\")   Wt 95.5 kg (210 lb 6.9 oz)   SpO2 96%   BMI 37.29 kg/m       General: NAD   Eyes: SHANIQUE, sclera anicteric   Lungs: CTA bilaterally, normal respiratory effort  Cardiovascular: RRR, no M/R/G   Abdominal/Rectal: +BS, soft, NT, ND.   Lymphatics: Bilateral thick ankles, nonpitting edema. Varicose veins bilaterally.   Skin: No rashes or petechaie, a few scattered ecchymoses  Neuro: A&O   Musculoskeletal: Muscle mass average, obese   Additional Findings: Hammer site NT, no drainage.    Current aGVHD staging:  Skin 0, UGI 0, LGI 0, Liver 0 (keep in note through day +180 for allos)      LABS AND IMAGING: I have assessed all abnormal lab values for their clinical significance and any values considered clinically significant have been addressed in the assessment and plan.        Lab Results   Component Value Date    WBC <0.1 (LL) 12/11/2022    ANEUTAUTO 4.7 11/07/2022    HGB 7.0 (L) 12/11/2022    HCT 22.4 (L) 12/11/2022    PLT 13 (LL) 12/11/2022     12/11/2022    POTASSIUM 3.8 12/11/2022    CHLORIDE 107 12/11/2022    CO2 25 12/11/2022    GLC 97 12/11/2022    BUN 8.9 " 12/11/2022    CR 0.59 12/11/2022    MAG 2.0 12/11/2022    INR 0.97 12/05/2022         SYSTEMS-BASED ASSESSMENT AND PLAN     Yahaira Fuentes is a 62 year old female with AML, undergoing allo HSCT, day +12, admission complicated by ongoing intermittent fevers 11/30/22-12/4/22. New fever overnight 12/10-12/11.    BMT/IEC PROTOCOL for 2015-32  Chemo protocol:   Day -7: start Allopurinol  Day -6: Cytoxan + Fludarabine  Day -5 through -2: Fludarabine  Day -1: TBI x1  Day 0: Transplant  Day +3 and +4: Cytoxan  Day +5: start GCSF, MMF, and Sirolimus  - Restaging plan: per protocol. Day 21 bmbx not yet scheduled    HEME/COAG  #Chemotherapy induced pancytopenia   - cytopenias due to chemotherapy and radiation  - Transfusion parameters: hemoglobin <7, platelets <10  - Relevant thrombosis or bleeding history: superficial thromboses on OCP decades past. Most recent DVT of right LE (distal veins), 10/19/2022, holding lovenox given thrombocytopenia    ID/Neutropenic fevers  - Neutropenic Fevers: likely T-cell activation from graft, improved following PT Cytoxan. Cefepime 11/30-12/6. Infectious work up unremarkable (BC, CXR, UC) -  New fever overnight 12/10-12/11. BC and UC repeated. Pt reports some cough, CXR with streaky atelectasis at bases, RVP/Covid testing and chest CT ordered, CT with pleural effusions that are new. Now back on cefepime. Discussed Lasix 20mg x 1 for pleural effusions, patient declined d/t not wanting to be urinating frequently, she will try to ambulate more and increase protein in diet (to help with 3rd spacing given she does have some hypoalbuminemia) - if she becomes hypoxic or any change in respiratory status would give diuresis  - po vanco for diarrhea, c dif colonized (12/2-12/16) [see GI below]  - Vaccination status: Influenza vaccination after day +60, COVID vaccination after day +100, followed by remaining vaccinations at 12, 14, 24, and 26 months.  - Prophylaxis plan: ACV (CMV donor and recipient  "negative), Micafungin --> transitioned to Fluconazole 12/10, Levaquin (held while on cefepime). Bactrim to start at day +28 (reported stomach upset with past use, per PharmD note will try at day +28. Can hold if GI symptoms occur).     RISK OF GVHD  - Prophylaxis: PTC days 3 and 4.   - Siro day +5, level check 12/9 7.1, will leave at same dose given she started fluconazole 12/10, check level 12/12  - MMF, day +5    CARDIOVASCULAR  - Vasovagal episode with bradycardia: 11/29 occurred around transplant time, improved without intervention  - Risk of cardiomyopathy:  Baseline EF 60-65%  - Risk of arrhythmia: Baseline EKG showed NSR and normal QTC    GI/NUTRITION:   - Ulcer prophylaxis: Protonix  - Risk of nausea/vomiting due to chemo/radiation: Ondansetron, Lorazepam and Compazine available prn   - Risk of malnutrition: monitoring.    - Diarrhea, c dif colonized: PO Vanco x14 days (12/2 - ordered to complete 12/16). Daily metamucil started 12/8. Patient experiencing rectal tenderness; surrounding skin denuded. Topical lidocaine and zinc oxide ointment available. Monitoring.     RENAL/ELECTROLYTES/  - Risk of renal injury: Off cytoxan flush. Monitor need for IVF.  - Electrolyte management: replace per sliding scale    DIABETES/ENDOCRINE  - Risk of steroid-induced hyperglyemia: Monitor BG, sliding scale if needed    MUSCULOSKELETAL/FRAILTY  - Baseline Frailty Score: 1  - Daily PT/OT as needed while inpatient      Clinically Significant Risk Factors              # Hypoalbuminemia: Lowest albumin = 3.1 g/dL at 12/5/2022  2:43 AM, will monitor as appropriate   # Thrombocytopenia: Lowest platelets = 13 in last 2 days, will monitor for bleeding         # Obesity: Estimated body mass index is 37.29 kg/m  as calculated from the following:    Height as of this encounter: 1.6 m (5' 2.99\").    Weight as of this encounter: 95.5 kg (210 lb 6.9 oz).          DISPOSITION: on discharge patient should be scheduled with Dr. Garrett " for first available appointment at the request of Dr. Zoltan French    Patient was discussed with Dr. Cartwright.    Beth Jacobson PA-C  [phone]    Attending Summary  The patient was seen and examined by me separate from the midlevel provider.The note above reflects my assessment and plan. I have personally reviewed today's labs,vital and radiology results. The points of care that were added by me are:     History and physical  Day +12 s/p 7/8 MMUD alloHCT for AML. Tmax to 100.4. CT chest shows new pleural effusions and opacification in the LLL. Report pending. Initiated antibiotics, with cultures pending. Pt declining diuretics at this time.    On exam:  Head/mouth/neck: Oropharynx clear.  No lymphadenopathy.  Heart: Regular rate and rhythm.  No murmurs rubs or gallops.  Lungs: Lungs are clear bilaterally.  Abdomen: Abdomen is soft nontender nondistended.  Intact bowel sounds.  Ext: No edema.  Skin: No rash.    Pertinent Labs:  Lab Results   Component Value Date    WBC <0.1 12/11/2022     Lab Results   Component Value Date    RBC 2.41 12/11/2022     Lab Results   Component Value Date    HGB 7.0 12/11/2022     Lab Results   Component Value Date    HCT 22.4 12/11/2022     No components found for: MCT  Lab Results   Component Value Date    MCV 93 12/11/2022     Lab Results   Component Value Date    MCH 29.0 12/11/2022     Lab Results   Component Value Date    MCHC 31.3 12/11/2022     Lab Results   Component Value Date    RDW 14.0 12/11/2022     Lab Results   Component Value Date    PLT 13 12/11/2022     ASSESSMENT AND PLAN  1.  AML: Day +12 s/p 7/8 alloHCT. Nausea improved. Barrier creams for perirectal irritation.  2.  GVHD prophylaxis: PTCy/Siro/MMF.  3. Chemotherapy induced pancytopenia: Transfuse per protocol. GCSF per protocol.  4. Neutropenic fever: Initiate antibiotics. Follow cultures. Lasix if hypoxic, due to new pleural effusions.    Zion Cartwright MD

## 2022-12-12 NOTE — PROGRESS NOTES
CLINICAL NUTRITION SERVICES - REASSESSMENT NOTE     Nutrition Prescription    RECOMMENDATIONS FOR MDs/PROVIDERS TO ORDER:  Encourage oral intake     Malnutrition Status:    Patient does not meet two criteria at this time     Recommendations already ordered by Registered Dietitian (RD):  Continue high kcal protein diet + snacks/supplements PRN   -Encouraged trial of boost soothe and continued use of special K bar     Future/Additional Recommendations:  If TPN is needed:  Dosing weight: 62.9 kg (adjusted)   Access: Central Line   Begin with minimal volume (1200 mL) or max concentration per phamD with initial dextrose of 135 g (GIR= 1.49 mg/kg/min), 100 g AA (1.5 g/kg) and 250 mL 20% clinolipid 6x per week = 1287 kcal (20 kcal/kg)  -Once patient receives 100% of initial PN volume with K>/=3, Mg>/=1.5, and Po4>/=2, advance dextrose by 50-55 g every 1-2 days to goal of 240  (GIR= 2.64 mg/kg/min)  --TPN at goal concentration provides: 240 g dex (815  kcal), 100 g AA (1.5 g/kg) and 250 mL 20% Clinolipid 6x per week =  1644 kcal (26 kcal/kg) and 26% kcal from fat   --Electrolytes/Additives per pharmD, recommend infuvite daily and MTE-4  --Monitor bili, LFTs, TG at the start and weekly thereafter while on TPN  --Monitor for onset of hyperglycemia and need for addition of insulin per pharmD/MD     EVALUATION OF THE PROGRESS TOWARD GOALS   Diet: High Kcal/High Protein + snacks/supplements PRN   Intake: %   Per [facility], Rebeka is ordering 2-3 meals per day for the past few days, using nutrition supplements of magic cup/special k bar   [facility] reviewed: intake if 100% meal consumed over the past 6 days provided 1430 kcal and 73 g protein meeting 90% and 77% of low end estimated energy and protein needs     NEW FINDINGS   Day +14, Rebeka was looking at the menu at time of visit. She reported she is having a hard time with food choices due to product shortages, menu fatigue and mouth sore. She has been trying to eat  2-3 meals but often not hungry in the morning for breakfast as she has been waking up early and eating a snack, usually a banana and occasionally crackers. She is working on intake of high protein foods.       Weight Trends:  12/13/22 0823 95.5 kg (210 lb 8.6 oz) Standing scale   12/09/22 0833 95.1 kg (209 lb 9.6 oz) Standing scale   12/05/22 0800 94.1 kg (207 lb 8 oz) Standing scale   12/02/22 0756 94.2 kg (207 lb 10.8 oz) Standing scale   11/28/22 0812 95 kg (209 lb 8 oz) Standing scale   11/25/22 0900 95.2 kg (209 lb 14.4 oz) Standing scale   11/24/22 0720 100.6 kg (221 lb 11.2 oz) Standing scale   11/23/22 0821 97 kg (213 lb 14.4 oz) Standing scale   11/22/22 10:10:14 94.9 kg (209 lb 4.8 oz) Standing scale   Will maintain dosing weight of 62.9 kg (adjusted)     GI: No nausea noted. No mouth pain/soreness. Last bowel movement, 12/12. Per I/O, 6 stools yesterday.   Skin: Kvng 21.   Labs: Na 141, K+ 3.7, Mg 2.1, Po4 3.2 (WNL), Glucose 99 (WNL), WBC <0.1 (L)    Medications:   Acyclovir  Mycophenolate  Protonix  Psyllium  Sirolimus  Ursodiol    MALNUTRITION  % Intake: Decreased intake does not meet criteria, suspect around 70-75%   % Weight Loss: None noted  Subcutaneous Fat Loss: None observed  Muscle Loss: Temporal:  mild, Upper arm (bicep, tricep):  mild and Dorsal hand:  moderate  Fluid Accumulation/Edema: Does not meet criteria  Malnutrition Diagnosis: Patient does not meet two of the established criteria necessary for diagnosing malnutrition but is at risk for malnutrition    Previous Goals   Patient to consume % of nutritionally adequate meal trays TID, or the equivalent with supplements/snacks.  Evaluation: Not met    Maintain weight of 94 kg   Evaluation: Met    Previous Nutrition Diagnosis  Predicted inadequate nutrient intake (energy/protien) related to reduced appetite and increased metabolic demand as evidenced by more variable intakes of %, report of food not appealing but currently able  to maintain weight   Evaluation: Declining     CURRENT NUTRITION DIAGNOSIS  Inadequate oral intake related to reduced appetite and intake  as evidenced by intakes %, mouth sore limiting food intake and patient report       INTERVENTIONS  Implementation  Collaboration with other providers- 5c rounds  Medical food supplement therapy- encouraged   Nutrition Education: Provided ways to continue to incorporate high protein foods     Goals  Weight to maintain 94 kg     Patient to consume % of nutritionally adequate meal trays TID, or the equivalent with supplements/snacks.    Monitoring/Evaluation  Progress toward goals will be monitored and evaluated per protocol.    Lynette Licona RD, LD  5C/BMT pager: [phone]

## 2022-12-12 NOTE — PROGRESS NOTES
"  BMT/Cell Therapy Daily Progress Note    Yahaira Fuentes is a 62 year old female with AML, undergoing a 7/8 matched unrelated allo HSCT, day +13.    HPI: Rebeka is doing well this morning, accompanied by her daughter on the phone during visit. Intermittent cough with a small amount of phlegm unchanged. Mild runny nose. Continues with rectal pain w/ BMs, improved with barrier cream. Stools are soft but no overt diarrhea. Reports one episode of incontinent stool overnight. No nausea or vomiting. No chest pain or shortness of breath.    Review of Systems    8 point review with pertinent positive and negatives in HPI    PHYSICAL EXAM      Weight     Wt Readings from Last 3 Encounters:   12/12/22 95.5 kg (210 lb 8 oz)   11/21/22 96.4 kg (212 lb 9.6 oz)   11/08/22 97.3 kg (214 lb 6.4 oz)        KPS: 80    /63   Pulse 89   Temp 99.4  F (37.4  C)   Resp 16   Ht 1.6 m (5' 2.99\")   Wt 95.5 kg (210 lb 8 oz)   SpO2 94%   BMI 37.30 kg/m       General: NAD   Eyes: SHANIQUE, sclera anicteric   Lungs: CTA bilaterally, normal respiratory effort  Cardiovascular: RRR, no M/R/G   Abdominal/Rectal: +BS, soft, NT, ND.   Lymphatics: Bilateral thick ankles, nonpitting edema. Varicose veins bilaterally.   Skin: No rashes or petechaie, a few scattered ecchymoses  Neuro: A&O   Musculoskeletal: Muscle mass average, obese   Additional Findings: Hammer site NT, no drainage.    Current aGVHD staging:  Skin 0, UGI 0, LGI 0, Liver 0 (keep in note through day +180 for allos)      LABS AND IMAGING: I have assessed all abnormal lab values for their clinical significance and any values considered clinically significant have been addressed in the assessment and plan.        Lab Results   Component Value Date    WBC <0.1 (LL) 12/12/2022    ANEUTAUTO 4.7 11/07/2022    HGB 7.8 (L) 12/12/2022    HCT 24.5 (L) 12/12/2022    PLT 4 (LL) 12/12/2022     12/12/2022    POTASSIUM 3.8 12/12/2022    CHLORIDE 107 12/12/2022    CO2 25 12/12/2022    " GLC 99 12/12/2022    BUN 10.2 12/12/2022    CR 0.57 12/12/2022    MAG 1.9 12/12/2022    INR 1.04 12/12/2022       SYSTEMS-BASED ASSESSMENT AND PLAN     Yahaira Fuentes is a 62 year old female with AML, undergoing allo HSCT, day +13, admission complicated by ongoing intermittent fevers 11/30/22-12/4/22.     BMT/IEC PROTOCOL for 2015-32  Chemo protocol:   Day -7: start Allopurinol  Day -6: Cytoxan + Fludarabine  Day -5 through -2: Fludarabine  Day -1: TBI x1  Day 0: Transplant  Day +3 and +4: Cytoxan  Day +5: start GCSF, MMF, and Sirolimus  - Restaging plan: per protocol. Day 21 bmbx not yet scheduled    HEME/COAG  #Chemotherapy induced pancytopenia   - cytopenias due to chemotherapy and radiation  - Transfusion parameters: hemoglobin <7, platelets <10  - Relevant thrombosis or bleeding history: superficial thromboses on OCP decades past. Most recent DVT of right LE (distal veins), 10/19/2022, holding lovenox given thrombocytopenia    ID/Neutropenic fevers  - Neutropenic Fevers: likely T-cell activation from graft, improved following PT Cytoxan. Cefepime 11/30-12/6. Infectious work up unremarkable (BC, CXR, UC)    - New fever overnight 12/10-12/11, on cefepime 12/11-x. BCx, UCx ngtd. RVP and Covid negative. CXR with streaky atelectasis at bases. CT with pleural effusions that are new. Patient declined Lasix 20mg x1 and agrees to try to ambulate more, increase protein intake, and try incentive spirometer. If she becomes hypoxic or any change in respiratory status would give diuresis  - po vanco for diarrhea, c dif colonized (12/2-12/16) [see GI below]  - Vaccination status: Influenza vaccination after day +60, COVID vaccination after day +100, followed by remaining vaccinations at 12, 14, 24, and 26 months.  - Prophylaxis: ACV (CMV donor and recipient negative), Micafungin --> transitioned to Fluconazole 12/10, Levaquin (held while on cefepime). Bactrim to start at day +28 (reported stomach upset with past use, per  "PharmD note will try at day +28. Can hold if GI symptoms occur).     RISK OF GVHD  - Prophylaxis: PTC days 3 and 4.   - Siro day +5, level check today 12/12 in process  - MMF, day +5    CARDIOVASCULAR  - Vasovagal episode with bradycardia: 11/29 occurred around transplant time, improved without intervention  - Risk of cardiomyopathy:  Baseline EF 60-65%  - Risk of arrhythmia: Baseline EKG showed NSR and normal QTC    GI/NUTRITION:   - Ulcer prophylaxis: Protonix  - Risk of nausea/vomiting due to chemo/radiation: Ondansetron, Lorazepam and Compazine available prn   - Risk of malnutrition: monitoring.    - Diarrhea, c dif colonized: PO Vanco x14 days (12/2 - ordered to complete 12/16). Daily metamucil started 12/8. Rectal tenderness, surrounding skin denuded. Topical lidocaine and zinc oxide ointment available. Monitoring.     RENAL/ELECTROLYTES/  - Risk of renal injury: Off cytoxan flush. Monitor need for IVF.  - Electrolyte management: replace per sliding scale    DIABETES/ENDOCRINE  - Risk of steroid-induced hyperglyemia: Monitor BG, sliding scale if needed    MUSCULOSKELETAL/FRAILTY  - Baseline Frailty Score: 1  - Daily PT/OT as needed while inpatient      Clinically Significant Risk Factors          # Hypocalcemia: Lowest Ca = 8.1 mg/dL in last 2 days, will monitor and replace as appropriate     # Hypoalbuminemia: Lowest albumin = 3.1 g/dL at 12/5/2022  2:43 AM, will monitor as appropriate   # Thrombocytopenia: Lowest platelets = 4 in last 2 days, will monitor for bleeding         # Obesity: Estimated body mass index is 37.3 kg/m  as calculated from the following:    Height as of this encounter: 1.6 m (5' 2.99\").    Weight as of this encounter: 95.5 kg (210 lb 8 oz).          DISPOSITION: on discharge patient should be scheduled with Dr. Garrett for first available appointment at the request of Dr. Zoltan Allen, PA-C  [id number]      Attending Summary  The patient was seen and examined by me " separate from the midlevel provider.The note above reflects my assessment and plan. I have personally reviewed today's labs,vital and radiology results. The points of care that were added by me are:     History and physical  Day +13 s/p 7/8 MMUD alloHCT for AML. No dyspnea.    On exam:  Head/mouth/neck: Oropharynx clear.  No lymphadenopathy.  Heart: Regular rate and rhythm.  No murmurs rubs or gallops.  Lungs: Lungs are clear bilaterally.  Abdomen: Abdomen is soft nontender nondistended.  Intact bowel sounds.  Ext: No edema.  Skin: No rash.    Lab Results   Component Value Date    WBC <0.1 12/12/2022     Lab Results   Component Value Date    RBC 2.67 12/12/2022     Lab Results   Component Value Date    HGB 7.8 12/12/2022     Lab Results   Component Value Date    HCT 24.5 12/12/2022     No components found for: MCT  Lab Results   Component Value Date    MCV 92 12/12/2022     Lab Results   Component Value Date    MCH 29.2 12/12/2022     Lab Results   Component Value Date    MCHC 31.8 12/12/2022     Lab Results   Component Value Date    RDW 14.0 12/12/2022     Lab Results   Component Value Date    PLT 30 12/12/2022     ASSESSMENT AND PLAN  1.  AML: Day +13 s/p 7/8 alloHCT. Nausea controlled. Barrier creams for perirectal irritation.  2.  GVHD prophylaxis: PTCy/Siro/MMF.  3. Chemotherapy induced pancytopenia: Transfuse per protocol. GCSF per protocol.  4. Neutropenic fever: Continue antibiotics. Follow cultures. Lasix if hypoxic, due to new pleural effusions.    Zion Cartwright MD

## 2022-12-12 NOTE — PLAN OF CARE
"/75   Pulse 90   Temp 99.9  F (37.7  C) (Axillary)   Resp 16   Ht 1.6 m (5' 2.99\")   Wt 95.5 kg (210 lb 6.9 oz)   SpO2 95%   BMI 37.29 kg/m      T max 100.2, other VSS on room air. Pain at denude skin around her rectum, relief with barrier cream. Receiving unit of platelets. Slight productive cough. One explosive episode of diarrhea overnight. Clustered cares for sleep promotion. Continue with plan of care.     Goal Outcome Evaluation:      Plan of Care Reviewed With: patient    Overall Patient Progress: no changeOverall Patient Progress: no change           "

## 2022-12-13 NOTE — PROGRESS NOTES
"  BMT/Cell Therapy Daily Progress Note    Yahaira Fuentes is a 62 year old female with AML, undergoing a 7/8 matched unrelated allo HSCT, day +14.    HPI: Rebeka is feeling well today. She walked some laps yesterday. Stools are becoming more formed, rectal pain slightly improved. Denies sore throat. Is experiencing mouth pain, specifically inside both cheeks, but no open sores present. Does make it a little harder to eat. No nausea or vomiting.     Review of Systems    8 point review with pertinent positive and negatives in HPI    PHYSICAL EXAM      Weight     Wt Readings from Last 3 Encounters:   12/12/22 95.5 kg (210 lb 8 oz)   11/21/22 96.4 kg (212 lb 9.6 oz)   11/08/22 97.3 kg (214 lb 6.4 oz)        KPS: 80    /62   Pulse 89   Temp 98  F (36.7  C) (Axillary)   Resp 16   Ht 1.6 m (5' 2.99\")   Wt 95.5 kg (210 lb 8 oz)   SpO2 94%   BMI 37.30 kg/m       General: NAD   Eyes: SHANIQUE, sclera anicteric   Mouth: Mucosa pink and moist. No visible mouth sores or petechiae.   Lungs: CTA bilaterally, normal respiratory effort  Cardiovascular: RRR, no M/R/G   Abdominal/Rectal: +BS, soft, NT, ND.   Lymphatics: Bilateral thick ankles, nonpitting edema. Varicose veins bilaterally.   Skin: No rashes or petechaie, a few scattered ecchymoses  Neuro: A&O   Musculoskeletal: Muscle mass average  Additional Findings: Hammer site NT, no drainage.    Current aGVHD staging:  Skin 0, UGI 0, LGI 0, Liver 0 (keep in note through day +180 for allos)      LABS AND IMAGING: I have assessed all abnormal lab values for their clinical significance and any values considered clinically significant have been addressed in the assessment and plan.        Lab Results   Component Value Date    WBC <0.1 (LL) 12/13/2022    ANEUTAUTO 4.7 11/07/2022    HGB 8.0 (L) 12/13/2022    HCT 25.1 (L) 12/13/2022    PLT 23 (LL) 12/13/2022     12/13/2022    POTASSIUM 3.7 12/13/2022    CHLORIDE 106 12/13/2022    CO2 24 12/13/2022    GLC 99 12/13/2022 "    BUN 12.6 12/13/2022    CR 0.61 12/13/2022    MAG 2.1 12/13/2022    INR 1.04 12/12/2022       SYSTEMS-BASED ASSESSMENT AND PLAN     Yahaira Fuentes is a 62 year old female with AML, undergoing allo HSCT, day +14, admission complicated by ongoing intermittent fevers 11/30/22-12/4/22.     BMT/IEC PROTOCOL for 2015-32  Chemo protocol:   Day -7: start Allopurinol  Day -6: Cytoxan + Fludarabine  Day -5 through -2: Fludarabine  Day -1: TBI x1  Day 0: Transplant  Day +3 and +4: Cytoxan  Day +5: start GCSF, MMF, and Sirolimus  - Restaging plan: per protocol. Day 21 bmbx (scheduled for 12/20 at 11:30am)     HEME/COAG  #Chemotherapy induced pancytopenia - leukopenia, anemia, thrombocytopenia  - Transfusion parameters: hemoglobin <7, platelets <10  - Relevant thrombosis or bleeding history: superficial thromboses on OCP decades past. Most recent DVT of right LE (distal veins), 10/19/2022, holding lovenox given thrombocytopenia    ID/Neutropenic fevers  - Neutropenic Fevers: likely T-cell activation from graft, improved following PT Cytoxan. Cefepime 11/30-12/6. Infectious work up unremarkable (BC, CXR, UC)    - New fever overnight 12/10-12/11, on cefepime 12/11-x. BCx, UCx ngtd. RVP and Covid negative. CXR with streaky atelectasis at bases. CT with pleural effusions that are new. Patient declined Lasix 20mg x1. If she becomes hypoxic or any change in respiratory status would give diuresis  - po vanco for diarrhea, c dif colonized (12/2-12/16) [see GI below]  - Vaccination status: Influenza vaccination after day +60, COVID vaccination after day +100, followed by remaining vaccinations at 12, 14, 24, and 26 months.  - Prophylaxis: ACV (CMV donor and recipient negative), Micafungin --> transitioned to Fluconazole 12/10, Levaquin (held while on cefepime). Bactrim to start at day +28 (reported stomach upset with past use, per PharmD note will try at day +28. Can hold if GI symptoms occur).     RISK OF GVHD  - Prophylaxis: PTC  "days 3 and 4.   - Siro day +5, level check 12/12 = 12.3. Reduce dose to 4mg daily.   - MMF, day +5    CARDIOVASCULAR  - Vasovagal episode with bradycardia: 11/29 occurred around transplant time, improved without intervention  - Risk of cardiomyopathy:  Baseline EF 60-65%  - Risk of arrhythmia: Baseline EKG showed NSR and normal QTC    GI/NUTRITION:   - Ulcer prophylaxis: Protonix  - Risk of nausea/vomiting due to chemo/radiation: Ondansetron, Lorazepam and Compazine available prn   - Risk of malnutrition: monitoring.    - Diarrhea, c dif colonized: PO Vanco x14 days (12/2 - ordered to complete 12/16). Daily metamucil started 12/8.    - Perirectal tenderness, surrounding skin denuded (improving). Topical lidocaine and zinc oxide ointment available.   - Mouth pain 12/13: MMW available prn.      RENAL/ELECTROLYTES/  - Risk of renal injury: Off cytoxan flush. Monitor need for IVF.  - Electrolyte management: replace per sliding scale    DIABETES/ENDOCRINE  - Risk of steroid-induced hyperglyemia: Monitor BG, sliding scale if needed    MUSCULOSKELETAL/FRAILTY  - Baseline Frailty Score: 1  - Daily PT/OT as needed while inpatient      Clinically Significant Risk Factors          # Hypocalcemia: Lowest Ca = 8.3 mg/dL in last 2 days, will monitor and replace as appropriate     # Hypoalbuminemia: Lowest albumin = 3.1 g/dL at 12/5/2022  2:43 AM, will monitor as appropriate   # Thrombocytopenia: Lowest platelets = 4 in last 2 days, will monitor for bleeding         # Obesity: Estimated body mass index is 37.3 kg/m  as calculated from the following:    Height as of this encounter: 1.6 m (5' 2.99\").    Weight as of this encounter: 95.5 kg (210 lb 8 oz).          DISPOSITION: on discharge patient should be scheduled with Dr. Garrett for first available appointment at the request of Dr. Zoltan Allen, PA-C  [id number]      Attending Summary  The patient was seen and examined by me separate from the midlevel " provider.The note above reflects my assessment and plan. I have personally reviewed today's labs,vital and radiology results. The points of care that were added by me are:     History and physical  Day +14 s/p 7/8 MMUD alloHCT for AML. Feeling better today. Up and active.    On exam:  Head/mouth/neck: Oropharynx clear.  No lymphadenopathy.  Heart: Regular rate and rhythm.  No murmurs rubs or gallops.  Lungs: Lungs are clear bilaterally.  Abdomen: Abdomen is soft nontender nondistended.  Intact bowel sounds.  Ext: No edema.  Skin: No rash.    Lab Results   Component Value Date    WBC <0.1 12/13/2022     Lab Results   Component Value Date    RBC 2.72 12/13/2022     Lab Results   Component Value Date    HGB 8.0 12/13/2022     Lab Results   Component Value Date    HCT 25.1 12/13/2022     No components found for: MCT  Lab Results   Component Value Date    MCV 92 12/13/2022     Lab Results   Component Value Date    MCH 29.4 12/13/2022     Lab Results   Component Value Date    MCHC 31.9 12/13/2022     Lab Results   Component Value Date    RDW 13.7 12/13/2022     Lab Results   Component Value Date    PLT 23 12/13/2022     ASSESSMENT AND PLAN  1.  AML: Day +14 s/p 7/8 alloHCT. Nausea controlled. Barrier creams for perirectal irritation.  2.  GVHD prophylaxis: PTCy/Siro/MMF.  3. Chemotherapy induced pancytopenia: Transfuse per protocol. GCSF per protocol.  4. Neutropenic fever: Continue antibiotics. Follow cultures. Lasix if hypoxic, due to new pleural effusions. Mobilizing fluid with increased ambulation as well.    Zion Cartwright MD

## 2022-12-13 NOTE — PLAN OF CARE
5C PT: Physical Therapy: Orders received. Chart reviewed and discussed with care team.? Physical Therapy not indicated due to patient IND with mobility, has been able to ambulate halls regularly t/o admission. OT will continue to follow, no further IP PT needs.? Defer discharge recommendations to OT.? Will complete orders.

## 2022-12-13 NOTE — PLAN OF CARE
Goal Outcome Evaluation:      Plan of Care Reviewed With: patient    Overall Patient Progress: no changeOverall Patient Progress: no change    Outcome Evaluation: Trying to focus on calorie dense foods, discussed supplements and encouraged oral intake

## 2022-12-13 NOTE — PROGRESS NOTES
BMT CLINICAL SOCIAL WORK NOTE:    Focus: Supportive Counseling/Resources/Discharge Planning    Data: Pt is a  62 year old female with AML, undergoing a 7/8 matched unrelated allo HSCT, day +14    Interventions: BMT Social Work Intern attempted to meet with Pt to assess coping, provide supportive counseling and assist with resources as needed, but pt was not in her room.     Plan: CSW will continue to provide supportive counseling and assistance with resources as needed. CSW will continue to collaborate with multidisciplinary team regarding Pt's plan of care.     DAVIN Edouard.  BMT Social Work Intern

## 2022-12-13 NOTE — PLAN OF CARE
"/66 (BP Location: Right arm, Cuff Size: Adult Regular)   Pulse 83   Temp 99  F (37.2  C) (Axillary)   Resp 16   Ht 1.6 m (5' 2.99\")   Wt 95.5 kg (210 lb 8 oz)   SpO2 97%   BMI 37.30 kg/m    Patient slept well through the night  Had one small stool, one medium stool, barrier cream applied to buttocks  No complaints of nausea  No replacements this morning  Continue with current POC  Problem: Plan of Care - These are the overarching goals to be used throughout the patient stay.    Goal: Optimal Comfort and Wellbeing  Outcome: Progressing     Problem: Stem Cell/Bone Marrow Transplant  Goal: Optimal Coping with Transplant  Outcome: Progressing     Problem: Stem Cell/Bone Marrow Transplant  Goal: Symptom-Free Urinary Elimination  Outcome: Progressing     Problem: Stem Cell/Bone Marrow Transplant  Goal: Blood Counts Within Acceptable Range  Outcome: Progressing     Problem: Oral Intake Inadequate  Goal: Improved Oral Intake  Outcome: Progressing   Goal Outcome Evaluation:                        "

## 2022-12-13 NOTE — PLAN OF CARE
"AVSS.  No pain, no nausea.  Soft stools.  Bottom still raw and denuded but starting to heal up.  Putting cream on after each stool.  Eating some and drinking.  Plts given and post plt check was 30.  Heplocked.  Walking laps in between cares.  Sirolimus lvl 12.3 and no changes.  Declined a shower, but linens changed and will do CHG wipes tonight.  Continue to monitor, continue plan of care.      Problem: Plan of Care - These are the overarching goals to be used throughout the patient stay.    Goal: Plan of Care Review  Description: The Plan of Care Review/Shift note should be completed every shift.  The Outcome Evaluation is a brief statement about your assessment that the patient is improving, declining, or no change.  This information will be displayed automatically on your shift note.  Outcome: Progressing  Flowsheets (Taken 12/12/2022 1833)  Plan of Care Reviewed With: patient  Goal: Patient-Specific Goal (Individualized)  Description: You can add care plan individualizations to a care plan. Examples of Individualization might be:  \"Parent requests to be called daily at 9am for status\", \"I have a hard time hearing out of my right ear\", or \"Do not touch me to wake me up as it startles me\".  Outcome: Progressing  Goal: Absence of Hospital-Acquired Illness or Injury  Outcome: Progressing  Intervention: Identify and Manage Fall Risk  Recent Flowsheet Documentation  Taken 12/12/2022 1600 by Stephy Zhou, RN  Safety Promotion/Fall Prevention:   clutter free environment maintained   lighting adjusted   nonskid shoes/slippers when out of bed  Taken 12/12/2022 0800 by Stephy Zhou, RN  Safety Promotion/Fall Prevention:   clutter free environment maintained   lighting adjusted   nonskid shoes/slippers when out of bed  Intervention: Prevent Skin Injury  Recent Flowsheet Documentation  Taken 12/12/2022 0800 by Stephy Zhou, RN  Body Position: position changed independently  Goal: Optimal Comfort and Wellbeing  Outcome: " Progressing  Goal: Readiness for Transition of Care  Outcome: Progressing     Problem: Stem Cell/Bone Marrow Transplant  Goal: Optimal Coping with Transplant  Outcome: Progressing  Goal: Symptom-Free Urinary Elimination  Outcome: Progressing  Goal: Diarrhea Symptom Control  Outcome: Progressing  Goal: Improved Activity Tolerance  Outcome: Progressing  Intervention: Promote Improved Energy  Recent Flowsheet Documentation  Taken 12/12/2022 0800 by Stephy Zhou RN  Activity Management: activity adjusted per tolerance  Goal: Blood Counts Within Acceptable Range  Outcome: Progressing  Intervention: Monitor and Manage Hematologic Symptoms  Recent Flowsheet Documentation  Taken 12/12/2022 1600 by Stephy Zhou RN  Medication Review/Management: medications reviewed  Taken 12/12/2022 0800 by Stephy Zhou RN  Bleeding Precautions: monitored for signs of bleeding  Medication Review/Management: medications reviewed  Goal: Absence of Hypersensitivity Reaction  Outcome: Progressing  Goal: Absence of Infection  Outcome: Progressing  Intervention: Prevent and Manage Infection  Recent Flowsheet Documentation  Taken 12/12/2022 0800 by Stephy Zhou RN  Isolation Precautions:   enteric precautions maintained   protective environment maintained  Goal: Improved Oral Mucous Membrane Health and Integrity  Outcome: Progressing  Intervention: Promote Oral Comfort and Health  Recent Flowsheet Documentation  Taken 12/12/2022 0800 by Stephy Zhou RN  Oral Care: oral rinse provided  Goal: Nausea and Vomiting Symptom Relief  Outcome: Progressing  Goal: Optimal Nutrition Intake  Outcome: Progressing     Problem: Pain Acute  Goal: Optimal Pain Control and Function  Outcome: Progressing  Intervention: Prevent or Manage Pain  Recent Flowsheet Documentation  Taken 12/12/2022 1600 by Stephy Zhou RN  Medication Review/Management: medications reviewed  Taken 12/12/2022 0800 by Stephy Zhou RN  Medication Review/Management: medications  reviewed     Problem: Fall Injury Risk  Goal: Absence of Fall and Fall-Related Injury  Outcome: Progressing  Intervention: Identify and Manage Contributors  Recent Flowsheet Documentation  Taken 12/12/2022 1600 by Stephy Zhou, RN  Medication Review/Management: medications reviewed  Taken 12/12/2022 0800 by Stephy Zhou, RN  Medication Review/Management: medications reviewed  Intervention: Promote Injury-Free Environment  Recent Flowsheet Documentation  Taken 12/12/2022 1600 by Stephy Zhou, RN  Safety Promotion/Fall Prevention:   clutter free environment maintained   lighting adjusted   nonskid shoes/slippers when out of bed  Taken 12/12/2022 0800 by Stephy Zhou, RN  Safety Promotion/Fall Prevention:   clutter free environment maintained   lighting adjusted   nonskid shoes/slippers when out of bed     Problem: Infection  Goal: Absence of Infection Signs and Symptoms  Outcome: Progressing  Intervention: Prevent or Manage Infection  Recent Flowsheet Documentation  Taken 12/12/2022 0800 by Stephy Zhou, RN  Isolation Precautions:   enteric precautions maintained   protective environment maintained     Problem: Oral Intake Inadequate  Goal: Improved Oral Intake  Outcome: Progressing   Goal Outcome Evaluation:      Plan of Care Reviewed With: patient

## 2022-12-14 NOTE — PHARMACY-VANCOMYCIN DOSING SERVICE
Pharmacy Vancomycin Initial Note  Date of Service 2022  Patient's  1960  62 year old, female  Actual Body Weight: 95.5 kg    Indication: Bacteremia and Febrile Neutropenia     Microbiology: 22 Blood culture: Gram positive bacilli (1 of 2 bottles; positive on 2nd day of incubation)    Current estimated CrCl = Estimated Creatinine Clearance: 103.7 mL/min (based on SCr of 0.61 mg/dL).    Creatinine for last 3 days  2022:  4:20 AM Creatinine 0.59 mg/dL  2022:  4:10 AM Creatinine 0.57 mg/dL  2022:  4:15 AM Creatinine 0.61 mg/dL    Recent Vancomycin Level(s) for last 3 days  No results found for requested labs within last 72 hours.      Vancomycin IV Administrations (past 72 hours)                   vancomycin (VANCOCIN) 1,250 mg in 0.9% NaCl 250 mL intermittent infusion (mg) 1,250 mg New Bag 22 1746                Nephrotoxins and other renal medications (From now, onward)    Start     Dose/Rate Route Frequency Ordered Stop    22 1700  vancomycin (VANCOCIN) 1,250 mg in 0.9% NaCl 250 mL intermittent infusion         1,250 mg  over 90 Minutes Intravenous EVERY 12 HOURS 22 1621      22 1600  vancomycin (VANCOCIN) capsule 125 mg         125 mg Oral 4 TIMES DAILY 22 1345 22 1559    22 0800  acyclovir (ZOVIRAX) tablet 800 mg         800 mg Oral 2 TIMES DAILY 22 1701            Contrast Orders - past 72 hours (72h ago, onward)    None          [employer] Prediction of Planned Initial Vancomycin Regimen  Regimen: 1250 mg IV every 12 hours.  Start time: 19:15 on 2022  Exposure target: AUC24 (range)400-600 mg/L.hr   AUC24,ss: 476 mg/L.hr  Probability of AUC24 > 400: 68 %  Ctrough,ss: 14.6 mg/L  Probability of Ctrough,ss > 20: 25 %  Probability of nephrotoxicity (Lodise ASUNCION ): 10 %          Plan:  1. Start vancomycin  1250 mg (13.1 mg/kg) IV q12h.   2. Vancomycin monitoring method: AUC  3. Vancomycin therapeutic monitoring goal:  400-600 mg*h/L  4. Pharmacy will check vancomycin levels as appropriate in 1-3 Days.    5. Serum creatinine levels will be ordered daily for the first week of therapy and at least twice weekly for subsequent weeks.      Jakob Fuller, PharmD

## 2022-12-14 NOTE — PLAN OF CARE
"/68 (BP Location: Right arm, Cuff Size: Adult Regular)   Pulse 81   Temp 99.2  F (37.3  C) (Axillary)   Resp 16   Ht 1.6 m (5' 2.99\")   Wt 95.5 kg (210 lb 8.6 oz)   SpO2 97%   BMI 37.30 kg/m    Patient slept well through the night  Barrier cream to her bottom X 3 this shift  No complaint of other pain or nausea.   No replacements this morning  Is getting Vanco and Cefepime  Heplocked most of the night.  Continue with current POC  Problem: Plan of Care - These are the overarching goals to be used throughout the patient stay.    Goal: Optimal Comfort and Wellbeing  Outcome: Progressing     Problem: Plan of Care - These are the overarching goals to be used throughout the patient stay.    Goal: Readiness for Transition of Care  Outcome: Progressing     Problem: Stem Cell/Bone Marrow Transplant  Goal: Optimal Coping with Transplant  Outcome: Progressing     Problem: Stem Cell/Bone Marrow Transplant  Goal: Diarrhea Symptom Control  Outcome: Progressing     Problem: Stem Cell/Bone Marrow Transplant  Goal: Improved Activity Tolerance  Outcome: Progressing     Problem: Stem Cell/Bone Marrow Transplant  Goal: Blood Counts Within Acceptable Range  Outcome: Progressing     Problem: Stem Cell/Bone Marrow Transplant  Goal: Improved Oral Mucous Membrane Health and Integrity  Outcome: Progressing     Problem: Stem Cell/Bone Marrow Transplant  Goal: Optimal Nutrition Intake  Outcome: Progressing   Goal Outcome Evaluation:                        "

## 2022-12-14 NOTE — PLAN OF CARE
"Pt stable, eating, walking and VSS. Pt cont to have soft BM and butt breakdown applying barrier cream. Vanco and cefepime dc'd and started zosyn. Pt will do CHG tonight declined shower.  Problem: Plan of Care - These are the overarching goals to be used throughout the patient stay.    Goal: Plan of Care Review  Description: The Plan of Care Review/Shift note should be completed every shift.  The Outcome Evaluation is a brief statement about your assessment that the patient is improving, declining, or no change.  This information will be displayed automatically on your shift note.  Outcome: Not Progressing  Flowsheets (Taken 12/14/2022 1736)  Plan of Care Reviewed With: patient  Overall Patient Progress: no change     Problem: Plan of Care - These are the overarching goals to be used throughout the patient stay.    Goal: Plan of Care Review  Description: The Plan of Care Review/Shift note should be completed every shift.  The Outcome Evaluation is a brief statement about your assessment that the patient is improving, declining, or no change.  This information will be displayed automatically on your shift note.  Outcome: Not Progressing  Flowsheets (Taken 12/14/2022 1736)  Plan of Care Reviewed With: patient  Overall Patient Progress: no change  Goal: Patient-Specific Goal (Individualized)  Description: You can add care plan individualizations to a care plan. Examples of Individualization might be:  \"Parent requests to be called daily at 9am for status\", \"I have a hard time hearing out of my right ear\", or \"Do not touch me to wake me up as it startles me\".  Outcome: Not Progressing  Goal: Absence of Hospital-Acquired Illness or Injury  Outcome: Not Progressing  Intervention: Identify and Manage Fall Risk  Recent Flowsheet Documentation  Taken 12/14/2022 0800 by Mer Arbeu, RN  Safety Promotion/Fall Prevention: clutter free environment maintained  Intervention: Prevent Skin Injury  Recent Flowsheet " Documentation  Taken 12/14/2022 0800 by Mer Abreu RN  Body Position: position changed independently  Goal: Optimal Comfort and Wellbeing  Outcome: Not Progressing  Goal: Readiness for Transition of Care  Outcome: Not Progressing     Problem: Stem Cell/Bone Marrow Transplant  Goal: Optimal Coping with Transplant  Outcome: Not Progressing  Goal: Symptom-Free Urinary Elimination  Outcome: Not Progressing  Goal: Diarrhea Symptom Control  Outcome: Not Progressing  Goal: Improved Activity Tolerance  Outcome: Not Progressing  Intervention: Promote Improved Energy  Recent Flowsheet Documentation  Taken 12/14/2022 0800 by Mer Abreu RN  Activity Management: activity adjusted per tolerance  Goal: Blood Counts Within Acceptable Range  Outcome: Not Progressing  Intervention: Monitor and Manage Hematologic Symptoms  Recent Flowsheet Documentation  Taken 12/14/2022 0800 by Mer Abreu RN  Medication Review/Management: medications reviewed  Goal: Absence of Hypersensitivity Reaction  Outcome: Not Progressing  Goal: Absence of Infection  Outcome: Not Progressing  Intervention: Prevent and Manage Infection  Recent Flowsheet Documentation  Taken 12/14/2022 0800 by Mer Abreu RN  Isolation Precautions:   enteric precautions maintained   cytotoxic precautions maintained  Goal: Improved Oral Mucous Membrane Health and Integrity  Outcome: Not Progressing  Intervention: Promote Oral Comfort and Health  Recent Flowsheet Documentation  Taken 12/14/2022 0800 by Mer Abreu RN  Oral Care:   mouth wash rinse   teeth brushed  Goal: Nausea and Vomiting Symptom Relief  Outcome: Not Progressing  Goal: Optimal Nutrition Intake  Outcome: Not Progressing     Problem: Pain Acute  Goal: Optimal Pain Control and Function  Outcome: Not Progressing  Intervention: Prevent or Manage Pain  Recent Flowsheet Documentation  Taken 12/14/2022 0800 by Mer Abreu RN  Medication Review/Management: medications reviewed      Problem: Fall Injury Risk  Goal: Absence of Fall and Fall-Related Injury  Outcome: Not Progressing  Intervention: Identify and Manage Contributors  Recent Flowsheet Documentation  Taken 12/14/2022 0800 by Mer Abreu RN  Medication Review/Management: medications reviewed  Intervention: Promote Injury-Free Environment  Recent Flowsheet Documentation  Taken 12/14/2022 0800 by Mer Abreu RN  Safety Promotion/Fall Prevention: clutter free environment maintained     Problem: Infection  Goal: Absence of Infection Signs and Symptoms  Outcome: Not Progressing  Intervention: Prevent or Manage Infection  Recent Flowsheet Documentation  Taken 12/14/2022 0800 by Mer Abreu RN  Isolation Precautions:   enteric precautions maintained   cytotoxic precautions maintained     Problem: Oral Intake Inadequate  Goal: Improved Oral Intake  Outcome: Not Progressing   Goal Outcome Evaluation:      Plan of Care Reviewed With: patient    Overall Patient Progress: no changeOverall Patient Progress: no change

## 2022-12-14 NOTE — PROGRESS NOTES
"  BMT/Cell Therapy Daily Progress Note    Yahaira Fuentes is a 62 year old female with AML, undergoing a 7/8 matched unrelated allo HSCT, day +15.    HPI: Rebeka slept well overnight. Tearful discussing positive blood culture. Has been afebrile for >72 hours. She walked 15 laps yesterday! Stools have been formed, rectal pain continuing to improve. Mouth pain stable, no changes. Denies nausea, vomiting, cough, congestion, sore throat, shortness of breath.    Review of Systems    8 point review with pertinent positive and negatives in HPI    PHYSICAL EXAM      Weight     Wt Readings from Last 3 Encounters:   12/13/22 95.5 kg (210 lb 8.6 oz)   11/21/22 96.4 kg (212 lb 9.6 oz)   11/08/22 97.3 kg (214 lb 6.4 oz)        KPS: 80    /56 (BP Location: Right arm, Cuff Size: Adult Regular)   Pulse 86   Temp 98.6  F (37  C) (Axillary)   Resp 16   Ht 1.6 m (5' 2.99\")   Wt 95.5 kg (210 lb 8.6 oz)   SpO2 99%   BMI 37.30 kg/m       General: NAD   Eyes: SHANIQUE, sclera anicteric   Mouth: Mucosa pink and moist. No visible mouth sores or petechiae.   Lungs: CTA bilaterally, normal respiratory effort  Cardiovascular: RRR, no M/R/G   Abdominal/Rectal: +BS, soft, NT, ND.   Lymphatics: Bilateral thick ankles, nonpitting trace edema (baseline). Varicose veins bilaterally.   Skin: No rashes or petechaie, a few scattered ecchymoses  Neuro: A&O   Musculoskeletal: Muscle mass average  Additional Findings: Hammer site NT, no drainage.    Current aGVHD staging:  Skin 0, UGI 0, LGI 0, Liver 0 (keep in note through day +180 for allos)      LABS AND IMAGING: I have assessed all abnormal lab values for their clinical significance and any values considered clinically significant have been addressed in the assessment and plan.        Lab Results   Component Value Date    WBC <0.1 (LL) 12/14/2022    ANEUTAUTO 4.7 11/07/2022    HGB 7.4 (L) 12/14/2022    HCT 22.8 (L) 12/14/2022    PLT 16 (LL) 12/14/2022     12/14/2022    POTASSIUM 3.7 " 12/14/2022    CHLORIDE 111 (H) 12/14/2022    CO2 24 12/14/2022    GLC 98 12/14/2022    BUN 10.8 12/14/2022    CR 0.62 12/14/2022    MAG 1.9 12/14/2022    INR 1.04 12/12/2022       SYSTEMS-BASED ASSESSMENT AND PLAN     Yahaira Fuentes is a 62 year old female with AML, undergoing allo HSCT, day +15, admission complicated by ongoing intermittent fevers 11/30/22-12/4/22, repeat fever 12/11 with positive blood culture.     BMT/IEC PROTOCOL for 2015-32  Chemo protocol:    Day -7: start Allopurinol   Day -6: Cytoxan + Fludarabine   Day -5 through -2: Fludarabine   Day -1: TBI x1   Day 0: Transplant   Day +3 and +4: Cytoxan   Day +5: start GCSF, MMF, and Sirolimus  - Restaging plan: per protocol. Day 21 bmbx (scheduled for 12/20 at 11:30am)     HEME/COAG  # Chemotherapy induced pancytopenia - leukopenia, anemia, thrombocytopenia  - Transfusion parameters: hemoglobin <7, platelets <10  - Relevant thrombosis or bleeding history: superficial thromboses on OCP decades past. Most recent DVT of right LE (distal veins), 10/19/2022, holding lovenox given thrombocytopenia    ID/Neutropenic fevers  # Neutropenic Fevers: likely T-cell activation from graft, improved following PT Cytoxan. Cefepime 11/30-12/6. Infectious work up unremarkable (BC, CXR, UC)    - New fever overnight 12/10-12/11, on cefepime 12/11-12/14. UCx ngtd. RVP and Covid negative. CXR with streaky atelectasis at bases. CT with pleural effusions that are new. Patient declined Lasix 20mg x1. If she becomes hypoxic or any change in respiratory status would give diuresis   # Blood cx from 12/11 positive on 12/13, started on Vanco. 12/14 showing Lactobacillus, vanco and cefepime discontinued. Zosyn 12/14-x. Repeat daily blood cultures x3 days and consider de-escalating antibiotic if no growth.     - Prophylaxis: ACV (CMV donor and recipient negative), Micafungin --> transitioned to Fluconazole 12/10, Levaquin (held while on Zosyn). Bactrim to start at day +28 (reported  "stomach upset with past use, per PharmD note will try at day +28. Can hold if GI symptoms occur).     RISK OF GVHD  - Prophylaxis: PTCytoxan days 3 and 4. Siro day +5. MMF day +5    CARDIOVASCULAR  - Vasovagal episode with bradycardia: 11/29 occurred around transplant time, improved without intervention  - Risk of cardiomyopathy:  Baseline EF 60-65%  - Risk of arrhythmia: Baseline EKG showed NSR and normal QTC    GI/NUTRITION:   - Ulcer prophylaxis: Protonix  - Risk of CINV: Ondansetron, Lorazepam and Compazine available prn   - Risk of malnutrition: monitoring.    # Diarrhea, c dif colonized: PO Vanco x14 days (12/2 - ordered to complete 12/16). Daily metamucil started 12/8.    - Perirectal tenderness, surrounding skin denuded (improving). Topical lidocaine and zinc oxide ointment available.   # Mucositis - mouth pain 12/13-x: MMW available prn. Stable.    RENAL/ELECTROLYTES/  - Risk of renal injury: Off cytoxan flush. Monitor need for IVF.  - Electrolyte management: replace per sliding scale  # Trace BLE edema (baseline): Weight up today 12/14, edema appears to be at baseline. Low threshold to give Lasix tomorrow if continues to be weight / fluid up.     MUSCULOSKELETAL/FRAILTY  - Baseline Frailty Score: 1  - Daily PT/OT as needed while inpatient    Clinically Significant Risk Factors              # Hypoalbuminemia: Lowest albumin = 3.1 g/dL at 12/5/2022  2:43 AM, will monitor as appropriate   # Thrombocytopenia: Lowest platelets = 16 in last 2 days, will monitor for bleeding         # Obesity: Estimated body mass index is 37.69 kg/m  as calculated from the following:    Height as of this encounter: 1.6 m (5' 2.99\").    Weight as of this encounter: 96.5 kg (212 lb 11.2 oz).            DISPOSITION: on discharge patient should be scheduled with Dr. Garrett for first available appointment at the request of Dr. Zoltan French    I spent 30 minutes face-to-face and/or coordinating care. Over 50% of our time on the unit " was spent counseling the patient and/or coordinating care and the plan detailed on today's notes.        Holly Allen PA-C  [phone]

## 2022-12-14 NOTE — PLAN OF CARE
"AVSS.  No pain, no nausea.  Soft formed stool.  Bottom healing, doing a good job with barrier cream.  Eating and drinking, noticing the mouth sores a bit more, but declines interventions.  She knows she has magic mouthwash if she needs it.  Positive bld cx from purple port g+ bacilli so vanco started. No targets detected on verigene.  Up walking laps.  Continue to monitor, continue plan of care.    Problem: Plan of Care - These are the overarching goals to be used throughout the patient stay.    Goal: Plan of Care Review  Description: The Plan of Care Review/Shift note should be completed every shift.  The Outcome Evaluation is a brief statement about your assessment that the patient is improving, declining, or no change.  This information will be displayed automatically on your shift note.  Outcome: Not Progressing  Flowsheets (Taken 12/13/2022 1800)  Plan of Care Reviewed With: patient     Problem: Plan of Care - These are the overarching goals to be used throughout the patient stay.    Goal: Plan of Care Review  Description: The Plan of Care Review/Shift note should be completed every shift.  The Outcome Evaluation is a brief statement about your assessment that the patient is improving, declining, or no change.  This information will be displayed automatically on your shift note.  Outcome: Not Progressing  Flowsheets (Taken 12/13/2022 1800)  Plan of Care Reviewed With: patient  Goal: Patient-Specific Goal (Individualized)  Description: You can add care plan individualizations to a care plan. Examples of Individualization might be:  \"Parent requests to be called daily at 9am for status\", \"I have a hard time hearing out of my right ear\", or \"Do not touch me to wake me up as it startles me\".  Outcome: Not Progressing  Goal: Absence of Hospital-Acquired Illness or Injury  Outcome: Not Progressing  Intervention: Identify and Manage Fall Risk  Recent Flowsheet Documentation  Taken 12/13/2022 1600 by Stephy Zhou, " RN  Safety Promotion/Fall Prevention:   clutter free environment maintained   lighting adjusted   nonskid shoes/slippers when out of bed  Taken 12/13/2022 0800 by Stephy Zhou RN  Safety Promotion/Fall Prevention:   clutter free environment maintained   lighting adjusted   nonskid shoes/slippers when out of bed  Intervention: Prevent Skin Injury  Recent Flowsheet Documentation  Taken 12/13/2022 0800 by Stephy Zhou RN  Body Position: position changed independently  Goal: Optimal Comfort and Wellbeing  Outcome: Not Progressing  Goal: Readiness for Transition of Care  Outcome: Not Progressing     Problem: Stem Cell/Bone Marrow Transplant  Goal: Optimal Coping with Transplant  Outcome: Not Progressing  Goal: Symptom-Free Urinary Elimination  Outcome: Not Progressing  Goal: Diarrhea Symptom Control  Outcome: Not Progressing  Goal: Improved Activity Tolerance  Outcome: Not Progressing  Intervention: Promote Improved Energy  Recent Flowsheet Documentation  Taken 12/13/2022 0800 by Stephy Zhou RN  Activity Management: up ad donita  Goal: Blood Counts Within Acceptable Range  Outcome: Not Progressing  Intervention: Monitor and Manage Hematologic Symptoms  Recent Flowsheet Documentation  Taken 12/13/2022 1600 by Stephy Zhou RN  Medication Review/Management: medications reviewed  Taken 12/13/2022 0800 by Stephy Zhou RN  Bleeding Precautions: monitored for signs of bleeding  Medication Review/Management: medications reviewed  Goal: Absence of Hypersensitivity Reaction  Outcome: Not Progressing  Goal: Absence of Infection  Outcome: Not Progressing  Intervention: Prevent and Manage Infection  Recent Flowsheet Documentation  Taken 12/13/2022 0800 by Stephy Zhou RN  Isolation Precautions: enteric precautions maintained  Goal: Improved Oral Mucous Membrane Health and Integrity  Outcome: Not Progressing  Intervention: Promote Oral Comfort and Health  Recent Flowsheet Documentation  Taken 12/13/2022 0800 by Stephy Zhou  SOPHIE RN  Oral Care: oral rinse provided  Goal: Nausea and Vomiting Symptom Relief  Outcome: Not Progressing  Goal: Optimal Nutrition Intake  Outcome: Not Progressing     Problem: Pain Acute  Goal: Optimal Pain Control and Function  Outcome: Not Progressing  Intervention: Prevent or Manage Pain  Recent Flowsheet Documentation  Taken 12/13/2022 1600 by Stephy Zhou RN  Medication Review/Management: medications reviewed  Taken 12/13/2022 0800 by Stephy Zhou RN  Medication Review/Management: medications reviewed     Problem: Fall Injury Risk  Goal: Absence of Fall and Fall-Related Injury  Outcome: Not Progressing  Intervention: Identify and Manage Contributors  Recent Flowsheet Documentation  Taken 12/13/2022 1600 by Stephy Zhou RN  Medication Review/Management: medications reviewed  Taken 12/13/2022 0800 by Stephy Zhou RN  Medication Review/Management: medications reviewed  Intervention: Promote Injury-Free Environment  Recent Flowsheet Documentation  Taken 12/13/2022 1600 by Stephy Zhou RN  Safety Promotion/Fall Prevention:   clutter free environment maintained   lighting adjusted   nonskid shoes/slippers when out of bed  Taken 12/13/2022 0800 by Stephy Zhou RN  Safety Promotion/Fall Prevention:   clutter free environment maintained   lighting adjusted   nonskid shoes/slippers when out of bed     Problem: Infection  Goal: Absence of Infection Signs and Symptoms  Outcome: Not Progressing  Intervention: Prevent or Manage Infection  Recent Flowsheet Documentation  Taken 12/13/2022 0800 by Stephy Zhou RN  Isolation Precautions: enteric precautions maintained     Problem: Oral Intake Inadequate  Goal: Improved Oral Intake  Outcome: Not Progressing   Goal Outcome Evaluation:      Plan of Care Reviewed With: patient

## 2022-12-15 NOTE — PLAN OF CARE
"/61 (BP Location: Right arm)   Pulse 85   Temp 98.9  F (37.2  C) (Axillary)   Resp 16   Ht 1.6 m (5' 2.99\")   Wt 96.5 kg (212 lb 11.2 oz)   SpO2 95%   BMI 37.69 kg/m    Patient slept well through the night.  Continues to use barrier cream on her bottom X3 this shift  No stool this shift  Plts replaced this morning  Continue with current POC  Problem: Plan of Care - These are the overarching goals to be used throughout the patient stay.    Goal: Absence of Hospital-Acquired Illness or Injury  Outcome: Progressing     Problem: Plan of Care - These are the overarching goals to be used throughout the patient stay.    Goal: Optimal Comfort and Wellbeing  Outcome: Progressing     Problem: Stem Cell/Bone Marrow Transplant  Goal: Optimal Coping with Transplant  Outcome: Progressing     Problem: Stem Cell/Bone Marrow Transplant  Goal: Diarrhea Symptom Control  Outcome: Progressing     Problem: Stem Cell/Bone Marrow Transplant  Goal: Improved Activity Tolerance  Outcome: Progressing     Problem: Stem Cell/Bone Marrow Transplant  Goal: Blood Counts Within Acceptable Range  Outcome: Progressing     Problem: Stem Cell/Bone Marrow Transplant  Goal: Absence of Infection  Outcome: Progressing     Problem: Stem Cell/Bone Marrow Transplant  Goal: Improved Oral Mucous Membrane Health and Integrity  Outcome: Progressing     Problem: Stem Cell/Bone Marrow Transplant  Goal: Optimal Nutrition Intake  Outcome: Progressing   Goal Outcome Evaluation:                        "

## 2022-12-15 NOTE — PLAN OF CARE
"Pt showered today, eating well, walking in halls. Pt bottom still looks raw but not quite as bad as it has been. Pt upset about no WBC but reassured her that it takes time. Pt given 20 mg IV lasix and responded well and each time she had some BM too which she was not thrilled about. Pt denies wanting MMW for sore spots in mouth which are difficult to locate.  Problem: Plan of Care - These are the overarching goals to be used throughout the patient stay.    Goal: Plan of Care Review  Description: The Plan of Care Review/Shift note should be completed every shift.  The Outcome Evaluation is a brief statement about your assessment that the patient is improving, declining, or no change.  This information will be displayed automatically on your shift note.  Outcome: Not Progressing  Flowsheets (Taken 12/15/2022 1745)  Plan of Care Reviewed With: patient  Overall Patient Progress: no change     Problem: Plan of Care - These are the overarching goals to be used throughout the patient stay.    Goal: Plan of Care Review  Description: The Plan of Care Review/Shift note should be completed every shift.  The Outcome Evaluation is a brief statement about your assessment that the patient is improving, declining, or no change.  This information will be displayed automatically on your shift note.  Outcome: Not Progressing  Flowsheets (Taken 12/15/2022 1745)  Plan of Care Reviewed With: patient  Overall Patient Progress: no change  Goal: Patient-Specific Goal (Individualized)  Description: You can add care plan individualizations to a care plan. Examples of Individualization might be:  \"Parent requests to be called daily at 9am for status\", \"I have a hard time hearing out of my right ear\", or \"Do not touch me to wake me up as it startles me\".  Outcome: Not Progressing  Goal: Absence of Hospital-Acquired Illness or Injury  Outcome: Not Progressing  Intervention: Identify and Manage Fall Risk  Recent Flowsheet Documentation  Taken " 12/15/2022 0837 by Mer Abreu RN  Safety Promotion/Fall Prevention: clutter free environment maintained  Intervention: Prevent Skin Injury  Recent Flowsheet Documentation  Taken 12/15/2022 0837 by Mer Abreu RN  Body Position: position changed independently  Goal: Optimal Comfort and Wellbeing  Outcome: Not Progressing  Intervention: Monitor Pain and Promote Comfort  Recent Flowsheet Documentation  Taken 12/15/2022 0837 by Mer Abreu RN  Pain Management Interventions: (creams) other (see comments)  Goal: Readiness for Transition of Care  Outcome: Not Progressing     Problem: Stem Cell/Bone Marrow Transplant  Goal: Optimal Coping with Transplant  Outcome: Not Progressing  Goal: Symptom-Free Urinary Elimination  Outcome: Not Progressing  Intervention: Prevent or Manage Bladder Irritation  Recent Flowsheet Documentation  Taken 12/15/2022 0837 by Mer Abreu RN  Pain Management Interventions: (creams) other (see comments)  Goal: Diarrhea Symptom Control  Outcome: Not Progressing  Goal: Improved Activity Tolerance  Outcome: Not Progressing  Intervention: Promote Improved Energy  Recent Flowsheet Documentation  Taken 12/15/2022 0837 by Mer Abreu RN  Activity Management: activity adjusted per tolerance  Goal: Blood Counts Within Acceptable Range  Outcome: Not Progressing  Intervention: Monitor and Manage Hematologic Symptoms  Recent Flowsheet Documentation  Taken 12/15/2022 0837 by Mer Abreu RN  Medication Review/Management: medications reviewed  Goal: Absence of Hypersensitivity Reaction  Outcome: Not Progressing  Goal: Absence of Infection  Outcome: Not Progressing  Intervention: Prevent and Manage Infection  Recent Flowsheet Documentation  Taken 12/15/2022 0837 by Mer Abreu RN  Isolation Precautions:   enteric precautions maintained   protective environment maintained   cytotoxic precautions maintained  Goal: Improved Oral Mucous Membrane Health and  Integrity  Outcome: Not Progressing  Intervention: Promote Oral Comfort and Health  Recent Flowsheet Documentation  Taken 12/15/2022 0837 by Mer Abreu RN  Oral Care:   mouth wash rinse   teeth brushed  Goal: Nausea and Vomiting Symptom Relief  Outcome: Not Progressing  Goal: Optimal Nutrition Intake  Outcome: Not Progressing     Problem: Pain Acute  Goal: Optimal Pain Control and Function  Outcome: Not Progressing  Intervention: Develop Pain Management Plan  Recent Flowsheet Documentation  Taken 12/15/2022 0837 by Mer Abreu RN  Pain Management Interventions: (creams) other (see comments)  Intervention: Prevent or Manage Pain  Recent Flowsheet Documentation  Taken 12/15/2022 0837 by Mer Abreu RN  Medication Review/Management: medications reviewed     Problem: Fall Injury Risk  Goal: Absence of Fall and Fall-Related Injury  Outcome: Not Progressing  Intervention: Identify and Manage Contributors  Recent Flowsheet Documentation  Taken 12/15/2022 0837 by Mer Abreu RN  Medication Review/Management: medications reviewed  Intervention: Promote Injury-Free Environment  Recent Flowsheet Documentation  Taken 12/15/2022 0837 by Mer Abreu RN  Safety Promotion/Fall Prevention: clutter free environment maintained     Problem: Infection  Goal: Absence of Infection Signs and Symptoms  Outcome: Not Progressing  Intervention: Prevent or Manage Infection  Recent Flowsheet Documentation  Taken 12/15/2022 0837 by Mer Abreu RN  Isolation Precautions:   enteric precautions maintained   protective environment maintained   cytotoxic precautions maintained     Problem: Oral Intake Inadequate  Goal: Improved Oral Intake  Outcome: Not Progressing   Goal Outcome Evaluation:      Plan of Care Reviewed With: patient    Overall Patient Progress: no changeOverall Patient Progress: no change

## 2022-12-15 NOTE — PROGRESS NOTES
"  BMT/Cell Therapy Daily Progress Note    Yahaira Fuentes is a 62 year old female with AML, undergoing a 7/8 matched unrelated allo HSCT, day +16.    HPI: Rebeka is feeling well today. Remains afebrile. Stools continue to be formed and frequency is decreasing. Still mildly uncomfortable to sit in chair, but rectal pain improving. Mouth pain stable, no changes. Denies nausea, vomiting, cough, congestion, sore throat, shortness of breath.    Review of Systems    8 point review with pertinent positive and negatives in HPI    PHYSICAL EXAM      Weight     Wt Readings from Last 3 Encounters:   12/15/22 96.7 kg (213 lb 1.6 oz)   11/21/22 96.4 kg (212 lb 9.6 oz)   11/08/22 97.3 kg (214 lb 6.4 oz)        KPS: 80    /58 (BP Location: Right arm)   Pulse 83   Temp 98.3  F (36.8  C) (Axillary)   Resp 18   Ht 1.6 m (5' 2.99\")   Wt 96.7 kg (213 lb 1.6 oz)   SpO2 98%   BMI 37.76 kg/m       General: NAD   Eyes: SHANIQUE, sclera anicteric   Lungs: CTA bilaterally, normal respiratory effort  Cardiovascular: RRR, no M/R/G   Abdominal/Rectal: +BS, soft, NT, ND.   Lymphatics: Bilateral thick ankles, nonpitting trace edema (baseline). Varicose veins bilaterally.   Skin: No rashes or petechaie, a few scattered ecchymoses  Neuro: A&O   Musculoskeletal: Muscle mass average  Additional Findings: Hammer site NT, no drainage.    Current aGVHD staging:  Skin 0, UGI 0, LGI 0, Liver 0 (keep in note through day +180 for allos)      LABS AND IMAGING: I have assessed all abnormal lab values for their clinical significance and any values considered clinically significant have been addressed in the assessment and plan.        Lab Results   Component Value Date    WBC <0.1 (LL) 12/15/2022    ANEUTAUTO 4.7 11/07/2022    HGB 7.9 (L) 12/15/2022    HCT 24.2 (L) 12/15/2022    PLT 9 (LL) 12/15/2022     12/15/2022    POTASSIUM 3.6 12/15/2022    CHLORIDE 107 12/15/2022    CO2 23 12/15/2022    GLC 99 12/15/2022    BUN 11.7 12/15/2022    CR " 0.62 12/15/2022    MAG 2.0 12/15/2022    INR 1.04 12/12/2022       SYSTEMS-BASED ASSESSMENT AND PLAN     Yahaira Fuentes is a 62 year old female with AML, undergoing allo HSCT, day +16, admission complicated by ongoing intermittent fevers 11/30/22-12/4/22, repeat fever 12/11 with positive blood culture.     BMT/IEC PROTOCOL for 2015-32  Chemo protocol:    Day -7: start Allopurinol   Day -6: Cytoxan + Fludarabine   Day -5 through -2: Fludarabine   Day -1: TBI x1   Day 0: Transplant   Day +3 and +4: Cytoxan   Day +5: start GCSF, MMF, and Sirolimus  - Restaging plan: per protocol. Day 21 bmbx (*scheduled for 12/20 at 11:30am)     HEME/COAG  # Chemotherapy induced pancytopenia - leukopenia, anemia, thrombocytopenia  - Transfusion parameters: hemoglobin <7, platelets <10  - Relevant thrombosis or bleeding history: superficial thromboses on OCP decades past. Most recent DVT of right LE (distal veins), 10/19/2022, holding lovenox given thrombocytopenia    ID/Neutropenic fevers  # Neutropenic Fevers: likely T-cell activation from graft, improved following PT Cytoxan. Cefepime 11/30-12/6. Infectious work up unremarkable (BC, CXR, UC)    - New fever overnight 12/10-12/11, on cefepime 12/11-12/14. UCx ngtd. RVP and Covid negative. CXR with streaky atelectasis at bases. CT with pleural effusions that are new.    # Blood cx from 12/11 positive on 12/13, started on Vanco. 12/14 showing Lactobacillus, vanco and cefepime discontinued. Zosyn 12/14-x. Repeat daily blood cultures x3 days and consider de-escalating antibiotic if no growth.    - BCx 12/14 with ngtd    - Prophylaxis: ACV (CMV donor and recipient negative), Fluconazole, Levaquin (currently held while on Zosyn). Bactrim to start at day +28 (reported stomach upset with past use, per PharmD note will try at day +28. Can hold if GI symptoms occur).     RISK OF GVHD  - Prophylaxis: PTCytoxan days 3 and 4. Siro day +5. MMF day +5    CARDIOVASCULAR  - Vasovagal episode with  "bradycardia: 11/29 occurred around transplant time, improved without intervention  - Risk of cardiomyopathy:  Baseline EF 60-65%  - Risk of arrhythmia: Baseline EKG showed NSR and normal QTC    GI/NUTRITION:   - Ulcer prophylaxis: Protonix  - Risk of CINV: Ondansetron, Lorazepam and Compazine available prn   - Risk of malnutrition: monitoring.    # Diarrhea, c dif colonized: PO Vanco x14 days (12/2 - ordered to complete 12/16). Daily metamucil started 12/8.    - Perirectal tenderness, surrounding skin denuded (improving). Topical lidocaine and zinc oxide ointment available.   # Mucositis - mouth pain 12/13-x: MMW available prn. Stable.    RENAL/ELECTROLYTES/  - Risk of renal injury: Off cytoxan flush. Monitor need for IVF.  - Electrolyte management: replace per sliding scale  # Trace BLE edema (baseline): Weight up 12/14-15. Edema appears to be at baseline. Patient receiving platelets today and still weight up so will give Lasix 20mg IV x1.     MUSCULOSKELETAL/FRAILTY  - Baseline Frailty Score: 1  - Daily PT/OT as needed while inpatient      Clinically Significant Risk Factors          # Hypocalcemia: Lowest Ca = 7.9 mg/dL in last 2 days, will monitor and replace as appropriate     # Hypoalbuminemia: Lowest albumin = 3.1 g/dL at 12/5/2022  2:43 AM, will monitor as appropriate   # Thrombocytopenia: Lowest platelets = 9 in last 2 days, will monitor for bleeding         # Obesity: Estimated body mass index is 37.76 kg/m  as calculated from the following:    Height as of this encounter: 1.6 m (5' 2.99\").    Weight as of this encounter: 96.7 kg (213 lb 1.6 oz).            DISPOSITION: on discharge patient should be scheduled with Dr. Garrett for first available appointment at the request of Dr. Zoltan French    I spent 30 minutes face-to-face and/or coordinating care. Over 50% of our time on the unit was spent counseling the patient and/or coordinating care and the plan detailed on today's notes.        Holly Allen, " PAIgnacioC  [phone]

## 2022-12-16 NOTE — PROGRESS NOTES
"  BMT/Cell Therapy Daily Progress Note    Yahaira Fuentes is a 62 year old female with AML, undergoing a 7/8 matched unrelated allo HSCT, day +17.    HPI: Rebeka is feeling well today. Difficulty eating some meals as they arrive very late so are cold by the time she receives them. Mouth pain stable, no changes. Frequent small stools yesterday after receiving lasix. Stools soft, denies diarrhea. Reports her rectal tenderness feels better and nursing has noted it appears to be healing. Denies nausea, vomiting, cough, congestion.     Review of Systems    8 point review with pertinent positive and negatives in HPI    PHYSICAL EXAM      Weight     Wt Readings from Last 3 Encounters:   12/15/22 96.7 kg (213 lb 1.6 oz)   11/21/22 96.4 kg (212 lb 9.6 oz)   11/08/22 97.3 kg (214 lb 6.4 oz)        KPS: 80    /64 (BP Location: Right arm)   Pulse 78   Temp 97.4  F (36.3  C) (Axillary)   Resp 16   Ht 1.6 m (5' 2.99\")   Wt 96.7 kg (213 lb 1.6 oz)   SpO2 98%   BMI 37.76 kg/m       General: NAD   Eyes: SHANIQUE, sclera anicteric   Mouth: Mucosa pink and moist. 2 small petechiae on inner right cheek. No mouth sores visualized.  Lungs: CTA bilaterally, normal respiratory effort  Cardiovascular: RRR, no M/R/G   Abdominal/Rectal: +BS, soft, NT, ND  Lymphatics: Bilateral thick ankles, nonpitting trace edema (baseline). Varicose veins bilaterally.   Skin: No rashes or petechaie, a few scattered ecchymoses  Neuro: A&O   Musculoskeletal: Muscle mass average  Additional Findings: Hammer site NT, no drainage.    Current aGVHD staging:  Skin 0, UGI 0, LGI 0, Liver 0 (keep in note through day +180 for allos)      LABS AND IMAGING: I have assessed all abnormal lab values for their clinical significance and any values considered clinically significant have been addressed in the assessment and plan.        Lab Results   Component Value Date    WBC <0.1 (LL) 12/16/2022    ANEUTAUTO 4.7 11/07/2022    HGB 7.4 (L) 12/16/2022    HCT 22.4 " (L) 12/16/2022    PLT 16 (LL) 12/16/2022     12/16/2022    POTASSIUM 3.3 (L) 12/16/2022    CHLORIDE 108 (H) 12/16/2022    CO2 26 12/16/2022    GLC 93 12/16/2022    BUN 8.8 12/16/2022    CR 0.66 12/16/2022    MAG 2.0 12/16/2022    INR 1.04 12/12/2022       SYSTEMS-BASED ASSESSMENT AND PLAN     Yahaira Fuentes is a 62 year old female with AML, undergoing allo HSCT, day +17, admission complicated by ongoing intermittent fevers 11/30/22-12/4/22, repeat fever 12/11 with positive blood culture.     BMT/IEC PROTOCOL for 2015-32  Chemo protocol:    Day -7: start Allopurinol   Day -6: Cytoxan + Fludarabine   Day -5 through -2: Fludarabine   Day -1: TBI x1   Day 0: Transplant   Day +3 and +4: Cytoxan   Day +5: start GCSF, MMF, and Sirolimus  - Restaging plan: per protocol. Day 21 bmbx (*scheduled for 12/20 at 11:30am)     HEME/COAG  # Chemotherapy induced pancytopenia - leukopenia, anemia, thrombocytopenia  - Transfusion parameters: hemoglobin <7, platelets <10  - Relevant thrombosis or bleeding history: superficial thromboses on OCP decades past. Most recent DVT of right LE (distal veins), 10/19/2022, holding lovenox given thrombocytopenia    ID/Neutropenic fevers  # Neutropenic Fevers: likely T-cell activation from graft, improved following PT Cytoxan. Cefepime 11/30-12/6. Infectious work up unremarkable (BC, CXR, UC)    - New fever overnight 12/10-12/11, on cefepime 12/11-12/14. UCx ngtd. RVP and Covid negative. CXR with streaky atelectasis at bases. CT with pleural effusions that are new.    # Blood cx from 12/11 positive on 12/13, started on Vanco. 12/14 showing Lactobacillus, vanco and cefepime discontinued. Zosyn 12/14-x. Repeat daily blood cultures x3 days and consider de-escalating antibiotic if no growth.    - BCx 12/14 and 12/15 with ngtd    - Prophylaxis: ACV (CMV donor and recipient negative), Fluconazole, Levaquin (currently held while on Zosyn). Bactrim to start at day +28 (reported stomach upset with  "past use, per PharmD note will try at day +28. Can hold if GI symptoms occur).     RISK OF GVHD  - Prophylaxis: PTCytoxan, Sirolimus, MMF  - Sirolimus level elevated today to 19.9. Dose reduced to 3mg; recheck Monday 12/19    CARDIOVASCULAR  - Vasovagal episode with bradycardia: 11/29 occurred around transplant time, improved without intervention  - Risk of cardiomyopathy:  Baseline EF 60-65%  - Risk of arrhythmia: Baseline EKG showed NSR and normal QTC    GI/NUTRITION:   - Ulcer prophylaxis: Protonix  - Risk of CINV: Ondansetron, Lorazepam and Compazine available prn   - Risk of malnutrition: monitoring, appetite stable.   # Diarrhea, c dif colonized: PO Vanco x14 days (12/2 -12/16). Daily metamucil started 12/8.    - Perirectal tenderness, surrounding skin denuded (improving). Topical lidocaine and zinc oxide ointment available.   # Mucositis - mouth pain 12/13-x: MMW available prn. Stable.    RENAL/ELECTROLYTES/  - Risk of renal injury: Off cytoxan flush. Monitor need for IVF.  - Electrolyte management: replace per sliding scale  # Trace BLE edema (baseline): Weight up 12/14-15. Responded well to Lasix 20mg IV x1 yesterday (12/15), weight nearly back to baseline today.     MUSCULOSKELETAL/FRAILTY  - Baseline Frailty Score: 1  - Daily PT/OT as needed while inpatient      Clinically Significant Risk Factors        # Hypokalemia: Lowest K = 3.3 mmol/L in last 2 days, will replace as needed   # Hypocalcemia: Lowest Ca = 8 mg/dL in last 2 days, will monitor and replace as appropriate     # Hypoalbuminemia: Lowest albumin = 3.1 g/dL at 12/5/2022  2:43 AM, will monitor as appropriate   # Thrombocytopenia: Lowest platelets = 9 in last 2 days, will monitor for bleeding         # Obesity: Estimated body mass index is 37.23 kg/m  as calculated from the following:    Height as of this encounter: 1.6 m (5' 2.99\").    Weight as of this encounter: 95.3 kg (210 lb 1.6 oz).            DISPOSITION: on discharge patient should be " scheduled with Dr. Garrett for first available appointment at the request of Dr. Zoltan French    I spent 30 minutes face-to-face and/or coordinating care. Over 50% of our time on the unit was spent counseling the patient and/or coordinating care and the plan detailed on today's notes.        Holly Allen PA-C  [phone]

## 2022-12-16 NOTE — PLAN OF CARE
"/64 (BP Location: Right arm)   Pulse 78   Temp 97.4  F (36.3  C) (Axillary)   Resp 16   Ht 1.6 m (5' 2.99\")   Wt 96.7 kg (213 lb 1.6 oz)   SpO2 98%   BMI 37.76 kg/m      Afebrile, T max 99.4. VSS. Denies pain/N/V. Continues to c/o soreness in mouth/throat, white patches and redness noted in oral cavity. Voiding well. Continues to have redness and soreness in rectum w frequent stools, barrier cream utilized, x3 BM's this shift. Repeat blood cultures drawn this AM. K+ 3.3, 1 bag 20 mEq KCl infused, 1 bag currently infusing, will need and recheck 1-2 hr post. No other replacements needed. Cares clustered for sleep promotion. Continue w plan of care.     Problem: Stem Cell/Bone Marrow Transplant  Goal: Optimal Coping with Transplant  Outcome: Not Progressing  Goal: Blood Counts Within Acceptable Range  Outcome: Not Progressing  Intervention: Monitor and Manage Hematologic Symptoms  Recent Flowsheet Documentation  Taken 12/15/2022 2000 by Jimmie Serrano RN  Medication Review/Management: medications reviewed     Problem: Plan of Care - These are the overarching goals to be used throughout the patient stay.    Goal: Absence of Hospital-Acquired Illness or Injury  Outcome: Progressing  Intervention: Identify and Manage Fall Risk  Recent Flowsheet Documentation  Taken 12/15/2022 2000 by Jimmie Serrano RN  Safety Promotion/Fall Prevention:    assistive device/personal items within reach    clutter free environment maintained    fall prevention program maintained    nonskid shoes/slippers when out of bed    patient and family education    room organization consistent    safety round/check completed  Intervention: Prevent Skin Injury  Recent Flowsheet Documentation  Taken 12/15/2022 2000 by Jimmie Serrano RN  Body Position: position changed independently  Intervention: Prevent and Manage VTE (Venous Thromboembolism) Risk  Recent Flowsheet Documentation  Taken 12/15/2022 2000 by [place]" Jimmie SUBRAMANIAN RN  VTE Prevention/Management: SCDs (sequential compression devices) off  Goal: Optimal Comfort and Wellbeing  Outcome: Progressing     Problem: Stem Cell/Bone Marrow Transplant  Goal: Symptom-Free Urinary Elimination  Outcome: Progressing  Goal: Diarrhea Symptom Control  Outcome: Progressing  Goal: Improved Activity Tolerance  Outcome: Progressing  Intervention: Promote Improved Energy  Recent Flowsheet Documentation  Taken 12/15/2022 2000 by Jimmie Serrano RN  Activity Management:    activity adjusted per tolerance    up ad donita  Goal: Absence of Hypersensitivity Reaction  Outcome: Progressing  Goal: Absence of Infection  Outcome: Progressing  Intervention: Prevent and Manage Infection  Recent Flowsheet Documentation  Taken 12/15/2022 2000 by Jimmie Serrano RN  Isolation Precautions:    enteric precautions maintained    protective environment maintained  Goal: Improved Oral Mucous Membrane Health and Integrity  Outcome: Progressing  Intervention: Promote Oral Comfort and Health  Recent Flowsheet Documentation  Taken 12/15/2022 2000 by Jimmie Serrano RN  Oral Care:    oral rinse provided    teeth brushed    tongue brushed  Goal: Nausea and Vomiting Symptom Relief  Outcome: Progressing  Goal: Optimal Nutrition Intake  Outcome: Progressing   Goal Outcome Evaluation:

## 2022-12-17 NOTE — PLAN OF CARE
Rebeka is feeling well.  Denies nausea and pain.  Three soft loose stools today, raw excoriated bottom, using barrier creams.  Eating fine, does not drink much,  this 12 hour shift.  Took many laps in the halls twice today.      Problem: Plan of Care - These are the overarching goals to be used throughout the patient stay.    Goal: Plan of Care Review    Outcome: Progressing     Problem: Plan of Care - These are the overarching goals to be used throughout the patient stay.    Goal: Optimal Comfort and Wellbeing  Outcome: Progressing     Problem: Stem Cell/Bone Marrow Transplant  Goal: Diarrhea Symptom Control  Outcome: Progressing  Goal: Optimal Nutrition Intake  Outcome: Progressing     Problem: Infection  Goal: Absence of Infection Signs and Symptoms  Outcome: Progressing  Intervention: Prevent or Manage Infection  Recent Flowsheet Documentation  Taken 12/16/2022 1117 by Patricia Laws, RN  Isolation Precautions: enteric precautions maintained   Goal Outcome Evaluation:

## 2022-12-17 NOTE — PROGRESS NOTES
"/67 (BP Location: Right arm, Cuff Size: Adult Regular)   Pulse 90   Temp 98.2  F (36.8  C) (Axillary)   Resp 16   Ht 1.6 m (5' 2.99\")   Wt 95.3 kg (210 lb 1.6 oz)   SpO2 96%   BMI 37.23 kg/m      AVSS. Alert and Ox4. Denies nausea/vomiting. Clustered cares to promote sleep. Voiding well. Barrier cream applied on bottom. No replacements needed. Hgb 7.4 Plt 11. No BM the whole shift.   Both lumens are heparin locked. Continue with plan of care.     Problem: Stem Cell/Bone Marrow Transplant  Goal: Optimal Coping with Transplant  Outcome: Progressing     Problem: Stem Cell/Bone Marrow Transplant  Goal: Diarrhea Symptom Control  Outcome: Progressing     Problem: Stem Cell/Bone Marrow Transplant  Goal: Improved Oral Mucous Membrane Health and Integrity  Outcome: Progressing  Intervention: Promote Oral Comfort and Health  Recent Flowsheet Documentation  Taken 12/16/2022 2000 by Jazmin Parker, RN  Oral Care: oral rinse provided   Goal Outcome Evaluation:      Plan of Care Reviewed With: patient                 "

## 2022-12-17 NOTE — PROGRESS NOTES
"  BMT/Cell Therapy Daily Progress Note    Yahaira Fuentes is a 62 year old female with AML, undergoing a 7/8 matched unrelated allo HSCT, day +18.    HPI: Rebeka is feeling well today. Slept well last night. Rectal pain is a lot better, no diarrhea. Denies nausea, vomiting, cough, congestion. No fever overnight.    Review of Systems    8 point review with pertinent positive and negatives in HPI    PHYSICAL EXAM      Weight     Wt Readings from Last 3 Encounters:   12/17/22 96.3 kg (212 lb 3.2 oz)   11/21/22 96.4 kg (212 lb 9.6 oz)   11/08/22 97.3 kg (214 lb 6.4 oz)        KPS: 80    /71 (BP Location: Right arm)   Pulse 89   Temp 98.9  F (37.2  C) (Axillary)   Resp 16   Ht 1.6 m (5' 2.99\")   Wt 96.3 kg (212 lb 3.2 oz)   SpO2 96%   BMI 37.60 kg/m       General: NAD   Eyes: SHANIQUE, sclera anicteric   Mouth: Mucosa pink and moist.   Lungs: CTA bilaterally, normal respiratory effort  Cardiovascular: RRR, no M/R/G   Abdominal/Rectal: +BS, soft, NT, ND  Lymphatics: Bilateral thick ankles, nonpitting trace edema (baseline). Varicose veins bilaterally.   Skin: No rashes or petechaie, a few scattered ecchymoses  Neuro: A&O   Musculoskeletal: Muscle mass average  Additional Findings: Hammer site NT, no drainage.    Current aGVHD staging:  Skin 0, UGI 0, LGI 0, Liver 0 (keep in note through day +180 for allos)      LABS AND IMAGING: I have assessed all abnormal lab values for their clinical significance and any values considered clinically significant have been addressed in the assessment and plan.        Lab Results   Component Value Date    WBC <0.1 (LL) 12/17/2022    ANEUTAUTO 4.7 11/07/2022    HGB 7.4 (L) 12/17/2022    HCT 23.1 (L) 12/17/2022    PLT 11 (LL) 12/17/2022     12/17/2022    POTASSIUM 3.7 12/17/2022    CHLORIDE 109 (H) 12/17/2022    CO2 24 12/17/2022    GLC 97 12/17/2022    BUN 9.5 12/17/2022    CR 0.65 12/17/2022    MAG 2.0 12/17/2022    INR 1.04 12/12/2022       SYSTEMS-BASED ASSESSMENT AND " PLAN     Yahaira Fuentes is a 62 year old female with AML, undergoing allo HSCT, day +18, admission complicated by ongoing intermittent fevers 11/30/22-12/4/22, repeat fever 12/11 with positive blood culture.     BMT/IEC PROTOCOL for 2015-32  Chemo protocol:    Day -7: start Allopurinol   Day -6: Cytoxan + Fludarabine   Day -5 through -2: Fludarabine   Day -1: TBI x1   Day 0: Transplant   Day +3 and +4: Cytoxan   Day +5: start GCSF, MMF, and Sirolimus  - Restaging plan: per protocol. Day 21 bmbx (*scheduled for 12/20 at 11:30am)     HEME/COAG  # Chemotherapy induced pancytopenia - leukopenia, anemia, thrombocytopenia  - Transfusion parameters: hemoglobin <7, platelets <10  - Relevant thrombosis or bleeding history: superficial thromboses on OCP decades past. Most recent DVT of right LE (distal veins), 10/19/2022, holding lovenox given thrombocytopenia    ID/Neutropenic fevers  # Neutropenic Fevers: likely T-cell activation from graft, improved following PT Cytoxan. Cefepime 11/30-12/6. Infectious work up unremarkable (BC, CXR, UC)    - New fever overnight 12/10-12/11, on cefepime 12/11-12/14. UCx ngtd. RVP and Covid negative. CXR with streaky atelectasis at bases. CT with pleural effusions that are new.    # Blood cx from 12/11 positive on 12/13, started on Vanco. 12/14 showing Lactobacillus, vanco and cefepime discontinued. Zosyn 12/14-x. Repeat daily blood cultures x3 days and consider de-escalating antibiotic if no growth.    - BCx 12/14-12/16 with ngtd    - Prophylaxis: ACV (CMV donor and recipient negative), Fluconazole, Levaquin (currently held while on Zosyn). Bactrim to start at day +28 (reported stomach upset with past use, per PharmD note will try at day +28. Can hold if GI symptoms occur).     RISK OF GVHD  - Prophylaxis: PTCytoxan, Sirolimus, MMF  - Sirolimus level elevated 12/17 to 19.9. Dose reduced to 3mg; recheck Monday 12/19    CARDIOVASCULAR  - Vasovagal episode with bradycardia: 11/29 occurred  "around transplant time, improved without intervention  - Risk of cardiomyopathy:  Baseline EF 60-65%  - Risk of arrhythmia: Baseline EKG showed NSR and normal QTC    GI/NUTRITION:   - Ulcer prophylaxis: Protonix  - Risk of CINV: Ondansetron, Lorazepam and Compazine available prn   - Risk of malnutrition: monitoring, appetite stable.   # Diarrhea, c dif colonized: PO Vanco x14 days (12/2 -12/16). Daily metamucil started 12/8.    - Perirectal tenderness, surrounding skin denuded (improving). Topical lidocaine and zinc oxide ointment available.   # Mucositis - mouth pain 12/13-x: MMW available prn. Stable.    RENAL/ELECTROLYTES/  - Risk of renal injury: Off cytoxan flush. Monitor need for IVF.  - Electrolyte management: replace per sliding scale  # Trace BLE edema (baseline): Weight up 12/14-15. Responded well to Lasix 20mg IV x1 12/15, weight stable.    MUSCULOSKELETAL/FRAILTY  - Baseline Frailty Score: 1  - Daily PT/OT as needed while inpatient      Clinically Significant Risk Factors        # Hypokalemia: Lowest K = 3.3 mmol/L in last 2 days, will replace as needed       # Hypoalbuminemia: Lowest albumin = 3.1 g/dL at 12/5/2022  2:43 AM, will monitor as appropriate   # Thrombocytopenia: Lowest platelets = 11 in last 2 days, will monitor for bleeding         # Obesity: Estimated body mass index is 37.6 kg/m  as calculated from the following:    Height as of this encounter: 1.6 m (5' 2.99\").    Weight as of this encounter: 96.3 kg (212 lb 3.2 oz).            DISPOSITION: on discharge patient should be scheduled with Dr. Garrett for first available appointment at the request of Dr. Zoltan French    I spent 30 minutes face-to-face and/or coordinating care. Over 50% of our time on the unit was spent counseling the patient and/or coordinating care and the plan detailed on today's notes.        MELO Stephenson-C  [phone]      "

## 2022-12-18 NOTE — PLAN OF CARE
Patient remains hospitalized following stem cell transplant, currently day +18. Awaiting return of counts. Continues on daily Neupogen. Continues IV abx as well as ppx antimicrobials. Continues on IV MMF & PO sirolimus. Appetite good. Denies nausea. 4 loose BMs this shift. Rectal area still excoriated - barrier paste applied after each BM. Denied pain. Ambulating independently, out in halls frequently today. VSS.     Problem: Stem Cell/Bone Marrow Transplant  Goal: Diarrhea Symptom Control  12/17/2022 1843 by Sachi Monge RN  Outcome: Not Progressing  12/17/2022 1843 by Sachi Monge RN  Outcome: Not Progressing  Goal: Blood Counts Within Acceptable Range  12/17/2022 1843 by aSchi Monge RN  Outcome: Not Progressing  12/17/2022 1843 by Sachi Monge RN  Outcome: Not Progressing  Intervention: Monitor and Manage Hematologic Symptoms  Recent Flowsheet Documentation  Taken 12/17/2022 0800 by Sachi Mnoge RN  Medication Review/Management: medications reviewed     Problem: Plan of Care - These are the overarching goals to be used throughout the patient stay.    Goal: Plan of Care Review  Description: The Plan of Care Review/Shift note should be completed every shift.  The Outcome Evaluation is a brief statement about your assessment that the patient is improving, declining, or no change.  This information will be displayed automatically on your shift note.  12/17/2022 1843 by Sachi Monge RN  Outcome: Progressing  12/17/2022 1843 by Sachi Monge RN  Outcome: Progressing     Problem: Plan of Care - These are the overarching goals to be used throughout the patient stay.    Goal: Plan of Care Review  Description: The Plan of Care Review/Shift note should be completed every shift.  The Outcome Evaluation is a brief statement about your assessment that the patient is improving, declining, or no change.  This information will be displayed automatically on your shift note.  12/17/2022 1843 by  "Sachi Monge RN  Outcome: Progressing  12/17/2022 1843 by Sachi Monge RN  Outcome: Progressing  Goal: Patient-Specific Goal (Individualized)  Description: You can add care plan individualizations to a care plan. Examples of Individualization might be:  \"Parent requests to be called daily at 9am for status\", \"I have a hard time hearing out of my right ear\", or \"Do not touch me to wake me up as it startles me\".  12/17/2022 1843 by Sachi Monge RN  Outcome: Progressing  12/17/2022 1843 by Sachi Monge RN  Outcome: Progressing  Goal: Absence of Hospital-Acquired Illness or Injury  12/17/2022 1843 by Sachi Monge RN  Outcome: Progressing  12/17/2022 1843 by Sachi Monge RN  Outcome: Progressing  Intervention: Identify and Manage Fall Risk  Recent Flowsheet Documentation  Taken 12/17/2022 0800 by Sachi Monge RN  Safety Promotion/Fall Prevention:   assistive device/personal items within reach   clutter free environment maintained   lighting adjusted   nonskid shoes/slippers when out of bed   patient and family education   room organization consistent   safety round/check completed  Goal: Optimal Comfort and Wellbeing  12/17/2022 1843 by Sachi Monge RN  Outcome: Progressing  12/17/2022 1843 by Sachi Monge RN  Outcome: Progressing  Goal: Readiness for Transition of Care  12/17/2022 1843 by Sachi Monge RN  Outcome: Progressing  12/17/2022 1843 by Sachi Monge RN  Outcome: Progressing     Problem: Stem Cell/Bone Marrow Transplant  Goal: Optimal Coping with Transplant  12/17/2022 1843 by Sachi Monge RN  Outcome: Progressing  12/17/2022 1843 by Sachi Monge RN  Outcome: Progressing  Goal: Symptom-Free Urinary Elimination  12/17/2022 1843 by Sachi Monge RN  Outcome: Progressing  12/17/2022 1843 by Sachi Monge RN  Outcome: Progressing  Goal: Improved Activity Tolerance  12/17/2022 1843 by Sachi Monge RN  Outcome: Progressing  12/17/2022 1843 " by Sachi Monge RN  Outcome: Progressing  Goal: Absence of Hypersensitivity Reaction  12/17/2022 1843 by Sachi Monge RN  Outcome: Progressing  12/17/2022 1843 by Sachi Monge RN  Outcome: Progressing  Goal: Absence of Infection  12/17/2022 1843 by Sachi Monge RN  Outcome: Progressing  12/17/2022 1843 by Sachi Monge RN  Outcome: Progressing  Intervention: Prevent and Manage Infection  Recent Flowsheet Documentation  Taken 12/17/2022 0800 by Sachi Monge RN  Isolation Precautions:   enteric precautions maintained   protective environment maintained  Goal: Improved Oral Mucous Membrane Health and Integrity  12/17/2022 1843 by Sachi Monge RN  Outcome: Progressing  12/17/2022 1843 by Sachi Monge RN  Outcome: Progressing  Goal: Nausea and Vomiting Symptom Relief  12/17/2022 1843 by Sachi Monge RN  Outcome: Progressing  12/17/2022 1843 by Sachi Monge RN  Outcome: Progressing  Goal: Optimal Nutrition Intake  12/17/2022 1843 by Sachi Monge RN  Outcome: Progressing  12/17/2022 1843 by Sachi Monge RN  Outcome: Progressing     Problem: Pain Acute  Goal: Optimal Pain Control and Function  12/17/2022 1843 by Sachi Monge RN  Outcome: Progressing  12/17/2022 1843 by Sachi Monge RN  Outcome: Progressing  Intervention: Prevent or Manage Pain  Recent Flowsheet Documentation  Taken 12/17/2022 0800 by Sachi Monge RN  Medication Review/Management: medications reviewed     Problem: Fall Injury Risk  Goal: Absence of Fall and Fall-Related Injury  12/17/2022 1843 by Sachi Monge RN  Outcome: Progressing  12/17/2022 1843 by Sachi Monge RN  Outcome: Progressing  Intervention: Identify and Manage Contributors  Recent Flowsheet Documentation  Taken 12/17/2022 0800 by Sachi Monge RN  Medication Review/Management: medications reviewed  Intervention: Promote Injury-Free Environment  Recent Flowsheet Documentation  Taken 12/17/2022 0800 by  Sachi Monge, RN  Safety Promotion/Fall Prevention:   assistive device/personal items within reach   clutter free environment maintained   lighting adjusted   nonskid shoes/slippers when out of bed   patient and family education   room organization consistent   safety round/check completed     Problem: Infection  Goal: Absence of Infection Signs and Symptoms  12/17/2022 1843 by Sachi Monge, RN  Outcome: Progressing  12/17/2022 1843 by Sachi Monge RN  Outcome: Progressing  Intervention: Prevent or Manage Infection  Recent Flowsheet Documentation  Taken 12/17/2022 0800 by Sachi Monge RN  Isolation Precautions:   enteric precautions maintained   protective environment maintained     Problem: Oral Intake Inadequate  Goal: Improved Oral Intake  12/17/2022 1843 by Sachi Monge RN  Outcome: Progressing  12/17/2022 1843 by Sachi Monge RN  Outcome: Progressing   Goal Outcome Evaluation:

## 2022-12-18 NOTE — PLAN OF CARE
"/64 (BP Location: Right arm)   Pulse 89   Temp 99.3  F (37.4  C) (Axillary)   Resp 16   Ht 1.6 m (5' 2.99\")   Wt 96.3 kg (212 lb 3.2 oz)   SpO2 95%   BMI 37.60 kg/m      Tmax 99.3, other VSS. Denies pain/nausea. Up independently. Voiding small amounts adequately. No BM overnight. L CVC running plts. RBC needed after plts complete. Dressing reinforced, will ask day team to address with pt again today. Refused to have dressing changed on eves. No electrolytes needed. Will continue with POC.     "

## 2022-12-18 NOTE — PROGRESS NOTES
"  BMT/Cell Therapy Daily Progress Note    Yahaira Fuentes is a 62 year old female with AML, undergoing a 7/8 matched unrelated allo HSCT, day +19.    HPI: Rebeka is feeling well today. Some new upper-to-mid abdominal pain, very intermittent and sporadic, 7/10 at its worst but not present all the time. It is crampy and she does feel like she is passing more gas than usual. Did get outside food brought in the past couple of nights. No urinary symptoms. She is having soft bowel movements. Denies nausea, vomiting, cough, congestion. No fever overnight. No heartburn.    Review of Systems    8 point review with pertinent positive and negatives in HPI  PHYSICAL EXAM      Weight     Wt Readings from Last 3 Encounters:   12/18/22 96.8 kg (213 lb 8 oz)   11/21/22 96.4 kg (212 lb 9.6 oz)   11/08/22 97.3 kg (214 lb 6.4 oz)        KPS: 80    /64   Pulse 86   Temp 99.2  F (37.3  C) (Axillary)   Resp 16   Ht 1.6 m (5' 2.99\")   Wt 96.8 kg (213 lb 8 oz)   SpO2 96%   BMI 37.83 kg/m       General: NAD   Eyes: SHANIQUE, sclera anicteric   Mouth: Mucosa pink and moist.   Lungs: CTA bilaterally, normal respiratory effort  Cardiovascular: RRR, no M/R/G   Abdominal/Rectal: +BS, soft, NT, ND  Lymphatics: Bilateral thick ankles, nonpitting trace edema (baseline). Varicose veins bilaterally.   Skin: No rashes or petechaie, a few scattered ecchymoses  Neuro: A&O   Musculoskeletal: Muscle mass average  Additional Findings: Hammer site NT, no drainage.    Current aGVHD staging:  Skin 0, UGI 0, LGI 0, Liver 0 (keep in note through day +180 for allos)      LABS AND IMAGING: I have assessed all abnormal lab values for their clinical significance and any values considered clinically significant have been addressed in the assessment and plan.        Lab Results   Component Value Date    WBC 0.1 (LL) 12/18/2022    ANEUTAUTO 4.7 11/07/2022    HGB 6.9 (LL) 12/18/2022    HCT 21.8 (L) 12/18/2022    PLT 3 (LL) 12/18/2022     12/18/2022    " POTASSIUM 3.6 12/18/2022    CHLORIDE 108 (H) 12/18/2022    CO2 25 12/18/2022     (H) 12/18/2022    BUN 9.3 12/18/2022    CR 0.64 12/18/2022    MAG 1.9 12/18/2022    INR 1.04 12/12/2022       SYSTEMS-BASED ASSESSMENT AND PLAN     Yahaira Fuentes is a 62 year old female with AML, undergoing allo HSCT, day +19, admission complicated by ongoing intermittent fevers 11/30/22-12/4/22, repeat fever 12/11 with positive blood culture.     BMT/IEC PROTOCOL for 2015-32  Chemo protocol:    Day -7: start Allopurinol   Day -6: Cytoxan + Fludarabine   Day -5 through -2: Fludarabine   Day -1: TBI x1   Day 0: Transplant   Day +3 and +4: Cytoxan   Day +5: start GCSF, MMF, and Sirolimus  - Restaging plan: per protocol. Day 21 bmbx (*scheduled for 12/20 at 11:30am)     HEME/COAG  # Chemotherapy induced pancytopenia - leukopenia, anemia, thrombocytopenia  - Transfusion parameters: hemoglobin <7, platelets <10 - PRBCs and PLTs today  - Relevant thrombosis or bleeding history: superficial thromboses on OCP decades past. Most recent DVT of right LE (distal veins), 10/19/2022, holding lovenox given thrombocytopenia    ID/Neutropenic fevers  # Neutropenic Fevers: likely T-cell activation from graft, improved following PT Cytoxan. Cefepime 11/30-12/6. Infectious work up unremarkable (BC, CXR, UC)    - New fever overnight 12/10-12/11, on cefepime 12/11-12/14. UCx ngtd. RVP and Covid negative. CXR with streaky atelectasis at bases. CT with pleural effusions that are new.    # Blood cx from 12/11 positive on 12/13, started on Vanco. 12/14 showing Lactobacillus, vanco and cefepime discontinued. Zosyn 12/14-x.    - BCx 12/14-12/16 with ngtd    - Prophylaxis: ACV (CMV donor and recipient negative), Fluconazole, Levaquin (currently held while on Zosyn). Bactrim to start at day +28 (reported stomach upset with past use, per PharmD note will try at day +28. Can hold if GI symptoms occur).     RISK OF GVHD  - Prophylaxis: PTCytoxan, Sirolimus,  "MMF  - Sirolimus level elevated 12/17 to 19.9. Dose reduced to 3mg; recheck Monday 12/19    CARDIOVASCULAR  - Vasovagal episode with bradycardia: 11/29 occurred around transplant time, improved without intervention  - Risk of cardiomyopathy:  Baseline EF 60-65%  - Risk of arrhythmia: Baseline EKG showed NSR and normal QTC    GI/NUTRITION:   - Ulcer prophylaxis: Protonix  - Risk of CINV: Ondansetron, Lorazepam and Compazine available prn   - Risk of malnutrition: monitoring, appetite stable.   # Diarrhea, c dif colonized: PO Vanco x14 days (12/2 -12/16). Daily metamucil started 12/8.    - Perirectal tenderness, surrounding skin denuded (improving). Topical lidocaine and zinc oxide ointment available.   # Mucositis - mouth pain 12/13-x: MMW available prn. Stable.  # Abdominal pain: started overnight 12/18, very intermittent, associated with increased flatus. Will monitor, consider imaging if persistent.    RENAL/ELECTROLYTES/  - Risk of renal injury: Off cytoxan flush. Monitor need for IVF.  - Electrolyte management: replace per sliding scale  # Trace BLE edema (baseline): Weight up 12/14-15. Responded well to Lasix 20mg IV x1 12/15, weight stable.    MUSCULOSKELETAL/FRAILTY  - Baseline Frailty Score: 1  - Daily PT/OT as needed while inpatient      Clinically Significant Risk Factors              # Hypoalbuminemia: Lowest albumin = 3.1 g/dL at 12/5/2022  2:43 AM, will monitor as appropriate   # Thrombocytopenia: Lowest platelets = 3 in last 2 days, will monitor for bleeding         # Obesity: Estimated body mass index is 37.83 kg/m  as calculated from the following:    Height as of this encounter: 1.6 m (5' 2.99\").    Weight as of this encounter: 96.8 kg (213 lb 8 oz).            DISPOSITION: on discharge patient should be scheduled with Dr. Garrett for first available appointment at the request of Dr. Zoltan French    I spent 30 minutes face-to-face and/or coordinating care. Over 50% of our time on the unit was spent " counseling the patient and/or coordinating care and the plan detailed on today's notes.        Beth Jacobson PA-C  [phone]

## 2022-12-19 NOTE — PLAN OF CARE
"/73 (BP Location: Right arm)   Pulse 94   Temp 99  F (37.2  C) (Axillary)   Resp 16   Ht 1.6 m (5' 2.99\")   Wt 96.8 kg (213 lb 8 oz)   SpO2 96%   BMI 37.83 kg/m      Tmax 99.0, other VSS. Denies pain/nausea. Up independently. Voiding small amounts adequately. No BM reported overnight. L CVC HL. No replacements this am. Plan is for bmbx Tues at 1130. Will continue with POC.   "

## 2022-12-19 NOTE — PLAN OF CARE
"Pt showered and will need to do CHG, pt walked and ate meals. Pt declined lasix because attending made it sound like she could wait until tomorrow. Pt had 3 bms continuing to do butt cream.  Problem: Plan of Care - These are the overarching goals to be used throughout the patient stay.    Goal: Plan of Care Review  Description: The Plan of Care Review/Shift note should be completed every shift.  The Outcome Evaluation is a brief statement about your assessment that the patient is improving, declining, or no change.  This information will be displayed automatically on your shift note.  Outcome: Progressing  Flowsheets (Taken 12/19/2022 1220)  Plan of Care Reviewed With: patient  Overall Patient Progress: no change     Problem: Plan of Care - These are the overarching goals to be used throughout the patient stay.    Goal: Plan of Care Review  Description: The Plan of Care Review/Shift note should be completed every shift.  The Outcome Evaluation is a brief statement about your assessment that the patient is improving, declining, or no change.  This information will be displayed automatically on your shift note.  Outcome: Progressing  Flowsheets (Taken 12/19/2022 1220)  Plan of Care Reviewed With: patient  Overall Patient Progress: no change  Goal: Patient-Specific Goal (Individualized)  Description: You can add care plan individualizations to a care plan. Examples of Individualization might be:  \"Parent requests to be called daily at 9am for status\", \"I have a hard time hearing out of my right ear\", or \"Do not touch me to wake me up as it startles me\".  Outcome: Progressing  Goal: Absence of Hospital-Acquired Illness or Injury  Outcome: Progressing  Intervention: Identify and Manage Fall Risk  Recent Flowsheet Documentation  Taken 12/19/2022 0800 by Mer Abreu, RN  Safety Promotion/Fall Prevention:    clutter free environment maintained    fall prevention program maintained    increase visualization of " patient    lighting adjusted    nonskid shoes/slippers when out of bed    patient and family education  Intervention: Prevent Skin Injury  Recent Flowsheet Documentation  Taken 12/19/2022 0800 by Mer Abreu RN  Body Position:    position changed independently    sitting up in bed  Goal: Optimal Comfort and Wellbeing  Outcome: Progressing  Goal: Readiness for Transition of Care  Outcome: Progressing     Problem: Stem Cell/Bone Marrow Transplant  Goal: Optimal Coping with Transplant  Outcome: Progressing  Goal: Blood Counts Within Acceptable Range  Outcome: Progressing  Intervention: Monitor and Manage Hematologic Symptoms  Recent Flowsheet Documentation  Taken 12/19/2022 0800 by Mer Abreu RN  Medication Review/Management: medications reviewed  Goal: Absence of Hypersensitivity Reaction  Outcome: Progressing  Goal: Absence of Infection  Outcome: Progressing  Intervention: Prevent and Manage Infection  Recent Flowsheet Documentation  Taken 12/19/2022 0800 by Mer Abreu RN  Isolation Precautions:    protective environment maintained    enteric precautions maintained    cytotoxic precautions maintained  Goal: Improved Oral Mucous Membrane Health and Integrity  Outcome: Progressing  Intervention: Promote Oral Comfort and Health  Recent Flowsheet Documentation  Taken 12/19/2022 0800 by Mer Abreu RN  Oral Care: oral rinse provided  Goal: Optimal Nutrition Intake  Outcome: Progressing     Problem: Pain Acute  Goal: Optimal Pain Control and Function  Outcome: Progressing  Intervention: Prevent or Manage Pain  Recent Flowsheet Documentation  Taken 12/19/2022 0800 by Mer Abreu RN  Medication Review/Management: medications reviewed     Problem: Fall Injury Risk  Goal: Absence of Fall and Fall-Related Injury  Outcome: Progressing  Intervention: Identify and Manage Contributors  Recent Flowsheet Documentation  Taken 12/19/2022 0800 by Mer Abreu RN  Medication Review/Management:  medications reviewed  Intervention: Promote Injury-Free Environment  Recent Flowsheet Documentation  Taken 12/19/2022 0800 by Mer Abreu, RN  Safety Promotion/Fall Prevention:    clutter free environment maintained    fall prevention program maintained    increase visualization of patient    lighting adjusted    nonskid shoes/slippers when out of bed    patient and family education     Problem: Infection  Goal: Absence of Infection Signs and Symptoms  Outcome: Progressing  Intervention: Prevent or Manage Infection  Recent Flowsheet Documentation  Taken 12/19/2022 0800 by Mer Abreu RN  Isolation Precautions:    protective environment maintained    enteric precautions maintained    cytotoxic precautions maintained     Problem: Oral Intake Inadequate  Goal: Improved Oral Intake  Outcome: Progressing   Goal Outcome Evaluation:      Plan of Care Reviewed With: patient    Overall Patient Progress: no changeOverall Patient Progress: no change

## 2022-12-19 NOTE — PROGRESS NOTES
"  BMT/Cell Therapy Daily Progress Note    Yahaira Fuentes is a 62 year old female with AML, undergoing a 7/8 matched unrelated allo HSCT, day +20.    HPI: Rebeka is doing ok this morning. Some abdominal pain/cramping. Remains afebrile. Eating ok. Continues to have loose stools, but improving volume/frequency. Up 7lbs in the last few days, slight edema in feet/ankles, no dyspnea. VSS and WNL.     Review of Systems    8 point review with pertinent positive and negatives in HPI  PHYSICAL EXAM      Weight     Wt Readings from Last 3 Encounters:   12/19/22 98 kg (216 lb 1.6 oz)   11/21/22 96.4 kg (212 lb 9.6 oz)   11/08/22 97.3 kg (214 lb 6.4 oz)        KPS: 80    /69 (BP Location: Right arm)   Pulse 83   Temp 98.9  F (37.2  C) (Axillary)   Resp 16   Ht 1.6 m (5' 2.99\")   Wt 98 kg (216 lb 1.6 oz)   SpO2 96%   BMI 38.29 kg/m       General: NAD   Eyes: SHANIQUE, sclera anicteric   Mouth: Mucosa pink and moist.   Lungs: CTA bilaterally, normal respiratory effort  Cardiovascular: RRR, no M/R/G   Abdominal/Rectal: +BS, soft, NT, ND  Lymphatics: Bilateral thick ankles, nonpitting trace edema (baseline). Varicose veins bilaterally.   Skin: No rashes or petechaie, a few scattered ecchymoses  Neuro: A&O   Musculoskeletal: Muscle mass average  Additional Findings: Hammer site NT, no drainage.    Current aGVHD staging:  Skin 0, UGI 0, LGI 0, Liver 0 (keep in note through day +180 for allos)      LABS AND IMAGING: I have assessed all abnormal lab values for their clinical significance and any values considered clinically significant have been addressed in the assessment and plan.        Lab Results   Component Value Date    WBC 0.1 (LL) 12/19/2022    ANEUTAUTO 4.7 11/07/2022    HGB 7.6 (L) 12/19/2022    HCT 23.5 (L) 12/19/2022    PLT 23 (LL) 12/19/2022     12/19/2022    POTASSIUM 3.6 12/19/2022    CHLORIDE 109 (H) 12/19/2022    CO2 26 12/19/2022     (H) 12/19/2022    BUN 10.3 12/19/2022    CR 0.63 12/19/2022 "    MAG 2.0 12/19/2022    INR 1.02 12/19/2022       SYSTEMS-BASED ASSESSMENT AND PLAN     Yahaira Fuentes is a 62 year old female with AML, undergoing allo HSCT, day +20, admission complicated by ongoing intermittent fevers 11/30/22-12/4/22, repeat fever 12/11 with positive blood culture.     BMT/IEC PROTOCOL for 2015-32  Chemo protocol:    Day -7: start Allopurinol   Day -6: Cytoxan + Fludarabine   Day -5 through -2: Fludarabine   Day -1: TBI x1   Day 0: Transplant   Day +3 and +4: Cytoxan   Day +5: start GCSF, MMF, and Sirolimus  - Restaging plan: per protocol. Day 21 bmbx (*scheduled for 12/20 at 11:30am)     HEME/COAG  # Chemotherapy induced pancytopenia - leukopenia, anemia, thrombocytopenia  - Transfusion parameters: hemoglobin <7, platelets <10 - PRBCs and PLTs today  - Relevant thrombosis or bleeding history: superficial thromboses on OCP decades past. Most recent DVT of right LE (distal veins), 10/19/2022, holding lovenox given thrombocytopenia    ID/Neutropenic fevers  # Neutropenic Fevers: likely T-cell activation from graft, improved following PT Cytoxan. Cefepime 11/30-12/6. Infectious work up unremarkable (BC, CXR, UC)    - New fever overnight 12/10-12/11, on cefepime 12/11-12/14. UCx ngtd. RVP and Covid negative. CXR with streaky atelectasis at bases. CT with pleural effusions that are new.    # Blood cx from 12/11 positive on 12/13, started on Vanco. 12/14 showing Lactobacillus, vanco and cefepime discontinued. Zosyn 12/14-x (7-10 day course).   - BCx 12/14-12/16 with ngtd  - Prophylaxis: ACV (CMV donor and recipient negative), Fluconazole, Levaquin (currently held while on Zosyn). Bactrim to start at day +28 (reported stomach upset with past use, per PharmD note will try at day +28. Can hold if GI symptoms occur).     RISK OF GVHD  - Prophylaxis: PTCytoxan, Sirolimus, MMF  - Sirolimus level elevated 12/17 to 19.9. Dose reduced to 3mg; recheck Monday 12/19  pending    CARDIOVASCULAR  - Vasovagal  "episode with bradycardia: 11/29 occurred around transplant time, improved without intervention  - Risk of cardiomyopathy:  Baseline EF 60-65%  - Risk of arrhythmia: Baseline EKG showed NSR and normal QTC    GI/NUTRITION:   - Ulcer prophylaxis: Protonix  - Risk of CINV: Ondansetron, Lorazepam and Compazine available prn   - Risk of malnutrition: monitoring, appetite stable.   # Diarrhea, c dif colonized: PO Vanco x14 days (12/2 -12/16). Daily metamucil started 12/8.    - Perirectal tenderness, surrounding skin denuded (improving). Topical lidocaine and zinc oxide ointment available.   # Mucositis - mouth pain 12/13-x: MMW available prn. Stable.  # Abdominal pain: started overnight 12/18, very intermittent, associated with increased flatus. Will monitor, consider imaging if persistent.    RENAL/ELECTROLYTES/  - Risk of renal injury: Off cytoxan flush. Monitor need for IVF.  - Electrolyte management: replace per sliding scale  # Trace BLE edema (baseline): Weight up 12/14-15. Responded well to Lasix 20mg IV x1 12/15, weight stable.   *Lasix not given 12/19    MUSCULOSKELETAL/FRAILTY  - Baseline Frailty Score: 1  - Daily PT/OT as needed while inpatient      Clinically Significant Risk Factors          # Hypocalcemia: Lowest Ca = 8.2 mg/dL in last 2 days, will monitor and replace as appropriate     # Hypoalbuminemia: Lowest albumin = 3.1 g/dL at 12/5/2022  2:43 AM, will monitor as appropriate   # Thrombocytopenia: Lowest platelets = 3 in last 2 days, will monitor for bleeding         # Obesity: Estimated body mass index is 38.29 kg/m  as calculated from the following:    Height as of this encounter: 1.6 m (5' 2.99\").    Weight as of this encounter: 98 kg (216 lb 1.6 oz).            DISPOSITION: on discharge patient should be scheduled with Dr. Garrett for first available appointment at the request of Dr. Zoltan French    I spent 30 minutes face-to-face and/or coordinating care. Over 50% of our time on the unit was spent " counseling the patient and/or coordinating care and the plan detailed on today's notes.        Nolvia Asif PA-C  [id number]

## 2022-12-20 NOTE — PROGRESS NOTES
CLINICAL NUTRITION SERVICES - REASSESSMENT NOTE     Nutrition Prescription    RECOMMENDATIONS FOR MDs/PROVIDERS TO ORDER:  Encourage oral intake     Malnutrition Status:    Moderate malnutrition in the context of acute on chronic illness     Recommendations already ordered by Registered Dietitian (RD):  Continue high kcal/protein diet   -Encouraged use of snacks/supplements     Future/Additional Recommendations:  If TPN is needed:  Dosing weight: 62.9 kg (adjusted)   Access: Central Line   Begin with minimal volume (1200 mL) or max concentration per phamD with initial dextrose of 135 g (GIR= 1.49 mg/kg/min), 100 g AA (1.5 g/kg) and 250 mL 20% clinolipid 6x per week = 1287 kcal (20 kcal/kg)  -Once patient receives 100% of initial PN volume with K>/=3, Mg>/=1.5, and Po4>/=2, advance dextrose by 50-55 g every 1-2 days to goal of 240  (GIR= 2.64 mg/kg/min)  --TPN at goal concentration provides: 240 g dex (815  kcal), 100 g AA (1.5 g/kg) and 250 mL 20% Clinolipid 6x per week =  1644 kcal (26 kcal/kg) and 26% kcal from fat   --Electrolytes/Additives per pharmD, recommend infuvite daily and MTE-4  --Monitor bili, LFTs, TG at the start and weekly thereafter while on TPN  --Monitor for onset of hyperglycemia and need for addition of insulin per pharmD/MD     EVALUATION OF THE PROGRESS TOWARD GOALS   Diet: High Kcal/High Protein + snacks/supplements PRN   Intake: %        NEW FINDINGS   Day +21, able to visit with Rebeka briefly prior to bone marrow biopsy. She reported largest barrier to eating due to service concerns rather than taste changes or functional challenges (mouth pain, nausea, diarrhea) related to transplant.     Weight Trends:  12/20/22 0833 98 kg (216 lb 1.6 oz) --   12/19/22 0855 98 kg (216 lb 1.6 oz) Standing scale   12/16/22 0748 95.3 kg (210 lb 1.6 oz) Standing scale   12/13/22 0823 95.5 kg (210 lb 8.6 oz) Standing scale   12/08/22 0732 94.9 kg (209 lb 4.8 oz) Standing scale   12/05/22 0800 94.1 kg  (207 lb 8 oz) Standing scale   12/02/22 0756 94.2 kg (207 lb 10.8 oz) Standing scale   11/29/22 0726 94.1 kg (207 lb 6.4 oz) Standing scale   11/26/22 0743 94.7 kg (208 lb 11.2 oz) Standing scale   11/24/22 0720 100.6 kg (221 lb 11.2 oz) Standing scale   11/23/22 0821 97 kg (213 lb 14.4 oz) Standing scale   11/22/22 10:10:14 94.9 kg (209 lb 4.8 oz) Standing scale   Will maintain dosing weight of 62.9 kg (adjusted)     GI:No nausea noted. Some crampy abdominal pain but denied today/impacting her ability to eat. Last bowel movement, 12/19, 7 stools documented.   Skin: Kvng 22. No rash or wounds noted.     Labs:   K+ 3.3 (L)  WBC 0.2 (Increasing)     Medications:   Mycophenolate  Protonix  Potassium  Sirolimus  Ursodiol    MALNUTRITION  % Intake: < 75% for > 7 days (moderate)  % Weight Loss: None noted  Subcutaneous Fat Loss: None observed, limited to visual   Muscle Loss: Temporal:  mild, Upper arm (bicep, tricep):  mild and Dorsal hand:  moderate  Fluid Accumulation/Edema: Does not meet criteria  Malnutrition Diagnosis: Moderate malnutrition in the context of acute on chronic illness     Previous Goals   Weight to maintain 94 kg    Evaluation: Not Met    Patient to consume % of nutritionally adequate meal trays TID, or the equivalent with supplements/snacks  Evaluation: Met    Previous Nutrition Diagnosis  Inadequate oral intake related to reduced appetite and intake as evidenced by intakes %, mouth sore limiting food intake and patient report     Evaluation: Modified     CURRENT NUTRITION DIAGNOSIS  Inadequate oral intake related to reduced appetite and increased metabolic demand as evidenced by intakes % and report of reduced intake     INTERVENTIONS  Implementation  Collaboration with other providers  Medical food supplement therapy    Goals  Patient to consume % of nutritionally adequate meal trays TID, or the equivalent with supplements/snacks.    Weight to maintain >94  kg    Monitoring/Evaluation  Progress toward goals will be monitored and evaluated per protocol.    Lynette Licona RD, LD  5C/BMT pager: [phone]

## 2022-12-20 NOTE — PLAN OF CARE
"Goal Outcome Evaluation:  Afebrile. VSS on RA. Up independent. Voiding minimally overnight; one BM reported. Pt states loose stools are improving. Pt denies pain, nausea, and SOB. CHG completed before bed. Cares clustered to promote sleep. Potassium replacement infusing; one more bag to be transfused with recheck 1-2 hours after last IV dose. No other replacements needed.     Problem: Plan of Care - These are the overarching goals to be used throughout the patient stay.    Goal: Plan of Care Review  Description: The Plan of Care Review/Shift note should be completed every shift.  The Outcome Evaluation is a brief statement about your assessment that the patient is improving, declining, or no change.  This information will be displayed automatically on your shift note.  Outcome: Progressing     Problem: Plan of Care - These are the overarching goals to be used throughout the patient stay.    Goal: Plan of Care Review  Description: The Plan of Care Review/Shift note should be completed every shift.  The Outcome Evaluation is a brief statement about your assessment that the patient is improving, declining, or no change.  This information will be displayed automatically on your shift note.  Outcome: Progressing  Goal: Patient-Specific Goal (Individualized)  Description: You can add care plan individualizations to a care plan. Examples of Individualization might be:  \"Parent requests to be called daily at 9am for status\", \"I have a hard time hearing out of my right ear\", or \"Do not touch me to wake me up as it startles me\".  Outcome: Progressing  Goal: Absence of Hospital-Acquired Illness or Injury  Outcome: Progressing  Intervention: Identify and Manage Fall Risk  Recent Flowsheet Documentation  Taken 12/19/2022 2100 by Patti Thompson, RN  Safety Promotion/Fall Prevention:    assistive device/personal items within reach    clutter free environment maintained    fall prevention program maintained    nonskid " shoes/slippers when out of bed    patient and family education  Intervention: Prevent Skin Injury  Recent Flowsheet Documentation  Taken 12/19/2022 2100 by Patti Thompson RN  Body Position:    position changed independently    sitting up in bed  Intervention: Prevent and Manage VTE (Venous Thromboembolism) Risk  Recent Flowsheet Documentation  Taken 12/19/2022 2100 by Patti Thompson RN  VTE Prevention/Management: SCDs (sequential compression devices) off  Goal: Optimal Comfort and Wellbeing  Outcome: Progressing  Goal: Readiness for Transition of Care  Outcome: Progressing     Problem: Stem Cell/Bone Marrow Transplant  Goal: Optimal Coping with Transplant  Outcome: Progressing  Goal: Blood Counts Within Acceptable Range  Outcome: Progressing  Intervention: Monitor and Manage Hematologic Symptoms  Recent Flowsheet Documentation  Taken 12/19/2022 2100 by Patti Thompson RN  Medication Review/Management: medications reviewed  Goal: Absence of Hypersensitivity Reaction  Outcome: Progressing  Goal: Absence of Infection  Outcome: Progressing  Intervention: Prevent and Manage Infection  Recent Flowsheet Documentation  Taken 12/19/2022 2100 by Patti Thompson RN  Isolation Precautions:    protective environment maintained    enteric precautions maintained  Goal: Improved Oral Mucous Membrane Health and Integrity  Outcome: Progressing  Intervention: Promote Oral Comfort and Health  Recent Flowsheet Documentation  Taken 12/19/2022 2100 by Patti Thompson RN  Oral Care:    oral rinse provided    teeth brushed    tongue brushed  Goal: Optimal Nutrition Intake  Outcome: Progressing     Problem: Pain Acute  Goal: Optimal Pain Control and Function  Outcome: Progressing  Intervention: Prevent or Manage Pain  Recent Flowsheet Documentation  Taken 12/19/2022 2100 by Patti Thompson RN  Medication Review/Management: medications reviewed     Problem: Fall Injury Risk  Goal: Absence of Fall and Fall-Related  Injury  Outcome: Progressing  Intervention: Identify and Manage Contributors  Recent Flowsheet Documentation  Taken 12/19/2022 2100 by Patti Thompson, RN  Medication Review/Management: medications reviewed  Intervention: Promote Injury-Free Environment  Recent Flowsheet Documentation  Taken 12/19/2022 2100 by Patti Thompson, RN  Safety Promotion/Fall Prevention:    assistive device/personal items within reach    clutter free environment maintained    fall prevention program maintained    nonskid shoes/slippers when out of bed    patient and family education     Problem: Infection  Goal: Absence of Infection Signs and Symptoms  Outcome: Progressing  Intervention: Prevent or Manage Infection  Recent Flowsheet Documentation  Taken 12/19/2022 2100 by Patti Thompson, RN  Isolation Precautions:    protective environment maintained    enteric precautions maintained     Problem: Oral Intake Inadequate  Goal: Improved Oral Intake  Outcome: Progressing

## 2022-12-20 NOTE — PROGRESS NOTES
"  BMT/Cell Therapy Daily Progress Note    Yahaira Fuentes is a 62 year old female with AML, undergoing a 7/8 matched unrelated allo HSCT, day +21.    HPI:     Continues to have cramping abdominal pain, unable to determine pattern. Does not hurt with movement, passing gas or BMs. Loose stools improving. Remains afebrile. Eating ok. Denies N/V. Weight stable from yesterday. BMBX today.     Review of Systems    8 point review with pertinent positive and negatives in HPI  PHYSICAL EXAM      Weight     Wt Readings from Last 3 Encounters:   12/20/22 98 kg (216 lb 1.6 oz)   11/21/22 96.4 kg (212 lb 9.6 oz)   11/08/22 97.3 kg (214 lb 6.4 oz)        KPS: 80    /75 (BP Location: Right arm)   Pulse 94   Temp 98.5  F (36.9  C) (Axillary)   Resp 19   Ht 1.6 m (5' 2.99\")   Wt 98 kg (216 lb 1.6 oz)   SpO2 97%   BMI 38.29 kg/m       General: NAD   Eyes: SHANIQUE, sclera anicteric   Mouth: Mucosa pink and moist.   Lungs: CTA bilaterally, normal respiratory effort  Cardiovascular: RRR, no M/R/G   Abdominal/Rectal: +BS, soft, NT, ND  Lymphatics: Bilateral thick ankles, nonpitting trace edema (baseline). Varicose veins bilaterally.   Skin: No rashes or petechaie, a few scattered ecchymoses  Neuro: A&O x4  Musculoskeletal: Muscle mass average  Additional Findings: Hammer site NT, no drainage.    Current aGVHD staging:  Skin 0, UGI 0, LGI 0, Liver 0 (keep in note through day +180 for allos)      LABS AND IMAGING: I have assessed all abnormal lab values for their clinical significance and any values considered clinically significant have been addressed in the assessment and plan.        Lab Results   Component Value Date    WBC 0.2 (LL) 12/20/2022    ANEUTAUTO 4.7 11/07/2022    HGB 7.5 (L) 12/20/2022    HCT 23.2 (L) 12/20/2022    PLT 12 (LL) 12/20/2022     12/20/2022    POTASSIUM 3.3 (L) 12/20/2022    CHLORIDE 107 12/20/2022    CO2 25 12/20/2022     (H) 12/20/2022    BUN 9.4 12/20/2022    CR 0.65 12/20/2022    " MAG 1.9 12/20/2022    INR 1.02 12/19/2022       SYSTEMS-BASED ASSESSMENT AND PLAN     Yahaira Fuentes is a 62 year old female with AML, undergoing allo HSCT, day +21, admission complicated by ongoing intermittent fevers 11/30/22-12/4/22, repeat fever 12/11 with positive blood culture.     BMT/IEC PROTOCOL for 2015-32  Chemo protocol:    Day -7: start Allopurinol   Day -6: Cytoxan + Fludarabine   Day -5 through -2: Fludarabine   Day -1: TBI x1   Day 0: Transplant   Day +3 and +4: Cytoxan   Day +5: start GCSF, MMF, and Sirolimus  - Restaging plan: per protocol. Day 21 bmbx (*scheduled for 12/20 at 11:30am)     HEME/COAG  # Chemotherapy induced pancytopenia - leukopenia, anemia, thrombocytopenia  - Transfusion parameters: hemoglobin <7, platelets <10 - PRBCs and PLTs today  - Relevant thrombosis or bleeding history: superficial thromboses on OCP decades past. Most recent DVT of right LE (distal veins), 10/19/2022, holding lovenox given thrombocytopenia    ID/Neutropenic fevers  # Neutropenic Fevers: likely T-cell activation from graft, improved following PT Cytoxan. Cefepime 11/30-12/6. Infectious work up unremarkable (BC, CXR, UC)    - New fever overnight 12/10-12/11, on cefepime 12/11-12/14. UCx ngtd. RVP and Covid negative. CXR with streaky atelectasis at bases. CT with pleural effusions that are new.    # Blood cx from 12/11 positive on 12/13, started on Vanco. 12/14 showing Lactobacillus, vanco and cefepime discontinued. Zosyn 12/14-x 14day course   - BCx 12/14-12/16 with ngtd  - Prophylaxis: ACV (CMV donor and recipient negative), Fluconazole, Levaquin (currently held while on Zosyn). Bactrim to start at day +28 (reported stomach upset with past use, per PharmD note will try at day +28. Can hold if GI symptoms occur).     RISK OF GVHD  - Prophylaxis: PTCytoxan, Sirolimus, MMF  - Sirolimus level = 11.7. Dose continued 3mg q day.    CARDIOVASCULAR  - Vasovagal episode with bradycardia: 11/29 occurred around  "transplant time, improved without intervention  - Risk of cardiomyopathy:  Baseline EF 60-65%  - Risk of arrhythmia: Baseline EKG showed NSR and normal QTC    GI/NUTRITION:   - Ulcer prophylaxis: Protonix  - Risk of CINV: Ondansetron, Lorazepam and Compazine available prn   - Risk of malnutrition: monitoring, appetite stable.   # Diarrhea, c dif colonized: PO Vanco x14 days (12/2 -12/16). Daily metamucil started 12/8.    - Perirectal tenderness, surrounding skin denuded (improving). Topical lidocaine and zinc oxide ointment available.   # Mucositis - mouth pain 12/13-x: MMW available prn. Stable.  # Abdominal pain: started overnight 12/18, very intermittent, associated with increased flatus. Will monitor, consider imaging if persistent.    RENAL/ELECTROLYTES/  - Risk of renal injury: Off cytoxan flush. Monitor need for IVF.  - Electrolyte management: replace per sliding scale  # Trace BLE edema (baseline): Weight up 12/14-15. Responded well to Lasix 20mg IV x1 12/15, weight stable.    MUSCULOSKELETAL/FRAILTY  - Baseline Frailty Score: 1  - Daily PT/OT as needed while inpatient      Clinically Significant Risk Factors        # Hypokalemia: Lowest K = 3.3 mmol/L in last 2 days, will replace as needed       # Hypoalbuminemia: Lowest albumin = 3.1 g/dL at 12/5/2022  2:43 AM, will monitor as appropriate   # Thrombocytopenia: Lowest platelets = 12 in last 2 days, will monitor for bleeding         # Obesity: Estimated body mass index is 38.29 kg/m  as calculated from the following:    Height as of this encounter: 1.6 m (5' 2.99\").    Weight as of this encounter: 98 kg (216 lb 1.6 oz).            DISPOSITION: on discharge patient should be scheduled with Dr. Garrett for first available appointment at the request of Dr. Zoltan French. Likely discharge early next week.    I spent 30 minutes face-to-face and/or coordinating care. Over 50% of our time on the unit was spent counseling the patient and/or coordinating care and the " plan detailed on today's notes.        Nolvia Asif PA-C  [id number]

## 2022-12-20 NOTE — PROCEDURES
"BMT ONC Adult Bone Marrow Biopsy Procedure Note  December 20, 2022  /75 (BP Location: Right arm)   Pulse 94   Temp 98.5  F (36.9  C) (Axillary)   Resp 19   Ht 1.6 m (5' 2.99\")   Wt 98 kg (216 lb 1.6 oz)   SpO2 97%   BMI 38.29 kg/m       Learning needs assessment complete within 12 months? YES    DIAGNOSIS: AML    PROCEDURE: Unilateral Bone Marrow Biopsy and Unilateral Aspirate    LOCATION: Inpatient [facility]    Patient s identification was positively verified by verbal identification and invasive procedure safety checklist was completed. Informed consent was obtained. Following the administration of Midazolam as pre-medication, patient was placed in the prone position and prepped and draped in a sterile manner. Approximately 10 cc of 1% Lidocaine was used over the right posterior iliac spine. Following this a 3 mm incision was made. Trephine bone marrow core(s) was (were) obtained from the [facility]. Bone marrow aspirates were obtained from the [facility]. Aspirates were sent for morphology, immunophenotyping, cytogenetics and molecular diagnostics RFLP. A total of approximately 20 ml of marrow was aspirated. Following this procedure a sterile dressing was applied to the bone marrow biopsy site(s). The patient was placed in the supine position to maintain pressure on the biopsy site. Post-procedure wound care instructions were given.     Complications: NO    Pre-procedural pain: 0 out of 10 on the numeric pain rating scale.     Procedural pain: 1 out of 10 on the numeric pain rating scale.     Post-procedural pain assessment: 0 out of 10 on the numeric pain rating scale.     Interventions: NO    Length of procedure:21 minutes to 45 minutes    Procedure performed by: Nolvia Asif PA-C [phone]    "

## 2022-12-20 NOTE — PLAN OF CARE
"Pt had BMBX today site clean,dry and intact. Pt declined shower as she took one yesterday. Pt excited about WBC coming up. Pt had about 4 BM's- butt cream applied- one spot on bottom looking more raw- as she doesn't always get BM wiped away.   Problem: Plan of Care - These are the overarching goals to be used throughout the patient stay.    Goal: Plan of Care Review  Description: The Plan of Care Review/Shift note should be completed every shift.  The Outcome Evaluation is a brief statement about your assessment that the patient is improving, declining, or no change.  This information will be displayed automatically on your shift note.  Outcome: Progressing  Flowsheets (Taken 12/20/2022 1755)  Plan of Care Reviewed With: patient  Overall Patient Progress: no change     Problem: Plan of Care - These are the overarching goals to be used throughout the patient stay.    Goal: Plan of Care Review  Description: The Plan of Care Review/Shift note should be completed every shift.  The Outcome Evaluation is a brief statement about your assessment that the patient is improving, declining, or no change.  This information will be displayed automatically on your shift note.  Outcome: Progressing  Flowsheets (Taken 12/20/2022 1755)  Plan of Care Reviewed With: patient  Overall Patient Progress: no change  Goal: Patient-Specific Goal (Individualized)  Description: You can add care plan individualizations to a care plan. Examples of Individualization might be:  \"Parent requests to be called daily at 9am for status\", \"I have a hard time hearing out of my right ear\", or \"Do not touch me to wake me up as it startles me\".  Outcome: Progressing  Goal: Absence of Hospital-Acquired Illness or Injury  Outcome: Progressing  Intervention: Identify and Manage Fall Risk  Recent Flowsheet Documentation  Taken 12/20/2022 0800 by Mer Abreu, RN  Safety Promotion/Fall Prevention:   assistive device/personal items within reach   clutter free " environment maintained   fall prevention program maintained   nonskid shoes/slippers when out of bed   patient and family education  Intervention: Prevent Skin Injury  Recent Flowsheet Documentation  Taken 12/20/2022 0800 by Mer Abreu RN  Body Position:   position changed independently   sitting up in bed  Goal: Optimal Comfort and Wellbeing  Outcome: Progressing  Goal: Readiness for Transition of Care  Outcome: Progressing     Problem: Stem Cell/Bone Marrow Transplant  Goal: Optimal Coping with Transplant  Outcome: Progressing  Goal: Blood Counts Within Acceptable Range  Outcome: Progressing  Intervention: Monitor and Manage Hematologic Symptoms  Recent Flowsheet Documentation  Taken 12/20/2022 0800 by Mer Abreu RN  Medication Review/Management: medications reviewed  Goal: Absence of Hypersensitivity Reaction  Outcome: Progressing  Goal: Absence of Infection  Outcome: Progressing  Intervention: Prevent and Manage Infection  Recent Flowsheet Documentation  Taken 12/20/2022 0800 by Mer Abreu RN  Isolation Precautions:   protective environment maintained   enteric precautions maintained   cytotoxic precautions maintained  Goal: Improved Oral Mucous Membrane Health and Integrity  Outcome: Progressing  Intervention: Promote Oral Comfort and Health  Recent Flowsheet Documentation  Taken 12/20/2022 1200 by Mer Abreu RN  Oral Care: oral rinse provided  Taken 12/20/2022 0800 by Mer Abreu RN  Oral Care: oral rinse provided  Goal: Optimal Nutrition Intake  Outcome: Progressing     Problem: Pain Acute  Goal: Optimal Pain Control and Function  Outcome: Progressing  Intervention: Prevent or Manage Pain  Recent Flowsheet Documentation  Taken 12/20/2022 0800 by Mer Abreu RN  Medication Review/Management: medications reviewed     Problem: Fall Injury Risk  Goal: Absence of Fall and Fall-Related Injury  Outcome: Progressing  Intervention: Identify and Manage Contributors  Recent  Flowsheet Documentation  Taken 12/20/2022 0800 by Mer Abreu RN  Medication Review/Management: medications reviewed  Intervention: Promote Injury-Free Environment  Recent Flowsheet Documentation  Taken 12/20/2022 0800 by Mer Abreu RN  Safety Promotion/Fall Prevention:   assistive device/personal items within reach   clutter free environment maintained   fall prevention program maintained   nonskid shoes/slippers when out of bed   patient and family education     Problem: Infection  Goal: Absence of Infection Signs and Symptoms  Outcome: Progressing  Intervention: Prevent or Manage Infection  Recent Flowsheet Documentation  Taken 12/20/2022 0800 by Mer Abreu RN  Isolation Precautions:   protective environment maintained   enteric precautions maintained   cytotoxic precautions maintained     Problem: Oral Intake Inadequate  Goal: Improved Oral Intake  Outcome: Progressing   Goal Outcome Evaluation:      Plan of Care Reviewed With: patient    Overall Patient Progress: no changeOverall Patient Progress: no change

## 2022-12-21 NOTE — PLAN OF CARE
Goal Outcome Evaluation:      Plan of Care Reviewed With: patient    Overall Patient Progress: no changeOverall Patient Progress: no change    Outcome Evaluation: experiencing menu fatiuge and challenges eating with limited menu, encouraged intake and snacks/supplements from home as able

## 2022-12-21 NOTE — PLAN OF CARE
"Goal Outcome Evaluation:  Afebrile. VSS on RA. Up independent. Voiding minimally overnight; no BM reported overnight. Pt denies pain, nausea, and SOB. CHG completed before bed. Cares clustered to promote sleep. Potassium replacement infusing; recheck 1-2 hours after last IV dose. Platelets infusing. Phosphorus to be replaced today with recheck tomorrow AM. Caps changed today. Sirolimus level to be drawn today.    Problem: Plan of Care - These are the overarching goals to be used throughout the patient stay.    Goal: Plan of Care Review  Description: The Plan of Care Review/Shift note should be completed every shift.  The Outcome Evaluation is a brief statement about your assessment that the patient is improving, declining, or no change.  This information will be displayed automatically on your shift note.  Outcome: Progressing     Problem: Plan of Care - These are the overarching goals to be used throughout the patient stay.    Goal: Plan of Care Review  Description: The Plan of Care Review/Shift note should be completed every shift.  The Outcome Evaluation is a brief statement about your assessment that the patient is improving, declining, or no change.  This information will be displayed automatically on your shift note.  Outcome: Progressing  Goal: Patient-Specific Goal (Individualized)  Description: You can add care plan individualizations to a care plan. Examples of Individualization might be:  \"Parent requests to be called daily at 9am for status\", \"I have a hard time hearing out of my right ear\", or \"Do not touch me to wake me up as it startles me\".  Outcome: Progressing  Goal: Absence of Hospital-Acquired Illness or Injury  Outcome: Progressing  Intervention: Identify and Manage Fall Risk  Recent Flowsheet Documentation  Taken 12/20/2022 2000 by Patti Thompson, RN  Safety Promotion/Fall Prevention:    assistive device/personal items within reach    clutter free environment maintained    fall prevention " program maintained    nonskid shoes/slippers when out of bed    patient and family education  Intervention: Prevent Skin Injury  Recent Flowsheet Documentation  Taken 12/20/2022 2000 by Patti Thompson RN  Body Position: position changed independently  Intervention: Prevent and Manage VTE (Venous Thromboembolism) Risk  Recent Flowsheet Documentation  Taken 12/20/2022 2000 by Patti Thompson RN  VTE Prevention/Management: (Ambulates in room.) SCDs (sequential compression devices) off  Goal: Optimal Comfort and Wellbeing  Outcome: Progressing  Goal: Readiness for Transition of Care  Outcome: Progressing     Problem: Stem Cell/Bone Marrow Transplant  Goal: Optimal Coping with Transplant  Outcome: Progressing  Goal: Blood Counts Within Acceptable Range  Outcome: Progressing  Intervention: Monitor and Manage Hematologic Symptoms  Recent Flowsheet Documentation  Taken 12/20/2022 2000 by Patti Thompson RN  Bleeding Precautions:    gentle oral care promoted    monitored for signs of bleeding  Medication Review/Management: medications reviewed  Goal: Absence of Hypersensitivity Reaction  Outcome: Progressing  Goal: Absence of Infection  Outcome: Progressing  Intervention: Prevent and Manage Infection  Recent Flowsheet Documentation  Taken 12/20/2022 2000 by Patti Thompson RN  Isolation Precautions:    protective environment maintained    enteric precautions maintained  Goal: Improved Oral Mucous Membrane Health and Integrity  Outcome: Progressing  Intervention: Promote Oral Comfort and Health  Recent Flowsheet Documentation  Taken 12/20/2022 2000 by Patti Thompson RN  Oral Care:    oral rinse provided    teeth brushed    tongue brushed  Goal: Optimal Nutrition Intake  Outcome: Progressing     Problem: Pain Acute  Goal: Optimal Pain Control and Function  Outcome: Progressing  Intervention: Prevent or Manage Pain  Recent Flowsheet Documentation  Taken 12/20/2022 2000 by Patti Thompson RN  Medication  Review/Management: medications reviewed     Problem: Fall Injury Risk  Goal: Absence of Fall and Fall-Related Injury  Outcome: Progressing  Intervention: Identify and Manage Contributors  Recent Flowsheet Documentation  Taken 12/20/2022 2000 by Patti Thompson, RN  Medication Review/Management: medications reviewed  Intervention: Promote Injury-Free Environment  Recent Flowsheet Documentation  Taken 12/20/2022 2000 by Patti Thompson, RN  Safety Promotion/Fall Prevention:    assistive device/personal items within reach    clutter free environment maintained    fall prevention program maintained    nonskid shoes/slippers when out of bed    patient and family education     Problem: Infection  Goal: Absence of Infection Signs and Symptoms  Outcome: Progressing  Intervention: Prevent or Manage Infection  Recent Flowsheet Documentation  Taken 12/20/2022 2000 by Patti Thompson, RN  Isolation Precautions:    protective environment maintained    enteric precautions maintained     Problem: Oral Intake Inadequate  Goal: Improved Oral Intake  Outcome: Progressing

## 2022-12-21 NOTE — PROGRESS NOTES
Antimicrobial Stewardship Team Note    Antimicrobial Stewardship Program - A joint venture between [facility] and  Physicians to optimize antibiotic management.  NOT a formal consult - Restricted Antimicrobial Review     Patient: Yahaira Fuentes  MRN: [id number]  Allergies: Sulfa drugs    Brief Summary: Yahaira Fuentes is a 62 year old female admitted on 11/22/2022 for AML with FLT3+, NPM1, DNMT3A and cKit mutant. 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 She is on day 8 of piperacillin/tazobactam, and day 11 of active antimicrobial therapy for a neutropenic fever thought to be secondary to a Lactobacillus bacteremia.    HPI: On the night of 12/10, she developed a fever (100.4 F), and again six hours later (100.4 F). Since then, she has not fevered. From 12/3-12/17 her WBC has been <0.1 cells 103/uL, with slow engraftment beginning to appear on 12/18. Today her WBC is 0.3 cells 103/uL. For this neutropenic fever, cefepime was started and blood cultures obtained. Blood cultures obtained on 12/11 resulted in lactobacillus recovered in 1/4 bottles, prompting a change to piperacillin/tazobactam. Since 12/14, the patient has been on piperacillin/tazobactam, with a current planned stop date of 12/28 for a 14 day course. In reviewing her chart, it was noted her diet has included yoghurt, but documentation of yoghurt began after the positive blood culture was recovered. She has a history of using probiotics, but has not received any supplemental probiotics this admission. She completed a 14 day course of oral vancomycin for CDI on 12/16/22.       Active Anti-infective Medications   (From admission, onward)                 Start     Stop    12/27/22 0800  sulfamethoxazole-trimethoprim  1 tablet,   Oral,   EVERY MONDAY TUESDAY BID        Pneumocystis prophylaxis        --    12/14/22 1300  piperacillin-tazobactam  4.5 g,   Intravenous,   EVERY 6 HOURS        Bacteremia        12/28/22 7429     12/10/22 0800  fluconazole  200 mg,   Oral,   DAILY        Fungal Infection Prophylaxis        --    11/30/22 0530  ceFEPIme  2 g,   Intravenous,   EVERY 8 HOURS        Febrile Neutropenia        11/30/22 1329    11/25/22 0800  acyclovir  800 mg,   Oral,   2 TIMES DAILY        Varicella Zoster Virus prophylaxis        --                  Assessment: Neutropenic Fever 2/2 to potentially lactobacillus   Lactobacillus likely was recovered due to the oral vancomycin use. Lactobacillus is intrinsically resistant to vancomycin, allowing it to outcompete other microbes in the setting of oral vancomycin use. This organism is associated with low-virulence, but reasonable to treat when recovered in the setting of neutropenic fever. Lactobacillus is susceptible to penicillin G, therefore we include cefepime in addition to piperacillin/tazobactam in calculating the days of active antimicrobial therapy. If lactobacillus was resistant to cefepime, we would have expected the 12/14 blood culture obtained while on cefepime following the recovery of Lactobacillus to reappear. Since she recently had a CDI episode, would consider the risk/benefit of prolonged broad-spectrum antimicrobial regimens. As she is slowly beginning to engraft and is no longer on oral vancomycin, recommend stopping piperacillin/tazobactam today for 7 days of therapy.     Recommendations:  Stop piperacillin/tazobactam today for 7 days of total therapy  Restart bacterial prophylaxis today    Pharmacy took the following actions: Electronic note created, Called/paged provider.    Discussed with Ramón Medellin MD ID Staff and Hyacinth Angel Pharm.D., RIDGE Bell Pharm.D.  PGY-2 ID Pharmacy Resident  Phone: [phone]    Vital Signs/Clinical Features:  Vitals         12/19 0700  12/20 0659 12/20 0700  12/21 0659 12/21 0700  12/21 1147   Most Recent      Temp ( F) 98.1 -  98.9    98.2 -  98.9    98.6 -  99.1     99.1 (37.3) 12/21 0821    Pulse 83 -  94     87 -  98    93 -  99     93 12/21 0821    Resp 16 -  17    16 -  19    16 -  17     16 12/21 0821    /66 -  135/63    115/75 -  128/63    119/71 -  120/59     119/71 12/21 0821    SpO2 (%) 94 -  97    95 -  98      96     96 12/21 0821            Labs  Estimated Creatinine Clearance: 98.6 mL/min (based on SCr of 0.65 mg/dL).  Recent Labs   Lab Test 12/16/22  0402 12/17/22  0403 12/18/22  0353 12/19/22  0352 12/20/22  0507 12/21/22  0506   CR 0.66 0.65 0.64 0.63 0.65 0.65       Recent Labs   Lab Test 11/26/22  0436 11/27/22  0438 11/28/22  0440 11/29/22  0434 11/30/22  0455 12/01/22  0318 12/02/22  0307 12/16/22  0402 12/17/22  0403 12/18/22  0353 12/19/22  0352 12/20/22  0507 12/21/22  0506   WBC 3.7* 2.2* 1.7* 1.4* 1.1* 1.0*   < > <0.1* <0.1* 0.1* 0.1* 0.2* 0.3*   ANEU 3.6 2.1 1.6 1.3* 1.1* 1.0*  --   --   --   --   --   --   --    ALYM 0.0* 0.1* 0.1* 0.1* 0.0* 0.0*  --   --   --   --   --   --   --    SURESH 0.1 0.0 0.0 0.0 0.0 0.0  --   --   --   --   --   --   --    AEOS 0.0 0.0 0.0 0.0 0.0 0.0  --   --   --   --   --   --   --    HGB 10.0* 10.0* 9.8* 10.0* 9.6* 9.7*   < > 7.4* 7.4* 6.9* 7.6* 7.5* 7.5*   HCT 32.1* 31.4* 31.3* 31.7* 30.9* 30.6*   < > 22.4* 23.1* 21.8* 23.5* 23.2* 23.1*   MCV 97 95 96 95 95 95   < > 90 93 91 91 90 91   PLT 72* 55* 47* 46* 32* 20*   < > 16* 11* 3* 23* 12* 6*    < > = values in this interval not displayed.       Recent Labs   Lab Test 12/01/22  0318 12/05/22  0243 12/08/22  0316 12/12/22  0410 12/15/22  0348 12/19/22  0352   BILITOTAL 0.3 0.2 <0.2 0.3 0.2 0.3   ALKPHOS 46 48 54 61 66 59   ALBUMIN 3.6 3.1* 3.4* 3.4* 3.5 3.4*   AST 17 17 15 17 16 16   ALT 15 15 12 8* 7* 6*       Recent Labs   Lab Test 12/01/22  0613 12/02/22  0532 12/02/22  1015 12/03/22  0714 12/04/22  0231 12/06/22  0601   LACT 0.9 2.1* 1.1 0.8 0.4* 0.7       Recent Labs   Lab Test 12/19/22  0808   RAPAMY 11.7       Culture Results:  7-Day Micro Results       Procedure Component Value Units Date/Time    CMV  DNA quantification [[id number]] Collected: 12/21/22 0506    Order Status: Sent Lab Status: In process Updated: 12/21/22 0512    Specimen: Blood from Line, venous     Blood Culture Purple Lumen [[id number]]  (Normal) Collected: 12/16/22 0402    Order Status: Completed Lab Status: Final result Updated: 12/21/22 0547    Specimen: Blood from Purple Lumen      Culture No Growth    Blood Culture Red Lumen [[id number]]  (Normal) Collected: 12/16/22 0402    Order Status: Completed Lab Status: Final result Updated: 12/21/22 0547    Specimen: Blood from Red Lumen      Culture No Growth    Blood Culture Line, venous [[id number]]  (Normal) Collected: 12/15/22 0348    Order Status: Completed Lab Status: Final result Updated: 12/20/22 0716    Specimen: Blood from Line, venous      Culture No Growth    Blood Culture Line, venous [[id number]]  (Normal) Collected: 12/15/22 0348    Order Status: Completed Lab Status: Final result Updated: 12/20/22 0716    Specimen: Blood from Line, venous      Culture No Growth            Recent Labs   Lab Test 11/03/22  1055 11/30/22  0857 12/02/22  0546 12/03/22  0705   URINEPH 7.5* 5.5 6.0 6.0   NITRITE Negative Negative Negative Negative   LEUKEST Negative Negative Negative Negative   WBCU 0 1 2 1       Recent Labs   Lab Test 11/04/22  1501   CWBC 0   CRBC 0   CGLU 65   CTP 21.8       Recent Labs   Lab Test 12/11/22  1018   IFLUA Not Detected   FLUAH1 Not Detected   FLUAH3 Not Detected   IN0114 Not Detected   IFLUB Not Detected   RSVA Not Detected   RSVB Not Detected   PIV1 Not Detected   PIV2 Not Detected   PIV3 Not Detected   HMPV Not Detected       Recent Labs   Lab Test 11/23/22  0858   CDBPCT Positive*       Imaging: No results found.

## 2022-12-21 NOTE — PLAN OF CARE
"  Problem: Plan of Care - These are the overarching goals to be used throughout the patient stay.    Goal: Patient-Specific Goal (Individualized)  Description: You can add care plan individualizations to a care plan. Examples of Individualization might be:  \"Parent requests to be called daily at 9am for status\", \"I have a hard time hearing out of my right ear\", or \"Do not touch me to wake me up as it startles me\".  Outcome: Progressing     Problem: Plan of Care - These are the overarching goals to be used throughout the patient stay.    Goal: Optimal Comfort and Wellbeing  Outcome: Progressing   Goal Outcome Evaluation:  Temp max 99.1. No pain. No nausea. Ate well. K+ recheck 4.1. B site is c/d/I. Received phosphorus over four hours and recheck tomorrow morning. Heparin locked. Urinary frequency. Soft brown stool and on metamucil. Sirolimus level 9.3. Independent.                         "

## 2022-12-21 NOTE — PROGRESS NOTES
"  BMT/Cell Therapy Daily Progress Note    Yahaira Fuentes is a 62 year old female with AML, undergoing a 7/8 matched unrelated allo HSCT, day +22.    HPI:   Eating well. Bottom pain continues, but tolerable with current regimen. Diarrhea/loose stools improving. BMBX site feels ok from yesterday.     Review of Systems    8 point review with pertinent positive and negatives in HPI  PHYSICAL EXAM      Weight     Wt Readings from Last 3 Encounters:   12/21/22 97.7 kg (215 lb 4.8 oz)   11/21/22 96.4 kg (212 lb 9.6 oz)   11/08/22 97.3 kg (214 lb 6.4 oz)        KPS: 80    /71 (BP Location: Right arm)   Pulse 93   Temp 99.1  F (37.3  C)   Resp 16   Ht 1.6 m (5' 2.99\")   Wt 97.7 kg (215 lb 4.8 oz)   SpO2 96%   BMI 38.15 kg/m       General: NAD   Eyes: SHANIQUE, sclera anicteric   Mouth: Mucosa pink and moist.   Lungs: CTA bilaterally  Cardiovascular: RRR, no M/R/G   Abdominal/Rectal: +BS, soft, NT, ND  Lymphatics: Bilateral thick ankles, nonpitting trace edema (baseline). Varicose veins bilaterally.   Skin: No rashes or petechaie  Neuro: A&O x4  Musculoskeletal: Muscle mass average  Additional Findings: Hammer site NT, no drainage.    Current aGVHD staging:  Skin 0, UGI 0, LGI 0, Liver 0 (keep in note through day +180 for allos)      LABS AND IMAGING: I have assessed all abnormal lab values for their clinical significance and any values considered clinically significant have been addressed in the assessment and plan.        Lab Results   Component Value Date    WBC 0.3 (LL) 12/21/2022    ANEUTAUTO 4.7 11/07/2022    HGB 7.5 (L) 12/21/2022    HCT 23.1 (L) 12/21/2022    PLT 6 (LL) 12/21/2022     12/21/2022    POTASSIUM 3.4 12/21/2022    CHLORIDE 108 (H) 12/21/2022    CO2 25 12/21/2022    GLC 99 12/21/2022    BUN 9.8 12/21/2022    CR 0.65 12/21/2022    MAG 2.0 12/21/2022    INR 1.02 12/19/2022       SYSTEMS-BASED ASSESSMENT AND PLAN     Yahairasa YUMIKO Fuentes is a 62 year old female with AML, undergoing allo HSCT, " day +22, admission complicated by ongoing intermittent fevers 11/30/22-12/4/22, repeat fever 12/11 with positive blood culture.     BMT/IEC PROTOCOL for 2015-32  Chemo protocol:    Day -7: start Allopurinol   Day -6: Cytoxan + Fludarabine   Day -5 through -2: Fludarabine   Day -1: TBI x1   Day 0: Transplant   Day +3 and +4: Cytoxan   Day +5: start GCSF, MMF, and Sirolimus  - Restaging plan: per protocol. Day 21 bmbx done 12/20    HEME/COAG  # Chemotherapy induced pancytopenia - leukopenia, anemia, thrombocytopenia  - Transfusion parameters: hemoglobin <7, platelets <10  - Relevant thrombosis or bleeding history: superficial thromboses on OCP decades past. Most recent DVT of right LE (distal veins), 10/19/2022, holding lovenox given thrombocytopenia    ID/Neutropenic fevers  # Neutropenic Fevers: likely T-cell activation from graft, improved following PT Cytoxan. Cefepime 11/30-12/6. Infectious work up unremarkable (BC, CXR, UC)    - New fever overnight 12/10-12/11, on cefepime 12/11-12/14. UCx ngtd. RVP and Covid negative. CXR with streaky atelectasis at bases. CT with pleural effusions that are new.    # Blood cx from 12/11 positive on 12/13, started on Vanco. 12/14 showing Lactobacillus, vanco and cefepime discontinued. Zosyn 12/14-x 14day course   - BCx 12/14-12/16 with ngtd   -12/21: micro lab running sensitivities of +BC to Cephalosporins. If sensitivities allow, we will transition to PO Vantin for a total 14 days.   - Prophylaxis: ACV (CMV donor and recipient negative), Fluconazole, Levaquin (currently held while on Zosyn). Bactrim to start at day +28 (reported stomach upset with past use, per PharmD note will try at day +28. Can hold if GI symptoms occur).     RISK OF GVHD  - Prophylaxis: PTCytoxan, Sirolimus, MMF  - Sirolimus level = 11.7. Dose continued 3mg q day.    CARDIOVASCULAR  - Vasovagal episode with bradycardia: 11/29 occurred around transplant time, improved without intervention  - Risk of  "cardiomyopathy:  Baseline EF 60-65%  - Risk of arrhythmia: Baseline EKG showed NSR and normal QTC    GI/NUTRITION:   - Ulcer prophylaxis: Protonix  - Risk of CINV: Ondansetron, Lorazepam and Compazine available prn   - moderate malnutrition-dietician to follow  # Diarrhea, c dif colonized: PO Vanco x14 days (12/2 -12/16). Daily metamucil started 12/8.    - Perirectal tenderness, surrounding skin denuded (improving). Topical lidocaine and zinc oxide ointment available.   # Mucositis - mouth pain: MMW available prn. improving  # Abdominal pain: improving.    RENAL/ELECTROLYTES/  - Risk of renal injury: Off cytoxan flush. Monitor need for IVF.  - Electrolyte management: replace per sliding scale  # Trace BLE edema (baseline): Weight improving without diuresis.    MUSCULOSKELETAL/FRAILTY  - Baseline Frailty Score: 1  - Daily PT/OT as needed while inpatient      Clinically Significant Risk Factors        # Hypokalemia: Lowest K = 3.3 mmol/L in last 2 days, will replace as needed       # Hypoalbuminemia: Lowest albumin = 3.1 g/dL at 12/5/2022  2:43 AM, will monitor as appropriate   # Thrombocytopenia: Lowest platelets = 6 in last 2 days, will monitor for bleeding         # Obesity: Estimated body mass index is 38.15 kg/m  as calculated from the following:    Height as of this encounter: 1.6 m (5' 2.99\").    Weight as of this encounter: 97.7 kg (215 lb 4.8 oz).   # Moderate Malnutrition: based on nutrition assessment          DISPOSITION: on discharge patient should be scheduled with Dr. Garrett for first available appointment at the request of Dr. Zoltan French. Likely discharge early next week. *Patient will likely discharge early next week/possibly Sunday*     I spent 30 minutes face-to-face and/or coordinating care. Over 50% of our time on the unit was spent counseling the patient and/or coordinating care and the plan detailed on today's notes.        Nolvia Asif PA-C  [phone]    "

## 2022-12-22 NOTE — PROGRESS NOTES
"/83 (BP Location: Right arm)   Pulse 94   Temp 98.6  F (37  C) (Axillary)   Resp 18   Ht 1.6 m (5' 2.99\")   Wt 97.7 kg (215 lb 4.8 oz)   SpO2 95%   BMI 38.15 kg/m      AVSS. Denies nausea/vomiting/pain. CHG wipes done before bedtime. Cares clustered to promote sleep. WBC 0.6, Hgb 7.5, Plt 12.  Followed up with lab, Still awaiting for chemistry results. Continue with plan of care.     Problem: Stem Cell/Bone Marrow Transplant  Goal: Optimal Coping with Transplant  Outcome: Progressing     Problem: Stem Cell/Bone Marrow Transplant  Goal: Blood Counts Within Acceptable Range  Outcome: Progressing  Intervention: Monitor and Manage Hematologic Symptoms  Recent Flowsheet Documentation  Taken 12/21/2022 1950 by Jazmin Parker, RN  Medication Review/Management: medications reviewed   Goal Outcome Evaluation:      Plan of Care Reviewed With: patient                 "

## 2022-12-22 NOTE — PLAN OF CARE
"Pt refused shower she said it was too late but promised to shower in am. Pt did chg. Drsg changed.  Problem: Plan of Care - These are the overarching goals to be used throughout the patient stay.    Goal: Plan of Care Review  Description: The Plan of Care Review/Shift note should be completed every shift.  The Outcome Evaluation is a brief statement about your assessment that the patient is improving, declining, or no change.  This information will be displayed automatically on your shift note.  Outcome: Progressing  Flowsheets (Taken 12/21/2022 5346)  Plan of Care Reviewed With: patient  Overall Patient Progress: no change     Problem: Plan of Care - These are the overarching goals to be used throughout the patient stay.    Goal: Plan of Care Review  Description: The Plan of Care Review/Shift note should be completed every shift.  The Outcome Evaluation is a brief statement about your assessment that the patient is improving, declining, or no change.  This information will be displayed automatically on your shift note.  Outcome: Progressing  Flowsheets (Taken 12/21/2022 4205)  Plan of Care Reviewed With: patient  Overall Patient Progress: no change  Goal: Patient-Specific Goal (Individualized)  Description: You can add care plan individualizations to a care plan. Examples of Individualization might be:  \"Parent requests to be called daily at 9am for status\", \"I have a hard time hearing out of my right ear\", or \"Do not touch me to wake me up as it startles me\".  Outcome: Progressing  Goal: Absence of Hospital-Acquired Illness or Injury  Outcome: Progressing  Intervention: Identify and Manage Fall Risk  Recent Flowsheet Documentation  Taken 12/21/2022 1700 by Mer Abreu RN  Safety Promotion/Fall Prevention:   assistive device/personal items within reach   clutter free environment maintained   fall prevention program maintained   increase visualization of patient   lighting adjusted   nonskid shoes/slippers " when out of bed  Intervention: Prevent Skin Injury  Recent Flowsheet Documentation  Taken 12/21/2022 1700 by Mer Abreu RN  Body Position: position changed independently  Intervention: Prevent and Manage VTE (Venous Thromboembolism) Risk  Recent Flowsheet Documentation  Taken 12/21/2022 1700 by Mer Abreu RN  VTE Prevention/Management: SCDs (sequential compression devices) off  Goal: Optimal Comfort and Wellbeing  Outcome: Progressing  Goal: Readiness for Transition of Care  Outcome: Progressing     Problem: Stem Cell/Bone Marrow Transplant  Goal: Optimal Coping with Transplant  Outcome: Progressing  Goal: Blood Counts Within Acceptable Range  Outcome: Progressing  Intervention: Monitor and Manage Hematologic Symptoms  Recent Flowsheet Documentation  Taken 12/21/2022 1700 by Mer Abreu RN  Medication Review/Management: medications reviewed  Goal: Absence of Hypersensitivity Reaction  Outcome: Progressing  Goal: Absence of Infection  Outcome: Progressing  Intervention: Prevent and Manage Infection  Recent Flowsheet Documentation  Taken 12/21/2022 1700 by Mer Abreu RN  Isolation Precautions: protective environment maintained  Goal: Improved Oral Mucous Membrane Health and Integrity  Outcome: Progressing  Intervention: Promote Oral Comfort and Health  Recent Flowsheet Documentation  Taken 12/21/2022 1700 by Mer Abreu RN  Oral Care: oral rinse provided  Goal: Optimal Nutrition Intake  Outcome: Progressing     Problem: Pain Acute  Goal: Optimal Pain Control and Function  Outcome: Progressing  Intervention: Prevent or Manage Pain  Recent Flowsheet Documentation  Taken 12/21/2022 1700 by Mer Abreu RN  Medication Review/Management: medications reviewed     Problem: Fall Injury Risk  Goal: Absence of Fall and Fall-Related Injury  Outcome: Progressing  Intervention: Identify and Manage Contributors  Recent Flowsheet Documentation  Taken 12/21/2022 1700 by Mer Abreu  RN  Medication Review/Management: medications reviewed  Intervention: Promote Injury-Free Environment  Recent Flowsheet Documentation  Taken 12/21/2022 1700 by Mer Abreu RN  Safety Promotion/Fall Prevention:   assistive device/personal items within reach   clutter free environment maintained   fall prevention program maintained   increase visualization of patient   lighting adjusted   nonskid shoes/slippers when out of bed     Problem: Infection  Goal: Absence of Infection Signs and Symptoms  Outcome: Progressing  Intervention: Prevent or Manage Infection  Recent Flowsheet Documentation  Taken 12/21/2022 1700 by Mer Abreu RN  Isolation Precautions: protective environment maintained     Problem: Oral Intake Inadequate  Goal: Improved Oral Intake  Outcome: Progressing   Goal Outcome Evaluation:      Plan of Care Reviewed With: patient    Overall Patient Progress: no changeOverall Patient Progress: no change

## 2022-12-22 NOTE — PLAN OF CARE
"Care Coordination - Discharge Planning    Line Company:  [facility] [phone] for line care supplies - at each discharge, CARLA to enter new line care orders (select \"yes\" for last question)  Referral made for line care:  Yes       IV medications needed at discharge:  None   Pharmacy concerns:  Siro/MMF (and any other specialty meds) must be filled through [facility] ( [phone]) - please allow 3-5 business days for delivery - scripts for MMF and siro (1mg tabs only) were sent to [facility] on 12/14 by CARLA-Nolvia. As of 12/22, pt states she has received one of these medications but does not know which one.   (All other meds can be filled through [facility])    Prevymis/letermovir:  NA - pt is CMV neg    PT/OT/therapies recommended:  No  Referral made for PT/OT/therapies:  NA    D/c location:  Daughter's home - [place], [place]  Has placement need been communicated to Social Work?  Yes    [place] Teaching time arranged with patient/caregiver:  Spoke with pt on 12/22 regarding discharge planning. Pt requests to have labs drawn locally in [place] after hospital discharge. Discussed the importance of having one team of people (BMT) managing her (and her central line) post-transplant, especially while still neutropenic. Pt expressed frustration with having to come to BMT clinic so frequently, which will likely be daily to start. Will discuss with BMT team. Will plan to complete RNCC discharge teaching with pt on Fri 12/23.  Notify nurse to schedule line care class/ DM teaching, prior to d/c:  On 12/22, discussed with pt the need for PLC prior to discharge; pt states she would like to have CVC removed prior to discharge (has a port-a-cath). Will discuss with BMT team.     Caregiver:  Darren Renner (daughter) [phone]    Cheryl Holloway [phone]    Rosie Dong [phone]    Cheyenne Telles [phone]      Bety Tubbs, RN, BSN  RN Care Coordinator - BMT   [phone]  Pg [phone]   "

## 2022-12-22 NOTE — PROGRESS NOTES
"  BMT/Cell Therapy Daily Progress Note    Yahaira Fuentes is a 62 year old female with AML, undergoing a 7/8 matched unrelated allo HSCT, day +23.    HPI:   Perirectal pain improving. Diarrhea improving. Eating well. Switching MMF to oral today. Engrafting. No infectious symptoms.     Review of Systems    8 point review with pertinent positive and negatives in HPI  PHYSICAL EXAM      Weight     Wt Readings from Last 3 Encounters:   12/22/22 98 kg (216 lb)   11/21/22 96.4 kg (212 lb 9.6 oz)   11/08/22 97.3 kg (214 lb 6.4 oz)        KPS: 80    BP (!) 145/73 (BP Location: Right arm)   Pulse 94   Temp 98.4  F (36.9  C) (Axillary)   Resp 18   Ht 1.6 m (5' 2.99\")   Wt 98 kg (216 lb)   SpO2 98%   BMI 38.27 kg/m       General: NAD   Eyes: SHANIQUE, sclera anicteric   Mouth: Mucosa pink and moist.   Lungs: CTA bilaterally  Cardiovascular: RRR, no M/R/G   Abdominal/Rectal: +BS, soft, NT, ND  Lymphatics: Bilateral thick ankles, nonpitting trace edema (baseline). Varicose veins bilaterally.   Skin: No rashes or petechaie  Neuro: A&O x4  Musculoskeletal: Muscle mass average  Additional Findings: Hammer site NT, no drainage.    Current aGVHD staging:  Skin 0, UGI 0, LGI 0, Liver 0 (keep in note through day +180 for allos)      LABS AND IMAGING: I have assessed all abnormal lab values for their clinical significance and any values considered clinically significant have been addressed in the assessment and plan.        Lab Results   Component Value Date    WBC 0.6 (LL) 12/22/2022    ANEUTAUTO 4.7 11/07/2022    HGB 7.5 (L) 12/22/2022    HCT 24.0 (L) 12/22/2022    PLT 12 (LL) 12/22/2022     12/22/2022    POTASSIUM 3.9 12/22/2022    CHLORIDE 107 12/22/2022    CO2 23 12/22/2022    GLC 96 12/22/2022    BUN 7.6 (L) 12/22/2022    CR 0.75 12/22/2022    MAG 1.9 12/22/2022    INR 1.02 12/19/2022       SYSTEMS-BASED ASSESSMENT AND PLAN     Yahaira Fuentes is a 62 year old female with AML, undergoing allo HSCT, day +23, " admission complicated by ongoing intermittent fevers 11/30/22-12/4/22, repeat fever 12/11 with positive blood culture.     BMT/IEC PROTOCOL for 2015-32  Chemo protocol:    Day -7: start Allopurinol   Day -6: Cytoxan + Fludarabine   Day -5 through -2: Fludarabine   Day -1: TBI x1   Day 0: Transplant   Day +3 and +4: Cytoxan   Day +5: start GCSF, MMF, and Sirolimus  - Restaging plan: per protocol. Day 21 bmbx done 12/20, results pending    HEME/COAG  # Chemotherapy induced pancytopenia - leukopenia, anemia, thrombocytopenia  - Transfusion parameters: hemoglobin <7, platelets <10  - Relevant thrombosis or bleeding history: superficial thromboses on OCP decades past. Most recent DVT of right LE (distal veins), 10/19/2022, holding lovenox given thrombocytopenia    ID/Neutropenic fevers  # Neutropenic Fevers: likely T-cell activation from graft, improved following PT Cytoxan. Cefepime 11/30-12/6. Infectious work up unremarkable (BC, CXR, UC)    - New fever overnight 12/10-12/11, on cefepime 12/11-12/14. UCx ngtd. RVP and Covid negative. CXR with streaky atelectasis at bases. CT with pleural effusions that are new.    # Blood cx from 12/11 positive on 12/13, started on Vanco. 12/14 showing Lactobacillus, vanco and cefepime discontinued. Zosyn 12/14-12/22.    - BCx 12/14-12/16 with ngtd   -12/22 switch from zosyn to Vantin 200mg BID until engrafted/12/28 for 15 day course antibiotics   - Prophylaxis: ACV (CMV donor and recipient negative), Fluconazole, Levaquin (currently held while on Zosyn). Bactrim to start at day +28 (reported stomach upset with past use, per PharmD note will try at day +28. Can hold if GI symptoms occur).     RISK OF GVHD  - Prophylaxis: PTCytoxan, Sirolimus, MMF  - Sirolimus level = 11.7. Dose continued 3mg q day.    CARDIOVASCULAR  - Vasovagal episode with bradycardia: 11/29 occurred around transplant time, improved without intervention  - Risk of cardiomyopathy:  Baseline EF 60-65%  - Risk of  arrhythmia: Baseline EKG showed NSR and normal QTC    GI/NUTRITION:   - Ulcer prophylaxis: Protonix  - Risk of CINV: Ondansetron, Lorazepam and Compazine available prn   - moderate malnutrition-dietician to follow  # Diarrhea, c dif colonized: PO Vanco x14 days (12/2 -12/16). Daily metamucil started 12/8.    - Perirectal tenderness, surrounding skin denuded (improving). Topical lidocaine and zinc oxide ointment available.   # Mucositis - mouth pain: MMW available prn. improving  # Abdominal pain: improving.    RENAL/ELECTROLYTES/  - Risk of renal injury: Off cytoxan flush. Monitor need for IVF.  - Electrolyte management: replace per sliding scale  # Trace BLE edema (baseline): Weight stable without recent diruesis    MUSCULOSKELETAL/FRAILTY  - Baseline Frailty Score: 1  - Daily PT/OT as needed while inpatient      Clinically Significant Risk Factors          # Hypocalcemia: Lowest Ca = 8.3 mg/dL in last 2 days, will monitor and replace as appropriate     # Hypoalbuminemia: Lowest albumin = 3.1 g/dL at 12/5/2022  2:43 AM, will monitor as appropriate   # Thrombocytopenia: Lowest platelets = 6 in last 2 days, will monitor for bleeding          # Moderate Malnutrition: based on nutrition assessment          DISPOSITION: on discharge patient should be scheduled with Dr. Garrett for first available appointment at the request of Dr. Zoltan French. Likely discharge early next week. *Patient will likely discharge early next week/possibly Sunday*     I spent 30 minutes face-to-face and/or coordinating care. Over 50% of our time on the unit was spent counseling the patient and/or coordinating care and the plan detailed on today's notes.        Nolvia Asif PA-C  [phone]

## 2022-12-23 NOTE — PLAN OF CARE
Rebeka is feeling good.  No complaints.  Eating and drinking well.  Four soft stools, leif-area excoriation is healing, using leeann cleanser and barrier cream.  Walked in the halls multiple times.  No visitors.      Problem: Plan of Care - These are the overarching goals to be used throughout the patient stay.    Goal: Plan of Care Review    Outcome: Progressing  Flowsheets (Taken 12/22/2022 1819)  Plan of Care Reviewed With: patient  Overall Patient Progress: improving     Problem: Plan of Care - These are the overarching goals to be used throughout the patient stay.    Goal: Optimal Comfort and Wellbeing  Outcome: Progressing     Problem: Stem Cell/Bone Marrow Transplant  Goal: Optimal Coping with Transplant  Outcome: Progressing  Goal: Absence of Infection  Outcome: Progressing  Intervention: Prevent and Manage Infection  Recent Flowsheet Documentation  Taken 12/22/2022 0927 by Patricia Laws, RN  Isolation Precautions: enteric precautions maintained     Problem: Pain Acute  Goal: Optimal Pain Control and Function  Outcome: Progressing  Intervention: Prevent or Manage Pain  Recent Flowsheet Documentation  Taken 12/22/2022 0927 by Patricia Laws, RN  Medication Review/Management: medications reviewed   Goal Outcome Evaluation:      Plan of Care Reviewed With: patient    Overall Patient Progress: improvingOverall Patient Progress: improving

## 2022-12-23 NOTE — PLAN OF CARE
Problem: Stem Cell/Bone Marrow Transplant  Goal: Improved Oral Mucous Membrane Health and Integrity  Intervention: Promote Oral Comfort and Health  Recent Flowsheet Documentation  Taken 12/23/2022 1700 by Airam Soto, RN  Oral Care:    oral rinse provided    mouth wash rinse    lip/mouth moisturizer applied    teeth brushed    Pt denies pain or concerns for comfort r/t health care at this time.      Problem: Stem Cell/Bone Marrow Transplant  Goal: Absence of Infection  Intervention: Prevent and Manage Infection  Recent Flowsheet Documentation  Taken 12/23/2022 1700 by Airam Soto, RN  Isolation Precautions:    enteric precautions maintained    protective environment maintained   Goal Outcome Evaluation:  s-pt up freq in tenorio- self care in room. Low grade temp this maría 99.7 at 1600. Pt request rectal leeann be applied - skin dark red around the rectal area and intact. No pain reported. Did not see maría tray and pt with minimal conversation this maría. Deferred asking for report on her meal -food intake. Self care and steady gait in tenorio.   b-day 24-cts rtning  A-self care continues.  r-intermitant assessment on going with focus on temp curve[navya with it at 99] and intake. Anticipate discharge next week.

## 2022-12-23 NOTE — PROGRESS NOTES
"  BMT/Cell Therapy Daily Progress Note    Yahaira Fuentes is a 62 year old female with AML, undergoing a 7/8 matched unrelated allo HSCT, day +24.    HPI:   Doing well. Perirectal pain ok. Eating ok. Walking halls and passing out treats. Feeling a little stressed by plans being made for when she leaves the hospital. Engrafted, but WBC slowly increasing.    Review of Systems    8 point review with pertinent positive and negatives in HPI  PHYSICAL EXAM      Weight     Wt Readings from Last 3 Encounters:   12/23/22 98.3 kg (216 lb 12.8 oz)   11/21/22 96.4 kg (212 lb 9.6 oz)   11/08/22 97.3 kg (214 lb 6.4 oz)        KPS: 80    /75   Pulse 95   Temp 98.8  F (37.1  C) (Axillary)   Resp 16   Ht 1.6 m (5' 2.99\")   Wt 98.3 kg (216 lb 12.8 oz)   SpO2 97%   BMI 38.41 kg/m       General: NAD   Eyes: SHANIQUE, sclera anicteric   Mouth: Mucosa pink and moist.   Lungs: CTA bilaterally  Cardiovascular: RRR, no M/R/G   Abdominal/Rectal: +BS, soft, NT, ND  Lymphatics: Bilateral thick ankles, nonpitting trace edema (baseline). Varicose veins bilaterally.   Skin: No rashes or petechaie  Neuro: A&O x4  Musculoskeletal: Muscle mass average  Additional Findings: Hammer site NT, no drainage.    Current aGVHD staging:  Skin 0, UGI 0, LGI 0, Liver 0 (keep in note through day +180 for allos)      LABS AND IMAGING: I have assessed all abnormal lab values for their clinical significance and any values considered clinically significant have been addressed in the assessment and plan.        Lab Results   Component Value Date    WBC 0.9 (LL) 12/23/2022    ANEUTAUTO 4.7 11/07/2022    HGB 7.4 (L) 12/23/2022    HCT 23.3 (L) 12/23/2022    PLT 4 (LL) 12/23/2022     12/23/2022    POTASSIUM 3.8 12/23/2022    CHLORIDE 108 (H) 12/23/2022    CO2 24 12/23/2022     (H) 12/23/2022    BUN 8.7 12/23/2022    CR 0.63 12/23/2022    MAG 2.0 12/23/2022    INR 1.02 12/19/2022       SYSTEMS-BASED ASSESSMENT AND PLAN     Yahaira Fuentes is a " 62 year old female with AML, undergoing allo HSCT, day +24, admission complicated by ongoing intermittent fevers 11/30/22-12/4/22, repeat fever 12/11 with positive blood culture.     BMT/IEC PROTOCOL for 2015-32  Chemo protocol:    Day -7: start Allopurinol   Day -6: Cytoxan + Fludarabine   Day -5 through -2: Fludarabine   Day -1: TBI x1   Day 0: Transplant   Day +3 and +4: Cytoxan   Day +5: start GCSF, MMF, and Sirolimus  - Restaging plan: per protocol.    *Day 21 bmbx done 12/20: Flow shows 4.5% myeloid blasts, FISH/cytogenetics pending.   *BMBX chimerisms: 95% donor   *Peripheral chimerisms: 96% donor    HEME/COAG  # Chemotherapy induced pancytopenia - leukopenia, anemia, thrombocytopenia  - Transfusion parameters: hemoglobin <7, platelets <10  - Relevant thrombosis or bleeding history: superficial thromboses on OCP decades past. Most recent DVT of right LE (distal veins), 10/19/2022, holding lovenox given thrombocytopenia    ID/Neutropenic fevers  # Neutropenic Fevers: likely T-cell activation from graft, improved following PT Cytoxan. Cefepime 11/30-12/6. Infectious work up unremarkable (BC, CXR, UC)    - New fever overnight 12/10-12/11, on cefepime 12/11-12/14. UCx ngtd. RVP and Covid negative. CXR with streaky atelectasis at bases. CT with pleural effusions that are new.    # Blood cx from 12/11 positive on 12/13, started on Vanco. 12/14 showing Lactobacillus, vanco and cefepime discontinued. Zosyn 12/14-12/22.    - BCx 12/14-12/16 with ngtd   -12/22 switch from zosyn to Vantin 200mg BID until engrafted/12/28 for 14 day course antibiotics   - Prophylaxis: ACV (CMV donor and recipient negative), Fluconazole, Levaquin (currently held while on Zosyn). Bactrim to start at day +28 (reported stomach upset with past use, per PharmD note will try at day +28. Can hold if GI symptoms occur).     RISK OF GVHD  - Prophylaxis: PTCytoxan, Sirolimus, MMF  - Sirolimus level = 11.7. Dose continued 3mg q  day.    CARDIOVASCULAR  - Vasovagal episode with bradycardia: 11/29 occurred around transplant time, improved without intervention  - Risk of cardiomyopathy:  Baseline EF 60-65%  - Risk of arrhythmia: Baseline EKG showed NSR and normal QTC    GI/NUTRITION:   - Ulcer prophylaxis: Protonix  - Risk of CINV: Ondansetron, Lorazepam and Compazine available prn   - moderate malnutrition-dietician to follow  # Diarrhea, c dif colonized: PO Vanco x14 days (12/2 -12/16). Daily metamucil started 12/8.    - Perirectal tenderness, surrounding skin denuded (improving). Topical lidocaine and zinc oxide ointment available.   # Mucositis - mouth pain: MMW available prn. improving  # Abdominal pain: improving.    RENAL/ELECTROLYTES/  - Risk of renal injury: Off cytoxan flush. Monitor need for IVF.  - Electrolyte management: replace per sliding scale  # Trace BLE edema (baseline): Weight stable without recent diruesis    MUSCULOSKELETAL/FRAILTY  - Baseline Frailty Score: 1  - Daily PT/OT as needed while inpatient      Clinically Significant Risk Factors              # Hypoalbuminemia: Lowest albumin = 3.1 g/dL at 12/5/2022  2:43 AM, will monitor as appropriate   # Thrombocytopenia: Lowest platelets = 4 in last 2 days, will monitor for bleeding          # Moderate Malnutrition: based on nutrition assessment          DISPOSITION: on discharge patient should be scheduled with Dr. Garrett for first available appointment at the request of Dr. Zoltan French. Likely discharge early next week. *Patient will likely discharge early next week/possibly Sunday-Mon*     I spent 30 minutes face-to-face and/or coordinating care. Over 50% of our time on the unit was spent counseling the patient and/or coordinating care and the plan detailed on today's notes.        Nolvia Asif PA-C  [phone]

## 2022-12-23 NOTE — PLAN OF CARE
"/65 (BP Location: Right arm)   Pulse 96   Temp 98.3  F (36.8  C) (Axillary)   Resp 16   Ht 1.6 m (5' 2.99\")   Wt 98.3 kg (216 lb 12.8 oz)   SpO2 98%   BMI 38.41 kg/m    Pt has denied any pain/discomfort. Up walking in halls several times this shift.  Eating well.  Stool is less frequent, per pt report-slightly firmer.  Eating/drinking well.     Problem: Plan of Care - These are the overarching goals to be used throughout the patient stay.    Goal: Optimal Comfort and Wellbeing  Outcome: Progressing     Problem: Stem Cell/Bone Marrow Transplant  Goal: Optimal Coping with Transplant  Outcome: Progressing     Problem: Stem Cell/Bone Marrow Transplant  Goal: Optimal Nutrition Intake  Outcome: Progressing     Problem: Plan of Care - These are the overarching goals to be used throughout the patient stay.    Goal: Plan of Care Review  Description: The Plan of Care Review/Shift note should be completed every shift.  The Outcome Evaluation is a brief statement about your assessment that the patient is improving, declining, or no change.  This information will be displayed automatically on your shift note.  Outcome: Progressing   Goal Outcome Evaluation:                        "

## 2022-12-23 NOTE — PROGRESS NOTES
BMT Teaching Flowsheet    Yahaira Fuentes is a 62 year old female  The primary encounter diagnosis was Acute myeloid leukemia in remission (H). Diagnoses of Pancytopenia due to antineoplastic chemotherapy (H), Neutropenic fever (H), Lactic acidosis, Diarrhea of infectious origin, Hyponatremia, and Status post bone marrow transplant (H) were also pertinent to this visit.    Teaching Topic: Allogeneic stem cell transplant discharge teaching     Person(s) involved in teaching: Patient (and Daughter, via phone)  Motivation Level  Asks Questions: Yes  Eager to Learn: Yes  Cooperative: Yes  Receptive (willing/able to accept information): Yes  Any cultural factors/Gnosticist beliefs that may influence understanding or compliance? No    Patient and Caregiver demonstrates understanding of the following:  - Reason for the appointment, diagnosis and treatment plan: Yes  - Knowledge of proper use of medications and conditions for which they are ordered (with special attention to potential side effects or drug interactions): No, explain: Bedside RN to complete medication teaching with patient and caregiver prior to discharge.   - Which situations necessitate calling provider and whom to contact: Yes    Teaching concerns addressed: None identified    Proper use and care of (medical equipment, care aids, etc.) Yes  Pain management techniques: Yes  Patient instructed on hand hygiene: Yes  How and/when to access community resources: Yes    Infection Control:  Patient and Caregiver demonstrates understanding of the following:  Surgical procedure site care taught: NA  Signs and symptoms of infection taught: Yes  Wound care taught: NA  Central venous catheter care taught: No, explain: Patient and caregiver previously received teaching, and decline further need     Instructional Materials Used/Given:   Discharge teaching completed with patient and family. Written guidelines provided including clinic follow up, signs and symptoms to  report, infection prevention, and contact list. Reviewed potential side effects s/p transplant and what to expect during recovery period. Reviewed symptoms and treatment of GVHD. Discussed clinic routines. Discussed activities to avoid to prevent infections and mask guidelines. [facility] for line care supplies. Pt and family verbalize understanding of material via teach back and all questions have been answered.    Time spent with patient: 40 minutes.  Specific Concerns: MAYRA Tubbs, RN, BSN  RN Care Coordinator - BMT  Ph [phone]   [phone]

## 2022-12-23 NOTE — PLAN OF CARE
"Care Coordination - Discharge Planning    Line Company:  [facility] [phone] for line care supplies - at each discharge, CARLA to enter new line care orders (select \"yes\" for last question)  Referral made for line care:  Yes       IV medications needed at discharge:  None   Pharmacy concerns:  Siro/MMF (and any other specialty meds) must be filled through [facility] ( [phone]) - please allow 3-5 business days for delivery - scripts for MMF and siro (1mg tabs only) were sent to [facility] on 12/14 by CARLA-[facility]ol[facility]. On 12/23, pt verified that she received both of these medications. For discharge medication teaching, pt will have her dtr bring siro/MMF to the hospital.   (All other meds can be filled through [facility])    Prevymis/letermovir:  NA - pt is CMV neg    PT/OT/therapies recommended:  No  Referral made for PT/OT/therapies:  NA    D/c location:  Daughter's home - [place], [place]  Has placement need been communicated to Social Work?  Yes    NC Teaching time arranged with patient/caregiver:  Spoke with pt on 12/22 regarding discharge planning. Pt strongly requests to have labs drawn locally in [place] after hospital discharge. Discussed the importance of having one team of people (BMT) managing her (and her central line) post-transplant, especially while still neutropenic. Pt expressed frustration with having to come to BMT clinic so frequently, which will likely be daily to start. NC discharge teaching completed 12/23 with pt (and daughter, via phone). Pt and daughter requested resources for assistance with transportation; SW notified.    Notify nurse to schedule line care class/ DM teaching, prior to d/c:  On 12/22, discussed with pt the need for PLC prior to discharge; pt states she would like to have CVC removed prior to discharge (has a port-a-cath). Will leave in place for the time being; pt agreeable to this but is still eager to have CVC removed ASAP.    Caregiver:  " Darren Renner (daughter) [phone]  Cheryl Carranza [phone]  Rosie Dong [phone]  Cheyenne Telles [phone]    Weekly lab day preference:  Fridays      Bety Tubbs, RN, BSN  RN Care Coordinator - BMT  Ph [phone]   [phone]

## 2022-12-23 NOTE — PROGRESS NOTES
"BMT SOCIAL WORK NOTE:    Focus: Support/Resources/Financial/Transportation    Data: Yahaira Fuentes is a 62 year old female with AML, undergoing a 7/8 matched unrelated allo HSCT, day +24.    Interventions: SW met with pt to assess coping, provide psychosocial support and discuss transportation. Pt shared she needs assistance with transportation as her daughter does not want to have to bring pt to clinic daily. SW looked into [employer] and [facility] and neither transportation service are active in [place], [place]. Pt does not have rides through insurance. SW offered to apply for financial assistance grants to help cover Uber/Taxi and/or provide gas cards. However, SW explained that her caregiver would need to ride with as the  cannot be considered her caregiver. Pt shared this would not be helpful if her caregiver needs to ride with anyway. SW explained that [facility] could be an option for pt to stay at and take shuttle to [facility] (as SW mapped pt's address to [facility] and it was 45 minutes today - pt is roughly 30 minutes away without traffic delays); however,pt said [facility] would not be helpful either as a caregiver would need to \"uproot their lives\" to stay with her at [facility]. Pt said it's really hard for her to ask for help and it seems her daughter does not want to change her schedule to assist. Pt shared she will talk with her daughter and son-in-law and provide this update above. SW explained that her daughter can call SW and SW would be happy to re-explain the above information as well. Pt understands she needs a  to all clinic appointments. Pt will talk with family and friends. SW provided empathetic listening, validation of concerns, and encouragement. SW encouraged pt to contact [facility] for support, questions and/or resources.     Assessment: Pt presented as frustrated/upset with caregiver requirement. Pt did hear about the caregiver requirement at multiple times prior to " transplant and confirmed she had a caregiver plan before consenting to treatment. Pt did not have any other questions today and will talk with family/friends.    Plan: SW will continue to provide psychosocial support and assistance with resources as needed. SW will continue to collaborate with multidisciplinary team regarding pt's plan of care.     Discharge Plan: Pt will discharge home ([facility] - pt lives with daughter and son-in-law) with daughter and son-in-law as caregivers on 12/26/2022.    Danitza WILSON, [facility]  Phone: [phone]  Pager: [phone]

## 2022-12-23 NOTE — PROGRESS NOTES
"/73 (BP Location: Right arm)   Pulse 89   Temp 98.8  F (37.1  C) (Axillary)   Resp 18   Ht 1.6 m (5' 2.99\")   Wt 98 kg (216 lb)   SpO2 94%   BMI 38.27 kg/m      AVSS. Alert and oriented x 4. Voiding adequately. Slept well through the night. K replacement infusing. Recheck K cristobal AM. Platelet infusing. Continue with plan of care.       Problem: Stem Cell/Bone Marrow Transplant  Goal: Blood Counts Within Acceptable Range  Outcome: Progressing  Intervention: Monitor and Manage Hematologic Symptoms  Recent Flowsheet Documentation  Taken 12/22/2022 2000 by Jazmin Parker, RN  Medication Review/Management: medications reviewed     Problem: Stem Cell/Bone Marrow Transplant  Goal: Improved Oral Mucous Membrane Health and Integrity  Intervention: Promote Oral Comfort and Health  Recent Flowsheet Documentation  Taken 12/22/2022 2000 by Jazmin Parker, RN  Oral Care: oral rinse provided   Goal Outcome Evaluation:      Plan of Care Reviewed With: patient                 "

## 2022-12-24 NOTE — PROGRESS NOTES
"  BMT/Cell Therapy Daily Progress Note    Yahaira Fuentes is a 62 year old female with AML, undergoing a 7/8 matched unrelated allo HSCT, day +25.    HPI:   Afebrile. Eating ok. Walking the halls often. Perirectal pain improving. Continues to have loose stools, but stable. Does report new rash this morning on bilateral upper/lower arms/neck. Appears to be sparse, small, erythematous wheals, and are pruritic.No dypsnea, N/V/D, weight gain, increased edema.     Review of Systems    8 point review with pertinent positive and negatives in HPI  PHYSICAL EXAM      Weight     Wt Readings from Last 3 Encounters:   12/24/22 97 kg (213 lb 12.8 oz)   11/21/22 96.4 kg (212 lb 9.6 oz)   11/08/22 97.3 kg (214 lb 6.4 oz)        KPS: 80    /61   Pulse 87   Temp 98.6  F (37  C)   Resp 16   Ht 1.6 m (5' 2.99\")   Wt 97 kg (213 lb 12.8 oz)   SpO2 97%   BMI 37.88 kg/m       General: NAD   Eyes: SHANIQUE, sclera anicteric   Mouth: Mucosa pink and moist.   Lungs: CTA bilaterally  Cardiovascular: RRR, no M/R/G   Abdominal/Rectal: +BS, soft, NT, ND  Lymphatics: Bilateral thick ankles, nonpitting trace edema (baseline). Varicose veins bilaterally.   Skin: Petechiae on bilateral lower legs, sparse, erythematous, pruritic rash on bilateral arms  Neuro: A&O x4  Musculoskeletal: Muscle mass average  Additional Findings: Hammer site NT, no drainage.    Current aGVHD staging:  Skin 0, UGI 0, LGI 0, Liver 0 (keep in note through day +180 for allos)      LABS AND IMAGING: I have assessed all abnormal lab values for their clinical significance and any values considered clinically significant have been addressed in the assessment and plan.        Lab Results   Component Value Date    WBC 1.2 (L) 12/24/2022    ANEUTAUTO 4.7 11/07/2022    HGB 6.9 (LL) 12/24/2022    HCT 22.0 (L) 12/24/2022    PLT 15 (LL) 12/24/2022     12/24/2022    POTASSIUM 3.6 12/24/2022    CHLORIDE 107 12/24/2022    CO2 24 12/24/2022     (H) 12/24/2022    " BUN 8.5 12/24/2022    CR 0.61 12/24/2022    MAG 1.9 12/24/2022    INR 1.02 12/19/2022       SYSTEMS-BASED ASSESSMENT AND PLAN     Yahaira Fuentes is a 62 year old female with AML, undergoing allo HSCT, day +25, admission complicated by ongoing intermittent fevers 11/30/22-12/4/22, repeat fever 12/11 with positive blood culture.     BMT/IEC PROTOCOL for 2015-32  Chemo protocol:    Day -7: start Allopurinol   Day -6: Cytoxan + Fludarabine   Day -5 through -2: Fludarabine   Day -1: TBI x1   Day 0: Transplant   Day +3 and +4: Cytoxan   Day +5: start GCSF, MMF, and Sirolimus  - Restaging plan: per protocol.    *Day 21 bmbx done 12/20: Flow shows 4.5% myeloid blasts, FISH/cytogenetics pending.   *BMBX chimerisms: 95% donor   *Peripheral chimerisms: 96% donor    HEME/COAG  # Chemotherapy induced pancytopenia - leukopenia, anemia, thrombocytopenia  - Transfusion parameters: hemoglobin <7, platelets <10  - Relevant thrombosis or bleeding history: superficial thromboses on OCP decades past. Most recent DVT of right LE (distal veins), 10/19/2022, holding lovenox given thrombocytopenia    ID/Neutropenic fevers  # Neutropenic Fevers: likely T-cell activation from graft, improved following PT Cytoxan. Cefepime 11/30-12/6. Infectious work up unremarkable (BC, CXR, UC)    - New fever overnight 12/10-12/11, on cefepime 12/11-12/14. UCx ngtd. RVP and Covid negative. CXR with streaky atelectasis at bases. CT with pleural effusions that are new.    # Blood cx from 12/11 positive on 12/13, started on Vanco. 12/14 showing Lactobacillus, vanco and cefepime discontinued. Zosyn 12/14-12/22.    - BCx 12/14-12/16 with ngtd   -12/22 switch from zosyn to Vantin 200mg BID until engrafted/12/28 for 14 day course antibiotics   - Prophylaxis: ACV (CMV donor and recipient negative), Fluconazole, Levaquin (currently held while on Zosyn). Bactrim to start at day +28 (reported stomach upset with past use, per PharmD note will try at day +28. Can  hold if GI symptoms occur).     RISK OF GVHD  - Prophylaxis: PTCytoxan, Sirolimus, MMF  - Sirolimus level = 11.7. Dose continued 3mg q day.    CARDIOVASCULAR  - Vasovagal episode with bradycardia: 11/29 occurred around transplant time, improved without intervention  - Risk of cardiomyopathy:  Baseline EF 60-65%  - Risk of arrhythmia: Baseline EKG showed NSR and normal QTC    GI/NUTRITION:   - Ulcer prophylaxis: Protonix  - Risk of CINV: Ondansetron, Lorazepam and Compazine available prn   - moderate malnutrition-dietician to follow  # Diarrhea, c dif colonized: PO Vanco x14 days (12/2 -12/16). Daily metamucil started 12/8.    - Perirectal tenderness, surrounding skin denuded (improving). Topical lidocaine and zinc oxide ointment available.   # Mucositis - mouth pain: MMW available prn. improving  # Abdominal pain: improving.    RENAL/ELECTROLYTES/  - Risk of renal injury: Off cytoxan flush. Monitor need for IVF.  - Electrolyte management: replace per sliding scale  # Trace BLE edema (baseline): Weight stable without recent diruesis    MUSCULOSKELETAL/FRAILTY  - Baseline Frailty Score: 1  - Daily PT/OT as needed while inpatient    SKIN:  -pruritic RASH starting 12/24: patient wanted to try topical benadryl for itching. Continue to monitor. Does not appear to be GVHD, engraftment syndrome.     Clinically Significant Risk Factors              # Hypoalbuminemia: Lowest albumin = 3.1 g/dL at 12/5/2022  2:43 AM, will monitor as appropriate   # Thrombocytopenia: Lowest platelets = 4 in last 2 days, will monitor for bleeding          # Moderate Malnutrition: based on nutrition assessment          DISPOSITION: on discharge patient should be scheduled with Dr. Garrett for first available appointment at the request of Dr. Zoltan French. Likely discharge early next week. *Patient will likely discharge early next week/possibly Sunday-Mon*     I spent 30 minutes face-to-face and/or coordinating care. Over 50% of our time on the  unit was spent counseling the patient and/or coordinating care and the plan detailed on today's notes.        Nolvia Asif PA-C  [phone]

## 2022-12-24 NOTE — PLAN OF CARE
"/64   Pulse 92   Temp 98.8  F (37.1  C) (Axillary)   Resp 16   Ht 1.6 m (5' 2.99\")   Wt 97 kg (213 lb 12.8 oz)   SpO2 98%   BMI 37.88 kg/m    Pt denies any pain/nausea.  Has an itchy rash bilateral arms-trying Benadryl cream.  Pt also stated she reports to PA, but wanted mentioned again-in right antecubital has palpable lump (eraser head sized), no swelling, redness or pain.  PRBC given.  Up independently in room/halls.  Problem: Plan of Care - These are the overarching goals to be used throughout the patient stay.    Goal: Plan of Care Review  Description: The Plan of Care Review/Shift note should be completed every shift.  The Outcome Evaluation is a brief statement about your assessment that the patient is improving, declining, or no change.  This information will be displayed automatically on your shift note.  Outcome: Progressing     Problem: Stem Cell/Bone Marrow Transplant  Goal: Optimal Coping with Transplant  Outcome: Progressing     Problem: Stem Cell/Bone Marrow Transplant  Goal: Optimal Nutrition Intake  Outcome: Progressing   Goal Outcome Evaluation:                        "

## 2022-12-24 NOTE — PLAN OF CARE
"/73 (BP Location: Right arm)   Pulse 97   Temp 99.6  F (37.6  C) (Axillary)   Resp 16   Ht 1.6 m (5' 2.99\")   Wt 98.3 kg (216 lb 12.8 oz)   SpO2 96%   BMI 38.41 kg/m    Patient slept well through the night  No complaints of pain or nausea  Will need RBCs this morning waiting on the type and screen  New rash on her arms and upper chest, will have team look at it today  Also petechia on he calfs.   Continue with current POC  Problem: Plan of Care - These are the overarching goals to be used throughout the patient stay.    Goal: Optimal Comfort and Wellbeing  Outcome: Adequate for Care Transition     Problem: Plan of Care - These are the overarching goals to be used throughout the patient stay.    Goal: Readiness for Transition of Care  Outcome: Adequate for Care Transition     Problem: Stem Cell/Bone Marrow Transplant  Goal: Optimal Coping with Transplant  Outcome: Progressing     Problem: Stem Cell/Bone Marrow Transplant  Goal: Blood Counts Within Acceptable Range  Outcome: Progressing     Problem: Stem Cell/Bone Marrow Transplant  Goal: Absence of Infection  Outcome: Progressing     Problem: Stem Cell/Bone Marrow Transplant  Goal: Optimal Nutrition Intake  Outcome: Progressing     Problem: Oral Intake Inadequate  Goal: Improved Oral Intake  Outcome: Progressing   ,vs  Goal Outcome Evaluation:                        "

## 2022-12-25 NOTE — PLAN OF CARE
"Patient remains hospitalized following stem cell transplant, currently day +25. Awaiting return of counts. Continues on daily Neupogen. Continues on ppx antimicrobials. Continues on PO MMF & sirolimus. Appetite fair. Denies nausea. 3 loose/soft BMs this shift. Rectal area still excoriated - barrier paste applied. Bilateral arm rash - Benadryl cream used x1 with relief. Denied pain. Ambulating independently, out in halls frequently today. VSS.     Problem: Plan of Care - These are the overarching goals to be used throughout the patient stay.    Goal: Plan of Care Review  Description: The Plan of Care Review/Shift note should be completed every shift.  The Outcome Evaluation is a brief statement about your assessment that the patient is improving, declining, or no change.  This information will be displayed automatically on your shift note.  Outcome: Progressing     Problem: Plan of Care - These are the overarching goals to be used throughout the patient stay.    Goal: Plan of Care Review  Description: The Plan of Care Review/Shift note should be completed every shift.  The Outcome Evaluation is a brief statement about your assessment that the patient is improving, declining, or no change.  This information will be displayed automatically on your shift note.  Outcome: Progressing  Goal: Patient-Specific Goal (Individualized)  Description: You can add care plan individualizations to a care plan. Examples of Individualization might be:  \"Parent requests to be called daily at 9am for status\", \"I have a hard time hearing out of my right ear\", or \"Do not touch me to wake me up as it startles me\".  Outcome: Progressing  Goal: Absence of Hospital-Acquired Illness or Injury  Outcome: Progressing  Intervention: Identify and Manage Fall Risk  Recent Flowsheet Documentation  Taken 12/24/2022 1204 by Sachi Monge, RN  Safety Promotion/Fall Prevention:   assistive device/personal items within reach   clutter free environment " maintained   lighting adjusted   nonskid shoes/slippers when out of bed   patient and family education   room organization consistent   safety round/check completed     Problem: Stem Cell/Bone Marrow Transplant  Goal: Optimal Coping with Transplant  Outcome: Progressing  Goal: Blood Counts Within Acceptable Range  Outcome: Progressing  Intervention: Monitor and Manage Hematologic Symptoms  Recent Flowsheet Documentation  Taken 12/24/2022 1204 by Sachi Monge RN  Medication Review/Management:   medications reviewed   high-risk medications identified  Goal: Absence of Hypersensitivity Reaction  Outcome: Progressing  Goal: Absence of Infection  Outcome: Progressing  Intervention: Prevent and Manage Infection  Recent Flowsheet Documentation  Taken 12/24/2022 1204 by Sachi Monge RN  Isolation Precautions:   enteric precautions maintained   protective environment maintained  Goal: Improved Oral Mucous Membrane Health and Integrity  Outcome: Progressing  Goal: Optimal Nutrition Intake  Outcome: Progressing     Problem: Pain Acute  Goal: Optimal Pain Control and Function  Outcome: Progressing  Intervention: Prevent or Manage Pain  Recent Flowsheet Documentation  Taken 12/24/2022 1204 by Sachi Monge RN  Medication Review/Management:   medications reviewed   high-risk medications identified     Problem: Fall Injury Risk  Goal: Absence of Fall and Fall-Related Injury  Outcome: Progressing  Intervention: Identify and Manage Contributors  Recent Flowsheet Documentation  Taken 12/24/2022 1204 by Sachi Monge RN  Medication Review/Management:   medications reviewed   high-risk medications identified  Intervention: Promote Injury-Free Environment  Recent Flowsheet Documentation  Taken 12/24/2022 1204 by Sachi Monge RN  Safety Promotion/Fall Prevention:   assistive device/personal items within reach   clutter free environment maintained   lighting adjusted   nonskid shoes/slippers when out of bed    patient and family education   room organization consistent   safety round/check completed     Problem: Infection  Goal: Absence of Infection Signs and Symptoms  Outcome: Progressing  Intervention: Prevent or Manage Infection  Recent Flowsheet Documentation  Taken 12/24/2022 1204 by Sachi Monge, RN  Isolation Precautions:   enteric precautions maintained   protective environment maintained     Problem: Oral Intake Inadequate  Goal: Improved Oral Intake  Outcome: Progressing   Goal Outcome Evaluation:

## 2022-12-25 NOTE — PLAN OF CARE
"/81 (BP Location: Right arm)   Pulse 105   Temp 99  F (37.2  C) (Axillary)   Resp 16   Ht 1.6 m (5' 2.99\")   Wt 97 kg (213 lb 12.8 oz)   SpO2 94%   BMI 37.88 kg/m      Tmax 99.6, HR tachy in 100s, other VSS. Denies pain/nausea. Up independently. Voiding adequately. X2 small BM. Barrier cream applied. L CVC running plts. Rash to b/l arms unchanged. Benadryl cream applied before bed. 20 meq KCl PO stefani w/bfast. Plan is to discharge home on Monday. Will continue with POC.   "

## 2022-12-25 NOTE — PROGRESS NOTES
"  BMT/Cell Therapy Daily Progress Note    Yahaira Fuentes is a 62 year old female with AML, undergoing a 7/8 matched unrelated allo HSCT, day +26.    HPI:   Continues to have a rash on arms and now chest. More prominent today, but less pruritic. She has been having daily loose stools, but this morning worsened liquid diarrhea. She is c.diff colonized and underwent course of PO Vancomycin. Denies N/V and is eating well. Up and walking around. She feels like her nose is congested, no sinus pressure or pain, no sore throat or mucositis. Plan to discharge tomorrow and she is excited about that.    Review of Systems    8 point review with pertinent positive and negatives in HPI  PHYSICAL EXAM      Weight     Wt Readings from Last 3 Encounters:   12/24/22 97 kg (213 lb 12.8 oz)   11/21/22 96.4 kg (212 lb 9.6 oz)   11/08/22 97.3 kg (214 lb 6.4 oz)        KPS: 80    /74   Pulse 91   Temp 98.9  F (37.2  C) (Axillary)   Resp 16   Ht 1.6 m (5' 2.99\")   Wt 97 kg (213 lb 12.8 oz)   SpO2 94%   BMI 37.88 kg/m       General: NAD   Eyes: SHANIQUE, sclera anicteric   Mouth: Mucosa pink and moist.   Lungs: CTA bilaterally  Cardiovascular: RRR, no M/R/G   Abdominal/Rectal: +BS, soft, NT, ND  Lymphatics: Bilateral thick ankles, nonpitting trace edema (baseline). Varicose veins bilaterally.   Skin: Petechiae on bilateral lower legs, maculopapular erythematous rash on bilateral upper arms, chest and neck  Neuro: A&O x4  Musculoskeletal: Muscle mass average  Additional Findings: Hammer site NT, no drainage.    Current aGVHD staging:  Skin 2 (~27% BSA), UGI 0, LGI 1, Liver 0 (keep in note through day +180 for allos)      LABS AND IMAGING: I have assessed all abnormal lab values for their clinical significance and any values considered clinically significant have been addressed in the assessment and plan.        Lab Results   Component Value Date    WBC 1.9 (L) 12/25/2022    ANEUTAUTO 1.7 12/25/2022    HGB 8.1 (L) 12/25/2022    " HCT 24.8 (L) 12/25/2022    PLT 10 (LL) 12/25/2022     12/25/2022    POTASSIUM 3.6 12/25/2022    CHLORIDE 105 12/25/2022    CO2 22 12/25/2022     (H) 12/25/2022    BUN 11.2 12/25/2022    CR 0.64 12/25/2022    MAG 1.9 12/25/2022    INR 1.02 12/19/2022       SYSTEMS-BASED ASSESSMENT AND PLAN     Yahaira Fuentes is a 62 year old female with AML, undergoing allo HSCT, day +26, admission complicated by ongoing intermittent fevers 11/30/22-12/4/22, repeat fever 12/11 with positive blood culture.     BMT/IEC PROTOCOL for 2015-32  Chemo protocol:    Day -7: start Allopurinol   Day -6: Cytoxan + Fludarabine   Day -5 through -2: Fludarabine   Day -1: TBI x1   Day 0: Transplant   Day +3 and +4: Cytoxan   Day +5: start GCSF, MMF, and Sirolimus  - Restaging plan: per protocol.    *Day 21 bmbx done 12/20: Flow shows 4.5% myeloid blasts, FISH/cytogenetics pending.   *BMBX chimerisms: 95% donor   *Peripheral chimerisms: 96% donor    HEME/COAG  #GCSF until ANC >1500 x3 days  # Chemotherapy induced pancytopenia - leukopenia, anemia, thrombocytopenia  - Transfusion parameters: hemoglobin <7, platelets <10  - Relevant thrombosis or bleeding history: superficial thromboses on OCP decades past. Most recent DVT of right LE (distal veins), 10/19/2022, holding lovenox given thrombocytopenia    ID/Neutropenic fevers  # Neutropenic Fevers: likely T-cell activation from graft, improved following PT Cytoxan. Cefepime 11/30-12/6. Infectious work up unremarkable (BC, CXR, UC)    - New fever overnight 12/10-12/11, on cefepime 12/11-12/14. UCx ngtd. RVP and Covid negative. CXR with streaky atelectasis at bases. CT with pleural effusions that are new.    # Blood cx from 12/11 positive on 12/13, started on Vanco. 12/14 showing Lactobacillus, vanco and cefepime discontinued. Zosyn 12/14-12/22.    - BCx 12/14-12/16 with ngtd   -12/22 switch from zosyn to Vantin 200mg BID until 12/28 for 14 day course antibiotics   - Prophylaxis: ACV (CMV  donor and recipient negative), Fluconazole, Levaquin held while on vantin, will likely not need ppx antibiotics at discharge as she is engrafted. Bactrim to start at day +28 (reported stomach upset with past use, per PharmD note will try at day +28. Can hold if GI symptoms occur).     RISK OF GVHD  - Prophylaxis: PTCytoxan, Sirolimus, MMF  - Sirolimus level = 11.7. Dose continued 3mg q day.    CARDIOVASCULAR  - Vasovagal episode with bradycardia: 11/29 occurred around transplant time, improved without intervention  - Risk of cardiomyopathy:  Baseline EF 60-65%  - Risk of arrhythmia: Baseline EKG showed NSR and normal QTC    GI/NUTRITION:   - Ulcer prophylaxis: Protonix  - Risk of CINV: Ondansetron, Lorazepam and Compazine available prn   - moderate malnutrition-dietician to follow  # Diarrhea, c dif colonized: PO Vanco x14 days (12/2 -12/16). Daily metamucil started 12/8.    - Perirectal tenderness, surrounding skin denuded (improving). Topical lidocaine and zinc oxide ointment available.   # Mucositis - mouth pain: MMW available prn. improving  # Abdominal pain: improving.    RENAL/ELECTROLYTES/  - Risk of renal injury: Off cytoxan flush. Monitor need for IVF.  - Electrolyte management: replace per sliding scale  # Trace BLE edema (baseline): Weight stable without recent diruesis    MUSCULOSKELETAL/FRAILTY  - Baseline Frailty Score: 1  - Daily PT/OT as needed while inpatient    SKIN:  -pruritic RASH starting 12/24: patient wanted to try topical benadryl for itching. Continue to monitor. Does not appear to be GVHD, engraftment syndrome.     Clinically Significant Risk Factors              # Hypoalbuminemia: Lowest albumin = 3.1 g/dL at 12/5/2022  2:43 AM, will monitor as appropriate   # Thrombocytopenia: Lowest platelets = 10 in last 2 days, will monitor for bleeding          # Moderate Malnutrition: based on nutrition assessment          DISPOSITION: on discharge patient should be scheduled with Dr. Garrett for  first available appointment at the request of Dr. Zoltan French. Likely discharge early next week. *Patient will likely discharge early next week/possibly Sunday-Mon*     I spent 30 minutes face-to-face and/or coordinating care. Over 50% of our time on the unit was spent counseling the patient and/or coordinating care and the plan detailed on today's notes.        Nolvia Asif PA-C  [phone]

## 2022-12-25 NOTE — DISCHARGE SUMMARY
[facility] Discharge Summary   Yahaira Fuentes MRN# [id number]   Age: 62 year old  YOB: 1960   Date of Admission: 11/22/2022  Date of Discharge:  12/27/2022  Admitting Physician: Meggan French MD  Discharge Physician:  Dr. Garrett  Discharge Diagnoses:    AML  S/p  7/8 matched unrelated allo HSCT  Pancytopenia 2/2 chemo- leukopenia, anemia, thrombocytopenia  History of DVT RLE  Neutropenic fevers  Lactobacillus bacteremia  C.diff +  Mucositis  Diarrhea  Moderate malnutrition  Maculopapular rash upper body    Discharge Medications:          Medication List      Started    cefpodoxime 200 MG tablet  Commonly known as: VANTIN  200 mg, Oral, 2 TIMES DAILY     fluconazole 200 MG tablet  Commonly known as: DIFLUCAN  200 mg, Oral, DAILY     loratadine 10 MG tablet  Commonly known as: CLARITIN  10 mg, Oral, DAILY     mycophenolate 500 MG tablet  Commonly known as: GENERIC EQUIVALENT  1,500 mg, Oral, 2 TIMES DAILY, THIS IS A TEST SCRIPT ONLY, DO NOT FILL.     ondansetron 8 MG tablet  Commonly known as: ZOFRAN  8 mg, Oral, EVERY 8 HOURS PRN     pantoprazole 40 MG EC tablet  Commonly known as: PROTONIX  40 mg, Oral, 2 TIMES DAILY BEFORE MEALS     prochlorperazine 5 MG tablet  Commonly known as: COMPAZINE  5 mg, Oral, EVERY 6 HOURS PRN     sirolimus 1 MG tablet  Commonly known as: GENERIC EQUIVALENT  3 mg, Oral, DAILY, THIS IS A TEST SCRIPT ONLY, DO NOT FILL.     sulfamethoxazole-trimethoprim 800-160 MG tablet  Commonly known as: BACTRIM DS  1 tablet, Oral, EVERY MONDAY TUESDAY BID, DO NOT BEGIN TAKING UNTIL INSTRUCTED TO DO SO BY BMT CLINIC ON DAY +28     triamcinolone 0.1 % external ointment  Commonly known as: KENALOG  Topical, 2 TIMES DAILY PRN     ursodiol 300 MG capsule  Commonly known as: ACTIGALL  300 mg, Oral, 3 TIMES DAILY     zinc oxide 20 % external ointment  Commonly known as: DESITIN  Topical, EVERY 1 HOUR PRN        Modified    acyclovir 800 MG tablet  Commonly known as: ZOVIRAX  800  mg, Oral, 2 TIMES DAILY  What changed:     medication strength    how much to take        Discontinued    enoxaparin ANTICOAGULANT 100 MG/ML syringe  Commonly known as: LOVENOX     levofloxacin 250 MG tablet  Commonly known as: LEVAQUIN          Brief History of Illness:    **Adopted from H&P  Disease presentation and baseline characteristics adopted from Meggan French's note 9/13 and Tami's note 11/8:  61 year old female otherwise healthy female who developed sinus infection 7/2022 with painful swollen lymph nodes felt that it was hard swallowing, generalized fatigue and easy bruising. Treated with antibiotics but malaise symptoms, bruises persisted. PCP checked CBC, wbc= 60. Bmbx 8/5/22 showing acute myeloid leukemia with 87% blasts (predominately monoblasts) with mutated NPM1. A FLT3 ITD mutation detected , ratio 0.58. Flow Positive for increased blast population with monocytoid differentiation. 84% of cells are in the monocyte/blast gate (dim to moderate CD45/low to moderate side scatter) and show expression of HLA-DR, partial CD13, CD4, CD64, partial CD14, CD33, CD15, CD11b and possible partial dim MPO.  They are negative for the remaining markers including CD34. CG: normal female karyotype   Variants of Known Clinical Significance  variant frequency close to 50%, it is unclear whether this is a germline or somatic variant. The clinical significance, if any, is uncertain.   Peripheral blood, morphology: Acute myeloid leukemia with 77% blasts. WBC 08069, hgb 11.3, plt 26712  8/8 Started cytarabine and idarubicin (7+3) with midostaurin added 8/15-8/28 9/6/2022 BMB: Bone marrow aspirate, clot section, and trephine core biopsy, showing normocellular marrow with trilinear hematopoiesis, 1% blasts and was negative for circulating blasts/blast equivalents   Rebeka presents today for planned, unrelated 7/8 matched hsct for AML.   DVT in right lower leg not swollen or tender, was not swollen or tender when  diagnosed. Has always had mild edema or thickness to LE and has history of superficial clots while on OCP and still takes aspirin daily. No DVT until 10/2022 at which time she started on lovenox.  No recent sick contacts, no recent infections, fevers or cough/cold symptoms.   She is feeling well today. Reports normal daily bowel movements; no headaches, N/V/D, rash, or swelling.       Lab Values     I have assessed all abnormal lab values for their clinical significance and any values considered clinically significant have been addressed in the assessment and plan.   Hospital Course:    Yahaira Fuentes is a 62 year old female with AML, undergoing allo HSCT, day +28, admission complicated by ongoing intermittent fevers 11/30/22-12/4/22, repeat fever 12/11 with positive blood culture, final fever 12/26 with negative workup.     BMT/IEC PROTOCOL for 2015-32  Chemo protocol:               Day -7: start Allopurinol              Day -6: Cytoxan + Fludarabine              Day -5 through -2: Fludarabine              Day -1: TBI x1              Day 0: Transplant              Day +3 and +4: Cytoxan              Day +5: start GCSF, MMF, and Sirolimus  - Restaging plan: per protocol.               *Day 21 bmbx done 12/20: Flow shows 4.5% myeloid blasts, FISH/cytogenetics pending.              *BMBX chimerisms: 95% donor              *Peripheral chimerisms: 96% donor     HEME/COAG  #GCSF until ANC >2500 x2 days - dose given prior to discharge 12/27  # Chemotherapy induced pancytopenia - leukopenia, anemia, thrombocytopenia  - Transfusion parameters: hemoglobin <7, platelets <10  - Relevant thrombosis or bleeding history: superficial thromboses on OCP decades past. Most recent DVT of right LE (distal veins), 10/19/2022, holding lovenox given thrombocytopenia     ID  # Neutropenic Fevers: likely T-cell activation from graft, improved following PT Cytoxan. Cefepime 11/30-12/6. Infectious work up unremarkable (BC, CXR, UC)                - New fever overnight 12/10-12/11, on cefepime 12/11-12/14. UCx ngtd. RVP and Covid negative. CXR with streaky atelectasis at bases. CT with pleural effusions that are new. Blood cx from 12/11 positive on 12/13, started on Vanco. 12/14 showing Lactobacillus, vanco and cefepime discontinued. Zosyn 12/14-12/22.               - BCx 12/14-12/16 with ngtd              -12/22 switched from zosyn to Vantin 200mg BID until 12/28 for 14 day course antibiotics    -Recurrent fever 12/26 with negative workup, given 2 doses of cefepime then put back on Vantin 200mg BID, afebrile for >24 hours on oral abx at time of discharge.  - Prophylaxis: ACV (CMV donor and recipient negative), Fluconazole, Levaquin (held while on vantin), will likely not need ppx antibiotics at discharge as she is engrafted. Bactrim to start at day +28 (reported stomach upset with past use, per PharmD note will try at day +28. Can hold if GI symptoms occur). - prescribed at discharge but pt told to avoid starting until she sees BMT tomorrow.     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 Aquaphor, triamcinolone PRN.  - Prophylaxis: PTCytoxan, Sirolimus, MMF  - Sirolimus level = 8.1. Dose continued 3mg q day. Recheck level on 12/30.     CARDIOVASCULAR  - Vasovagal episode with bradycardia: 11/29 occurred around transplant time, improved without intervention  - Risk of cardiomyopathy:  Baseline EF 60-65%  - Risk of arrhythmia: Baseline EKG showed NSR and normal QTC     GI/NUTRITION:   - Ulcer prophylaxis: Protonix  - Risk of CINV: Ondansetron, Lorazepam and Compazine available prn   - moderate malnutrition-dietician to follow  # Diarrhea, c dif colonized: PO Vanco x14 days (12/2 -12/16). Daily metamucil started 12/8.               - Perirectal tenderness, surrounding skin denuded (improving). Topical lidocaine and zinc oxide ointment available.   # Mucositis - mouth pain: MMW available prn.  improving  # Abdominal pain: improving.     RENAL/ELECTROLYTES/  - Risk of renal injury: Off cytoxan flush.   - Electrolyte management: replace per sliding scale  # Trace BLE edema (baseline): Weight stable without recent diruesis     MUSCULOSKELETAL/FRAILTY  - Baseline Frailty Score: 1  - Daily PT/OT as needed while inpatient     SKIN:  -Pruritic RASH starting 12/24: topical benadryl, triamcinolone PRN. Appearing more like possible GVHD vs engraftment syndrome on 12/25, [id number] on discharge date. Consider skin biopsy in clinic.      CODE STATUS: full code  Discharge Instructions and Follow-Up:    Discharge diet: Regular diet as tolerated  Discharge activity: Activity as tolerated   Discharge follow-up: Follow up with BMT Clinic as follows: Wednesday 12/28 0730 labs, 0800 provider visit. Plan for daily clinic CVC flushes until line removed (IR referral placed for line removal). Message sent to discuss starting PJP prophy outpatient.    Discharge Disposition:    Discharged to home.    Beth Jacobson PA-C  12/27/2022    Advice for Patients concerning COVID19:  a. Avoid contact with individuals:   i. Who are sick or have recently been sick  ii. Have traveled to high risk areas (per CDC guidelines) or have been on a cruise in the last 14 days  iii. Who were or could have been exposed to COVID-19   b. If experiencing symptoms such as: Fever, cough or shortness of breath contact BMT at [phone] Mon-Fri 8am-4:30pm or After Hours at [phone] (ask to speak to a BMT Fellow) for guidance on need for clinical assessment  c. Avoid all non- essential travel at this time; if traveling is necessary use mask (N-95)   d. Wear a mask when in public areas  d. Avoid crowded places, if possible  f. Follow CDC advice https://www.cdc.gov/coronavirus/2019-ncov/index.html and travel guidelines https://www.cdc.gov/coronavirus/2019-ncov/travelers/index.html

## 2022-12-25 NOTE — DISCHARGE INSTRUCTIONS
BMT CLINIC FOLLOW UP Wednesday 12/28/22: 730a lab, 800am Provider visit    BMT Contact Information  For issues including fevers 100.5 or more:  Please call during the week: Monday through Friday between hours of 8:00 am and 4:30 pm- Call BMT office- [phone]  After hours/Weekends: Please call [facility]  and ask for BMT Physician on call and the  will have the BMT Physician call you back: [phone]    Regarding COVID-19 Pandemic  Advice for Patients:  a.       Avoid contact with individuals:   i.     Who are sick or have recently been sick  ii.      Have traveled to high risk areas (per CDC guidelines) or have been on a cruise in the last 14 days  iii.      Who were or could have been exposed to COVID-19   b.       If experiencing symptoms such as: Fever, cough or shortness of breath contact BMT at [phone] Mon-Fri 8am-4:30pm or After Hours at [phone] (ask to speak to a BMT Fellow) for guidance on need for clinical assessment  c.      Avoid all non- essential travel at this time; if traveling is necessary use mask (N-95)   d.    Wear a mask when in public areas  d.       Avoid crowded places, if possible  f.        Follow CDC advice https://www.cdc.gov/coronavirus/2019-ncov/index.html and travel guidelines https://www.cdc.gov/coronavirus/2019-ncov/travelers/index.html

## 2022-12-26 NOTE — PLAN OF CARE
"/65 (BP Location: Right arm)   Pulse 104   Temp 98.5  F (36.9  C) (Axillary)   Resp 16   Ht 1.6 m (5' 2.99\")   Wt 96.8 kg (213 lb 4.8 oz)   SpO2 94%   BMI 37.79 kg/m      Tmax 101.8 axillary with am VS. Pt requested recheck and it was 98.9 axillary, third verification was done orally and resulted at 101.6. Pt reported she was bundled up in bed and wearing her socks and wanted to go to the bathroom and recheck temp as she usually gets warm in the night. Temp recheck after returning from the bathroom was WNL axillary but oral remained elevated. Pt reported she did not feel febrile (declined chills). BC drawn and tylenol declined. Vantin escalated to cefepime. UA sent. Lactic 0.8. HR tachy in 100s, other VSS. Pt reports congestion and post nasal drip that is baseline for her. Claritin started yesterday. Denies pain/nausea. Up independently. Voiding adequately. No BM reported overnight. L CVC at TKO. Rash to b/l arms unchanged. No replacements this am. Sirolimus level due this am. Pt was up for possible discharge today, team to reeval. Will continue with POC.   "

## 2022-12-26 NOTE — PLAN OF CARE
"Patient remains hospitalized following stem cell transplant, currently day +26. Awaiting return of counts. 1 unit of plt given early this AM. Continues on daily Neupogen. Continues on ppx antimicrobials. Continues on PO MMF & sirolimus. Appetite fair. Denies nausea, but reports \"uneasy\" stomach. 4 loose/soft BMs this shift - cdiff rechecked - negative. Rectal area still excoriated - barrier paste applied. K replaced this shift per protocol, recheck ordered for tomorrow AM. Bilateral arm rash - Benadryl cream used x1 with relief. Reported nasal congestion & postnasal drip - claritin ordered and administered. Denied pain. Ambulating independently, out in halls frequently today. VSS. Planning for discharge tomorrow AM.     Problem: Plan of Care - These are the overarching goals to be used throughout the patient stay.    Goal: Plan of Care Review  Description: The Plan of Care Review/Shift note should be completed every shift.  The Outcome Evaluation is a brief statement about your assessment that the patient is improving, declining, or no change.  This information will be displayed automatically on your shift note.  Outcome: Progressing     Problem: Plan of Care - These are the overarching goals to be used throughout the patient stay.    Goal: Plan of Care Review  Description: The Plan of Care Review/Shift note should be completed every shift.  The Outcome Evaluation is a brief statement about your assessment that the patient is improving, declining, or no change.  This information will be displayed automatically on your shift note.  Outcome: Progressing  Goal: Patient-Specific Goal (Individualized)  Description: You can add care plan individualizations to a care plan. Examples of Individualization might be:  \"Parent requests to be called daily at 9am for status\", \"I have a hard time hearing out of my right ear\", or \"Do not touch me to wake me up as it startles me\".  Outcome: Progressing  Goal: Absence of " Hospital-Acquired Illness or Injury  Outcome: Progressing  Intervention: Identify and Manage Fall Risk  Recent Flowsheet Documentation  Taken 12/25/2022 0800 by Sachi Monge RN  Safety Promotion/Fall Prevention:    assistive device/personal items within reach    clutter free environment maintained    lighting adjusted    nonskid shoes/slippers when out of bed    patient and family education    room organization consistent    safety round/check completed     Problem: Stem Cell/Bone Marrow Transplant  Goal: Optimal Coping with Transplant  Outcome: Progressing  Goal: Blood Counts Within Acceptable Range  Outcome: Progressing  Intervention: Monitor and Manage Hematologic Symptoms  Recent Flowsheet Documentation  Taken 12/25/2022 0800 by Scahi Monge RN  Medication Review/Management:    medications reviewed    high-risk medications identified  Goal: Absence of Hypersensitivity Reaction  Outcome: Progressing  Goal: Absence of Infection  Outcome: Progressing  Intervention: Prevent and Manage Infection  Recent Flowsheet Documentation  Taken 12/25/2022 0800 by Sachi Monge RN  Isolation Precautions:    enteric precautions maintained    protective environment maintained  Goal: Improved Oral Mucous Membrane Health and Integrity  Outcome: Progressing  Goal: Optimal Nutrition Intake  Outcome: Progressing     Problem: Pain Acute  Goal: Optimal Pain Control and Function  Outcome: Progressing  Intervention: Prevent or Manage Pain  Recent Flowsheet Documentation  Taken 12/25/2022 0800 by Sachi Monge RN  Medication Review/Management:    medications reviewed    high-risk medications identified     Problem: Fall Injury Risk  Goal: Absence of Fall and Fall-Related Injury  Outcome: Progressing  Intervention: Identify and Manage Contributors  Recent Flowsheet Documentation  Taken 12/25/2022 0800 by Sachi Monge RN  Medication Review/Management:    medications reviewed    high-risk medications  identified  Intervention: Promote Injury-Free Environment  Recent Flowsheet Documentation  Taken 12/25/2022 0800 by Sachi Monge, RN  Safety Promotion/Fall Prevention:    assistive device/personal items within reach    clutter free environment maintained    lighting adjusted    nonskid shoes/slippers when out of bed    patient and family education    room organization consistent    safety round/check completed     Problem: Infection  Goal: Absence of Infection Signs and Symptoms  Outcome: Progressing  Intervention: Prevent or Manage Infection  Recent Flowsheet Documentation  Taken 12/25/2022 0800 by Sachi Monge, RN  Isolation Precautions:    enteric precautions maintained    protective environment maintained     Problem: Oral Intake Inadequate  Goal: Improved Oral Intake  Outcome: Progressing   Goal Outcome Evaluation:

## 2022-12-26 NOTE — PROGRESS NOTES
"  BMT/Cell Therapy Daily Progress Note    Yahaira Fuentes is a 62 year old female with AML, undergoing a 7/8 matched unrelated allo HSCT, day +27.    HPI:   Spiked fever overnight, but feels elevated temp was secondary to plastic mattress/pillows in combination with thick socks and clothing. She continues with sinus congestion and cough which she feels are chronic. She denies any shortness of breath, nausea or vomiting. More diarrhea over past 1-2 days. Eating well. Really wants to go home. Rash is stable.    Review of Systems    8 point review with pertinent positive and negatives in HPI  PHYSICAL EXAM      Weight     Wt Readings from Last 3 Encounters:   12/26/22 95.5 kg (210 lb 9.6 oz)   11/21/22 96.4 kg (212 lb 9.6 oz)   11/08/22 97.3 kg (214 lb 6.4 oz)        KPS: 80    /70 (BP Location: Right arm)   Pulse 96   Temp 99.9  F (37.7  C) (Axillary)   Resp 16   Ht 1.6 m (5' 2.99\")   Wt 95.5 kg (210 lb 9.6 oz)   SpO2 97%   BMI 37.32 kg/m       General: NAD   Eyes: SHANIQUE, sclera anicteric   Mouth: Mucosa pink and moist.   Lungs: CTA bilaterally  Cardiovascular: RRR, no M/R/G   Abdominal/Rectal: +BS, soft, NT, ND  Lymphatics: Bilateral thick ankles, nonpitting trace edema (baseline). Varicose veins bilaterally.   Skin: Petechiae on bilateral lower legs, maculopapular erythematous rash on bilateral upper arms, chest and neck  Neuro: A&O x4  Musculoskeletal: Muscle mass average  Additional Findings: Hammer site NT, no drainage.    Current aGVHD staging:  Skin 2 (~27% BSA), UGI 0, LGI 1, Liver 0 (keep in note through day +180 for allos)      LABS AND IMAGING: I have assessed all abnormal lab values for their clinical significance and any values considered clinically significant have been addressed in the assessment and plan.        Lab Results   Component Value Date    WBC 2.4 (L) 12/26/2022    ANEUTAUTO 2.2 12/26/2022    HGB 8.0 (L) 12/26/2022    HCT 24.2 (L) 12/26/2022    PLT 22 (LL) 12/26/2022     " 12/26/2022    POTASSIUM 3.9 12/26/2022    CHLORIDE 107 12/26/2022    CO2 23 12/26/2022     (H) 12/26/2022    BUN 11.4 12/26/2022    CR 0.71 12/26/2022    MAG 2.0 12/26/2022    INR 1.05 12/26/2022       SYSTEMS-BASED ASSESSMENT AND PLAN     Yahaira Fuentes is a 62 year old female with AML, undergoing allo HSCT, day +27, admission complicated by ongoing intermittent fevers 11/30/22-12/4/22, repeat fever 12/11 with positive blood culture.     BMT/IEC PROTOCOL for 2015-32  Chemo protocol:    Day -7: start Allopurinol   Day -6: Cytoxan + Fludarabine   Day -5 through -2: Fludarabine   Day -1: TBI x1   Day 0: Transplant   Day +3 and +4: Cytoxan   Day +5: start GCSF, MMF, and Sirolimus  - Restaging plan: per protocol.    *Day 21 bmbx done 12/20: Flow shows 4.5% myeloid blasts, FISH/cytogenetics pending.   *BMBX chimerisms: 95% donor   *Peripheral chimerisms: 96% donor    HEME/COAG  #GCSF until ANC >1500 x3 days  # Chemotherapy induced pancytopenia - leukopenia, anemia, thrombocytopenia  - Transfusion parameters: hemoglobin <7, platelets <10  - Relevant thrombosis or bleeding history: superficial thromboses on OCP decades past. Most recent DVT of right LE (distal veins), 10/19/2022, holding lovenox given thrombocytopenia    ID/Neutropenic fevers  # Neutropenic Fevers: likely T-cell activation from graft, improved following PT Cytoxan. Cefepime 11/30-12/6. Infectious work up unremarkable (BC, CXR, UC)    - New fever overnight 12/10-12/11, on cefepime 12/11-12/14. UCx ngtd. RVP and Covid negative. CXR with streaky atelectasis at bases. CT with pleural effusions that are new.    # Blood cx from 12/11 positive on 12/13, started on Vanco. 12/14 showing Lactobacillus, vanco and cefepime discontinued. Zosyn 12/14-12/22.    - BCx 12/14-12/16 with ngtd   -12/22 switch from zosyn to Vantin 200mg BID until 12/28 for 14 day course antibiotics    - Febrile again 12/26 with Tmax 102.6F. UA negative. CXR without evidence of acute  infection, blood cultures in process. Procal and CRP slightly elevated. COVID, adeno, HHV6 pending.   - Prophylaxis: ACV (CMV donor and recipient negative), Fluconazole, Levaquin held while on vantin, will likely not need ppx antibiotics at discharge as she is engrafted. Bactrim to start at day +28 (reported stomach upset with past use, per PharmD note will try at day +28. Can hold if GI symptoms occur).     RISK OF GVHD  - Prophylaxis: PTCytoxan, Sirolimus, MMF  - Sirolimus level = 11.7. Dose continued 3mg q day. Level 12/26 is 8.1, keep dose same and recheck 12/30.    CARDIOVASCULAR  - Vasovagal episode with bradycardia: 11/29 occurred around transplant time, improved without intervention  - Risk of cardiomyopathy:  Baseline EF 60-65%  - Risk of arrhythmia: Baseline EKG showed NSR and normal QTC    GI/NUTRITION:   - Ulcer prophylaxis: Protonix  - Risk of CINV: Ondansetron, Lorazepam and Compazine available prn   - moderate malnutrition-dietician to follow  # Diarrhea, c dif colonized: PO Vanco x14 days (12/2 -12/16). Daily metamucil started 12/8. Repeat C.diff 12/25 negative.   - Perirectal tenderness, surrounding skin denuded (improving). Topical lidocaine and zinc oxide ointment available.   # Mucositis - mouth pain: MMW available prn. improving  # Abdominal pain: improving.    RENAL/ELECTROLYTES/  - Risk of renal injury: Off cytoxan flush. Monitor need for IVF.  - Electrolyte management: replace per sliding scale  # Trace BLE edema (baseline): Weight stable without recent diruesis    MUSCULOSKELETAL/FRAILTY  - Baseline Frailty Score: 1  - Daily PT/OT as needed while inpatient    SKIN:  -pruritic RASH starting 12/24: patient wanted to try topical benadryl for itching. Continue to monitor. Does not appear to be GVHD, engraftment syndrome.     Clinically Significant Risk Factors          # Hypocalcemia: Lowest Ca = 8.4 mg/dL in last 2 days, will monitor and replace as appropriate     # Hypoalbuminemia: Lowest  albumin = 3.1 g/dL at 12/5/2022  2:43 AM, will monitor as appropriate   # Thrombocytopenia: Lowest platelets = 10 in last 2 days, will monitor for bleeding          # Moderate Malnutrition: based on nutrition assessment          DISPOSITION: on discharge patient should be scheduled with Dr. aGrrett for first available appointment at the request of Dr. Zoltan French. Likely discharge in next 1-2 days pending fever curve.    I spent 30 minutes face-to-face and/or coordinating care. Over 50% of our time on the unit was spent counseling the patient and/or coordinating care and the plan detailed on today's notes.        PHIL StephensonC  [phone]

## 2022-12-26 NOTE — PROVIDER NOTIFICATION
"Provider paged at [phone] regarding \"5412 T.L. temp of 101.8 axillary, . BC drawn. pt declines tylenol and thinks her temp is r/t blankets/socks/etc. she is on vantin and supposed to dc today. thanks! melanie oneill\"  "

## 2022-12-27 NOTE — PROGRESS NOTES
"Discharge SBAR:    Situation:  Yahaira Fuentes is a 62 year old being discharged to: Home  Admission reason: Scheduled Admission  Is this a readmission? No  Discharge time: 1055  Discharge barrier if discharged after 11AM: N/A    Background:  Primary diagnosis: AML  /50 (BP Location: Right arm)   Pulse 103   Temp 98.8  F (37.1  C) (Axillary)   Resp 16   Ht 1.6 m (5' 2.99\")   Wt 94.9 kg (209 lb 4.8 oz)   SpO2 98%   BMI 37.09 kg/m    Type of donor: Allogeneic  Type of stem cells: BMT  Relapsed? No  Falls Precautions? No  Isolation? Yes  DNR? No  DNI? No  Confidential Patient? No  Positive blood cultures? Yes - Lumen colors: purple    Assessment:  Discharge teaching    Review discharge medications and schedule: Yes    Set up pill box: No    Discharge instructions reviewed: Yes    Special considerations (think about previous reactions, issues with flushing CVC, premedication needs, etc): No    Patient Concerns: No    Recommendations:  Anticipated needs:    Daily infusions: No    Daily transfusions: No    G-CSF: No (last dose given today)    Other: Not Applicable  Verbal report called to clinic: No      "

## 2022-12-27 NOTE — PLAN OF CARE
Occupational Therapy Discharge Summary    Reason for therapy discharge:    Discharged to home.    Progress towards therapy goal(s). See goals on Care Plan in [facility] electronic health record for goal details.  Goals partially met.  Barriers to achieving goals:   discharge from facility.    Therapy recommendation(s):    Continue home exercise program.

## 2022-12-27 NOTE — PROGRESS NOTES
Care Management Discharge Note    Discharge Date: 12/27/2022     Discharge Disposition: Home    Discharge Services: None    Discharge DME: None    Discharge Transportation: family or friend will provide    Education Provided on the Discharge Plan: Yes  Persons Notified of Discharge Plans: Yes  Patient/Family in Agreement with the Plan: yes    Handoff Referral Completed: Yes    Additional Information:  Per Med Team, pt is medically stable for discharge today. SW attempted to meet with pt to assess coping, provide psychosocial support and provide discharge packet with post-transplant resources for patient and caregiver. Also present in the room was pt's RN and Son-In-Law going through medication teaching. SW did not want to interrupt. Pt will be discharging to her home with her daughter-Darren and ADEOLA as caregivers. SW will mail discharge packet.    Education Provided: Discharge Resource Packet    Assessment: Pt presented as ready for discharge today. Pt continues to be supported by her family/friends.    Discharge Plan: Pt to discharge home ([facility]) with daughter-Darren and ADEOLA as primary caregivers.     KATIE Reyes, [facility]  Phone: [phone]  Pager: [phone]        KATIE Reyes

## 2022-12-27 NOTE — PLAN OF CARE
Problem: Stem Cell/Bone Marrow Transplant  Goal: Optimal Coping with Transplant  Outcome: Progressing   Goal Outcome Evaluation:  Temp max 99.9. Chest xray done. No pain. No nausea. Received vantin po. Heparin locked. Rash on upper forearms and not itchy. Walked in the halls. Two soft stools and two loose stools. Sirolimus level 8.1. Covid swab done.

## 2022-12-27 NOTE — PLAN OF CARE
"/69 (BP Location: Right arm)   Pulse 102   Temp 99.4  F (37.4  C) (Axillary)   Resp 16   Ht 1.6 m (5' 2.99\")   Wt 95.5 kg (210 lb 9.6 oz)   SpO2 98%   BMI 37.32 kg/m      Afebrile, T max 99.7 overnight. HR tachy, low 100s. OVSS on RA. Pt triggered sepsis protocol, lactic acid 0.9. Pt extremely frustrated and irritable over being kept inpatient. Cares clustered. Pt slept well. Will need 20mEq potassium this morning, other labs stable. Continue POC.    Problem: Stem Cell/Bone Marrow Transplant  Goal: Improved Oral Mucous Membrane Health and Integrity  Outcome: Progressing     Problem: Pain Acute  Goal: Optimal Pain Control and Function  Outcome: Progressing     Problem: Fall Injury Risk  Goal: Absence of Fall and Fall-Related Injury  Outcome: Progressing     Problem: Infection  Goal: Absence of Infection Signs and Symptoms  Outcome: Progressing    "

## 2022-12-28 NOTE — LETTER
12/28/2022         RE: Yahaira Fuentes  [address] [address]        Dear Colleague,    Thank you for referring your patient, Yahaira Fuentes, to the [facility] [place]. Please see a copy of my visit note below.    BMT Skin Biopsy Procedure Note  December 28, 2022 8:52 AM    Indications: Rule out GVHD    After informed consent, including risks of procedure, infection and/or bleeding, patient was prepped in the usual sterile manner. Local anesthesia was given with 1 ml of 1% lidocaine with epi. A 4 mm punch biopsy was taken from right upper arm. 1 stitches were placed with a 4-0 prolene, nonabsorbable suture. Wound was dressed with triple antibiotic ointment and a bandaid.  Patient tolerated the procedure without complications. Patient given Biopsy Information pamphlet.      Joshua Pierson PA-C

## 2022-12-28 NOTE — PROGRESS NOTES
BMT/Cell Therapy Daily Progress Note  12/28/2022       Yahaira Fuentes is a 62 year old female with AML, undergoing a 7/8 matched unrelated allo HSCT, day +29.     HPI:     She is feeling frustrated as she was discharged without the correct prescriptions sent to [facility] so she missed a dose of diflucan and pantoprazole yesterday. She started having more frequent loose to soft stools over the last 2 days, about 4-5 stools yesterday. She has completed a course of Vanco for recent C. Diff but tested negative for C. Diff 3 days ago in patient. She otherwise is eating okay. No nausea, vomiting, recurrent fevers, or abdominal pain.     The rash is still present on her arms but the patient notes it does not bother her at all. She does not notice it on her back or chest but it does appear irritated today.             Review of Systems                                                                                                                           10 point review with pertinent positive and negatives in HPI  PHYSICAL EXAM                                                                                                                                      Wt Readings from Last 4 Encounters:   12/28/22 94.8 kg (208 lb 14.4 oz)   12/27/22 94.9 kg (209 lb 4.8 oz)   11/21/22 96.4 kg (212 lb 9.6 oz)   11/08/22 97.3 kg (214 lb 6.4 oz)     KPS: 80     /56   Pulse 109   Temp 99.3  F (37.4  C) (Oral)   Resp 16   Wt 94.8 kg (208 lb 14.4 oz)   SpO2 97%   BMI 37.01 kg/m       General: NAD   Eyes: SHANIQUE, sclera anicteric   Mouth: Mucosa pink and moist.   Lungs: CTA bilaterally  Cardiovascular: RRR, no M/R/G   Abdominal/Rectal: +BS, soft, NT, ND  Lymphatics: Bilateral thick ankles, nonpitting trace edema (baseline). Varicose veins bilaterally.   Skin: Petechiae on bilateral lower legs, maculopapular erythematous rash on bilateral upper arms, chest and upper back.  Neuro: A&O x4  Musculoskeletal: Muscle mass  average  Additional Findings: Hammer site NT, no drainage.     Current aGVHD staging:  Skin 2 (~27% BSA), UGI 0, LGI 1, Liver 0 (keep in note through day +180 for allos)        LABS AND IMAGING: I have assessed all abnormal lab values for their clinical significance and any values considered clinically significant have been addressed in the assessment and plan.          Lab Results   Component Value Date    WBC 4.1 12/28/2022    ANEU 3.6 12/28/2022    HGB 7.9 (L) 12/28/2022    HCT 24.4 (L) 12/28/2022    PLT 15 (LL) 12/28/2022     12/28/2022    POTASSIUM 3.6 12/28/2022    CHLORIDE 103 12/28/2022    CO2 21 (L) 12/28/2022     (H) 12/28/2022    BUN 10.6 12/28/2022    CR 0.70 12/28/2022    MAG 1.9 12/27/2022    INR 1.05 12/26/2022    BILITOTAL 0.2 12/26/2022    AST 19 12/26/2022    ALT 10 12/26/2022    ALKPHOS 74 12/26/2022    PROTTOTAL 6.2 (L) 12/26/2022    ALBUMIN 3.6 12/26/2022          SYSTEMS-BASED ASSESSMENT AND PLAN      Yahaira Fuentes is a 62 year old female with AML, undergoing allo HSCT, day +29, admission complicated by ongoing intermittent fevers 11/30/22-12/4/22, repeat fever 12/11 with positive blood culture.      BMT/IEC PROTOCOL for 2015-32  Chemo protocol:               Day -7: start Allopurinol              Day -6: Cytoxan + Fludarabine              Day -5 through -2: Fludarabine              Day -1: TBI x1              Day 0: Transplant              Day +3 and +4: Cytoxan              Day +5: start GCSF, MMF, and Sirolimus  - Restaging plan: per protocol.               *Day 21 bmbx done 12/20: Flow shows 4.5% myeloid blasts, FISH/cytogenetics pending.              *BMBX chimerisms: 95% donor              *Peripheral chimerisms: 96% donor     HEME/COAG  #GCSF until ANC >1500 x3 days  # Chemotherapy induced pancytopenia - leukopenia, anemia, thrombocytopenia  - Transfusion parameters: hemoglobin <7, platelets <10  - Relevant thrombosis or bleeding history: superficial thromboses on OCP  decades past. Most recent DVT of right LE (distal veins), 10/19/2022, holding lovenox given thrombocytopenia     ID/Neutropenic fevers  # Neutropenic Fevers: likely T-cell activation from graft, improved following PT Cytoxan. Cefepime 11/30-12/6. Infectious work up unremarkable (BC, CXR, UC)               - New fever overnight 12/10-12/11, on cefepime 12/11-12/14. UCx ngtd. RVP and Covid negative. CXR with streaky atelectasis at bases. CT with pleural effusions that are new.               # Blood cx from 12/11 positive on 12/13, started on Vanco. 12/14 showing Lactobacillus, vanco and cefepime discontinued. Zosyn 12/14-12/22.               - BCx 12/14-12/16 with ngtd              -12/22 switch from zosyn to Vantin 200mg BID until 12/28 for 14 day course antibiotics               - Febrile again 12/26 with Tmax 102.6F. UA negative. CXR without evidence of acute infection, blood cultures in process. Procal and CRP slightly elevated. COVID, adeno, HHV6 pending.   - Prophylaxis: ACV (CMV donor and recipient negative), Fluconazole, Levaquin held while on vantin, will likely not need ppx antibiotics at discharge as she is engrafted. Bactrim to start around Day 60 (reported stomach upset with past use, per PharmD note will try at day +28. Can hold if GI symptoms occur). (12/28) Plan for inh pentamidine on 12/30 and trial Bactrim next month if GI symptoms have improved at that point so we do not exacerbate them now     RISK OF GVHD  - Prophylaxis: PTCytoxan, Sirolimus, MMF  - Sirolimus level = 11.7. Dose continued 3mg q day. Level 12/26 is 8.1, keep dose same and recheck 12/30.     CARDIOVASCULAR  - Vasovagal episode with bradycardia: 11/29 occurred around transplant time, improved without intervention  - Risk of cardiomyopathy:  Baseline EF 60-65%  - Risk of arrhythmia: Baseline EKG showed NSR and normal QTC     GI/NUTRITION:   - Ulcer prophylaxis: Protonix  - Risk of CINV: Ondansetron, Lorazepam and Compazine available  prn   - moderate malnutrition-dietician to follow  # Diarrhea, c dif colonized: PO Vanco x14 days (12/2 -12/16). Daily metamucil started 12/8. Repeat C.diff 12/25 negative. (12/28) Recurrent loose stools, add metamucil. Monitor, no repeat stool kit yet.              - Perirectal tenderness, surrounding skin denuded (improving). Topical lidocaine and zinc oxide ointment available.   # Mucositis - mouth pain: MMW available prn. improving  # Abdominal pain: improving.     RENAL/ELECTROLYTES/  - Risk of renal injury: Off cytoxan flush. Monitor need for IVF.  - Electrolyte management: replace per sliding scale  # Trace BLE edema (baseline): Weight stable without recent diruesis  #hypocalcemia: 7.7 increase oral intake of calcium. Consider starting Tums 1000 mg BID.     SKIN:  -Pruritic RASH starting 12/24: topical benadryl, triamcinolone PRN. Appearing more like possible GVHD vs engraftment syndrome on 12/25, [id number] on discharge date. Consider skin biopsy in clinic.    (12/28): Skin biopsy today - right upper arm: dermatitis vs GVHD    Today's Summary:  Skin biopsy today  Start metamucil - monitor stools. Completed Vanco course inpatient for C. Diff.  Pharmacist was able to get her refills figured out  Request pentamidine for 12/30  Request next appt with Dr. Garrett  Will likely need to add on Tums to help with hypocalcemia, forgot to add on meds during today's visit    Follow up on 12/30 for provider, labs, and possible infusion for PLTs.    Addendum:  [facility] reached out to me, this patient declined teaching for line care so she needs daily appts for line flushes.     I spent 30 minutes in the care of this patient today, which included time necessary for preparation for the visit, obtaining history, ordering medications/tests/procedures as medically indicated, review of pertinent medical literature, counseling of the patient, communication of recommendations to the care team, and documentation  time.    Joshua Pierson PA-C   *[phone]

## 2022-12-28 NOTE — NURSING NOTE
"Oncology Rooming Note    December 28, 2022 8:05 AM   Yahaira Fuentes is a 62 year old female who presents for:    Chief Complaint   Patient presents with     Oncology Clinic Visit     Acute myeloid leukemia in remission      Initial Vitals: /56   Pulse 109   Temp 99.3  F (37.4  C) (Oral)   Resp 16   Wt 94.8 kg (208 lb 14.4 oz)   SpO2 97%   BMI 37.01 kg/m   Estimated body mass index is 37.01 kg/m  as calculated from the following:    Height as of 11/22/22: 1.6 m (5' 2.99\").    Weight as of this encounter: 94.8 kg (208 lb 14.4 oz). Body surface area is 2.05 meters squared.  No Pain (0) Comment: Data Unavailable   No LMP recorded. Patient has had a hysterectomy.  Allergies reviewed: Yes  Medications reviewed: Yes    Medications: MEDICATION REFILLS NEEDED TODAY. Provider was notified.  Pharmacy name entered into [employer]:    [id number] - [place], [place] - [address] AT [facility] [address]  [address]    Clinical concerns: Needs an appointment with pharmacy, says its an emergency. Pt states when she was discharged she didn't get any of her meds       Felipe Anu            "

## 2022-12-28 NOTE — NURSING NOTE
Chief Complaint   Patient presents with     Oncology Clinic Visit     Acute myeloid leukemia in remission      Blood Draw     Labs drawn via CVC by RN in lab.  VS taken        Labs collected from CVC by RN, line flushed with saline and heparin.  Vitals taken. Pt checked in for appointment(s).    Miriam Mares RN

## 2022-12-28 NOTE — LETTER
12/28/2022         RE: Yahaira Fuentes  [address] [facility] [address]        Dear Colleague,    Thank you for referring your patient, Yahaira Fuentes, to the [facility] [place]. Please see a copy of my visit note below.    BMT/Cell Therapy Daily Progress Note  12/28/2022       Yahaira Fuentes is a 62 year old female with AML, undergoing a 7/8 matched unrelated allo HSCT, day +29.     HPI:     She is feeling frustrated as she was discharged without the correct prescriptions sent to [facility] so she missed a dose of diflucan and pantoprazole yesterday. She started having more frequent loose to soft stools over the last 2 days, about 4-5 stools yesterday. She has completed a course of Vanco for recent C. Diff but tested negative for C. Diff 3 days ago in patient. She otherwise is eating okay. No nausea, vomiting, recurrent fevers, or abdominal pain.     The rash is still present on her arms but the patient notes it does not bother her at all. She does not notice it on her back or chest but it does appear irritated today.             Review of Systems                                                                                                                           10 point review with pertinent positive and negatives in HPI  PHYSICAL EXAM                                                                                                                                      Wt Readings from Last 4 Encounters:   12/28/22 94.8 kg (208 lb 14.4 oz)   12/27/22 94.9 kg (209 lb 4.8 oz)   11/21/22 96.4 kg (212 lb 9.6 oz)   11/08/22 97.3 kg (214 lb 6.4 oz)     KPS: 80     /56   Pulse 109   Temp 99.3  F (37.4  C) (Oral)   Resp 16   Wt 94.8 kg (208 lb 14.4 oz)   SpO2 97%   BMI 37.01 kg/m       General: NAD   Eyes: SHANIQUE, sclera anicteric   Mouth: Mucosa pink and moist.   Lungs: CTA bilaterally  Cardiovascular: RRR, no M/R/G   Abdominal/Rectal: +BS, soft,  NT, ND  Lymphatics: Bilateral thick ankles, nonpitting trace edema (baseline). Varicose veins bilaterally.   Skin: Petechiae on bilateral lower legs, maculopapular erythematous rash on bilateral upper arms, chest and upper back.  Neuro: A&O x4  Musculoskeletal: Muscle mass average  Additional Findings: Hammer site NT, no drainage.     Current aGVHD staging:  Skin 2 (~27% BSA), UGI 0, LGI 1, Liver 0 (keep in note through day +180 for allos)        LABS AND IMAGING: I have assessed all abnormal lab values for their clinical significance and any values considered clinically significant have been addressed in the assessment and plan.          Lab Results   Component Value Date    WBC 4.1 12/28/2022    ANEU 3.6 12/28/2022    HGB 7.9 (L) 12/28/2022    HCT 24.4 (L) 12/28/2022    PLT 15 (LL) 12/28/2022     12/28/2022    POTASSIUM 3.6 12/28/2022    CHLORIDE 103 12/28/2022    CO2 21 (L) 12/28/2022     (H) 12/28/2022    BUN 10.6 12/28/2022    CR 0.70 12/28/2022    MAG 1.9 12/27/2022    INR 1.05 12/26/2022    BILITOTAL 0.2 12/26/2022    AST 19 12/26/2022    ALT 10 12/26/2022    ALKPHOS 74 12/26/2022    PROTTOTAL 6.2 (L) 12/26/2022    ALBUMIN 3.6 12/26/2022          SYSTEMS-BASED ASSESSMENT AND PLAN      Yahaira Fuentes is a 62 year old female with AML, undergoing allo HSCT, day +29, admission complicated by ongoing intermittent fevers 11/30/22-12/4/22, repeat fever 12/11 with positive blood culture.      BMT/IEC PROTOCOL for 2015-32  Chemo protocol:               Day -7: start Allopurinol              Day -6: Cytoxan + Fludarabine              Day -5 through -2: Fludarabine              Day -1: TBI x1              Day 0: Transplant              Day +3 and +4: Cytoxan              Day +5: start GCSF, MMF, and Sirolimus  - Restaging plan: per protocol.               *Day 21 bmbx done 12/20: Flow shows 4.5% myeloid blasts, FISH/cytogenetics pending.              *BMBX chimerisms: 95% donor              *Peripheral  chimerisms: 96% donor     HEME/COAG  #GCSF until ANC >1500 x3 days  # Chemotherapy induced pancytopenia - leukopenia, anemia, thrombocytopenia  - Transfusion parameters: hemoglobin <7, platelets <10  - Relevant thrombosis or bleeding history: superficial thromboses on OCP decades past. Most recent DVT of right LE (distal veins), 10/19/2022, holding lovenox given thrombocytopenia     ID/Neutropenic fevers  # Neutropenic Fevers: likely T-cell activation from graft, improved following PT Cytoxan. Cefepime 11/30-12/6. Infectious work up unremarkable (BC, CXR, UC)               - New fever overnight 12/10-12/11, on cefepime 12/11-12/14. UCx ngtd. RVP and Covid negative. CXR with streaky atelectasis at bases. CT with pleural effusions that are new.               # Blood cx from 12/11 positive on 12/13, started on Vanco. 12/14 showing Lactobacillus, vanco and cefepime discontinued. Zosyn 12/14-12/22.               - BCx 12/14-12/16 with ngtd              -12/22 switch from zosyn to Vantin 200mg BID until 12/28 for 14 day course antibiotics               - Febrile again 12/26 with Tmax 102.6F. UA negative. CXR without evidence of acute infection, blood cultures in process. Procal and CRP slightly elevated. COVID, adeno, HHV6 pending.   - Prophylaxis: ACV (CMV donor and recipient negative), Fluconazole, Levaquin held while on vantin, will likely not need ppx antibiotics at discharge as she is engrafted. Bactrim to start around Day 60 (reported stomach upset with past use, per PharmD note will try at day +28. Can hold if GI symptoms occur). (12/28) Plan for inh pentamidine on 12/30 and trial Bactrim next month if GI symptoms have improved at that point so we do not exacerbate them now     RISK OF GVHD  - Prophylaxis: PTCytoxan, Sirolimus, MMF  - Sirolimus level = 11.7. Dose continued 3mg q day. Level 12/26 is 8.1, keep dose same and recheck 12/30.     CARDIOVASCULAR  - Vasovagal episode with bradycardia: 11/29 occurred around  transplant time, improved without intervention  - Risk of cardiomyopathy:  Baseline EF 60-65%  - Risk of arrhythmia: Baseline EKG showed NSR and normal QTC     GI/NUTRITION:   - Ulcer prophylaxis: Protonix  - Risk of CINV: Ondansetron, Lorazepam and Compazine available prn   - moderate malnutrition-dietician to follow  # Diarrhea, c dif colonized: PO Vanco x14 days (12/2 -12/16). Daily metamucil started 12/8. Repeat C.diff 12/25 negative. (12/28) Recurrent loose stools, add metamucil. Monitor, no repeat stool kit yet.              - Perirectal tenderness, surrounding skin denuded (improving). Topical lidocaine and zinc oxide ointment available.   # Mucositis - mouth pain: MMW available prn. improving  # Abdominal pain: improving.     RENAL/ELECTROLYTES/  - Risk of renal injury: Off cytoxan flush. Monitor need for IVF.  - Electrolyte management: replace per sliding scale  # Trace BLE edema (baseline): Weight stable without recent diruesis  #hypocalcemia: 7.7 increase oral intake of calcium. Consider starting Tums 1000 mg BID.     SKIN:  -Pruritic RASH starting 12/24: topical benadryl, triamcinolone PRN. Appearing more like possible GVHD vs engraftment syndrome on 12/25, [id number] on discharge date. Consider skin biopsy in clinic.    (12/28): Skin biopsy today - right upper arm: dermatitis vs GVHD    Today's Summary:  Skin biopsy today  Start metamucil - monitor stools. Completed Vanco course inpatient for C. Diff.  Pharmacist was able to get her refills figured out  Request pentamidine for 12/30  Request next appt with Dr. Garrett  Will likely need to add on Tums to help with hypocalcemia, forgot to add on meds during today's visit    Follow up on 12/30 for provider, labs, and possible infusion for PLTs.     I spent 30 minutes in the care of this patient today, which included time necessary for preparation for the visit, obtaining history, ordering medications/tests/procedures as medically indicated,  review of pertinent medical literature, counseling of the patient, communication of recommendations to the care team, and documentation time.    Joshua Pierson PA-C   *[phone]

## 2022-12-28 NOTE — PROGRESS NOTES
BMT Skin Biopsy Procedure Note  December 28, 2022 8:52 AM    Indications: Rule out GVHD    After informed consent, including risks of procedure, infection and/or bleeding, patient was prepped in the usual sterile manner. Local anesthesia was given with 1 ml of 1% lidocaine with epi. A 4 mm punch biopsy was taken from right upper arm. 1 stitches were placed with a 4-0 prolene, nonabsorbable suture. Wound was dressed with triple antibiotic ointment and a bandaid.  Patient tolerated the procedure without complications. Patient given Biopsy Information pamphlet.      Joshua Pierson PA-C

## 2022-12-29 NOTE — TELEPHONE ENCOUNTER
Following up on patient's request to have line flushed at [facility] in [place].    Spoke with the infusion center in [place]. They said they would be happy to flush Rebeka's line. They requested orders faxed to [phone]. They said they could see her at 10:30am.    RNCC called Rebeka to update her. She knows her appointment is at 1030 and will present to [facility] for a line flush.    Appointments for line flush and pharmacy appointment here today will be cancelled and pharmacy rescheduled for tomorrow.

## 2022-12-29 NOTE — PROGRESS NOTES
Attention - [facility]    Rebeka Fuentes  : 1960  Diagnosis: AML  BMT MD: Dr Garrett (covering [place] leave)    To Whom It May Concern -     Rebeka Fuentes is a mutual patient of Dr Emmanuel Arshad's. She had an allogeneic stem cell transplant for AML on 2022. She requires line care maintenance for her Hammer catheter.    Please order and administer:    Heparin lock flush 10 unit/ML injection 5mL  5mL, Intracatheter, EVERY 1 HOUR PRN, line flush  For each lumen.    If you have any questions about these orders, please call the Blood and Marrow Transplant office at [facility] at [phone].     Thank you.      Luann Garrett MD, PhD  Professor of Medicine  Section Head Hematologic Malignancy   Division of Hematology, Oncology and Transplantation  [facility] Mail Code [address] [place] [address]  tel: [phone]  E-mail: [email or web address]

## 2022-12-30 NOTE — NURSING NOTE
Patient had dressing changed using sterile technique. Site is clean and dry. New dressing was applied. Patient tolerated without incident and was discharged after. Please see flow sheet for additional details.       Alisa Nolasco LPN December 30, 2022 8:46 AM

## 2022-12-30 NOTE — LETTER
12/30/2022         RE: Yahaira Fuentes  [address] [facility] [address]        Dear Colleague,    Thank you for referring your patient, Yahaira Fuentes, to the [facility] BLOOD AND MARROW TRANSPLANT PROGRAM [place]. Please see a copy of my visit note below.    BMT/Cell Therapy Daily Progress Note  12/30/2022       Yahaira Fuentes is a 62 year old female with AML, undergoing a 7/8 matched unrelated allo HSCT, day +31.     HPI:     She is well today - she had a soft stool this morning which is better for her. She felt that her appetite was better yesterday but it is still hit or miss on some foods. She was able to get all her Rx figured out and does not need an appt with the pharmacist today. She otherwise denies any new symptoms today.     Review of Systems                                                                                                                           10 point review with pertinent positive and negatives in HPI  PHYSICAL EXAM                                                                                                                                      Wt Readings from Last 4 Encounters:   12/30/22 94.9 kg (209 lb 4.8 oz)   12/28/22 94.8 kg (208 lb 14.4 oz)   12/27/22 94.9 kg (209 lb 4.8 oz)   11/21/22 96.4 kg (212 lb 9.6 oz)     KPS: 80     /51 (BP Location: Left arm, Patient Position: Sitting, Cuff Size: Adult Large)   Pulse 102   Temp 98.6  F (37  C) (Axillary)   Resp 16   Wt 94.9 kg (209 lb 4.8 oz)   SpO2 98%   BMI 37.09 kg/m       General: NAD   Eyes: SHANIQUE, sclera anicteric   Mouth: masked  Lungs: CTA bilaterally  Cardiovascular: RRR, no M/R/G   Abdominal/Rectal: +BS, soft, NT, ND  Lymphatics: Bilateral thick ankles, nonpitting trace edema (baseline). Varicose veins bilaterally.   Skin: Petechiae on bilateral lower legs, maculopapular erythematous rash on bilateral upper arms, chest and upper back. Skin biopsy site, healing well.    Musculoskeletal: Muscle mass average  Additional Findings: Hammer site NT, no drainage.     Current aGVHD staging:  Skin 2 (~27% BSA), UGI 0, LGI 1, Liver 0 (keep in note through day +180 for allos)        LABS AND IMAGING: I have assessed all abnormal lab values for their clinical significance and any values considered clinically significant have been addressed in the assessment and plan.          Lab Results   Component Value Date    WBC 1.3 (L) 12/30/2022    ANEU 3.6 12/28/2022    HGB 7.7 (L) 12/30/2022    HCT 23.8 (L) 12/30/2022    PLT 15 (LL) 12/30/2022     12/30/2022    POTASSIUM 3.4 12/30/2022    CHLORIDE 106 12/30/2022    CO2 22 12/30/2022     (H) 12/30/2022    BUN 10.0 12/30/2022    CR 0.61 12/30/2022    MAG 1.9 12/27/2022    INR 1.05 12/26/2022    BILITOTAL 0.2 12/26/2022    AST 19 12/26/2022    ALT 10 12/26/2022    ALKPHOS 74 12/26/2022    PROTTOTAL 6.2 (L) 12/26/2022    ALBUMIN 3.6 12/26/2022          SYSTEMS-BASED ASSESSMENT AND PLAN      Yahaira Fuentes is a 62 year old female with AML, undergoing allo HSCT, day +31, admission complicated by ongoing intermittent fevers 11/30/22-12/4/22, repeat fever 12/11 with positive blood culture.      BMT/IEC PROTOCOL for 2015-32  Chemo protocol:               Day -7: start Allopurinol              Day -6: Cytoxan + Fludarabine              Day -5 through -2: Fludarabine              Day -1: TBI x1              Day 0: Transplant              Day +3 and +4: Cytoxan              Day +5: start GCSF, MMF, and Sirolimus  - Restaging plan: per protocol.               *Day 21 bmbx done 12/20: Flow shows 4.5% myeloid blasts, FISH/cytogenetics pending.              *BMBX chimerisms: 95% donor              *Peripheral chimerisms: 96% donor  - (12/30) Requested BM bx be scheduled for next week (1/5) as requested by Dr. Dickinson during inpatient stay.     HEME/COAG  #GCSF until ANC >1500 x3 days.   # Chemotherapy induced pancytopenia - leukopenia, anemia,  thrombocytopenia  - Transfusion parameters: hemoglobin <7, platelets <10  - Relevant thrombosis or bleeding history: superficial thromboses on OCP decades past. Most recent DVT of right LE (distal veins), 10/19/2022, holding lovenox given thrombocytopenia     ID/Neutropenic fevers  # Neutropenic Fevers: likely T-cell activation from graft, improved following PT Cytoxan. Cefepime 11/30-12/6. Infectious work up unremarkable (BC, CXR, UC)               - New fever overnight 12/10-12/11, on cefepime 12/11-12/14. UCx ngtd. RVP and Covid negative. CXR with streaky atelectasis at bases. CT with pleural effusions that are new.               # Blood cx from 12/11 positive on 12/13, started on Vanco. 12/14 showing Lactobacillus, vanco and cefepime discontinued. Zosyn 12/14-12/22.               - BCx 12/14-12/16 with ngtd              -12/22 switch from zosyn to Vantin 200mg BID until 12/28 for 14 day course antibiotics. Completed.               - Febrile again 12/26 with Tmax 102.6F. UA negative. CXR without evidence of acute infection, blood cultures in process. Procal and CRP slightly elevated. COVID and adeno are negative, HHV6 pending.   - Prophylaxis: ACV (CMV donor and recipient negative), Fluconazole, Levaquin held while on vantin, will likely not need ppx antibiotics at discharge as she is engrafted. Bactrim to start around Day 60 (reported stomach upset with past use, per PharmD note will try at day +28. Can hold if GI symptoms occur). (12/30) Pentamidine today, and trial Bactrim next month if GI symptoms have improved at that point so we do not exacerbate them now.      RISK OF GVHD  - Prophylaxis: PTCytoxan, Sirolimus, MMF  - Sirolimus level = 11.7. Dose continued 3mg q day. Level 12/26 is 8.1, keep dose same and recheck today. Plan to check again on 1/2.     CARDIOVASCULAR  - Vasovagal episode with bradycardia: 11/29 occurred around transplant time, improved without intervention  - Risk of cardiomyopathy:   Baseline EF 60-65%  - Risk of arrhythmia: Baseline EKG showed NSR and normal QTC     GI/NUTRITION:   - Ulcer prophylaxis: Protonix  - Risk of CINV: Ondansetron, Lorazepam and Compazine available prn   - moderate malnutrition-dietician to follow  # Diarrhea, c dif colonized: PO Vanco x14 days (12/2 -12/16). Daily metamucil started 12/8. Repeat C.diff 12/25 negative. (12/28) Recurrent loose stools, add metamucil. Improving, soft not watery.              - Perirectal tenderness, surrounding skin denuded (improving). Topical lidocaine and zinc oxide ointment available.   # Mucositis - mouth pain: MMW available prn. improving  # Abdominal pain: improving.     RENAL/ELECTROLYTES/  - Risk of renal injury: Off cytoxan flush. Monitor need for IVF.  - Electrolyte management: replace per sliding scale  # Trace BLE edema (baseline): Weight stable without recent diruesis  #hypocalcemia: Improved. Stable.     SKIN:  -Pruritic RASH starting 12/24: topical benadryl, triamcinolone PRN. Appearing more like possible GVHD vs engraftment syndrome on 12/25, [id number] on discharge date. Consider skin biopsy in clinic.    (12/28): Skin biopsy today - right upper arm: dermatitis vs GVHD. Results pending. Stitch removal on 1/5.    Today's Summary:  Skin biopsy results pending  Pentamidine today  Needs daily line flushes - she will schedule off days with [place] infusion clinic  Remove stitches and plan for BM bx on Thursday, 1/5  Requested appts provider, labs, and possible infusion for PLTs/RBCs every other day for next week    Follow up with 1/13 with Tami requested with labs.     I spent 30 minutes in the care of this patient today, which included time necessary for preparation for the visit, obtaining history, ordering medications/tests/procedures as medically indicated, review of pertinent medical literature, counseling of the patient, communication of recommendations to the care team, and documentation time.    Joshua  MEGAN Pierson   *[phone]

## 2022-12-30 NOTE — PROGRESS NOTES
Patient was seen today for a Pentamidine nebulizer tx ordered by Joshua Pierson PA-C.    Patient was first given 2.5 mg of Albuterol via nebulizer, after which Pentamidine 300 mg (Lot # [id number]) mixed with 6cc Sterile H20 was administered through a filtered nebulizer.    Pre-treatment: SpO2 = 96%   HR = 99   BBS = Clear  Post-treatment: SpO2 = 98%  HR =101   BBS = Clear    No adverse side effects noted by the patient.    This service today was provided under the direct supervision of Dr. Long, who was available if needed.     Procedure was completed by Hilary Macias. [place]

## 2022-12-30 NOTE — PROGRESS NOTES
Patient reported she did not need to talk to pharmacy, she received all of her medications that she had been waiting on. See pharmacist discharge follow up visit note 12/29/22.

## 2022-12-30 NOTE — PROGRESS NOTES
BMT/Cell Therapy Daily Progress Note  12/30/2022       Yahaira Fuentes is a 62 year old female with AML, undergoing a 7/8 matched unrelated allo HSCT, day +31.     HPI:     She is well today - she had a soft stool this morning which is better for her. She felt that her appetite was better yesterday but it is still hit or miss on some foods. She was able to get all her Rx figured out and does not need an appt with the pharmacist today. She otherwise denies any new symptoms today.     Review of Systems                                                                                                                           10 point review with pertinent positive and negatives in HPI  PHYSICAL EXAM                                                                                                                                      Wt Readings from Last 4 Encounters:   12/30/22 94.9 kg (209 lb 4.8 oz)   12/28/22 94.8 kg (208 lb 14.4 oz)   12/27/22 94.9 kg (209 lb 4.8 oz)   11/21/22 96.4 kg (212 lb 9.6 oz)     KPS: 80     /51 (BP Location: Left arm, Patient Position: Sitting, Cuff Size: Adult Large)   Pulse 102   Temp 98.6  F (37  C) (Axillary)   Resp 16   Wt 94.9 kg (209 lb 4.8 oz)   SpO2 98%   BMI 37.09 kg/m       General: NAD   Eyes: SHANIQUE, sclera anicteric   Mouth: masked  Lungs: CTA bilaterally  Cardiovascular: RRR, no M/R/G   Abdominal/Rectal: +BS, soft, NT, ND  Lymphatics: Bilateral thick ankles, nonpitting trace edema (baseline). Varicose veins bilaterally.   Skin: Petechiae on bilateral lower legs, maculopapular erythematous rash on bilateral upper arms, chest and upper back. Skin biopsy site, healing well.   Musculoskeletal: Muscle mass average  Additional Findings: Hammer site NT, no drainage.     Current aGVHD staging:  Skin 2 (~27% BSA), UGI 0, LGI 1, Liver 0 (keep in note through day +180 for allos)        LABS AND IMAGING: I have assessed all abnormal lab values for their clinical significance  and any values considered clinically significant have been addressed in the assessment and plan.          Lab Results   Component Value Date    WBC 1.3 (L) 12/30/2022    ANEU 3.6 12/28/2022    HGB 7.7 (L) 12/30/2022    HCT 23.8 (L) 12/30/2022    PLT 15 (LL) 12/30/2022     12/30/2022    POTASSIUM 3.4 12/30/2022    CHLORIDE 106 12/30/2022    CO2 22 12/30/2022     (H) 12/30/2022    BUN 10.0 12/30/2022    CR 0.61 12/30/2022    MAG 1.9 12/27/2022    INR 1.05 12/26/2022    BILITOTAL 0.2 12/26/2022    AST 19 12/26/2022    ALT 10 12/26/2022    ALKPHOS 74 12/26/2022    PROTTOTAL 6.2 (L) 12/26/2022    ALBUMIN 3.6 12/26/2022          SYSTEMS-BASED ASSESSMENT AND PLAN      Yahaira Fuentes is a 62 year old female with AML, undergoing allo HSCT, day +31, admission complicated by ongoing intermittent fevers 11/30/22-12/4/22, repeat fever 12/11 with positive blood culture.      BMT/IEC PROTOCOL for 2015-32  Chemo protocol:               Day -7: start Allopurinol              Day -6: Cytoxan + Fludarabine              Day -5 through -2: Fludarabine              Day -1: TBI x1              Day 0: Transplant              Day +3 and +4: Cytoxan              Day +5: start GCSF, MMF, and Sirolimus  - Restaging plan: per protocol.               *Day 21 bmbx done 12/20: Flow shows 4.5% myeloid blasts, FISH/cytogenetics pending.              *BMBX chimerisms: 95% donor              *Peripheral chimerisms: 96% donor  - (12/30) Requested BM bx be scheduled for next week (1/5) as requested by Dr. Dickinson during inpatient stay.     HEME/COAG  #GCSF until ANC >1500 x3 days.   # Chemotherapy induced pancytopenia - leukopenia, anemia, thrombocytopenia  - Transfusion parameters: hemoglobin <7, platelets <10  - Relevant thrombosis or bleeding history: superficial thromboses on OCP decades past. Most recent DVT of right LE (distal veins), 10/19/2022, holding lovenox given thrombocytopenia     ID/Neutropenic fevers  # Neutropenic  Fevers: likely T-cell activation from graft, improved following PT Cytoxan. Cefepime 11/30-12/6. Infectious work up unremarkable (BC, CXR, UC)               - New fever overnight 12/10-12/11, on cefepime 12/11-12/14. UCx ngtd. RVP and Covid negative. CXR with streaky atelectasis at bases. CT with pleural effusions that are new.               # Blood cx from 12/11 positive on 12/13, started on Vanco. 12/14 showing Lactobacillus, vanco and cefepime discontinued. Zosyn 12/14-12/22.               - BCx 12/14-12/16 with ngtd              -12/22 switch from zosyn to Vantin 200mg BID until 12/28 for 14 day course antibiotics. Completed.               - Febrile again 12/26 with Tmax 102.6F. UA negative. CXR without evidence of acute infection, blood cultures in process. Procal and CRP slightly elevated. COVID and adeno are negative, HHV6 pending.   - Prophylaxis: ACV (CMV donor and recipient negative), Fluconazole, Levaquin held while on vantin, will likely not need ppx antibiotics at discharge as she is engrafted. Bactrim to start around Day 60 (reported stomach upset with past use, per PharmD note will try at day +28. Can hold if GI symptoms occur). (12/30) Pentamidine today, and trial Bactrim next month if GI symptoms have improved at that point so we do not exacerbate them now.      RISK OF GVHD  - Prophylaxis: PTCytoxan, Sirolimus, MMF  - Sirolimus level = 11.7. Dose continued 3mg q day. Level 12/26 is 8.1, keep dose same and recheck today. Plan to check again on 1/2.     CARDIOVASCULAR  - Vasovagal episode with bradycardia: 11/29 occurred around transplant time, improved without intervention  - Risk of cardiomyopathy:  Baseline EF 60-65%  - Risk of arrhythmia: Baseline EKG showed NSR and normal QTC     GI/NUTRITION:   - Ulcer prophylaxis: Protonix  - Risk of CINV: Ondansetron, Lorazepam and Compazine available prn   - moderate malnutrition-dietician to follow  # Diarrhea, c dif colonized: PO Vanco x14 days (12/2  -12/16). Daily metamucil started 12/8. Repeat C.diff 12/25 negative. (12/28) Recurrent loose stools, add metamucil. Improving, soft not watery.              - Perirectal tenderness, surrounding skin denuded (improving). Topical lidocaine and zinc oxide ointment available.   # Mucositis - mouth pain: MMW available prn. improving  # Abdominal pain: improving.     RENAL/ELECTROLYTES/  - Risk of renal injury: Off cytoxan flush. Monitor need for IVF.  - Electrolyte management: replace per sliding scale  # Trace BLE edema (baseline): Weight stable without recent diruesis  #hypocalcemia: Improved. Stable.     SKIN:  -Pruritic RASH starting 12/24: topical benadryl, triamcinolone PRN. Appearing more like possible GVHD vs engraftment syndrome on 12/25, [id number] on discharge date. Consider skin biopsy in clinic.    (12/28): Skin biopsy today - right upper arm: dermatitis vs GVHD. Results pending. Stitch removal on 1/5.    Today's Summary:  Skin biopsy results pending  Pentamidine today  Needs daily line flushes - she will schedule off days with [place] infusion clinic  Remove stitches and plan for BM bx on Thursday, 1/5  Requested appts provider, labs, and possible infusion for PLTs/RBCs every other day for next week    Follow up with 1/13 with Tami requested with labs.     I spent 30 minutes in the care of this patient today, which included time necessary for preparation for the visit, obtaining history, ordering medications/tests/procedures as medically indicated, review of pertinent medical literature, counseling of the patient, communication of recommendations to the care team, and documentation time.    PHIL BarraganC   *[phone]

## 2022-12-30 NOTE — TELEPHONE ENCOUNTER
Rebeka had called to see if we could set up line flushes in [place] on days when she doesn't have BMT appointments here. RNCC called [facility] to see if this is possible, and they are happy to help, but unfortunately closed on Saturday and Monday (for the holiday). RNCC called Rebeka to discuss. She said she has four heparin flushes and her daughter feels comfortable doing the line care since she has taken care of Rebeka's line before. So, [facility] B[facility] will do line flushes when she is here being seen. Rebeka and her daughter will do line care Saturday and Monday. RNCC will set up appointments with [facility] on Wednesday, Jan 4th and Friday, Jan 6th. Rebeka aware and agreeable to plan.

## 2022-12-30 NOTE — NURSING NOTE
Chief Complaint   Patient presents with     Oncology Clinic Visit     AML in remission     Blood Draw     Vitals taken, CVC labs drawn, heparin locked, checked into next appts     /51 (BP Location: Left arm, Patient Position: Sitting, Cuff Size: Adult Large)   Pulse 102   Temp 98.6  F (37  C) (Axillary)   Resp 16   Wt 94.9 kg (209 lb 4.8 oz)   SpO2 98%   BMI 37.09 kg/m    Darren Torres RN on 12/30/2022 at 7:46 AM

## 2022-12-30 NOTE — NURSING NOTE
"Oncology Rooming Note    December 30, 2022 7:53 AM   Yahaira Fuentes is a 62 year old female who presents for:    Chief Complaint   Patient presents with     Oncology Clinic Visit     AML in remission     Blood Draw     Vitals taken, CVC labs drawn, heparin locked, checked into next appts     Initial Vitals: /51 (BP Location: Left arm, Patient Position: Sitting, Cuff Size: Adult Large)   Pulse 102   Temp 98.6  F (37  C) (Axillary)   Resp 16   Wt 94.9 kg (209 lb 4.8 oz)   SpO2 98%   BMI 37.09 kg/m   Estimated body mass index is 37.09 kg/m  as calculated from the following:    Height as of 11/22/22: 1.6 m (5' 2.99\").    Weight as of this encounter: 94.9 kg (209 lb 4.8 oz). Body surface area is 2.05 meters squared.  No Pain (0) Comment: Data Unavailable   No LMP recorded. Patient has had a hysterectomy.  Allergies reviewed: Yes  Medications reviewed: Yes    Medications: Medication refills not needed today.  Pharmacy name entered into [employer]:    [id number] - [place], [place] - [address] AT [facility] [address]  [address]    Clinical concerns: none       Gabi Penn CMA            "

## 2023-01-01 ENCOUNTER — ONCOLOGY VISIT (OUTPATIENT)
Dept: TRANSPLANT | Facility: CLINIC | Age: 63
End: 2023-01-01
Attending: INTERNAL MEDICINE
Payer: COMMERCIAL

## 2023-01-01 ENCOUNTER — APPOINTMENT (OUTPATIENT)
Dept: LAB | Facility: CLINIC | Age: 63
End: 2023-01-01
Attending: INTERNAL MEDICINE
Payer: COMMERCIAL

## 2023-01-01 ENCOUNTER — TELEPHONE (OUTPATIENT)
Dept: TRANSPLANT | Facility: CLINIC | Age: 63
End: 2023-01-01

## 2023-01-01 ENCOUNTER — HEALTH MAINTENANCE LETTER (OUTPATIENT)
Age: 63
End: 2023-01-01

## 2023-01-01 ENCOUNTER — ONCOLOGY VISIT (OUTPATIENT)
Dept: TRANSPLANT | Facility: CLINIC | Age: 63
End: 2023-01-01
Payer: COMMERCIAL

## 2023-01-01 ENCOUNTER — TELEPHONE (OUTPATIENT)
Dept: ONCOLOGY | Facility: CLINIC | Age: 63
End: 2023-01-01
Payer: COMMERCIAL

## 2023-01-01 VITALS
RESPIRATION RATE: 16 BRPM | TEMPERATURE: 98.3 F | WEIGHT: 207.7 LBS | BODY MASS INDEX: 36.8 KG/M2 | HEART RATE: 101 BPM | OXYGEN SATURATION: 93 % | SYSTOLIC BLOOD PRESSURE: 114 MMHG | DIASTOLIC BLOOD PRESSURE: 75 MMHG

## 2023-01-01 VITALS
TEMPERATURE: 98.5 F | RESPIRATION RATE: 16 BRPM | HEART RATE: 98 BPM | SYSTOLIC BLOOD PRESSURE: 139 MMHG | WEIGHT: 209.1 LBS | BODY MASS INDEX: 37.05 KG/M2 | DIASTOLIC BLOOD PRESSURE: 83 MMHG | OXYGEN SATURATION: 99 %

## 2023-01-01 VITALS
RESPIRATION RATE: 16 BRPM | SYSTOLIC BLOOD PRESSURE: 122 MMHG | DIASTOLIC BLOOD PRESSURE: 62 MMHG | OXYGEN SATURATION: 100 % | TEMPERATURE: 99.2 F | BODY MASS INDEX: 36.55 KG/M2 | HEART RATE: 101 BPM | WEIGHT: 206.3 LBS

## 2023-01-01 VITALS
DIASTOLIC BLOOD PRESSURE: 81 MMHG | SYSTOLIC BLOOD PRESSURE: 127 MMHG | RESPIRATION RATE: 16 BRPM | OXYGEN SATURATION: 99 % | HEART RATE: 110 BPM | BODY MASS INDEX: 36.91 KG/M2 | TEMPERATURE: 99.4 F | WEIGHT: 208.34 LBS

## 2023-01-01 VITALS
TEMPERATURE: 99.4 F | HEART RATE: 110 BPM | BODY MASS INDEX: 36.93 KG/M2 | OXYGEN SATURATION: 99 % | SYSTOLIC BLOOD PRESSURE: 127 MMHG | DIASTOLIC BLOOD PRESSURE: 81 MMHG | RESPIRATION RATE: 16 BRPM | WEIGHT: 208.4 LBS

## 2023-01-01 VITALS
DIASTOLIC BLOOD PRESSURE: 73 MMHG | RESPIRATION RATE: 16 BRPM | OXYGEN SATURATION: 96 % | HEART RATE: 88 BPM | TEMPERATURE: 99.6 F | SYSTOLIC BLOOD PRESSURE: 121 MMHG

## 2023-01-01 VITALS
HEART RATE: 102 BPM | TEMPERATURE: 99.3 F | SYSTOLIC BLOOD PRESSURE: 106 MMHG | WEIGHT: 207 LBS | RESPIRATION RATE: 16 BRPM | DIASTOLIC BLOOD PRESSURE: 59 MMHG | OXYGEN SATURATION: 98 % | BODY MASS INDEX: 36.68 KG/M2

## 2023-01-01 DIAGNOSIS — Z53.9 ERRONEOUS ENCOUNTER--DISREGARD: ICD-10-CM

## 2023-01-01 DIAGNOSIS — C92.01 ACUTE MYELOID LEUKEMIA IN REMISSION (H): ICD-10-CM

## 2023-01-01 DIAGNOSIS — Z94.81 STATUS POST BONE MARROW TRANSPLANT (H): Primary | ICD-10-CM

## 2023-01-01 DIAGNOSIS — Z94.81 STATUS POST BONE MARROW TRANSPLANT (H): ICD-10-CM

## 2023-01-01 DIAGNOSIS — C92.01 ACUTE MYELOID LEUKEMIA IN REMISSION (H): Primary | ICD-10-CM

## 2023-01-01 DIAGNOSIS — C92.02 ACUTE MYELOID LEUKEMIA IN RELAPSE (H): Primary | ICD-10-CM

## 2023-01-01 LAB
ABO/RH(D): NORMAL
ACANTHOCYTES BLD QL SMEAR: SLIGHT
ADDITIONAL COMMENTS: NORMAL
ALBUMIN SERPL BCG-MCNC: 3.7 G/DL (ref 3.5–5.2)
ALBUMIN SERPL BCG-MCNC: 4 G/DL (ref 3.5–5.2)
ALP SERPL-CCNC: 59 U/L (ref 35–104)
ALP SERPL-CCNC: 64 U/L (ref 35–104)
ALT SERPL W P-5'-P-CCNC: 18 U/L (ref 10–35)
ALT SERPL W P-5'-P-CCNC: 18 U/L (ref 10–35)
ANION GAP SERPL CALCULATED.3IONS-SCNC: 10 MMOL/L (ref 7–15)
ANION GAP SERPL CALCULATED.3IONS-SCNC: 10 MMOL/L (ref 7–15)
ANION GAP SERPL CALCULATED.3IONS-SCNC: 11 MMOL/L (ref 7–15)
ANION GAP SERPL CALCULATED.3IONS-SCNC: 11 MMOL/L (ref 7–15)
ANION GAP SERPL CALCULATED.3IONS-SCNC: 9 MMOL/L (ref 7–15)
ANTIBODY SCREEN: NEGATIVE
AST SERPL W P-5'-P-CCNC: 29 U/L (ref 10–35)
AST SERPL W P-5'-P-CCNC: 37 U/L (ref 10–35)
BACTERIA BLD CULT: NO GROWTH
BACTERIA BLD CULT: NO GROWTH
BASOPHILS # BLD MANUAL: 0 10E3/UL (ref 0–0.2)
BASOPHILS NFR BLD MANUAL: 0 %
BASOPHILS NFR BLD MANUAL: 1 %
BILIRUB SERPL-MCNC: 0.2 MG/DL
BILIRUB SERPL-MCNC: 0.3 MG/DL
BLASTS # BLD MANUAL: 0.6 10E3/UL
BLASTS NFR BLD MANUAL: 10 %
BLD PROD TYP BPU: NORMAL
BLOOD COMPONENT TYPE: NORMAL
BUN SERPL-MCNC: 8 MG/DL (ref 8–23)
BUN SERPL-MCNC: 8.1 MG/DL (ref 8–23)
BUN SERPL-MCNC: 8.5 MG/DL (ref 8–23)
BUN SERPL-MCNC: 8.9 MG/DL (ref 8–23)
BUN SERPL-MCNC: 9.4 MG/DL (ref 8–23)
CALCIUM SERPL-MCNC: 7.2 MG/DL (ref 8.8–10.2)
CALCIUM SERPL-MCNC: 8.3 MG/DL (ref 8.8–10.2)
CALCIUM SERPL-MCNC: 8.4 MG/DL (ref 8.8–10.2)
CALCIUM SERPL-MCNC: 8.5 MG/DL (ref 8.8–10.2)
CALCIUM SERPL-MCNC: 8.8 MG/DL (ref 8.8–10.2)
CHLORIDE SERPL-SCNC: 103 MMOL/L (ref 98–107)
CHLORIDE SERPL-SCNC: 103 MMOL/L (ref 98–107)
CHLORIDE SERPL-SCNC: 104 MMOL/L (ref 98–107)
CHLORIDE SERPL-SCNC: 105 MMOL/L (ref 98–107)
CHLORIDE SERPL-SCNC: 109 MMOL/L (ref 98–107)
CMV DNA SPEC NAA+PROBE-ACNC: NOT DETECTED IU/ML
CMV DNA SPEC NAA+PROBE-ACNC: NOT DETECTED IU/ML
CODING SYSTEM: NORMAL
CREAT SERPL-MCNC: 0.53 MG/DL (ref 0.51–0.95)
CREAT SERPL-MCNC: 0.6 MG/DL (ref 0.51–0.95)
CREAT SERPL-MCNC: 0.63 MG/DL (ref 0.51–0.95)
CREAT SERPL-MCNC: 0.68 MG/DL (ref 0.51–0.95)
CREAT SERPL-MCNC: 0.72 MG/DL (ref 0.51–0.95)
CROSSMATCH: NORMAL
CULTURE HARVEST COMPLETE DATE: NORMAL
DEPRECATED HCO3 PLAS-SCNC: 23 MMOL/L (ref 22–29)
DEPRECATED HCO3 PLAS-SCNC: 23 MMOL/L (ref 22–29)
DEPRECATED HCO3 PLAS-SCNC: 25 MMOL/L (ref 22–29)
DEPRECATED HCO3 PLAS-SCNC: 25 MMOL/L (ref 22–29)
DEPRECATED HCO3 PLAS-SCNC: 27 MMOL/L (ref 22–29)
EOSINOPHIL # BLD MANUAL: 0 10E3/UL (ref 0–0.7)
EOSINOPHIL # BLD MANUAL: 0.1 10E3/UL (ref 0–0.7)
EOSINOPHIL # BLD MANUAL: 0.1 10E3/UL (ref 0–0.7)
EOSINOPHIL # BLD MANUAL: 0.3 10E3/UL (ref 0–0.7)
EOSINOPHIL # BLD MANUAL: 0.6 10E3/UL (ref 0–0.7)
EOSINOPHIL NFR BLD MANUAL: 10 %
EOSINOPHIL NFR BLD MANUAL: 12 %
EOSINOPHIL NFR BLD MANUAL: 4 %
EOSINOPHIL NFR BLD MANUAL: 5 %
EOSINOPHIL NFR BLD MANUAL: 6 %
ERYTHROCYTE [DISTWIDTH] IN BLOOD BY AUTOMATED COUNT: 13.7 % (ref 10–15)
ERYTHROCYTE [DISTWIDTH] IN BLOOD BY AUTOMATED COUNT: 14 % (ref 10–15)
ERYTHROCYTE [DISTWIDTH] IN BLOOD BY AUTOMATED COUNT: 14.6 % (ref 10–15)
ERYTHROCYTE [DISTWIDTH] IN BLOOD BY AUTOMATED COUNT: 15.6 % (ref 10–15)
ERYTHROCYTE [DISTWIDTH] IN BLOOD BY AUTOMATED COUNT: 16.2 % (ref 10–15)
GFR SERPL CREATININE-BSD FRML MDRD: >90 ML/MIN/1.73M2
GLUCOSE SERPL-MCNC: 129 MG/DL (ref 70–99)
GLUCOSE SERPL-MCNC: 153 MG/DL (ref 70–99)
GLUCOSE SERPL-MCNC: 154 MG/DL (ref 70–99)
GLUCOSE SERPL-MCNC: 155 MG/DL (ref 70–99)
GLUCOSE SERPL-MCNC: 170 MG/DL (ref 70–99)
HCT VFR BLD AUTO: 20.3 % (ref 35–47)
HCT VFR BLD AUTO: 22.8 % (ref 35–47)
HCT VFR BLD AUTO: 23.5 % (ref 35–47)
HCT VFR BLD AUTO: 23.7 % (ref 35–47)
HCT VFR BLD AUTO: 24.4 % (ref 35–47)
HGB BLD-MCNC: 6.6 G/DL (ref 11.7–15.7)
HGB BLD-MCNC: 7.4 G/DL (ref 11.7–15.7)
HGB BLD-MCNC: 7.7 G/DL (ref 11.7–15.7)
INTERPRETATION: NORMAL
ISCN: NORMAL
ISSUE DATE AND TIME: NORMAL
LAB DIRECTOR COMMENTS: NORMAL
LAB DIRECTOR DISCLAIMER: NORMAL
LAB DIRECTOR DISCLAIMER: NORMAL
LAB DIRECTOR INTERPRETATION: NORMAL
LAB DIRECTOR INTERPRETATION: NORMAL
LAB DIRECTOR METHODOLOGY: NORMAL
LAB DIRECTOR METHODOLOGY: NORMAL
LAB DIRECTOR RESULTS: NORMAL
LAB DIRECTOR RESULTS: NORMAL
LYMPHOCYTES # BLD MANUAL: 0 10E3/UL (ref 0.8–5.3)
LYMPHOCYTES # BLD MANUAL: 0.1 10E3/UL (ref 0.8–5.3)
LYMPHOCYTES # BLD MANUAL: 0.1 10E3/UL (ref 0.8–5.3)
LYMPHOCYTES # BLD MANUAL: 0.6 10E3/UL (ref 0.8–5.3)
LYMPHOCYTES # BLD MANUAL: 2.1 10E3/UL (ref 0.8–5.3)
LYMPHOCYTES NFR BLD MANUAL: 2 %
LYMPHOCYTES NFR BLD MANUAL: 22 %
LYMPHOCYTES NFR BLD MANUAL: 34 %
LYMPHOCYTES NFR BLD MANUAL: 5 %
LYMPHOCYTES NFR BLD MANUAL: 8 %
MCH RBC QN AUTO: 29.1 PG (ref 26.5–33)
MCH RBC QN AUTO: 29.1 PG (ref 26.5–33)
MCH RBC QN AUTO: 29.4 PG (ref 26.5–33)
MCH RBC QN AUTO: 29.4 PG (ref 26.5–33)
MCH RBC QN AUTO: 29.9 PG (ref 26.5–33)
MCHC RBC AUTO-ENTMCNC: 31.6 G/DL (ref 31.5–36.5)
MCHC RBC AUTO-ENTMCNC: 32.5 G/DL (ref 31.5–36.5)
MCHC RBC AUTO-ENTMCNC: 32.8 G/DL (ref 31.5–36.5)
MCV RBC AUTO: 90 FL (ref 78–100)
MCV RBC AUTO: 90 FL (ref 78–100)
MCV RBC AUTO: 91 FL (ref 78–100)
MCV RBC AUTO: 92 FL (ref 78–100)
MCV RBC AUTO: 92 FL (ref 78–100)
METAMYELOCYTES # BLD MANUAL: 0 10E3/UL
METAMYELOCYTES # BLD MANUAL: 0.1 10E3/UL
METAMYELOCYTES NFR BLD MANUAL: 1 %
METAMYELOCYTES NFR BLD MANUAL: 2 %
METHODS: NORMAL
MONOCYTES # BLD MANUAL: 0 10E3/UL (ref 0–1.3)
MONOCYTES # BLD MANUAL: 0.1 10E3/UL (ref 0–1.3)
MONOCYTES # BLD MANUAL: 0.1 10E3/UL (ref 0–1.3)
MONOCYTES # BLD MANUAL: 0.2 10E3/UL (ref 0–1.3)
MONOCYTES # BLD MANUAL: 0.4 10E3/UL (ref 0–1.3)
MONOCYTES NFR BLD MANUAL: 2 %
MONOCYTES NFR BLD MANUAL: 6 %
MONOCYTES NFR BLD MANUAL: 9 %
MYELOCYTES # BLD MANUAL: 0 10E3/UL
MYELOCYTES NFR BLD MANUAL: 1 %
NEUTROPHILS # BLD MANUAL: 1 10E3/UL (ref 1.6–8.3)
NEUTROPHILS # BLD MANUAL: 1.3 10E3/UL (ref 1.6–8.3)
NEUTROPHILS # BLD MANUAL: 1.5 10E3/UL (ref 1.6–8.3)
NEUTROPHILS # BLD MANUAL: 1.6 10E3/UL (ref 1.6–8.3)
NEUTROPHILS # BLD MANUAL: 2.4 10E3/UL (ref 1.6–8.3)
NEUTROPHILS NFR BLD MANUAL: 38 %
NEUTROPHILS NFR BLD MANUAL: 53 %
NEUTROPHILS NFR BLD MANUAL: 81 %
NEUTROPHILS NFR BLD MANUAL: 83 %
NEUTROPHILS NFR BLD MANUAL: 87 %
NRBC # BLD AUTO: 0.4 10E3/UL
NRBC BLD MANUAL-RTO: 7 %
OTHER CELLS # BLD MANUAL: 0.2 10E3/UL
OTHER CELLS NFR BLD MANUAL: 6 %
PATH REPORT.COMMENTS IMP SPEC: ABNORMAL
PATH REPORT.COMMENTS IMP SPEC: YES
PATH REPORT.COMMENTS IMP SPEC: YES
PATH REPORT.FINAL DX SPEC: ABNORMAL
PATH REPORT.FINAL DX SPEC: ABNORMAL
PATH REPORT.GROSS SPEC: ABNORMAL
PATH REPORT.MICROSCOPIC SPEC OTHER STN: ABNORMAL
PATH REPORT.RELEVANT HX SPEC: ABNORMAL
PATH REPORT.RELEVANT HX SPEC: ABNORMAL
PATH REV: ABNORMAL
PLAT MORPH BLD: ABNORMAL
PLATELET # BLD AUTO: 22 10E3/UL (ref 150–450)
PLATELET # BLD AUTO: 30 10E3/UL (ref 150–450)
PLATELET # BLD AUTO: 32 10E3/UL (ref 150–450)
PLATELET # BLD AUTO: 43 10E3/UL (ref 150–450)
PLATELET # BLD AUTO: 58 10E3/UL (ref 150–450)
POLYCHROMASIA BLD QL SMEAR: SLIGHT
POTASSIUM SERPL-SCNC: 2.7 MMOL/L (ref 3.4–5.3)
POTASSIUM SERPL-SCNC: 3.1 MMOL/L (ref 3.4–5.3)
POTASSIUM SERPL-SCNC: 3.4 MMOL/L (ref 3.4–5.3)
POTASSIUM SERPL-SCNC: 3.5 MMOL/L (ref 3.4–5.3)
POTASSIUM SERPL-SCNC: 3.8 MMOL/L (ref 3.4–5.3)
PROT SERPL-MCNC: 6.2 G/DL (ref 6.4–8.3)
PROT SERPL-MCNC: 6.4 G/DL (ref 6.4–8.3)
RBC # BLD AUTO: 2.21 10E6/UL (ref 3.8–5.2)
RBC # BLD AUTO: 2.54 10E6/UL (ref 3.8–5.2)
RBC # BLD AUTO: 2.62 10E6/UL (ref 3.8–5.2)
RBC # BLD AUTO: 2.62 10E6/UL (ref 3.8–5.2)
RBC # BLD AUTO: 2.65 10E6/UL (ref 3.8–5.2)
RBC MORPH BLD: ABNORMAL
SIGNIFICANT RESULTS: NORMAL
SIROLIMUS BLD-MCNC: 8.1 UG/L (ref 5–15)
SODIUM SERPL-SCNC: 138 MMOL/L (ref 136–145)
SODIUM SERPL-SCNC: 139 MMOL/L (ref 136–145)
SODIUM SERPL-SCNC: 139 MMOL/L (ref 136–145)
SODIUM SERPL-SCNC: 140 MMOL/L (ref 136–145)
SODIUM SERPL-SCNC: 142 MMOL/L (ref 136–145)
SPECIMEN DESCRIPTION: NORMAL
SPECIMEN EXPIRATION DATE: NORMAL
STOMATOCYTES BLD QL SMEAR: SLIGHT
TEST DETAILS, MDL: NORMAL
TME LAST DOSE: NORMAL H
TME LAST DOSE: NORMAL H
UNIT ABO/RH: NORMAL
UNIT ABO/RH: NORMAL
UNIT NUMBER: NORMAL
UNIT STATUS: NORMAL
UNIT TYPE ISBT: 2800
UNIT TYPE ISBT: 9500
WBC # BLD AUTO: 1.2 10E3/UL (ref 4–11)
WBC # BLD AUTO: 1.6 10E3/UL (ref 4–11)
WBC # BLD AUTO: 1.8 10E3/UL (ref 4–11)
WBC # BLD AUTO: 2.8 10E3/UL (ref 4–11)
WBC # BLD AUTO: 6.3 10E3/UL (ref 4–11)

## 2023-01-01 PROCEDURE — G0463 HOSPITAL OUTPT CLINIC VISIT: HCPCS | Mod: 25

## 2023-01-01 PROCEDURE — 85007 BL SMEAR W/DIFF WBC COUNT: CPT

## 2023-01-01 PROCEDURE — 36592 COLLECT BLOOD FROM PICC: CPT

## 2023-01-01 PROCEDURE — G0463 HOSPITAL OUTPT CLINIC VISIT: HCPCS

## 2023-01-01 PROCEDURE — 85027 COMPLETE CBC AUTOMATED: CPT

## 2023-01-01 PROCEDURE — 80048 BASIC METABOLIC PNL TOTAL CA: CPT

## 2023-01-01 PROCEDURE — 86850 RBC ANTIBODY SCREEN: CPT

## 2023-01-01 PROCEDURE — 36591 DRAW BLOOD OFF VENOUS DEVICE: CPT

## 2023-01-01 PROCEDURE — 81450 HL NEO GSAP 5-50DNA/DNA&RNA: CPT

## 2023-01-01 PROCEDURE — 250N000011 HC RX IP 250 OP 636: Performed by: INTERNAL MEDICINE

## 2023-01-01 PROCEDURE — 99215 OFFICE O/P EST HI 40 MIN: CPT

## 2023-01-01 PROCEDURE — 86923 COMPATIBILITY TEST ELECTRIC: CPT

## 2023-01-01 PROCEDURE — 86901 BLOOD TYPING SEROLOGIC RH(D): CPT

## 2023-01-01 PROCEDURE — 99214 OFFICE O/P EST MOD 30 MIN: CPT

## 2023-01-01 PROCEDURE — 80195 ASSAY OF SIROLIMUS: CPT

## 2023-01-01 PROCEDURE — 80053 COMPREHEN METABOLIC PANEL: CPT

## 2023-01-01 PROCEDURE — 88291 CYTO/MOLECULAR REPORT: CPT | Performed by: MEDICAL GENETICS

## 2023-01-01 PROCEDURE — 88305 TISSUE EXAM BY PATHOLOGIST: CPT | Mod: 26 | Performed by: PATHOLOGY

## 2023-01-01 PROCEDURE — G0452 MOLECULAR PATHOLOGY INTERPR: HCPCS | Mod: 26 | Performed by: PATHOLOGY

## 2023-01-01 PROCEDURE — 88185 FLOWCYTOMETRY/TC ADD-ON: CPT

## 2023-01-01 PROCEDURE — 88305 TISSUE EXAM BY PATHOLOGIST: CPT | Mod: TC

## 2023-01-01 PROCEDURE — 88237 TISSUE CULTURE BONE MARROW: CPT

## 2023-01-01 PROCEDURE — 85097 BONE MARROW INTERPRETATION: CPT | Mod: GC | Performed by: PATHOLOGY

## 2023-01-01 PROCEDURE — 88311 DECALCIFY TISSUE: CPT | Mod: 26 | Performed by: PATHOLOGY

## 2023-01-01 PROCEDURE — 86923 COMPATIBILITY TEST ELECTRIC: CPT | Performed by: INTERNAL MEDICINE

## 2023-01-01 PROCEDURE — 88341 IMHCHEM/IMCYTCHM EA ADD ANTB: CPT | Mod: 26 | Performed by: PATHOLOGY

## 2023-01-01 PROCEDURE — 85060 BLOOD SMEAR INTERPRETATION: CPT | Mod: GC | Performed by: PATHOLOGY

## 2023-01-01 PROCEDURE — 88342 IMHCHEM/IMCYTCHM 1ST ANTB: CPT | Mod: 26 | Performed by: PATHOLOGY

## 2023-01-01 PROCEDURE — P9040 RBC LEUKOREDUCED IRRADIATED: HCPCS | Performed by: INTERNAL MEDICINE

## 2023-01-01 PROCEDURE — 36430 TRANSFUSION BLD/BLD COMPNT: CPT

## 2023-01-01 PROCEDURE — 81267 CHIMERISM ANAL NO CELL SELEC: CPT

## 2023-01-01 PROCEDURE — 88189 FLOWCYTOMETRY/READ 16 & >: CPT | Mod: GC | Performed by: PATHOLOGY

## 2023-01-01 PROCEDURE — 38222 DX BONE MARROW BX & ASPIR: CPT | Performed by: PHYSICIAN ASSISTANT

## 2023-01-01 PROCEDURE — 82310 ASSAY OF CALCIUM: CPT

## 2023-01-01 PROCEDURE — 250N000011 HC RX IP 250 OP 636

## 2023-01-01 PROCEDURE — 88271 CYTOGENETICS DNA PROBE: CPT

## 2023-01-01 PROCEDURE — 38222 DX BONE MARROW BX & ASPIR: CPT | Mod: RT | Performed by: PHYSICIAN ASSISTANT

## 2023-01-01 RX ORDER — ALBUTEROL SULFATE 90 UG/1
1-2 AEROSOL, METERED RESPIRATORY (INHALATION)
Status: CANCELLED
Start: 2023-01-01

## 2023-01-01 RX ORDER — METHYLPREDNISOLONE SODIUM SUCCINATE 125 MG/2ML
125 INJECTION, POWDER, LYOPHILIZED, FOR SOLUTION INTRAMUSCULAR; INTRAVENOUS
Status: CANCELLED
Start: 2023-01-01

## 2023-01-01 RX ORDER — POTASSIUM CHLORIDE 1500 MG/1
TABLET, EXTENDED RELEASE ORAL
Qty: 30 TABLET | Refills: 1 | Status: SHIPPED | OUTPATIENT
Start: 2023-01-01

## 2023-01-01 RX ORDER — DIPHENHYDRAMINE HYDROCHLORIDE 50 MG/ML
50 INJECTION INTRAMUSCULAR; INTRAVENOUS
Status: CANCELLED
Start: 2023-01-01

## 2023-01-01 RX ORDER — HEPARIN SODIUM,PORCINE 10 UNIT/ML
5 VIAL (ML) INTRAVENOUS
Status: DISCONTINUED | OUTPATIENT
Start: 2023-01-01 | End: 2023-01-01 | Stop reason: HOSPADM

## 2023-01-01 RX ORDER — PENTAMIDINE ISETHIONATE 300 MG/300MG
300 INHALANT RESPIRATORY (INHALATION) ONCE
Status: CANCELLED
Start: 2023-01-01 | End: 2023-01-01

## 2023-01-01 RX ORDER — MEPERIDINE HYDROCHLORIDE 25 MG/ML
25 INJECTION INTRAMUSCULAR; INTRAVENOUS; SUBCUTANEOUS EVERY 30 MIN PRN
Status: CANCELLED | OUTPATIENT
Start: 2023-01-01

## 2023-01-01 RX ORDER — HEPARIN SODIUM (PORCINE) LOCK FLUSH IV SOLN 100 UNIT/ML 100 UNIT/ML
5 SOLUTION INTRAVENOUS
Status: COMPLETED | OUTPATIENT
Start: 2023-01-01 | End: 2023-01-01

## 2023-01-01 RX ORDER — HEPARIN SODIUM,PORCINE 10 UNIT/ML
5 VIAL (ML) INTRAVENOUS ONCE
Status: COMPLETED | OUTPATIENT
Start: 2023-01-01 | End: 2023-01-01

## 2023-01-01 RX ORDER — HEPARIN SODIUM (PORCINE) LOCK FLUSH IV SOLN 100 UNIT/ML 100 UNIT/ML
5 SOLUTION INTRAVENOUS
Status: CANCELLED | OUTPATIENT
Start: 2023-01-01

## 2023-01-01 RX ORDER — HEPARIN SODIUM,PORCINE 10 UNIT/ML
5 VIAL (ML) INTRAVENOUS
Status: CANCELLED | OUTPATIENT
Start: 2023-01-01

## 2023-01-01 RX ORDER — ACYCLOVIR 800 MG/1
800 TABLET ORAL 2 TIMES DAILY
Qty: 60 TABLET | Refills: 3 | Status: SHIPPED | OUTPATIENT
Start: 2023-01-01

## 2023-01-01 RX ORDER — ALBUTEROL SULFATE 0.83 MG/ML
2.5 SOLUTION RESPIRATORY (INHALATION)
Status: CANCELLED | OUTPATIENT
Start: 2023-01-01

## 2023-01-01 RX ORDER — ALBUTEROL SULFATE 0.83 MG/ML
2.5 SOLUTION RESPIRATORY (INHALATION) ONCE
Status: CANCELLED
Start: 2023-01-01 | End: 2023-01-01

## 2023-01-01 RX ORDER — EPINEPHRINE 1 MG/ML
0.3 INJECTION, SOLUTION INTRAMUSCULAR; SUBCUTANEOUS EVERY 5 MIN PRN
Status: CANCELLED | OUTPATIENT
Start: 2023-01-01

## 2023-01-01 RX ADMIN — Medication 5 ML: at 09:14

## 2023-01-01 RX ADMIN — Medication 5 ML: at 07:45

## 2023-01-01 RX ADMIN — Medication 5 ML: at 06:37

## 2023-01-01 RX ADMIN — Medication 5 ML: at 13:27

## 2023-01-01 RX ADMIN — Medication 5 ML: at 13:36

## 2023-01-01 ASSESSMENT — PAIN SCALES - GENERAL
PAINLEVEL: NO PAIN (0)

## 2023-01-01 NOTE — LETTER
Date:January 2, 2023      Provider requested that no letter be sent. Do not send.       [facility]

## 2023-01-01 NOTE — LETTER
1/1/2023         RE: Yahaira Fuentes  [address] [facility] [address]        Dear Colleague,    Thank you for referring your patient, Yahaira Fuentes, to the [facility] BLOOD AND MARROW TRANSPLANT PROGRAM [place]. Please see a copy of my visit note below.      This encounter was opened in error. Please disregard.      Again, thank you for allowing me to participate in the care of your patient.        Sincerely,        BMT Infusion Nurse

## 2023-01-01 NOTE — PROGRESS NOTES
BMT/Cell Therapy Daily Progress Note  01/01/2023       Yahaira Fuentes is a 62 year old female with AML, undergoing a 7/8 matched unrelated allo HSCT, day +33.     HPI:     She is doing Ok today.  She ate poorly yesterday but feels better today and had a big breakfast.  No new rash, fevers, diarrhea or other changes.  She has no bleeding and is taking her meds without a problem.    Review of Systems                                                                                                                           10 point review with pertinent positive and negatives in HPI  PHYSICAL EXAM                                                                                                                                      Wt Readings from Last 4 Encounters:   01/01/23 94.8 kg (209 lb 1.6 oz)   12/30/22 94.9 kg (209 lb 4.8 oz)   12/28/22 94.8 kg (208 lb 14.4 oz)   12/27/22 94.9 kg (209 lb 4.8 oz)     KPS: 80     /83 (BP Location: Right arm, Patient Position: Sitting)   Pulse 98   Temp 98.5  F (36.9  C) (Axillary)   Resp 16   Wt 94.8 kg (209 lb 1.6 oz)   SpO2 99%   BMI 37.05 kg/m       General: NAD   Eyes: SHANIQUE, sclera anicteric   Mouth: masked  Lungs: CTA bilaterally  Cardiovascular: RRR, no M/R/G   Abdominal/Rectal: +BS, soft, NT, ND  Lymphatics: Bilateral thick ankles, nonpitting trace edema (baseline). Varicose veins bilaterally.   Skin: Petechiae on bilateral lower legs, maculopapular erythematous rash on bilateral upper arms, chest and upper back. Rash appears to be darkening without new rash.  Musculoskeletal: Muscle mass average  Additional Findings: Hammer site NT, no drainage.     Current aGVHD staging:  Skin 2 (~27% BSA), UGI 0, LGI 1, Liver 0 (keep in note through day +180 for allos)        LABS AND IMAGING: I have assessed all abnormal lab values for their clinical significance and any values considered clinically significant have been addressed in the assessment and plan.          Lab  Results   Component Value Date    WBC 1.2 (L) 01/01/2023    ANEU 1.0 (L) 01/01/2023    HGB 7.7 (L) 01/01/2023    HCT 23.7 (L) 01/01/2023    PLT 22 (LL) 01/01/2023     01/01/2023    POTASSIUM 3.1 (L) 01/01/2023    CHLORIDE 103 01/01/2023    CO2 25 01/01/2023     (H) 01/01/2023    BUN 8.9 01/01/2023    CR 0.60 01/01/2023    MAG 1.9 12/27/2022    INR 1.05 12/26/2022    BILITOTAL 0.2 12/26/2022    AST 19 12/26/2022    ALT 10 12/26/2022    ALKPHOS 74 12/26/2022    PROTTOTAL 6.2 (L) 12/26/2022    ALBUMIN 3.6 12/26/2022          SYSTEMS-BASED ASSESSMENT AND PLAN      Yahaira Fuentes is a 62 year old female with AML, undergoing allo HSCT, day +33, admission complicated by ongoing intermittent fevers 11/30/22-12/4/22, repeat fever 12/11 with positive blood culture.      BMT/IEC PROTOCOL for 2015-32  Chemo protocol:               Day -7: start Allopurinol              Day -6: Cytoxan + Fludarabine              Day -5 through -2: Fludarabine              Day -1: TBI x1              Day 0: Transplant              Day +3 and +4: Cytoxan              Day +5: start GCSF, MMF, and Sirolimus  - Restaging plan: per protocol.               *Day 21 bmbx done 12/20: Flow shows 4.5% myeloid blasts, FISH/cytogenetics pending.              *BMBX chimerisms: 95% donor              *Peripheral chimerisms: 96% donor  - (12/30) Requested BM bx be scheduled for next week (1/5) as requested by Dr. Dickinson during inpatient stay.     HEME/COAG  #GCSF until ANC >1500 x3 days.   # Chemotherapy induced pancytopenia - leukopenia, anemia, thrombocytopenia  - Transfusion parameters: hemoglobin <7, platelets <10  - Relevant thrombosis or bleeding history: superficial thromboses on OCP decades past. Most recent DVT of right LE (distal veins), 10/19/2022, holding lovenox given thrombocytopenia     ID/Neutropenic fevers  # Neutropenic Fevers: likely T-cell activation from graft, improved following PT Cytoxan. Cefepime 11/30-12/6. Infectious  work up unremarkable (BC, CXR, UC)               - New fever 12/10-12/11, on cefepime 12/11-12/14. UCx ngtd. RVP and Covid negative. CXR with streaky atelectasis at bases. CT with pleural effusions that are new.               # Blood cx from 12/11 positive on 12/13, started on Vanco. 12/14 showing Lactobacillus, vanco and cefepime discontinued. Zosyn 12/14-12/22.               - BCx 12/14-12/16 with ngtd              -12/22 switch from zosyn to Vantin 200mg BID until 12/28 for 14 day course antibiotics. Completed.               - Febrile again 12/26 with Tmax 102.6F. UA negative. CXR without evidence of acute infection, blood cultures in process. Procal and CRP slightly elevated. COVID and adeno are negative, HHV6 pending.   - Prophylaxis: ACV (CMV donor and recipient negative), Fluconazole, Levaquin held while on vantin, will likely not need ppx antibiotics at discharge as she is engrafted. Bactrim to start around Day 60 (reported stomach upset with past use, per PharmD note will try at day +28. Can hold if GI symptoms occur). (12/30) Pentamidine today, and trial Bactrim next month if GI symptoms have improved at that point so we do not exacerbate them now.      RISK OF GVHD  - Prophylaxis: PTCytoxan, Sirolimus, MMF  - Sirolimus level = 11.7. Dose continued 3mg q day. Level 12/26 is 8.1, keep dose same and recheck today. Plan to check again on 1/2.     CARDIOVASCULAR  - Vasovagal episode with bradycardia: 11/29 occurred around transplant time, improved without intervention  - Risk of cardiomyopathy:  Baseline EF 60-65%  - Risk of arrhythmia: Baseline EKG showed NSR and normal QTC     GI/NUTRITION:   - Ulcer prophylaxis: Protonix  - Risk of CINV: Ondansetron, Lorazepam and Compazine available prn   - moderate malnutrition-dietician to follow  # Diarrhea, c dif colonized: PO Vanco x14 days (12/2 -12/16). Daily metamucil started 12/8. Repeat C.diff 12/25 negative. (12/28) Recurrent loose stools, add metamucil.  Improving, soft not watery.              - Perirectal tenderness, surrounding skin denuded (improving). Topical lidocaine and zinc oxide ointment available.   # Mucositis - mouth pain: MMW available prn. improving  # Abdominal pain: improving.     RENAL/ELECTROLYTES/  - Risk of renal injury: Off cytoxan flush. Monitor need for IVF.  - Electrolyte management: replace per sliding scale  # Trace BLE edema (baseline): Weight stable without recent diruesis  #hypocalcemia: Improved. Stable.     SKIN:  -Pruritic RASH starting 12/24: topical benadryl, triamcinolone PRN. Appearing more like possible GVHD vs engraftment syndrome on 12/25, [id number] on discharge date. Consider skin biopsy in clinic.    (12/28): Skin biopsy today - right upper arm: dermatitis vs GVHD. Results pending. Stitch removal on 1/5.    Today's Summary:  Reviewed skin biopsy results with her  Discussed K rich foods to eat today  RTC Tuesday    Follow up with 1/13 with Tami requested with labs.     I spent 30 minutes in the care of this patient today, which included time necessary for preparation for the visit, obtaining history, ordering medications/tests/procedures as medically indicated, review of pertinent medical literature, counseling of the patient, communication of recommendations to the care team, and documentation time.    NICOLE RAMACHANDRAN MD

## 2023-01-01 NOTE — LETTER
1/1/2023         RE: Yahaira Fuentes  [address] [address]        Dear Colleague,    Thank you for referring your patient, Yahaira Fuentes, to the [facility]LO[facility]PLANT [facility] [place]. Please see a copy of my visit note below.    BMT/Cell Therapy Daily Progress Note  01/01/2023       Yahaira Fuentes is a 62 year old female with AML, undergoing a 7/8 matched unrelated allo HSCT, day +33.     HPI:     She is doing Ok today.  She ate poorly yesterday but feels better today and had a big breakfast.  No new rash, fevers, diarrhea or other changes.  She has no bleeding and is taking her meds without a problem.    Review of Systems                                                                                                                           10 point review with pertinent positive and negatives in HPI  PHYSICAL EXAM                                                                                                                                      Wt Readings from Last 4 Encounters:   01/01/23 94.8 kg (209 lb 1.6 oz)   12/30/22 94.9 kg (209 lb 4.8 oz)   12/28/22 94.8 kg (208 lb 14.4 oz)   12/27/22 94.9 kg (209 lb 4.8 oz)     KPS: 80     /83 (BP Location: Right arm, Patient Position: Sitting)   Pulse 98   Temp 98.5  F (36.9  C) (Axillary)   Resp 16   Wt 94.8 kg (209 lb 1.6 oz)   SpO2 99%   BMI 37.05 kg/m       General: NAD   Eyes: SHANIQUE, sclera anicteric   Mouth: masked  Lungs: CTA bilaterally  Cardiovascular: RRR, no M/R/G   Abdominal/Rectal: +BS, soft, NT, ND  Lymphatics: Bilateral thick ankles, nonpitting trace edema (baseline). Varicose veins bilaterally.   Skin: Petechiae on bilateral lower legs, maculopapular erythematous rash on bilateral upper arms, chest and upper back. Rash appears to be darkening without new rash.  Musculoskeletal: Muscle mass average  Additional Findings: Hammer site NT, no drainage.     Current aGVHD staging:  Skin 2  (~27% BSA), UGI 0, LGI 1, Liver 0 (keep in note through day +180 for allos)        LABS AND IMAGING: I have assessed all abnormal lab values for their clinical significance and any values considered clinically significant have been addressed in the assessment and plan.          Lab Results   Component Value Date    WBC 1.2 (L) 01/01/2023    ANEU 1.0 (L) 01/01/2023    HGB 7.7 (L) 01/01/2023    HCT 23.7 (L) 01/01/2023    PLT 22 (LL) 01/01/2023     01/01/2023    POTASSIUM 3.1 (L) 01/01/2023    CHLORIDE 103 01/01/2023    CO2 25 01/01/2023     (H) 01/01/2023    BUN 8.9 01/01/2023    CR 0.60 01/01/2023    MAG 1.9 12/27/2022    INR 1.05 12/26/2022    BILITOTAL 0.2 12/26/2022    AST 19 12/26/2022    ALT 10 12/26/2022    ALKPHOS 74 12/26/2022    PROTTOTAL 6.2 (L) 12/26/2022    ALBUMIN 3.6 12/26/2022          SYSTEMS-BASED ASSESSMENT AND PLAN      Yahaira Fuentes is a 62 year old female with AML, undergoing allo HSCT, day +33, admission complicated by ongoing intermittent fevers 11/30/22-12/4/22, repeat fever 12/11 with positive blood culture.      BMT/IEC PROTOCOL for 2015-32  Chemo protocol:               Day -7: start Allopurinol              Day -6: Cytoxan + Fludarabine              Day -5 through -2: Fludarabine              Day -1: TBI x1              Day 0: Transplant              Day +3 and +4: Cytoxan              Day +5: start GCSF, MMF, and Sirolimus  - Restaging plan: per protocol.               *Day 21 bmbx done 12/20: Flow shows 4.5% myeloid blasts, FISH/cytogenetics pending.              *BMBX chimerisms: 95% donor              *Peripheral chimerisms: 96% donor  - (12/30) Requested BM bx be scheduled for next week (1/5) as requested by Dr. Dickinson during inpatient stay.     HEME/COAG  #GCSF until ANC >1500 x3 days.   # Chemotherapy induced pancytopenia - leukopenia, anemia, thrombocytopenia  - Transfusion parameters: hemoglobin <7, platelets <10  - Relevant thrombosis or bleeding history:  superficial thromboses on OCP decades past. Most recent DVT of right LE (distal veins), 10/19/2022, holding lovenox given thrombocytopenia     ID/Neutropenic fevers  # Neutropenic Fevers: likely T-cell activation from graft, improved following PT Cytoxan. Cefepime 11/30-12/6. Infectious work up unremarkable (BC, CXR, UC)               - New fever 12/10-12/11, on cefepime 12/11-12/14. UCx ngtd. RVP and Covid negative. CXR with streaky atelectasis at bases. CT with pleural effusions that are new.               # Blood cx from 12/11 positive on 12/13, started on Vanco. 12/14 showing Lactobacillus, vanco and cefepime discontinued. Zosyn 12/14-12/22.               - BCx 12/14-12/16 with ngtd              -12/22 switch from zosyn to Vantin 200mg BID until 12/28 for 14 day course antibiotics. Completed.               - Febrile again 12/26 with Tmax 102.6F. UA negative. CXR without evidence of acute infection, blood cultures in process. Procal and CRP slightly elevated. COVID and adeno are negative, HHV6 pending.   - Prophylaxis: ACV (CMV donor and recipient negative), Fluconazole, Levaquin held while on vantin, will likely not need ppx antibiotics at discharge as she is engrafted. Bactrim to start around Day 60 (reported stomach upset with past use, per PharmD note will try at day +28. Can hold if GI symptoms occur). (12/30) Pentamidine today, and trial Bactrim next month if GI symptoms have improved at that point so we do not exacerbate them now.      RISK OF GVHD  - Prophylaxis: PTCytoxan, Sirolimus, MMF  - Sirolimus level = 11.7. Dose continued 3mg q day. Level 12/26 is 8.1, keep dose same and recheck today. Plan to check again on 1/2.     CARDIOVASCULAR  - Vasovagal episode with bradycardia: 11/29 occurred around transplant time, improved without intervention  - Risk of cardiomyopathy:  Baseline EF 60-65%  - Risk of arrhythmia: Baseline EKG showed NSR and normal QTC     GI/NUTRITION:   - Ulcer prophylaxis: Protonix  -  Risk of CINV: Ondansetron, Lorazepam and Compazine available prn   - moderate malnutrition-dietician to follow  # Diarrhea, c dif colonized: PO Vanco x14 days (12/2 -12/16). Daily metamucil started 12/8. Repeat C.diff 12/25 negative. (12/28) Recurrent loose stools, add metamucil. Improving, soft not watery.              - Perirectal tenderness, surrounding skin denuded (improving). Topical lidocaine and zinc oxide ointment available.   # Mucositis - mouth pain: MMW available prn. improving  # Abdominal pain: improving.     RENAL/ELECTROLYTES/  - Risk of renal injury: Off cytoxan flush. Monitor need for IVF.  - Electrolyte management: replace per sliding scale  # Trace BLE edema (baseline): Weight stable without recent diruesis  #hypocalcemia: Improved. Stable.     SKIN:  -Pruritic RASH starting 12/24: topical benadryl, triamcinolone PRN. Appearing more like possible GVHD vs engraftment syndrome on 12/25, [id number] on discharge date. Consider skin biopsy in clinic.    (12/28): Skin biopsy today - right upper arm: dermatitis vs GVHD. Results pending. Stitch removal on 1/5.    Today's Summary:  Reviewed skin biopsy results with her  Discussed K rich foods to eat today  RTC Tuesday    Follow up with 1/13 with Tami requested with labs.     I spent 30 minutes in the care of this patient today, which included time necessary for preparation for the visit, obtaining history, ordering medications/tests/procedures as medically indicated, review of pertinent medical literature, counseling of the patient, communication of recommendations to the care team, and documentation time.    NICOLE RAMACHANDRAN MD

## 2023-01-03 NOTE — PROGRESS NOTES
"BMT/Cell Therapy Daily Progress Note  01/03/2023       Yahaira Fuentes is a 62 year old female with AML, undergoing a 7/8 matched unrelated allo HSCT, day +35.     HPI:   Dry mouth; post nasal drip (states she always has had a \"mucus problem\").  Occasional cough with the drip.  Rash is much improved on TMC cream bid. No vomiting, no diarrhea.  No nausea, but appetite is poor.  Struggling to find high potassium foods, since she can't go grocery shopping herself.     Review of Systems                                                                                                                           8 point review with pertinent positive and negatives in HPI  PHYSICAL EXAM                                                                                                                                      Wt Readings from Last 4 Encounters:   01/03/23 93.9 kg (207 lb)   01/01/23 94.8 kg (209 lb 1.6 oz)   12/30/22 94.9 kg (209 lb 4.8 oz)   12/28/22 94.8 kg (208 lb 14.4 oz)     KPS: 80     /59 (BP Location: Left arm, Patient Position: Sitting, Cuff Size: Adult Large)   Pulse 102   Temp 99.3  F (37.4  C) (Axillary)   Resp 16   Wt 93.9 kg (207 lb)   SpO2 98%   BMI 36.68 kg/m       General: NAD   Eyes: SHANIQUE, sclera anicteric   Mouth: no oral lesions; not dry appearing  Lungs: CTA bilaterally  Cardiovascular: RRR, no M/R/G   Abdominal/Rectal: +BS, soft, NT, ND  Lymphatics: Bilateral thick ankles, nonpitting trace edema (baseline). Varicose veins bilaterally.   Skin: rash bilateral arms; improving per patient (my first time viewing 1/3)  Musculoskeletal: Muscle mass average  Additional Findings: Hammer site NT, no drainage.     Current aGVHD staging:  Skin 1 (~18% BSA), UGI 0, LGI 1, Liver 0 (keep in note through day +180 for allos)        LABS AND IMAGING: I have assessed all abnormal lab values for their clinical significance and any values considered clinically significant have been addressed in the " assessment and plan.          Lab Results   Component Value Date    WBC 1.6 (L) 01/03/2023    ANEU 1.3 (L) 01/03/2023    HGB 7.7 (L) 01/03/2023    HCT 23.5 (L) 01/03/2023    PLT 30 (LL) 01/03/2023     01/03/2023    POTASSIUM 2.7 (L) 01/03/2023    CHLORIDE 109 (H) 01/03/2023    CO2 23 01/03/2023     (H) 01/03/2023    BUN 8.1 01/03/2023    CR 0.53 01/03/2023    MAG 1.9 12/27/2022    INR 1.05 12/26/2022    BILITOTAL 0.2 12/26/2022    AST 19 12/26/2022    ALT 10 12/26/2022    ALKPHOS 74 12/26/2022    PROTTOTAL 6.2 (L) 12/26/2022    ALBUMIN 3.6 12/26/2022          SYSTEMS-BASED ASSESSMENT AND PLAN      Yahaira Fuentes is a 62 year old female with AML, undergoing allo HSCT, day +35, admission complicated by ongoing intermittent fevers 11/30/22-12/4/22, repeat fever 12/11 with positive blood culture.      BMT/IEC PROTOCOL for 2015-32  Chemo protocol:               Day -7: start Allopurinol              Day -6: Cytoxan + Fludarabine              Day -5 through -2: Fludarabine              Day -1: TBI x1              Day 0: Transplant              Day +3 and +4: Cytoxan              Day +5: start GCSF, MMF, and Sirolimus  - Restaging plan: per protocol.               *Day 21 bmbx done 12/20: Flow shows 4.5% myeloid blasts, FISH/cytogenetics pending.              *BMBX chimerisms: 95% donor              *Peripheral chimerisms: 96% donor  - BMBX 1/5 per Dr. Dickinson.     HEME/COAG  #GCSF until ANC >1500 x3 days; missed 1/3/23, but that might be good given upcoming bone marrow biopsy 1/5 (and plts have started to auto recover).  # Chemotherapy induced pancytopenia - leukopenia, anemia, thrombocytopenia  - Transfusion parameters: hemoglobin <7, platelets <10  - Relevant thrombosis or bleeding history: superficial thromboses on OCP decades past. Most recent DVT of right LE (distal veins), 10/19/2022, holding lovenox given thrombocytopenia     ID/Neutropenic fevers  # Neutropenic Fevers:    11/30: likely T-cell  activation from graft, improved following PT Cytoxan. Cefepime 11/30-12/6. ID w/u negative.             12/10-12/11: on cefepime 12/11-12/14. ID w/u negative; CT showed new pleural effusions.   12/26: ID w/u negative.   #Lactobacillus bacteremia:  S/p zosyn (12/14-22) and cefpodoxime (12/22-12/28).      Prophylaxis: ACV (CMV donor and recipient negative), Fluconazole; Pentamidine given 12/30; try to change to Bactrim next month if GI issues are improved.       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  Improving on TMC cream.  Needs stitch removed ~1/5.  - Prophylaxis: PTCytoxan, Sirolimus, MMF; stop MMF 1/3/23  - Sirolimus level due today, but not collected as she took her med before blood draw.      CARDIOVASCULAR  - Vasovagal episode with bradycardia: 11/29 occurred around transplant time, improved without intervention  - Risk of cardiomyopathy:  Baseline EF 60-65%  - Risk of arrhythmia: Baseline EKG showed NSR and normal QTC     GI/NUTRITION:   - Ulcer prophylaxis: Protonix  - Risk of CINV: Ondansetron, Lorazepam and Compazine available prn   - moderate malnutrition-dietician to follow  # Diarrhea, c dif colonized: PO Vanco x14 days (12/2 -12/16). Daily metamucil started 12/8. Repeat C.diff 12/25 negative. Continue metamucil, which is keeping stools bulked.   # Perirectal tenderness, surrounding skin denuded (improving). Topical lidocaine and zinc oxide ointment available.  Not discussed 1/3.   # Abdominal pain: improving.  # Xeristomia: declines biotene.      RENAL/ELECTROLYTES/  - Risk of renal injury: Off cytoxan flush. Monitor need for IVF.  - Electrolyte management: replace per sliding scale  - Hypokalemia, etiology unclear.  Maybe poor PO intake?  Declines IV potassium repletion; will give 60mEq PO on 1/3; 40mEq PO on 1/4 and 20 meQ PO on 1/5; readdress at 1/5 visit.     Today's Summary:  Potassium repletion  Stop MMF after today's dose  Increase potassium containing food  BMBx 1/5 with provider visit;  readdress potassium needs (has rx for 30 pills)    Follow up with 1/13 with Tami requested with labs.     I spent 30 minutes in the care of this patient today, which included time necessary for preparation for the visit, obtaining history, ordering medications/tests/procedures as medically indicated, review of pertinent medical literature, counseling of the patient, communication of recommendations to the care team, and documentation time.    Damaris Dela Cruz PA-C

## 2023-01-03 NOTE — NURSING NOTE
Chief Complaint   Patient presents with     Blood Draw     Vitals taken, CVC labs drawn, heparin locked, checked into next appt     /59 (BP Location: Left arm, Patient Position: Sitting, Cuff Size: Adult Large)   Pulse 102   Temp 99.3  F (37.4  C) (Axillary)   Resp 16   Wt 93.9 kg (207 lb)   SpO2 98%   BMI 36.68 kg/m    Darren Torres RN on 1/3/2023 at 9:16 AM

## 2023-01-03 NOTE — LETTER
"    1/3/2023         RE: Yahaira Fuentes  [address] [facility] [address]        Dear Colleague,    Thank you for referring your patient, Yahaira Fuentes, to the [facility] BLOOD AND MARROW TRANSPLANT PROGRAM [place]. Please see a copy of my visit note below.    BMT/Cell Therapy Daily Progress Note  01/03/2023       Yahaira Fuentes is a 62 year old female with AML, undergoing a 7/8 matched unrelated allo HSCT, day +35.     HPI:   Dry mouth; post nasal drip (states she always has had a \"mucus problem\").  Occasional cough with the drip.  Rash is much improved on TMC cream bid. No vomiting, no diarrhea.  No nausea, but appetite is poor.  Struggling to find high potassium foods, since she can't go grocery shopping herself.     Review of Systems                                                                                                                           8 point review with pertinent positive and negatives in HPI  PHYSICAL EXAM                                                                                                                                      Wt Readings from Last 4 Encounters:   01/03/23 93.9 kg (207 lb)   01/01/23 94.8 kg (209 lb 1.6 oz)   12/30/22 94.9 kg (209 lb 4.8 oz)   12/28/22 94.8 kg (208 lb 14.4 oz)     KPS: 80     /59 (BP Location: Left arm, Patient Position: Sitting, Cuff Size: Adult Large)   Pulse 102   Temp 99.3  F (37.4  C) (Axillary)   Resp 16   Wt 93.9 kg (207 lb)   SpO2 98%   BMI 36.68 kg/m       General: NAD   Eyes: SHANIQUE, sclera anicteric   Mouth: no oral lesions; not dry appearing  Lungs: CTA bilaterally  Cardiovascular: RRR, no M/R/G   Abdominal/Rectal: +BS, soft, NT, ND  Lymphatics: Bilateral thick ankles, nonpitting trace edema (baseline). Varicose veins bilaterally.   Skin: rash bilateral arms; improving per patient (my first time viewing 1/3)  Musculoskeletal: Muscle mass average  Additional Findings: Hammer site NT, no " drainage.     Current aGVHD staging:  Skin 1 (~18% BSA), UGI 0, LGI 1, Liver 0 (keep in note through day +180 for allos)        LABS AND IMAGING: I have assessed all abnormal lab values for their clinical significance and any values considered clinically significant have been addressed in the assessment and plan.          Lab Results   Component Value Date    WBC 1.6 (L) 01/03/2023    ANEU 1.3 (L) 01/03/2023    HGB 7.7 (L) 01/03/2023    HCT 23.5 (L) 01/03/2023    PLT 30 (LL) 01/03/2023     01/03/2023    POTASSIUM 2.7 (L) 01/03/2023    CHLORIDE 109 (H) 01/03/2023    CO2 23 01/03/2023     (H) 01/03/2023    BUN 8.1 01/03/2023    CR 0.53 01/03/2023    MAG 1.9 12/27/2022    INR 1.05 12/26/2022    BILITOTAL 0.2 12/26/2022    AST 19 12/26/2022    ALT 10 12/26/2022    ALKPHOS 74 12/26/2022    PROTTOTAL 6.2 (L) 12/26/2022    ALBUMIN 3.6 12/26/2022          SYSTEMS-BASED ASSESSMENT AND PLAN      Yahaira Fuentes is a 62 year old female with AML, undergoing allo HSCT, day +35, admission complicated by ongoing intermittent fevers 11/30/22-12/4/22, repeat fever 12/11 with positive blood culture.      BMT/IEC PROTOCOL for 2015-32  Chemo protocol:               Day -7: start Allopurinol              Day -6: Cytoxan + Fludarabine              Day -5 through -2: Fludarabine              Day -1: TBI x1              Day 0: Transplant              Day +3 and +4: Cytoxan              Day +5: start GCSF, MMF, and Sirolimus  - Restaging plan: per protocol.               *Day 21 bmbx done 12/20: Flow shows 4.5% myeloid blasts, FISH/cytogenetics pending.              *BMBX chimerisms: 95% donor              *Peripheral chimerisms: 96% donor  - BMBX 1/5 per Dr. Dickinson.     HEME/COAG  #GCSF until ANC >1500 x3 days; missed 1/3/23, but that might be good given upcoming bone marrow biopsy 1/5 (and plts have started to auto recover).  # Chemotherapy induced pancytopenia - leukopenia, anemia, thrombocytopenia  - Transfusion  parameters: hemoglobin <7, platelets <10  - Relevant thrombosis or bleeding history: superficial thromboses on OCP decades past. Most recent DVT of right LE (distal veins), 10/19/2022, holding lovenox given thrombocytopenia     ID/Neutropenic fevers  # Neutropenic Fevers:    11/30: likely T-cell activation from graft, improved following PT Cytoxan. Cefepime 11/30-12/6. ID w/u negative.             12/10-12/11: on cefepime 12/11-12/14. ID w/u negative; CT showed new pleural effusions.   12/26: ID w/u negative.   #Lactobacillus bacteremia:  S/p zosyn (12/14-22) and cefpodoxime (12/22-12/28).      Prophylaxis: ACV (CMV donor and recipient negative), Fluconazole; Pentamidine given 12/30; try to change to Bactrim next month if GI issues are improved.       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  Improving on TMC cream.  Needs stitch removed ~1/5.  - Prophylaxis: PTCytoxan, Sirolimus, MMF; stop MMF 1/3/23  - Sirolimus level due today, but not collected as she took her med before blood draw.      CARDIOVASCULAR  - Vasovagal episode with bradycardia: 11/29 occurred around transplant time, improved without intervention  - Risk of cardiomyopathy:  Baseline EF 60-65%  - Risk of arrhythmia: Baseline EKG showed NSR and normal QTC     GI/NUTRITION:   - Ulcer prophylaxis: Protonix  - Risk of CINV: Ondansetron, Lorazepam and Compazine available prn   - moderate malnutrition-dietician to follow  # Diarrhea, c dif colonized: PO Vanco x14 days (12/2 -12/16). Daily metamucil started 12/8. Repeat C.diff 12/25 negative. Continue metamucil, which is keeping stools bulked.   # Perirectal tenderness, surrounding skin denuded (improving). Topical lidocaine and zinc oxide ointment available.  Not discussed 1/3.   # Abdominal pain: improving.  # Xeristomia: declines biotene.      RENAL/ELECTROLYTES/  - Risk of renal injury: Off cytoxan flush. Monitor need for IVF.  - Electrolyte management: replace per sliding scale  - Hypokalemia,  etiology unclear.  Maybe poor PO intake?  Declines IV potassium repletion; will give 60mEq PO on 1/3; 40mEq PO on 1/4 and 20 meQ PO on 1/5; readdress at 1/5 visit.     Today's Summary:  Potassium repletion  Stop MMF after today's dose  Increase potassium containing food  BMBx 1/5 with provider visit; readdress potassium needs (has rx for 30 pills)    Follow up with 1/13 with Tami requested with labs.     I spent 30 minutes in the care of this patient today, which included time necessary for preparation for the visit, obtaining history, ordering medications/tests/procedures as medically indicated, review of pertinent medical literature, counseling of the patient, communication of recommendations to the care team, and documentation time.    Damaris Dela Cruz PA-C

## 2023-01-03 NOTE — NURSING NOTE
"Oncology Rooming Note    January 3, 2023 9:25 AM   Yahaira Fuentes is a 62 year old female who presents for:    Chief Complaint   Patient presents with     Blood Draw     Vitals taken, CVC labs drawn, heparin locked, checked into next appt     Oncology Clinic Visit     Acute Myeloid Leukemia     Initial Vitals: /59 (BP Location: Left arm, Patient Position: Sitting, Cuff Size: Adult Large)   Pulse 102   Temp 99.3  F (37.4  C) (Axillary)   Resp 16   Wt 93.9 kg (207 lb)   SpO2 98%   BMI 36.68 kg/m   Estimated body mass index is 36.68 kg/m  as calculated from the following:    Height as of 11/22/22: 1.6 m (5' 2.99\").    Weight as of this encounter: 93.9 kg (207 lb). Body surface area is 2.04 meters squared.  No Pain (0) Comment: Data Unavailable   No LMP recorded. Patient has had a hysterectomy.  Allergies reviewed: Yes  Medications reviewed: Yes    Medications: Medication refills not needed today.  Pharmacy name entered into [employer]:    [id number] - [place], [place] - [address] AT [facility] [address]  [place], [place] - [address] [place] [address]    Clinical concerns: Pt presents today for f/u.       Mila Goodson LPN  1/3/2023              "

## 2023-01-05 NOTE — PROGRESS NOTES
BMT ONC Adult Bone Marrow Biopsy Procedure Note  January 5, 2023  /81   Pulse 110   Temp 99.4  F (37.4  C)   Resp 16   Wt 94.5 kg (208 lb 6.4 oz)   SpO2 99%   BMI 36.93 kg/m       Learning needs assessment complete within 12 months? YES    DIAGNOSIS: AML     PROCEDURE: Unilateral Bone Marrow Biopsy and Unilateral Aspirate    LOCATION: [facility] 2[facility]    Patient s identification was positively verified by verbal identification and invasive procedure safety checklist was completed. Informed consent was obtained. Following the administration of no IV meds as pre-medication, patient was placed in the right lateral decubitus position and prepped and draped in a sterile manner. Approximately 15 cc of 1% Lidocaine was used over the right posterior iliac spine. Following this a 3 mm incision was made. Trephine bone marrow core(s) was (were) obtained from the [facility]. Bone marrow aspirates were obtained from the [facility]. Aspirates were sent for morphology, immunophenotyping, cytogenetics, molecular diagnostics RFLP and gene rearrangement. A total of approximately 20 ml of marrow was aspirated. Following this procedure a sterile dressing was applied to the bone marrow biopsy site(s). The patient was placed in the supine position to maintain pressure on the biopsy site. Post-procedure wound care instructions were given.     Complications: NO    Pre-procedural pain: n/a out of 10 on the numeric pain rating scale.     Procedural pain: n/a out of 10 on the numeric pain rating scale.     Post-procedural pain assessment: n/a out of 10 on the numeric pain rating scale.  rating scale.     Interventions: NO    Length of procedure:20 minutes or less      Procedure performed by: Joshua Pierson PA-C and Aleyda Amado PA-C

## 2023-01-05 NOTE — LETTER
1/5/2023         RE: Yahaira Fuentes  [address] [facility] [address]        Dear Colleague,    Thank you for referring your patient, Yahaira Fuentes, to the [facility] BLOOD AND MARROW TRANSPLANT PROGRAM [place]. Please see a copy of my visit note below.    BMT/Cell Therapy Daily Progress Note  01/05/2023       Yahaira Fuentes is a 62 year old female with AML, undergoing a 7/8 matched unrelated allo HSCT, day +37.     HPI: Rebeka returns for follow up. After some confusion with meds at discharge she will meet with pharmD today review her box. Rash resolved off creams. No n/v/d. No fevers, cough or cold symptoms. She is anticipating her bmbx today.    Review of Systems                                                                                                                           8 point review with pertinent positive and negatives in HPI  PHYSICAL EXAM                                                                                                                                      Wt Readings from Last 4 Encounters:   01/05/23 94.5 kg (208 lb 5.4 oz)   01/03/23 93.9 kg (207 lb)   01/01/23 94.8 kg (209 lb 1.6 oz)   12/30/22 94.9 kg (209 lb 4.8 oz)     KPS: 80     /81   Pulse 110   Temp 99.4  F (37.4  C)   Resp 16   Wt 94.5 kg (208 lb 5.4 oz)   SpO2 99%   BMI 36.91 kg/m       General: NAD   Eyes: SHANIQUE, sclera anicteric   Mouth: no oral lesions; not dry appearing  Lungs: CTA bilaterally  Cardiovascular: RRR, no M/R/G   Abdominal/Rectal: +BS, soft, NT, ND  Lymphatics: Bilateral thick ankles, nonpitting trace edema (baseline). Varicose veins bilaterally.   Skin: faint maculopapular erythema generally to bilateral arms, nearly gone  Musculoskeletal: Muscle mass average  Additional Findings: Hammer site NT, no drainage.     Current aGVHD staging:  Skin 0 (was ~18% BSA, now resolved), UGI 0, LGI 1, Liver 0 (keep in note through day +180 for allos)        LABS AND IMAGING: I  have assessed all abnormal lab values for their clinical significance and any values considered clinically significant have been addressed in the assessment and plan.          Lab Results   Component Value Date    WBC 1.8 (L) 01/05/2023    ANEU 1.6 01/05/2023    HGB 7.4 (L) 01/05/2023    HCT 22.8 (L) 01/05/2023    PLT 32 (LL) 01/05/2023     01/05/2023    POTASSIUM 3.8 01/05/2023    CHLORIDE 104 01/05/2023    CO2 23 01/05/2023     (H) 01/05/2023    BUN 8.5 01/05/2023    CR 0.68 01/05/2023    MAG 1.9 12/27/2022    INR 1.05 12/26/2022    BILITOTAL 0.3 01/05/2023    AST 29 01/05/2023    ALT 18 01/05/2023    ALKPHOS 64 01/05/2023    PROTTOTAL 6.2 (L) 01/05/2023    ALBUMIN 3.7 01/05/2023          SYSTEMS-BASED ASSESSMENT AND PLAN      Yahaira Fuentes is a 62 year old female with AML, undergoing allo HSCT, day +37, admission complicated by ongoing intermittent fevers 11/30/22-12/4/22, repeat fever 12/11 with positive blood culture.      BMT/IEC PROTOCOL for 2015-32  Chemo protocol:               Day -7: start Allopurinol              Day -6: Cytoxan + Fludarabine              Day -5 through -2: Fludarabine              Day -1: TBI x1              Day 0: Transplant              Day +3 and +4: Cytoxan              Day +5: start GCSF, MMF, and Sirolimus  - Restaging plan: per protocol.               *Day 21 bmbx done 12/20: Flow shows 4.5% myeloid blasts, FISH/cytogenetics pending.              *BMBX chimerisms: 95% donor              *Peripheral chimerisms: 96% donor  - BMBX 1/5 per Dr. Dickinson done today     HEME/COAG  #GCSF until ANC >1500 x3 days; missed 1/3/23, but that might be good given upcoming bone marrow biopsy 1/5 (and plts have started to auto recover). WBC up today on its own.  # Chemotherapy induced pancytopenia - leukopenia, anemia, thrombocytopenia  - Transfusion parameters: hemoglobin <7, platelets <10  - Relevant thrombosis or bleeding history: superficial thromboses on OCP decades past.  Most recent DVT of right LE (distal veins), 10/19/2022, holding lovenox given thrombocytopenia     ID/Neutropenic fevers  # Neutropenic Fevers:    11/30: likely T-cell activation from graft, improved following PT Cytoxan. Cefepime 11/30-12/6. ID w/u negative.             12/10-12/11: on cefepime 12/11-12/14. ID w/u negative; CT showed new pleural effusions.   12/26: ID w/u negative.   #Lactobacillus bacteremia:  S/p zosyn (12/14-22) and cefpodoxime (12/22-12/28).      Prophylaxis: ACV (CMV donor and recipient negative), Fluconazole; Pentamidine given 12/30; try to change to Bactrim next month if GI issues are improved.       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  Improving on TMC cream.  Needs stitch removed ~1/5. Requested stitch removal today  - Prophylaxis: PTCytoxan, Sirolimus, MMF; stop MMF 1/3/23  - Sirolimus level in process, pt held as directed      CARDIOVASCULAR  - Vasovagal episode with bradycardia: 11/29 occurred around transplant time, improved without intervention  - Risk of cardiomyopathy:  Baseline EF 60-65%  - Risk of arrhythmia: Baseline EKG showed NSR and normal QTC     GI/NUTRITION:   - Ulcer prophylaxis: Protonix  - Risk of CINV: Ondansetron, Lorazepam and Compazine available prn   - moderate malnutrition-dietician to follow  # Diarrhea, c dif colonized: PO Vanco x14 days (12/2 -12/16). Daily metamucil started 12/8. Repeat C.diff 12/25 negative. Continue metamucil, which is keeping stools bulked.   # Perirectal tenderness, surrounding skin denuded (improving). Topical lidocaine and zinc oxide ointment available.   # Abdominal pain: improving.  # Xeristomia: declines biotene.      RENAL/ELECTROLYTES/  - Risk of renal injury: Off cytoxan flush. Monitor need for IVF.  - Electrolyte management: replace per sliding scale  - Hypokalemia, etiology unclear. Resolved after some oral tablets (no longer taking) and dietary changes      Today's Summary:  [place] level pending  Counts stable, would like  visit early next week as next provider 1/13 bmbx review with Tami (labs ordered for 1/13)  Pt will present Tuesday for labs, possible infusion (she is aware the wait time to prepare blood)     I spent 30 minutes in the care of this patient today, which included time necessary for preparation for the visit, obtaining history, ordering medications/tests/procedures as medically indicated, review of pertinent medical literature, counseling of the patient, communication of recommendations to the care team, and documentation time.    Aleyda Amado PA-C   [phone]

## 2023-01-05 NOTE — PROGRESS NOTES
"I met with Yahaira YUMIKO Fuentes to review her medication list and med box.  Had difficulty filling prescriptions on discharge.     I assessed her med box which was filled correctly.  There were a few tabs that had fallen out of the box in transit and found in bottom of her bag. We talked about appropriate timing of medications specifically pantoprazole at least 30 min prior to a meal.  I taped her med box so meds shouldn't fall out on the trip home.      Current Outpatient Medications   Medication Sig Dispense Refill     acyclovir (ZOVIRAX) 800 MG tablet Take 1 tablet (800 mg) by mouth 2 times daily 60 tablet 0     Ascorbic Acid (RITESH-C PO) Take 1 tablet by mouth daily \"Vitamin C extra with Zinc\"       cholecalciferol 25 MCG (1000 UT) TABS Take 1,000 Units by mouth daily       fluconazole (DIFLUCAN) 200 MG tablet Take 1 tablet (200 mg) by mouth daily 30 tablet 0     multivitamin w/minerals (THERA-VIT-M) tablet Take 1 tablet by mouth daily       pantoprazole (PROTONIX) 40 MG EC tablet Take 1 tablet (40 mg) by mouth 2 times daily (before meals) 60 tablet 0     potassium chloride ER (KLOR-CON M) 20 MEQ CR tablet Take 60 mEq (3 pills) on 1/3/23; 40mEq (2 pills) on 1/4/23; 20 mEq (1 pill) on 1/5/23; clinic will direct further thereafter. 30 tablet 1     psyllium (METAMUCIL/KONSYL) capsule Take 1 capsule by mouth daily for 30 days 30 capsule 0     sirolimus (GENERIC EQUIVALENT) 1 MG tablet Take 3 tablets (3 mg) by mouth daily THIS IS A TEST SCRIPT ONLY, DO NOT FILL. 30 tablet 0     sulfamethoxazole-trimethoprim (BACTRIM DS) 800-160 MG tablet Take 1 tablet by mouth Every Mon, Wed, Fri Morning DO NOT BEGIN TAKING UNTIL INSTRUCTED TO DO SO BY BMT CLINIC ON FOLLOW UP VISIT. Plan to start next month if GI sxms have improved. 16 tablet 0     triamcinolone (KENALOG) 0.1 % external ointment Apply topically 2 times daily as needed (rash) 80 g 0     ursodiol (ACTIGALL) 300 MG capsule Take 1 capsule (300 mg) by mouth 3 times daily 105 " capsule 0     zinc oxide (DESITIN) 20 % external ointment Apply topically every hour as needed for irritation 425 g 0        Pertinent labs considered today:  Lab Results   Component Value Date     01/05/2023                 normal sodium 136-148  Lab Results   Component Value Date    POTASSIUM 3.8 01/05/2023       normal potassium 3.5-5.2   Lab Results   Component Value Date    CHLORIDE 104 01/05/2023           normal chloride    Lab Results   Component Value Date    CO2 23 01/05/2023                normal CO2 20-32  Lab Results   Component Value Date    BUN 8.5 01/05/2023                 normal BUN 5-24  Lab Results   Component Value Date     01/05/2023                 normal glucose   Lab Results   Component Value Date    CR 0.68 01/05/2023                 normal cr 0.8-1.5  Lab Results   Component Value Date    IRA 8.5 01/05/2023               normal calcium  8.5-10.4  Lab Results   Component Value Date    BILITOTAL 0.3 01/05/2023          normal bilirubin 0.2-1.3  Lab Results   Component Value Date    PROTTOTAL 6.2 01/05/2023          normal total protein 6.0-8.2  Lab Results   Component Value Date    ALBUMIN 3.7 01/05/2023             normal albumin 3.2-4.5  Lab Results   Component Value Date    ALKPHOS 64 01/05/2023            normal alkphos   Lab Results   Component Value Date    ALT 18 01/05/2023         normal ALT 0-65  Lab Results   Component Value Date    AST 29 01/05/2023         normal AST 0-37  Lab Results   Component Value Date    HGB 7.4 01/05/2023     Lab Results   Component Value Date    WBC 1.8 01/05/2023      Lab Results   Component Value Date    PLT 32 01/05/2023     Estimated Creatinine Clearance: 92.5 mL/min (based on SCr of 0.68 mg/dL).      Karlos Kessler, EDWARD, PharmD

## 2023-01-05 NOTE — NURSING NOTE
Chief Complaint   Patient presents with     Blood Draw     Labs drawn via cvc by rn in lab. VS taken.     Labs collected from CVC by RN, line flushed with saline and heparin.  Vitals taken. Pt checked in for appointment(s).    Phillip Pride RN

## 2023-01-05 NOTE — NURSING NOTE
Dressing change was completed. Sterile technique was used. Site WDL. Patient tolerated dressing change well and had no questions. See Dock Flowsheet.     Felipe Brennan on 1/5/2023 at 9:14 AM

## 2023-01-05 NOTE — NURSING NOTE
BMT Teaching Flowsheet  Teaching Topic: bone marrow biopsy  Person(s) involved in teaching: Patient  Motivation Level  Asks Questions: Yes  Eager to Learn: Yes  Cooperative: Yes  Receptive (willing/able to accept information): Yes  Patient demonstrates understanding of the following:   - Reason for the appointment, diagnosis and treatment plan: Yes  - Knowledge of proper use of medications and conditions for which they are ordered (with special attention to potential side effects or drug interactions): Yes  - Which situations necessitate calling provider and whom to contact: Yes  Teaching concerns addressed: what to expect during and post procedure including restrictions  Proper use and care of (medical equipment, care aids, etc.) NA  Pain management techniques: Yes  Patient instructed on hand hygiene: NA  How and/when to access community resources: NA  Infection Control:  Patient demonstrates understanding of the following:   Surgical procedure site care taught Yes  Signs and symptoms of infection taught Yes  Wound care taught Yes  Central venous catheter care taught NA  Instructional Materials Used/Given: post procedure instructions    Pt does not \want IV pre meds

## 2023-01-05 NOTE — LETTER
1/5/2023         RE: Yahaira Fuentes  [address] [facility] [address]        Dear Colleague,    Thank you for referring your patient, Yahaira Fuentes, to the [facility] BLOOD AND MARROW TRANSPLANT PROGRAM [place]. Please see a copy of my visit note below.    BMT ONC Adult Bone Marrow Biopsy Procedure Note  January 5, 2023  /81   Pulse 110   Temp 99.4  F (37.4  C)   Resp 16   Wt 94.5 kg (208 lb 6.4 oz)   SpO2 99%   BMI 36.93 kg/m       Learning needs assessment complete within 12 months? YES    DIAGNOSIS: AML     PROCEDURE: Unilateral Bone Marrow Biopsy and Unilateral Aspirate    LOCATION: [facility] [place] 2nd Floor    Patient s identification was positively verified by verbal identification and invasive procedure safety checklist was completed. Informed consent was obtained. Following the administration of no IV meds as pre-medication, patient was placed in the right lateral decubitus position and prepped and draped in a sterile manner. Approximately 15 cc of 1% Lidocaine was used over the right posterior iliac spine. Following this a 3 mm incision was made. Trephine bone marrow core(s) was (were) obtained from the [facility]. Bone marrow aspirates were obtained from the [facility]. Aspirates were sent for morphology, immunophenotyping, cytogenetics, molecular diagnostics RFLP and gene rearrangement. A total of approximately 20 ml of marrow was aspirated. Following this procedure a sterile dressing was applied to the bone marrow biopsy site(s). The patient was placed in the supine position to maintain pressure on the biopsy site. Post-procedure wound care instructions were given.     Complications: NO    Pre-procedural pain: n/a out of 10 on the numeric pain rating scale.     Procedural pain: n/a out of 10 on the numeric pain rating scale.     Post-procedural pain assessment: n/a out of 10 on the numeric pain rating scale.  rating scale.     Interventions: NO    Length of procedure:20 minutes or less       Procedure performed by: Joshua Pierson PA-C and Aleyda Amado PA-C

## 2023-01-05 NOTE — PROGRESS NOTES
BMT/Cell Therapy Daily Progress Note  01/05/2023       Yahaira Fuentes is a 62 year old female with AML, undergoing a 7/8 matched unrelated allo HSCT, day +37.     HPI: Rebeka returns for follow up. After some confusion with meds at discharge she will meet with pharmD today review her box. Rash resolved off creams. No n/v/d. No fevers, cough or cold symptoms. She is anticipating her bmbx today.    Review of Systems                                                                                                                           8 point review with pertinent positive and negatives in HPI  PHYSICAL EXAM                                                                                                                                      Wt Readings from Last 4 Encounters:   01/05/23 94.5 kg (208 lb 5.4 oz)   01/03/23 93.9 kg (207 lb)   01/01/23 94.8 kg (209 lb 1.6 oz)   12/30/22 94.9 kg (209 lb 4.8 oz)     KPS: 80     /81   Pulse 110   Temp 99.4  F (37.4  C)   Resp 16   Wt 94.5 kg (208 lb 5.4 oz)   SpO2 99%   BMI 36.91 kg/m       General: NAD   Eyes: SHANIQUE, sclera anicteric   Mouth: no oral lesions; not dry appearing  Lungs: CTA bilaterally  Cardiovascular: RRR, no M/R/G   Abdominal/Rectal: +BS, soft, NT, ND  Lymphatics: Bilateral thick ankles, nonpitting trace edema (baseline). Varicose veins bilaterally.   Skin: faint maculopapular erythema generally to bilateral arms, nearly gone  Musculoskeletal: Muscle mass average  Additional Findings: Hammer site NT, no drainage.     Current aGVHD staging:  Skin 0 (was ~18% BSA, now resolved), UGI 0, LGI 1, Liver 0 (keep in note through day +180 for allos)        LABS AND IMAGING: I have assessed all abnormal lab values for their clinical significance and any values considered clinically significant have been addressed in the assessment and plan.          Lab Results   Component Value Date    WBC 1.8 (L) 01/05/2023    ANEU 1.6 01/05/2023    HGB 7.4 (L)  01/05/2023    HCT 22.8 (L) 01/05/2023    PLT 32 (LL) 01/05/2023     01/05/2023    POTASSIUM 3.8 01/05/2023    CHLORIDE 104 01/05/2023    CO2 23 01/05/2023     (H) 01/05/2023    BUN 8.5 01/05/2023    CR 0.68 01/05/2023    MAG 1.9 12/27/2022    INR 1.05 12/26/2022    BILITOTAL 0.3 01/05/2023    AST 29 01/05/2023    ALT 18 01/05/2023    ALKPHOS 64 01/05/2023    PROTTOTAL 6.2 (L) 01/05/2023    ALBUMIN 3.7 01/05/2023          SYSTEMS-BASED ASSESSMENT AND PLAN      Yahaira Fuentes is a 62 year old female with AML, undergoing allo HSCT, day +37, admission complicated by ongoing intermittent fevers 11/30/22-12/4/22, repeat fever 12/11 with positive blood culture.      BMT/IEC PROTOCOL for 2015-32  Chemo protocol:               Day -7: start Allopurinol              Day -6: Cytoxan + Fludarabine              Day -5 through -2: Fludarabine              Day -1: TBI x1              Day 0: Transplant              Day +3 and +4: Cytoxan              Day +5: start GCSF, MMF, and Sirolimus  - Restaging plan: per protocol.               *Day 21 bmbx done 12/20: Flow shows 4.5% myeloid blasts, FISH/cytogenetics pending.              *BMBX chimerisms: 95% donor              *Peripheral chimerisms: 96% donor  - BMBX 1/5 per Dr. Dickinson done today     HEME/COAG  #GCSF until ANC >1500 x3 days; missed 1/3/23, but that might be good given upcoming bone marrow biopsy 1/5 (and plts have started to auto recover). WBC up today on its own.  # Chemotherapy induced pancytopenia - leukopenia, anemia, thrombocytopenia  - Transfusion parameters: hemoglobin <7, platelets <10  - Relevant thrombosis or bleeding history: superficial thromboses on OCP decades past. Most recent DVT of right LE (distal veins), 10/19/2022, holding lovenox given thrombocytopenia     ID/Neutropenic fevers  # Neutropenic Fevers:    11/30: likely T-cell activation from graft, improved following PT Cytoxan. Cefepime 11/30-12/6. ID w/u negative.              12/10-12/11: on cefepime 12/11-12/14. ID w/u negative; CT showed new pleural effusions.   12/26: ID w/u negative.   #Lactobacillus bacteremia:  S/p zosyn (12/14-22) and cefpodoxime (12/22-12/28).      Prophylaxis: ACV (CMV donor and recipient negative), Fluconazole; Pentamidine given 12/30; try to change to Bactrim next month if GI issues are improved.       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  Improving on TMC cream.  Needs stitch removed ~1/5. Requested stitch removal today  - Prophylaxis: PTCytoxan, Sirolimus, MMF; stop MMF 1/3/23  - Sirolimus level in process, pt held as directed      CARDIOVASCULAR  - Vasovagal episode with bradycardia: 11/29 occurred around transplant time, improved without intervention  - Risk of cardiomyopathy:  Baseline EF 60-65%  - Risk of arrhythmia: Baseline EKG showed NSR and normal QTC     GI/NUTRITION:   - Ulcer prophylaxis: Protonix  - Risk of CINV: Ondansetron, Lorazepam and Compazine available prn   - moderate malnutrition-dietician to follow  # Diarrhea, c dif colonized: PO Vanco x14 days (12/2 -12/16). Daily metamucil started 12/8. Repeat C.diff 12/25 negative. Continue metamucil, which is keeping stools bulked.   # Perirectal tenderness, surrounding skin denuded (improving). Topical lidocaine and zinc oxide ointment available.   # Abdominal pain: improving.  # Xeristomia: declines biotene.      RENAL/ELECTROLYTES/  - Risk of renal injury: Off cytoxan flush. Monitor need for IVF.  - Electrolyte management: replace per sliding scale  - Hypokalemia, etiology unclear. Resolved after some oral tablets (no longer taking) and dietary changes      Today's Summary:  Siro level pending  Counts stable, would like visit early next week as next provider 1/13 bmbx review with Tami (labs ordered for 1/13)  Pt will present Tuesday for labs, possible infusion (she is aware the wait time to prepare blood)     I spent 30 minutes in the care of this patient today, which included time  necessary for preparation for the visit, obtaining history, ordering medications/tests/procedures as medically indicated, review of pertinent medical literature, counseling of the patient, communication of recommendations to the care team, and documentation time.    Aleyda Amado PA-C   [phone]

## 2023-01-09 NOTE — TELEPHONE ENCOUNTER
Spoke with Rebeka this morning as she called regarding questions on her Pantoprazole and the specific reasoning she is taking this medication, as well as to follow up on appointments that were to be scheduled for this week.  In reviewing the notes, it looks like Rebeka was seen by Aleyda Amado on 1/5/23, and had requested appointments for 1/10/23 follow up (not yet scheduled yet).  Rebeka expressed that she would to be seen on 1/9/23 if possible as she noticed some increased weakness over the weekend, especially in regard to energy level and when walking up stairs.  Hgb on 1/5/23 was 7.4.  This RN requested appointments for 1/9/23 for labs/provider visit and possible infusion,and is waiting to have this scheduled.  This RN also sent an Epic in basket to the providers to learn more specifically why Rebeka needs to be on pantoprazole.  This RN will also notify the triage CARLA with Rebeka's updated symptoms from the weekend.

## 2023-01-10 PROBLEM — Z94.81 STATUS POST BONE MARROW TRANSPLANT (H): Status: ACTIVE | Noted: 2023-01-01

## 2023-01-10 PROBLEM — C92.02 ACUTE MYELOID LEUKEMIA IN RELAPSE (H): Status: ACTIVE | Noted: 2023-01-01

## 2023-01-10 NOTE — LETTER
1/10/2023         RE: Yahaira Fuentes  [address] [facility] [address]        Dear Colleague,    Thank you for referring your patient, Yahaira Fuentes, to the [facility] [place]. Please see a copy of my visit note below.    Infusion Nursing Note:  Yahaira Fuentes presents today for possible infusion.    Patient seen by provider today: Yes: Dr. Yuan   present during visit today: Not Applicable.    Note: Patient received 1U PRBC for Hgb of 6.6. No premedication given.    Intravenous Access:  Hammer.    Treatment Conditions:  Lab Results   Component Value Date    HGB 6.6 (LL) 01/10/2023    WBC 2.8 (L) 01/10/2023    ANEU 1.5 (L) 01/10/2023    ANEUTAUTO 1.0 (L) 12/30/2022    PLT 43 (LL) 01/10/2023        Post Infusion Assessment:  Patient tolerated infusion without incident.  Site patent and intact, free from redness, edema or discomfort.  No evidence of extravasations.     Discharge Plan:   AVS to patient via MYCHART.   Patient discharged in stable condition accompanied by: family.  Departure Mode: Ambulatory.      Patricia Elizondo RN                          Again, thank you for allowing me to participate in the care of your patient.        Sincerely,        BMT Infusion Nurse

## 2023-01-10 NOTE — LETTER
Date:January 12, 2023      Provider requested that no letter be sent. Do not send.       [facility]

## 2023-01-10 NOTE — NURSING NOTE
Chief Complaint   Patient presents with     Blood Draw     Labs drawn via CVC by RN in lab.  VS taken        Labs collected from CVC by RN, line flushed with saline and heparin.  Vitals taken. Pt checked in for appointment(s).    Miriam Mares RN

## 2023-01-10 NOTE — PROGRESS NOTES
Infusion Nursing Note:  Yahaira Fuentes presents today for possible infusion.    Patient seen by provider today: Yes: Dr. Yuan   present during visit today: Not Applicable.    Note: Patient received 1U PRBC for Hgb of 6.6. No premedication given.    Intravenous Access:  Hammer.    Treatment Conditions:  Lab Results   Component Value Date    HGB 6.6 (LL) 01/10/2023    WBC 2.8 (L) 01/10/2023    ANEU 1.5 (L) 01/10/2023    ANEUTAUTO 1.0 (L) 12/30/2022    PLT 43 (LL) 01/10/2023        Post Infusion Assessment:  Patient tolerated infusion without incident.  Site patent and intact, free from redness, edema or discomfort.  No evidence of extravasations.     Discharge Plan:   AVS to patient via MYCHART.   Patient discharged in stable condition accompanied by: family.  Departure Mode: Ambulatory.      Patricia Elizondo RN

## 2023-01-10 NOTE — NURSING NOTE
"Oncology Rooming Note    January 10, 2023 11:05 AM   Yahaira Fuentes is a 62 year old female who presents for:    Chief Complaint   Patient presents with     Blood Draw     Labs drawn via CVC by RN in lab.  VS taken      Oncology Clinic Visit     Return clinic visit dx AML     Initial Vitals: /62   Pulse 101   Temp 99.2  F (37.3  C) (Axillary)   Resp 16   Wt 93.6 kg (206 lb 4.8 oz)   SpO2 100%   BMI 36.55 kg/m   Estimated body mass index is 36.55 kg/m  as calculated from the following:    Height as of 11/22/22: 1.6 m (5' 2.99\").    Weight as of this encounter: 93.6 kg (206 lb 4.8 oz). Body surface area is 2.04 meters squared.  No Pain (0) Comment: Data Unavailable   No LMP recorded. Patient has had a hysterectomy.  Allergies reviewed: Yes  Medications reviewed: Yes    Medications: Medication refills not needed today.  Pharmacy name entered into [employer]:    [id number] [place] [id number] [address] AT [facility] [address]  [facility] [address]    Clinical concerns: Patient reports feeling \"run down\" and SOB. Denies chest pain, N/V, lightheadedness, fever.       Patricia Elizondo RN              "

## 2023-01-10 NOTE — PROGRESS NOTES
BMT / Cell Therapy Progress Note      Yahaira Fuentes is a 62 year old female referred by Dr. Emmanuel Arshad for acute myeloid leukemia.      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 She by PCP, WBC of 60.   8/5/2022 BMB:  - Acute myeloid leukemia with 87% blasts (predominately monoblasts) with mutated NPM1.   - A FLT3 ITD mutation detected , ratio 0.58  - Flow Positive for increased blast population with monocytoid differentiation. 84% of cells are in the monocyte/blast gate (dim to moderate CD45/low to moderate side scatter) and show expression of HLA-DR, partial CD13, CD4, CD64, partial CD14, CD33, CD15, CD11b and possible partial dim MPO.  They are negative for the remaining markers including CD34.    - CG: normal female karyotype      - AML Panel by NGS   TIER 1: Variants of Known Clinical Significance    1. FLT3 c.1738_1794dup, p.Aui350_Yvl090wbm ([id number])   VAF: Not Reported   2. NPM1 c.860_863dup, p.Ecc166ht (NM_[id number])   VAF: 35.2%   3. DNMT3A c.2645G>A, p.Hcm226Qxg ([id number])   VAF: 39.7%   TIER 2: Variants of Unknown Clinical Significance     1. KIT c.200C>G, p.Ajm94Aoh (NM_[id number])   VAF: 47.5%   Given that the variant frequency is close to 50%, it is unclear whether this is a germline or somatic variant. The clinical significance, if any, is uncertain.   - Peripheral blood, morphology: Acute myeloid leukemia with 77% blasts. WBC 80833, hgb 11.3, plt 96770     9/6/2022 BMB:  Bone marrow aspirate, clot section, and trephine core biopsy:    Normocellular marrow with trilinear hematopoiesis    1% blasts    FLOW CYTOMETRY:Not performed    CYTOGENETICS/ MOLECULAR/ FISH: Not  "submitted     Peripheral blood, morphology: WBC 2.1, hb 8.4, plt [id number]    Negative for circulating blasts/blast equivalents    Date Treatment Name Response Side Effects / Toxicities   8/8/2022 Cytarabine and idarubicin (\"7+3\") with midostaurin therapy   Midostaurin: 8/15-8/28 9/6/2022 morphologic and immunophenotypic CR -Neutropenic fever cx neg completed cefepime 9/1  -ROHIT-Max creat 1.27.   11/29/22 SLAVA allo HSCT            HPI:  Please see my entry above for hematologic history.  Ms. Fuentes is seen today for follow up of her restaging studies done 1/5.  These unfortunately showed early relapse of AML with 25% blasts.  I spoke with her by phone yesterday to review this and we discussed further today.  She is understandably distressed by this new but denies any acute health concerns today and would like to discuss next therapy steps.      ASSESSMENT AND PLAN:  We reviewed the results of her bone marrow biopsy in detail today.  This shows recurrent acute myeloid leukemia with 25% abnormal myeloid blasts by flow.  FISH/cytogenetics are still pending but Ms. Fuentes previously did have a FLT3-ITD mutation as noted above.    We discussed next treatment options in detail.  I would favor starting azacitidine/venetoclax with plan for eventual DLI.  I did discuss with Ms. Fuentes and her daughter that the overall success rate of DLI is approximately 20%.  She understands that in general the prognosis in this scenario is not good but would like to pursue further treatment if able.  We reviewed the risks of DLI, particularly GVHD.  As she is still very early after transplant she will need to continue immunosuppression with sirolimus until D+60.  I recommend stopping at that point without a taper in order to enable graft versus leukemia effect.  This will increase her risk of GVH which we discussed today but in the setting of relapsed leukemia I would consider this a necessary risk.    We had a ron discussion regarding " "the poor prognosis of early relapse and the relatively low rate of success with DLI in this context.  She does not qualify for any of our current clinical trials and will not in the near future.  As she does need to start therapy soon I recommend starting therapy as above as soon as possible.  She and her daughter were in agreement with this.  They would like to resume care with their primary oncologist, Dr. Arshad.  I spoke with him by phone today to discuss next steps.  He will arrange venetoclax/azacitidine and set up appointments for Ms. Fuentes; I asked to repeat a bone marrow biopsy after 2 cycles to assess response and hopefully proceed with DLI at that time.    Ms. Fuentes should keep her currently scheduled follow up with us for labs and possible transfusions until she has set appointments with Dr. Arshad.      Plan:   - Recommend starting venetoclax/azacitidine  - Follow up pending FISH/cytogenetics  - Continue sirolimus until D+60, then stop without taper  - Please repeat a bone marrow biopsy after 2 cycles  - We will arrange DLI and tentatively plan to do this after 2 cycles  - Hold bactrim, start pentamidine    I spent 40 minutes in the care of this patient today, which included time necessary for preparation for the visit, obtaining history, ordering medications/tests/procedures as medically indicated, review of pertinent medical literature, counseling of the patient, communication of recommendations to the care team, and documentation time.    Bernard Yuan MD    ROS:    10 point ROS neg other than the symptoms noted above in the HPI.        Current Outpatient Medications   Medication Sig Dispense Refill     acyclovir (ZOVIRAX) 800 MG tablet Take 1 tablet (800 mg) by mouth 2 times daily 60 tablet 0     Ascorbic Acid (RITESH-C PO) Take 1 tablet by mouth daily \"Vitamin C extra with Zinc\"       cholecalciferol 25 MCG (1000 UT) TABS Take 1,000 Units by mouth daily       fluconazole (DIFLUCAN) 200 MG " tablet Take 1 tablet (200 mg) by mouth daily 30 tablet 0     multivitamin w/minerals (THERA-VIT-M) tablet Take 1 tablet by mouth daily       pantoprazole (PROTONIX) 40 MG EC tablet Take 1 tablet (40 mg) by mouth 2 times daily (before meals) 60 tablet 0     potassium chloride ER (KLOR-CON M) 20 MEQ CR tablet Take 60 mEq (3 pills) on 1/3/23; 40mEq (2 pills) on 1/4/23; 20 mEq (1 pill) on 1/5/23; clinic will direct further thereafter. 30 tablet 1     psyllium (METAMUCIL/KONSYL) capsule Take 1 capsule by mouth daily for 30 days 30 capsule 0     sirolimus (GENERIC EQUIVALENT) 1 MG tablet Take 3 tablets (3 mg) by mouth daily THIS IS A TEST SCRIPT ONLY, DO NOT FILL. 30 tablet 0     sulfamethoxazole-trimethoprim (BACTRIM DS) 800-160 MG tablet Take 1 tablet by mouth Every Mon, Wed, Fri Morning DO NOT BEGIN TAKING UNTIL INSTRUCTED TO DO SO BY BMT CLINIC ON FOLLOW UP VISIT. Plan to start next month if GI sxms have improved. 16 tablet 0     triamcinolone (KENALOG) 0.1 % external ointment Apply topically 2 times daily as needed (rash) 80 g 0     ursodiol (ACTIGALL) 300 MG capsule Take 1 capsule (300 mg) by mouth 3 times daily 105 capsule 0     zinc oxide (DESITIN) 20 % external ointment Apply topically every hour as needed for irritation 425 g 0         Physical Exam:     Vital Signs: /62   Pulse 101   Temp 99.2  F (37.3  C) (Axillary)   Resp 16   Wt 93.6 kg (206 lb 4.8 oz)   SpO2 100%   BMI 36.55 kg/m      KPS:  80  General Appearance: sitting up in chair, alert  Eyes: PERRL, conjunctiva and lids normal, sclera nonicteric  Cardio/Vascular: extremities WWP  Resp Effort And Auscultation: Breathing comfortably on room air  Musculoskeletal: Musculoskeletal normal  Edema: none  Skin: Skin color, texture, turgor normal. No rashes or lesions in visualized areas.  Psych/Affect: Mood and affect are appropriate.    Blood Counts     Recent Labs   Lab Test 01/10/23  0742 01/05/23  0642 01/03/23  0912 01/01/23  0840  12/30/22  0740 12/28/22  0758 12/27/22  0506 12/26/22  0304   HGB 6.6* 7.4* 7.7*   < > 7.7*   < > 7.9* 8.0*   HCT 20.3* 22.8* 23.5*   < > 23.8*   < > 24.5* 24.2*   WBC 2.8* 1.8* 1.6*   < > 1.3*   < > 3.1* 2.4*   ANEUTAUTO  --   --   --   --  1.0*  --  2.7 2.2   ALYMPAUTO  --   --   --   --  0.1*  --  0.0* 0.0*   AMONOAUTO  --   --   --   --  0.2  --  0.3 0.2   AEOSAUTO  --   --   --   --  0.0  --  0.0 0.0   ABSBASO  --   --   --   --  0.0  --  0.0 0.0   NRBCMAN  --   --   --   --  0.0  --  0.0 0.0   PLT 43* 32* 30*   < > 15*   < > 15* 22*    < > = values in this interval not displayed.       ABO/RH    Recent Labs   Lab Test 01/10/23  0742   ABORH AB POS         Chemistries     Basic Panel  Recent Labs   Lab Test 01/10/23  0742 01/05/23  0642 01/03/23  0912    138 142   POTASSIUM 3.4 3.8 2.7*   CHLORIDE 105 104 109*   CO2 25 23 23   BUN 8.0 8.5 8.1   CR 0.72 0.68 0.53   * 153* 155*        Calcium, Magnesium, Phosphorus  Recent Labs   Lab Test 01/10/23  0742 01/05/23  0642 01/03/23  0912 12/28/22  0758 12/27/22  0506 12/26/22  0304 12/25/22  0158   IRA 8.3* 8.5* 7.2*   < > 8.6* 8.9 8.4*   MAG  --   --   --   --  1.9 2.0 1.9   PHOS  --   --   --   --  3.1 3.3 3.0    < > = values in this interval not displayed.        LFTs  Recent Labs   Lab Test 01/05/23  0642 12/26/22  0304 12/22/22  0429   BILITOTAL 0.3 0.2 0.2   ALKPHOS 64 74 65   AST 29 19 15   ALT 18 10 6*   ALBUMIN 3.7 3.6 3.7       Infectious Disease Markers     [facility][note 3] IDM    Recent Labs   Lab Test 11/03/22  1030   TCRUZI Non-Reactive         Hepatitis and HIV    Recent Labs   Lab Test 11/03/22  1030   HEPBANG Nonreactive   HBCAB Nonreactive   AUSAB 2.15   HCVAB Nonreactive   HIAGAB Nonreactive         CMV  Recent Labs   Lab Test 11/03/22  1030   CMVIGG No detectable antibody.         EBV    Recent Labs   Lab Test 11/03/22  1030   EBVCAG Positive*       Bone Marrow Biopsy       Morphology    Results for orders placed or performed in  visit on 01/05/23 (from the past 8760 hour(s))   Bone marrow biopsy   Result Value    Final Diagnosis      Bone marrow, posterior iliac crest, right decalcified trephine biopsy and touch imprint; direct aspirate smear, and concentrated aspirate smear; and peripheral blood smear:      -Persistent/recurrent acute myeloid leukemia involving approximately 40% of moderately hypercellular bone marrow [80% cellular] with marked dysgranulopoiesis and 26.8% blasts by differential count.  - Peripheral blood showing marked pancytopenia with very rare circulating blasts(<1%).  - See comment      Comment      Flow cytometry analysis on concurrent specimen ([id number]) showed increased abnormal myeloid blasts [25%]. Overall, the features of this marrow are consistent with relapsed/recurrent acute myeloid leukemia.    Concurrent ancillary studies are in progress and will be reported separately. Correlation with the results of ancillary tests and clinical findings is recommended.      Clinical Information      Per [facility] records, the patient is a 62-year-old woman with history of acute myeloid leukemia with monocytic differentiation, mutated NPM1 and FLT3 ITD mutation, who is now Day 37 s/p allo-SCT. She has required transfusion support including multiple units of pRBCs (most recent 12/24/22) and platelets (most recent 12/25/22) as well as G-SCF therapy (12/27/22). This is a routine follow up biopsy.      Peripheral Hematologic Data      CBC WITH MANUAL DIFFERENTIAL (01/05/2023 07:03 AM CST):     RESULT VALUE REF. RANGE UNITS    WBC Count    Hemoglobin    Hematocrit    Platelet Count    RBC Count   MCV  MCH  MCHC  RDW  1.8  ( L )     7.4  ( L )      22.8  ( L )   32  ( LL )   2.54  ( L )       90 (NORMAL)     29.1 (NORMAL)     32.5 (NORMAL)     14.6 (NORMAL) 4.0-11.0  11.7-15.7  35.0-47.0  150-450  3.80-5.20    26.5-33.0  31.5-36.5  10.0-15.0 10e3/uL  g/dL  %  10e3/uL  10e6/uL  fL  pg  g/dL  %   % Neutrophils  % Lymphocytes  %  Monocytes  % Eosinophils  % Basophils  % Metamyelocytes  % Myelocytes  % Promyelocytes  % Blasts  % Plasma Cells  % Other Cells  Absolute Neutrophils   Absolute Lymphocytes   Absolute Monocytes  Absolute Eosinophils  Absolute Basophils  Absolute Metamyelocytes  Absolute Myelocytes  Absolute Promyelocytes  Absolute Blasts  Absolute Plasma Cells  Absolute Other Cells  NRBCs per 100 WBC  Absolute NRBCs 87  5  2  6  0               1.6 (NORMAL)      0.1  ( L )     0.0 (NORMAL)     0.1 (NORMAL)     0.0 (NORMAL)   ()   ()   ()       ()   ()   ()   ()      () N/A  N/A  N/A  N/A  N/A  N/A  N/A  N/A  N/A  N/A  N/A  1.6-8.3  0.8-5.3  0.0-1.3  0.0-0.7  0.0-0.2  <=0.0  <=0.0  <=0.0  <=0.0  <=0.0  <=0.0  <=0  <=0.0 %  %  %  %  %  %  %  %  %  %  %  10e3/uL  10e3/uL  10e3/uL  10e3/uL  10e3/uL  10e3/uL  10e3/uL  10e3/uL  10e3/uL  10e3/uL  10e3/uL  %  10e3/uL         Microscopic Description      PERIPHERAL BLOOD SMEAR MORPHOLOGY:  The red blood cells appear normochromic. Poikilocytosis  includes occasional ovalocytes and rare dacrocytes . Polychromasia is present, but not increased. Rouleaux formation is not increased. The morphology of the platelets is predominantly normal, though a subset of hypogranular forms are appreciated.    Lymphocyte morphology is polymorphous. Neutrophils displays predominantly normal cytoplasmic granularity and unremarkable nuclear morphology; rare cells show pseudo-Pelger-Huët nuclear morphology.  Rare circulating blasts are seen.    Bone marrow aspirates and bone marrow trephine core biopsy touch imprints are reviewed.    BONE MARROW DIFFERENTIAL (500 cells on bone marrow biopsy touch imprints )  Percent Cell (reference range)  26.8    Blasts (0 - 1)  0.4      Neutrophil promyelocytes (2 - 4)  37.0    Neutrophils and precursors (54 - 63)  23.6    Erythroid precursors (18 - 24)  1.2      Monocytes (1 - 1.5)  3.8      Eosinophils (1 - 3)  0.2      Basophils (0 - 1)  7.0      Lymphocytes (8 - 12)  0.0       Plasma cells (0 - 1.5)    The aspirate smears are hemodilute, therefore the above differential is performed on the bone marrow trephine core biopsy touch imprints..    Numerous intermediate to large sized blasts increased NC ratios, ovoid to indented nuclei, fine chromatin, one or more prominent nucleoli, and a moderate amount of basophilic cytoplasm are present; a subset contain a few cytoplasmic vacuoles.    Granulocytes are decreased in number, but with progressive and complete maturation; no overt dysplasia seen though a small subset show pseudo-Pelger-Huët nuclear morphology.  Erythrocytes show progressive and complete maturation; no overt dysplasia seen. Megakaryocytes include a subset of small hypolobated forms and forms with widely  nuclear lobes.     TREPHINE SECTIONS:  Hematoxylin and eosin stains are reviewed. The quality of the trephine core biopsy is good, though slight crush artifact is present.  The marrow cellularity is estimated at 80%. The cellular composition reflects the the marrow core biopsy differential. Megakaryocytes are present within the cellular areas; frequent clusters and a few large aggregates are present.  There is increased population of immature appearing cells that forms clusters and large sheets and comprise approximately 40% of bone marrow cellularity    IMMUNOHISTOCHEMISTRY:  Immunohistochemical stains are performed on the paraffin-embedded trephine core with appropriate controls.    CD34 highlights a subset of immature appearing cells [approximately 5%] as well as apparently stains occasional megakaryocytes.       highlights clusters and aggregates of increased blasts comprising approximately 40 to 50% of overall cellularity.    CD61 highlights numerous clusters and large aggregates of megakaryocytes with spectrum of sizes.  A subset of blasts also appears to stain with CD61.    Note: These immunohistochemical stains are deemed medically necessary. Some of the  antigens may also be evaluated by flow cytometry. Concurrent evaluation by immunohistochemistry on clot and/or trephine sections is indicated in this case in order to correlate immunophenotype with cell morphology and determine extent of involvement, spatial pattern, and focality of potential disease distribution.    A resident/fellow in an ACGME accredited training program was involved in the initial review, preparation, and/or interpretation of this case.  I, as the senior physician, attest that I: (i) reviewed patient clinical records if indicated; (ii) reviewed relevant lab test results; (iii) examined the relevant preparation(s) for the specimen(s); and (iv) agree with the report, diagnosis(es), and interpretation as documented by the resident/fellow and edited/confirmed by me. Reporting resident/fellow: Roselia Klein DO, MLS([facility])      Gross Description      Procedure/Gross Description   Aspirate(s) and trephine(s) procured by MELO Andujar    Specimen sent for Special Studies:         Flow Cytometry: right aspirate        Cytogenetics: right aspirate        Molecular Diagnostics: right aspirate          Biopsy aspiration site: right posterior iliac crest                                                      (Reference Range)          Amount of aspirate           4.3   mL        Fat and P.V. cell layer        0    %               (1 - 3)        Particles                             0   %        Myeloid-erythroid layer    trace    %               (5 - 8)          Clot Section: no    Trephine biopsy site: right posterior iliac crest    Designated right posterior iliac crest is 1 cylinder of gritty tissue, labeled with the patient's name and hospital number, obtained with 11 gauge needle and a length of 11 mm; entirely submitted in 1 cassette; acetic zinc formalin fixed, decalcified, processed, and stained for hematoxylin and eosin per laboratory protocol.        MCRS Yes (A)    Performing Labs      The technical  component of this testing was completed at [facility]         CSF Studies       Recent Labs   Lab Test 11/04/22  1501   CCOL Colorless   CAPP Clear   CWBC 0   CRBC 0   CCOM Essentially acellular specimen. Negative for blasts. Please correlate with flow cytometry case [id number] and cytology case [id number].   Fabio Cavazos MD on 11/7/2022 at 9:57 AM   Reviewed by JUVENTINO CORTEZ, Hematopathology Fellow       Recent Labs   Lab Test 11/04/22  1501   CGLU 65       Recent Labs   Lab Test 11/04/22  1501   CTP 21.8       Yahaira understood the above assessment and recommendations.  Multiple questions answered.  No barriers to learning identified.      Known issues that I take into account for medical decisions, with salient changes to the plan considering these complexities noted above.    Patient Active Problem List   Diagnosis     Acute myeloid leukemia in remission (H)       ------------------------------------------------------------------------------------------------------------------------------------------------    Patient Care Team       Relationship Specialty Notifications Start End    Lyndon Ross PCP - General   9/28/11     Phone: [phone] Fax: [phone] [address] [facility] [address]    Charlie Worrell MD MD Ophthalmology  2/14/20     Phone: [phone] Fax: [phone] [address] [facility] [address]    Lorin Monsivais APRN CNP Nurse Practitioner Radiation Oncology Admissions 10/17/22     Phone: [phone] Fax: [phone] [address] [facility] [address]    Paola Arellano MD Assigned Cancer Care Provider   11/12/22     Phone: [phone] Fax: [phone]         [address] [place] [address]    Danitza Bowles MSW  BMT - Adult Admissions 11/14/22     Phone: [phone]         Chapincito Car, RN BMT Nurse Coordinator   12/1/22      Meggan Falcon MD BMT Physician Transplant  12/9/22     Phone: [phone] Fax: [phone]         [address] [place] [address]

## 2023-01-13 NOTE — LETTER
1/13/2023         RE: Yahaira Fuentes  [address] [facility] [address]        Dear Colleague,    Thank you for referring your patient, Yahaira Fuentes, to the [facility] [place]. Please see a copy of my visit note below.         BMT / Cell Therapy Progress Note      Yahaira Fuentes is a 62 year old female referred by Dr. Emmanuel Arshad for acute myeloid leukemia.  She now has relapsed disease/  Awaiting transition to  for René/Aza.    She is ok, well, no infections, fevers or bleeding. No worse rash or GI sy's, no active GVHD now.     Better after PSBC on 1/10.    O/E:  /75 (BP Location: Right arm, Patient Position: Sitting, Cuff Size: Adult Large)   Pulse 101   Temp 98.3  F (36.8  C) (Oral)   Resp 16   Wt 94.2 kg (207 lb 11.2 oz)   SpO2 93%   BMI 36.80 kg/m    Physical Exam:    Patient is ambulating , AOx3, NAD, well appearing patient.   HEENT: No mucositis, MM moist, no thrush or ulcers, buccal mucosa intact  Neck supple, no peripheral adenopathy Thyroid gland midline, not enlarged   Lungs: good air exchange, CTA, no crackles,no wheezing.   Heart RRR S1 S2, no murmur or gallop   Abd soft, NT, ND. Without hepato-splenomegaly, no ascites,    LE symmetrical, no edema   Skin without lesion or rashes. No petechiae or ecchymosis.  Neuro  Intact, AOx3    Lab Results   Component Value Date    WBC 6.3 01/13/2023    ANEU 1.5 (L) 01/10/2023    HGB 7.7 (L) 01/13/2023    HCT 24.4 (L) 01/13/2023    PLT 58 (L) 01/13/2023     01/13/2023    POTASSIUM 3.5 01/13/2023    CHLORIDE 103 01/13/2023    CO2 27 01/13/2023     (H) 01/13/2023    BUN 9.4 01/13/2023    CR 0.63 01/13/2023    MAG 1.9 12/27/2022    INR 1.05 12/26/2022    AST 37 (H) 01/13/2023    ALT 18 01/13/2023     Plan:  AML relapsed - counts are stable, no transfusions today.  Plan to repeat CBC on Monday at [place] and transfuse for plts <10K and Hgb <7g/dl.    She will see  on  moday.  TOday we changed her dressing and removed her stitches.    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 She by PCP, WBC of 60.   8/5/2022 BMB:  - Acute myeloid leukemia with 87% blasts (predominately monoblasts) with mutated NPM1.   - A FLT3 ITD mutation detected , ratio 0.58  - Flow Positive for increased blast population with monocytoid differentiation. 84% of cells are in the monocyte/blast gate (dim to moderate CD45/low to moderate side scatter) and show expression of HLA-DR, partial CD13, CD4, CD64, partial CD14, CD33, CD15, CD11b and possible partial dim MPO.  They are negative for the remaining markers including CD34.    - CG: normal female karyotype      - AML Panel by NGS   TIER 1: Variants of Known Clinical Significance    1. FLT3 c.1738_1794dup, p.[id number] ([id number])   VAF: Not Reported   2. NPM1 c.860_863dup, p.Xxs308mp (NM_[id number])   VAF: 35.2%   3. DNMT3A c.2645G>A, p.Yeq059Grs ([id number])   VAF: 39.7%   TIER 2: Variants of Unknown Clinical Significance     1. KIT c.200C>G, p.Sun76Scc (NM_[id number])   VAF: 47.5%   Given that the variant frequency is close to 50%, it is unclear whether this is a germline or somatic variant. The clinical significance, if any, is uncertain.   - Peripheral blood, morphology: Acute myeloid leukemia with 77% blasts. WBC 23054, hgb 11.3, plt [id number]     9/6/2022 BMB:  Bone marrow aspirate, clot section, and trephine core biopsy:    Normocellular marrow with trilinear hematopoiesis    1% blasts    FLOW CYTOMETRY:Not performed    CYTOGENETICS/ MOLECULAR/ FISH: Not submitted     Peripheral blood, morphology: WBC  "2.1, hb 8.4, plt [id number]    Negative for circulating blasts/blast equivalents    Date Treatment Name Response Side Effects / Toxicities   8/8/2022 Cytarabine and idarubicin (\"7+3\") with midostaurin therapy   Midostaurin: 8/15-8/28 9/6/2022 morphologic and immunophenotypic CR -Neutropenic fever cx neg completed cefepime 9/1  -ROHIT-Max creat 1.27.   11/29/22 SLAVA allo HSCT          I spent 30 minutes in the care of this patient today, which included time necessary for preparation for the visit, obtaining history, ordering medications/tests/procedures as medically indicated, review of pertinent medical literature, counseling of the patient, communication of recommendations to the care team, and documentation time.    Luann Garrett MD    "

## 2023-01-13 NOTE — NURSING NOTE
"Oncology Rooming Note    January 13, 2023 2:03 PM   Yahaira Fuentes is a 62 year old female who presents for:    Chief Complaint   Patient presents with     Blood Draw     Labs drawn from cvc by rn.   Port flushed by rn.  VS taken.     Port Flush     Oncology Clinic Visit     AML     Initial Vitals: /75 (BP Location: Right arm, Patient Position: Sitting, Cuff Size: Adult Large)   Pulse 101   Temp 98.3  F (36.8  C) (Oral)   Resp 16   Wt 94.2 kg (207 lb 11.2 oz)   SpO2 93%   BMI 36.80 kg/m   Estimated body mass index is 36.8 kg/m  as calculated from the following:    Height as of 11/22/22: 1.6 m (5' 2.99\").    Weight as of this encounter: 94.2 kg (207 lb 11.2 oz). Body surface area is 2.05 meters squared.  No Pain (0) Comment: Data Unavailable   No LMP recorded. Patient has had a hysterectomy.  Allergies reviewed: Yes  Medications reviewed: Yes    Medications: Medication refills not needed today.  Pharmacy name entered into [employer]:    [id number] - [place], [place] - [address] [facility] [address]  [address]    Clinical concerns: none       Yanely Claudio            "

## 2023-01-13 NOTE — PROGRESS NOTES
BMT / Cell Therapy Progress Note      Yahaira Fuentes is a 62 year old female referred by Dr. Emmanuel Arshad for acute myeloid leukemia.  She now has relapsed disease/  Awaiting transition to  for René/Aza.    She is ok, well, no infections, fevers or bleeding. No worse rash or GI sy's, no active GVHD now.     Better after PSBC on 1/10.    O/E:  /75 (BP Location: Right arm, Patient Position: Sitting, Cuff Size: Adult Large)   Pulse 101   Temp 98.3  F (36.8  C) (Oral)   Resp 16   Wt 94.2 kg (207 lb 11.2 oz)   SpO2 93%   BMI 36.80 kg/m    Physical Exam:    Patient is ambulating , AOx3, NAD, well appearing patient.   HEENT: No mucositis, MM moist, no thrush or ulcers, buccal mucosa intact  Neck supple, no peripheral adenopathy Thyroid gland midline, not enlarged   Lungs: good air exchange, CTA, no crackles,no wheezing.   Heart RRR S1 S2, no murmur or gallop   Abd soft, NT, ND. Without hepato-splenomegaly, no ascites,    LE symmetrical, no edema   Skin without lesion or rashes. No petechiae or ecchymosis.  Neuro  Intact, AOx3    Lab Results   Component Value Date    WBC 6.3 01/13/2023    ANEU 1.5 (L) 01/10/2023    HGB 7.7 (L) 01/13/2023    HCT 24.4 (L) 01/13/2023    PLT 58 (L) 01/13/2023     01/13/2023    POTASSIUM 3.5 01/13/2023    CHLORIDE 103 01/13/2023    CO2 27 01/13/2023     (H) 01/13/2023    BUN 9.4 01/13/2023    CR 0.63 01/13/2023    MAG 1.9 12/27/2022    INR 1.05 12/26/2022    AST 37 (H) 01/13/2023    ALT 18 01/13/2023     Plan:  AML relapsed - counts are stable, no transfusions today.  Plan to repeat CBC on Monday at [place] and transfuse for plts <10K and Hgb <7g/dl.    She will see  on moday.  TOday we changed her dressing and removed her stitches.    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 She by PCP, WBC of 60.   8/5/2022 BMB:  - Acute myeloid leukemia with 87% blasts (predominately monoblasts) with mutated NPM1.   - A FLT3 ITD mutation detected , ratio 0.58  - Flow Positive for increased blast population with monocytoid differentiation. 84% of cells are in the monocyte/blast gate (dim to moderate CD45/low to moderate side scatter) and show expression of HLA-DR, partial CD13, CD4, CD64, partial CD14, CD33, CD15, CD11b and possible partial dim MPO.  They are negative for the remaining markers including CD34.    - CG: normal female karyotype      - AML Panel by NGS   TIER 1: Variants of Known Clinical Significance    1. FLT3 c.1738_1794dup, p.[id number] ([id number])   VAF: Not Reported   2. NPM1 c.860_863dup, p.Yui934hw (NM_[id number])   VAF: 35.2%   3. DNMT3A c.2645G>A, p.Uvh572Bef ([id number])   VAF: 39.7%   TIER 2: Variants of Unknown Clinical Significance     1. KIT c.200C>G, p.Mbt80Cbj (NM_[id number])   VAF: 47.5%   Given that the variant frequency is close to 50%, it is unclear whether this is a germline or somatic variant. The clinical significance, if any, is uncertain.   - Peripheral blood, morphology: Acute myeloid leukemia with 77% blasts. WBC 94202, hgb 11.3, plt 66136     9/6/2022 BMB:  Bone marrow aspirate, clot section, and trephine core biopsy:    Normocellular marrow with trilinear hematopoiesis    1% blasts    FLOW CYTOMETRY:Not performed    CYTOGENETICS/ MOLECULAR/ FISH: Not submitted     Peripheral blood, morphology: WBC 2.1, hb 8.4, plt [id number]    Negative for circulating blasts/blast equivalents    Date Treatment Name Response Side Effects / Toxicities   8/8/2022 Cytarabine and idarubicin (\"7+3\") with midostaurin therapy   Midostaurin: 8/15-8/28 9/6/2022 morphologic and immunophenotypic CR " -Neutropenic fever cx neg completed cefepime 9/1  -ROHIT-Max creat 1.27.   11/29/22 SLAVA allo HSCT          I spent 30 minutes in the care of this patient today, which included time necessary for preparation for the visit, obtaining history, ordering medications/tests/procedures as medically indicated, review of pertinent medical literature, counseling of the patient, communication of recommendations to the care team, and documentation time.    Luann Garrett MD

## 2023-01-13 NOTE — NURSING NOTE
Chief Complaint   Patient presents with     Blood Draw     Labs drawn from cvc by rn.   Port flushed by rn.  VS taken.     Port Flush     Labs drawn from CVC by rn.  Good blood return noted in both lumens.  Both lumens flushed with NS and heparin.     Good blood return noted from port.  Port flushed with NS and heparin then de-accessed.  Pt tolerated well.  VS taken.  Pt checked in for next appt.    Tamera Meredith RN

## 2023-01-17 NOTE — NURSING NOTE
I removed pt. Skin bx sutures. Site was cleaned with iodine. Sutures were removed with no concerns. Site cleaned and bandage with bacitracin ointment applied.     Carmina Price, A

## 2023-02-25 NOTE — TELEPHONE ENCOUNTER
Received call from Ms. Fuentes who has a history of FLT3 ITD positive AML s/p 7+3 and SLAVA allo HSCT (11/29/22). She called to report mild, scattered rashes with some itching on her b/l forearms. She notes that these feel like hives that she has had before, she has at home some PRN benadryl cream. Denies any systemic symptoms, including no fever, chills, N/V, SOB, chest tightness, HA, diarrhea. Spoke with Dr. Dong with BMT, recommended using PRN benadryl cream or over the counter hydrocortisone cream. She already has an appt in Tuesday 2/28 and can follow-up with an in-person exam.

## (undated) RX ORDER — HEPARIN SODIUM (PORCINE) LOCK FLUSH IV SOLN 100 UNIT/ML 100 UNIT/ML
SOLUTION INTRAVENOUS
Status: DISPENSED
Start: 2022-01-01

## (undated) RX ORDER — LIDOCAINE HYDROCHLORIDE 10 MG/ML
INJECTION, SOLUTION EPIDURAL; INFILTRATION; INTRACAUDAL; PERINEURAL
Status: DISPENSED
Start: 2022-01-01

## (undated) RX ORDER — PENTAMIDINE ISETHIONATE 300 MG/300MG
INHALANT RESPIRATORY (INHALATION)
Status: DISPENSED
Start: 2022-01-01

## (undated) RX ORDER — ALBUTEROL SULFATE 0.83 MG/ML
SOLUTION RESPIRATORY (INHALATION)
Status: DISPENSED
Start: 2022-01-01

## (undated) RX ORDER — CEFAZOLIN SODIUM 2 G/100ML
INJECTION, SOLUTION INTRAVENOUS
Status: DISPENSED
Start: 2022-01-01

## (undated) RX ORDER — SODIUM CHLORIDE 9 MG/ML
INJECTION, SOLUTION INTRAVENOUS
Status: DISPENSED
Start: 2022-01-01

## (undated) RX ORDER — FENTANYL CITRATE 50 UG/ML
INJECTION, SOLUTION INTRAMUSCULAR; INTRAVENOUS
Status: DISPENSED
Start: 2022-01-01